# Patient Record
Sex: FEMALE | Race: WHITE | NOT HISPANIC OR LATINO | Employment: UNEMPLOYED | URBAN - METROPOLITAN AREA
[De-identification: names, ages, dates, MRNs, and addresses within clinical notes are randomized per-mention and may not be internally consistent; named-entity substitution may affect disease eponyms.]

---

## 2020-12-17 ENCOUNTER — MEDICAL CORRESPONDENCE (OUTPATIENT)
Dept: HEALTH INFORMATION MANAGEMENT | Facility: CLINIC | Age: 40
End: 2020-12-17

## 2020-12-17 ENCOUNTER — TRANSFERRED RECORDS (OUTPATIENT)
Dept: HEALTH INFORMATION MANAGEMENT | Facility: CLINIC | Age: 40
End: 2020-12-17

## 2020-12-23 DIAGNOSIS — Z31.69 ENCOUNTER FOR PRECONCEPTION CONSULTATION: Primary | ICD-10-CM

## 2021-03-18 ENCOUNTER — TELEPHONE (OUTPATIENT)
Dept: MATERNAL FETAL MEDICINE | Facility: CLINIC | Age: 41
End: 2021-03-18

## 2021-03-18 NOTE — TELEPHONE ENCOUNTER
Pt called to cancel her preconception appointment. Pt declined rescheduling appointment. Orders are being removed.    Jacki Muhammad

## 2023-11-02 ENCOUNTER — TELEPHONE (OUTPATIENT)
Dept: SURGERY | Facility: CLINIC | Age: 43
End: 2023-11-02

## 2023-11-02 ENCOUNTER — OFFICE VISIT (OUTPATIENT)
Dept: SURGERY | Facility: CLINIC | Age: 43
End: 2023-11-02
Payer: COMMERCIAL

## 2023-11-02 VITALS — WEIGHT: 170.3 LBS | HEIGHT: 66 IN | BODY MASS INDEX: 27.37 KG/M2

## 2023-11-02 DIAGNOSIS — C50.811 MALIGNANT NEOPLASM OF OVERLAPPING SITES OF BOTH BREASTS IN FEMALE, ESTROGEN RECEPTOR POSITIVE (H): Primary | ICD-10-CM

## 2023-11-02 DIAGNOSIS — C50.812 MALIGNANT NEOPLASM OF OVERLAPPING SITES OF BOTH BREASTS IN FEMALE, ESTROGEN RECEPTOR POSITIVE (H): Primary | ICD-10-CM

## 2023-11-02 DIAGNOSIS — Z17.0 MALIGNANT NEOPLASM OF OVERLAPPING SITES OF BOTH BREASTS IN FEMALE, ESTROGEN RECEPTOR POSITIVE (H): Primary | ICD-10-CM

## 2023-11-02 PROCEDURE — 99205 OFFICE O/P NEW HI 60 MIN: CPT | Performed by: SURGERY

## 2023-11-02 PROCEDURE — 99417 PROLNG OP E/M EACH 15 MIN: CPT | Performed by: SURGERY

## 2023-11-02 NOTE — NURSING NOTE
"Angie Michael's goals for this visit include:   Chief Complaint   Patient presents with    Consult     Breast cancer       She requests these members of her care team be copied on today's visit information: no    PCP: No Ref-Primary, Physician    Referring Provider:  No referring provider defined for this encounter.    Ht 1.676 m (5' 6\")   Wt 77.2 kg (170 lb 4.8 oz)   BMI 27.49 kg/m      Do you need any medication refills at today's visit? No    Chari Sepulveda LPN      "

## 2023-11-02 NOTE — LETTER
11/2/2023         RE: Angie Michael  [address] [place] [id number]        Dear Colleague,    Thank you for referring your patient, Angie Michael, to the [facility]. Please see a copy of my visit note below.    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 She is here for a 2nd opinion. She is scheduled for surgery at [facility] (Dr. Oliva Dwyer) on 11/17 at this time.     Angie has a known history of BRCA2 gene mutation.  She had a screening breast MRI on 10/5/2023 which revealed a 3.6 cm irregular enhancing mass in the right breast posterior depth as well as a 0.9 cm enhancing mass at 9:00 middle depth and a 1.2 cm irregular enhancing mass at 930 anterior depth with this mass being within 1 cm of the nipple.  There is a suspicious level 1 right axillary lymph node.  In her left breast there was a 9.9 cm span of suspicious non-mass enhancement from 10:00 to 7:00 as well as within this a 1.7 cm enhancing mass at 4:00 and a 1.9 cm enhancing mass at 1230.  There were other suspicious foci of enhancement in the left breast as well.  There was one suspicious level 1 left axillary lymph node noted.  She then underwent bilateral diagnostic mammography and ultrasound and had ultrasound guided core needle biopsy of bilateral breast and bilateral axillary lymph nodes with the results as above.  She had a PET/CT on 10/17/2023 which revealed hypermetabolic bilateral breast nodules compatible with known malignancy and  hypermetabolic bilateral level 1 axillary lymph nodes compatible with metastases and no convincing evidence for metastatic disease.  There is a single focus of hypermetabolic activity in the right tibial shaft of unknown significance and osseous metastases was felt unlikely and recommendation for MRI or bone scan was noted.  A subcentimeter hypermetabolic focus in the anterior margin of the left thyroid lobe was seen and ultrasound was recommended.    She reports she had triplets in 2022 and  for approximately 7 months. She felt her breasts were more deflated and then felt more firmness which she attributed to her breasts returning to their normal state. The night prior to her MRI she noticed a lump on her right upper outer breast. She denies prior breast surgeries. She has had prior left breast biopsies which were benign. She is otherwise very healthy. IVF was needed for pregnancy and she did 3 rounds of this.).       Hormonal history:  menarche 15, triplets are 20 months now, 3 children, 1st at age 41,  pre menopausal, approx 10 years OCP use, no HRT, IVF for fertility treatment.     Family history of breast cancer: Yes - paternal grandmother, many paternal grandfathers side - cousin with DCIS tested positive for BRCA 1  Family history of ovarian cancer:  No  Family history of colon cancer: No  Family history of prostate cancer: No      Past Medical History:   has no past medical history on file.    No current outpatient medications on file.    Past Surgical History:  No past surgical history on file.      Not on File     Social History:  Social History     Socioeconomic History     Marital status: Not on file     Spouse name: Not on file     Number of children: Not on file     Years of education: Not on file     Highest education level: Not on file   Occupational History     Not on file   Tobacco Use     Smoking status: Not on file     Smokeless tobacco: Not on file   Substance and Sexual Activity      "Alcohol use: Not on file     Drug use: Not on file     Sexual activity: Not on file   Other Topics Concern     Not on file   Social History Narrative     Not on file     Social Determinants of Health     Financial Resource Strain: Not on file   Food Insecurity: Not on file   Transportation Needs: Not on file   Physical Activity: Not on file   Stress: Not on file   Social Connections: Not on file   Interpersonal Safety: Not on file   Housing Stability: Not on file        ROS:  The 10 point review of systems is negative other than noted in the HPI and above.    PE:  Vitals: Ht 1.676 m (5' 6\")   Wt 77.2 kg (170 lb 4.8 oz)   BMI 27.49 kg/m    General appearance: well-nourished, sitting comfortably, no apparent distress  Psych: normal affect, pleasant  HEENT:  Head normocephalic and atraumatic, pupils equal and round, conjunctivae clear, mucous membranes moist, external ears and nose normal  Neck: Supple without thyromegaly or masses  Lungs: Respirations unlabored  Lymphatic: No cervical, or supraclavicular lymphadenopathy  Extremities: Without edema  Musculoskeletal:  Normal station and gait  Neurologic: nonfocal, grossly intact times four extremities, alert and oriented times three  Psychiatric: Mood and affect are appropriate  Skin: Without lesions or rashes    Breast:  A bilateral breast exam was performed in the supine position.. Bilateral breasts were palpated in a circumferential clockwise fashion including the supraclavicular and axillary areas.   Breasts are large and pendulous. There is increased density across the majority of the left breast on the lateral and upper breast. On the right breast there is a palpable mass in the upper outer breast which is 4cm in size. It is mobile from the chest wall. There is a palpable mass on the left breast that is 2.5cm in size at 12:00 on the upper breast and a smaller mass on the left lower outer breast.     Lymph:       No supraclavicular/infraclavicular " adenopathy.   Axillary adenopathy: bilateral axillary adenopathy with palpable mobile nodes in each axilla.     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     Plan:   Angie Michael is a 43 year old female has newly diagnosed bilateral breast cancer.  I reviewed the imaging and pathology reports with her and her  and explained the findings.  We talked about the fact that this is invasive lobular and ductal carcinoma  that is large in size and multifocal and was found on screening MRI.  we discussed her mike involvement and the Ki67 being elevated. We discussed the receptor status. We discussed that breast cancer is treated in a multidisciplinary fashion.     We did discuss the possibility of neoadjuvant chemotherapy given axillary mike involvement and a 10 cm span of disease in her left breast with a Ki-67 of 70% it is likely she would see a response with neoadjuvant chemotherapy and decreasing the size of the tumor and potentially making her a candidate for more limited axillary surgery, ie targeted node dissection versus complete axillary mike dissection. We also discussed the improved likelihood of negative margins with neoadjuvant therapy.     We reviewed that oncologic outcomes are equivalent when chemotherapy is given either before or after surgery. Surgery is typically performed 4-6 weeks after neoadjuvant chemotherapy is completed.  We reviewed that the benefits of neoadjuvant systemic therapy include potential prognostic information from tumor pathology post-chemotherapy, which may influence additional adjuvant systemic therapy. This can also downstage her axilla and reduce the number of  nodes removed at the time of surgery which decreases lymphedema risk.     We next discussed the surgical options for treatment.  She is unfortunately not a candidate for breast conservation and I would recommend bilateral mastectomies.    We also discussed the various types of mastectomy, including simple with aesthetic flat closure vs skin-sparing, and nipple-sparing mastectomy.  We also talked about post-mastectomy reconstruction options and the stages involved. We reviewed that the nipple-sparing technique is cosmetic; sensation and contractility will likely be lost.  Angie  is not a candidate for nipple-sparing mastectomy from an oncologic perspective given tumor proximity to the NAC.  The option of having immediate versus delayed reconstruction was also discussed.   We reviewed that the advantages of immediate reconstruction includes superior cosmetics, as the skin is preserved.      She is interested in immediate implant based reconstruction.     I advised that lymph node biopsy is recommended whenever we are dealing with invasive breast cancer and described the procedure for sentinel lymph node biopsy.  We discussed with node positive disease NCCN guidelines would recommend axillary node dissection and with this is the highest risk for lymphedema.  Adjuvant radiation would also be recommended given her young age even with mastectomy which would further increase risk for axillary lymphedema.  We discussed the option of tag localized node excision with sentinel lymph node biopsy and that I would routinely send these for routine pathology except in the neoadjuvant chemotherapy setting.  We discussed the use of technetium and sometimes methylene blue to identify sentinel nodes. We talked about the risk for lymphedema which is small with removal of only a few nodes, but certainly not zero.      We discussed the role of oncotype for hormone positive, HER 2 negative cancers with negative sentinel nodes or 1-3  positive nodes. We reviewed all of her imaging together at length and I believe there are likely 3 abnormal nodes in each axilla. I would like to present her at tumor board next week to have additional opinions weigh in on neoadjuvant chemotherapy. She is in agreement with this. We will also help her schedule with medical oncology (Dr. Dan) next week as well as with plastic surgery (Dr. Yee) and radiation oncology (Dr. Cortes). She would recommend from adjuvant radiation bilaterally following surgery given mike involvement and likely size of invasive disease. She is getting a 3rd opinion at [place] next week which I encouraged as well.     Plan:   Tumor board review next Wednesday  Med onc referral  Plastic surgery referral   Rad onc referral  Recommend consideration of neoadjuvant chemotherapy  Port placement    103 minutes total time spent on the date of this encounter doing: chart review, review of test results, patient visit, physical exam, education, counseling, developing plan of care, and documenting.    Fanny Little MD      Please route or send letter to:  Primary Care Provider (PCP) and Referring Provider            Again, thank you for allowing me to participate in the care of your patient.        Sincerely,        Fanny Little MD

## 2023-11-02 NOTE — PROGRESS NOTES
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 She is here for a 2nd opinion. She is scheduled for surgery at [facility] (Dr. Oliva Dwyer) on 11/17 at this time.     Angie has a known history of BRCA2 gene mutation.  She had a screening breast MRI on 10/5/2023 which revealed a 3.6 cm irregular enhancing mass in the right breast posterior depth as well as a 0.9 cm enhancing mass at 9:00 middle depth and a 1.2 cm irregular enhancing mass at 930 anterior depth with this mass being within 1 cm of the nipple.  There is a suspicious level 1 right axillary lymph node.  In her left breast there was a 9.9 cm span of suspicious non-mass enhancement from 10:00 to 7:00 as well as within this a 1.7 cm enhancing mass at 4:00 and a 1.9 cm enhancing mass at 1230.  There were other suspicious foci of enhancement in the left breast as well.  There was one suspicious level 1 left axillary lymph node noted.  She then underwent bilateral diagnostic mammography and ultrasound and had ultrasound guided core needle biopsy of bilateral breast and bilateral axillary lymph nodes with the results as above.  She had a PET/CT on 10/17/2023 which revealed hypermetabolic bilateral breast nodules compatible with known malignancy and hypermetabolic bilateral level 1 axillary lymph nodes compatible with metastases and no convincing evidence for metastatic disease.  There is a single focus of hypermetabolic activity in the right tibial shaft of unknown significance and osseous metastases  was felt unlikely and recommendation for MRI or bone scan was noted.  A subcentimeter hypermetabolic focus in the anterior margin of the left thyroid lobe was seen and ultrasound was recommended.    She reports she had triplets in 2022 and  for approximately 7 months. She felt her breasts were more deflated and then felt more firmness which she attributed to her breasts returning to their normal state. The night prior to her MRI she noticed a lump on her right upper outer breast. She denies prior breast surgeries. She has had prior left breast biopsies which were benign. She is otherwise very healthy. IVF was needed for pregnancy and she did 3 rounds of this.).       Hormonal history:  menarche 15, triplets are 20 months now, 3 children, 1st at age 41,  pre menopausal, approx 10 years OCP use, no HRT, IVF for fertility treatment.     Family history of breast cancer: Yes - paternal grandmother, many paternal grandfathers side - cousin with DCIS tested positive for BRCA 1  Family history of ovarian cancer:  No  Family history of colon cancer: No  Family history of prostate cancer: No      Past Medical History:   has no past medical history on file.    No current outpatient medications on file.    Past Surgical History:  No past surgical history on file.      Not on File     Social History:  Social History     Socioeconomic History    Marital status: Not on file     Spouse name: Not on file    Number of children: Not on file    Years of education: Not on file    Highest education level: Not on file   Occupational History    Not on file   Tobacco Use    Smoking status: Not on file    Smokeless tobacco: Not on file   Substance and Sexual Activity    Alcohol use: Not on file    Drug use: Not on file    Sexual activity: Not on file   Other Topics Concern    Not on file   Social History Narrative    Not on file     Social Determinants of Health     Financial Resource Strain: Not on file   Food Insecurity: Not on  "file   Transportation Needs: Not on file   Physical Activity: Not on file   Stress: Not on file   Social Connections: Not on file   Interpersonal Safety: Not on file   Housing Stability: Not on file        ROS:  The 10 point review of systems is negative other than noted in the HPI and above.    PE:  Vitals: Ht 1.676 m (5' 6\")   Wt 77.2 kg (170 lb 4.8 oz)   BMI 27.49 kg/m    General appearance: well-nourished, sitting comfortably, no apparent distress  Psych: normal affect, pleasant  HEENT:  Head normocephalic and atraumatic, pupils equal and round, conjunctivae clear, mucous membranes moist, external ears and nose normal  Neck: Supple without thyromegaly or masses  Lungs: Respirations unlabored  Lymphatic: No cervical, or supraclavicular lymphadenopathy  Extremities: Without edema  Musculoskeletal:  Normal station and gait  Neurologic: nonfocal, grossly intact times four extremities, alert and oriented times three  Psychiatric: Mood and affect are appropriate  Skin: Without lesions or rashes    Breast:  A bilateral breast exam was performed in the supine position.. Bilateral breasts were palpated in a circumferential clockwise fashion including the supraclavicular and axillary areas.   Breasts are large and pendulous. There is increased density across the majority of the left breast on the lateral and upper breast. On the right breast there is a palpable mass in the upper outer breast which is 4cm in size. It is mobile from the chest wall. There is a palpable mass on the left breast that is 2.5cm in size at 12:00 on the upper breast and a smaller mass on the left lower outer breast.     Lymph:       No supraclavicular/infraclavicular adenopathy.   Axillary adenopathy: bilateral axillary adenopathy with palpable mobile nodes in each axilla.     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     Plan:   Angie Michael is a 43 year old female has newly diagnosed bilateral breast cancer.  I reviewed the imaging and pathology reports with her and her  and explained the findings.  We talked about the fact that this is invasive lobular and ductal carcinoma  that is large in size and multifocal and was found on screening MRI.  we discussed her mike involvement and the Ki67 being elevated. We discussed the receptor status. We discussed that breast cancer is treated in a multidisciplinary fashion.     We did discuss the possibility of neoadjuvant chemotherapy given axillary mike involvement and a 10 cm span of disease in her left breast with a Ki-67 of 70% it is likely she would see a response with neoadjuvant chemotherapy and decreasing the size of the tumor and potentially making her a candidate for more limited axillary surgery, ie targeted node dissection versus complete axillary mike dissection. We also discussed the improved likelihood of negative margins with neoadjuvant therapy.     We reviewed that oncologic outcomes are equivalent when chemotherapy is given either before or after surgery. Surgery is typically performed 4-6 weeks after neoadjuvant chemotherapy is completed.  We reviewed that the benefits of neoadjuvant systemic therapy include potential prognostic information from tumor pathology post-chemotherapy, which may influence additional adjuvant systemic therapy. This can also downstage her axilla and reduce the number of nodes removed at the time of surgery which decreases lymphedema risk.     We next discussed the surgical options for treatment.  She is unfortunately not a candidate for breast conservation and I would recommend bilateral mastectomies.    We also discussed the various types of  mastectomy, including simple with aesthetic flat closure vs skin-sparing, and nipple-sparing mastectomy.  We also talked about post-mastectomy reconstruction options and the stages involved. We reviewed that the nipple-sparing technique is cosmetic; sensation and contractility will likely be lost.  Angie  is not a candidate for nipple-sparing mastectomy from an oncologic perspective given tumor proximity to the NAC.  The option of having immediate versus delayed reconstruction was also discussed.   We reviewed that the advantages of immediate reconstruction includes superior cosmetics, as the skin is preserved.      She is interested in immediate implant based reconstruction.     I advised that lymph node biopsy is recommended whenever we are dealing with invasive breast cancer and described the procedure for sentinel lymph node biopsy.  We discussed with node positive disease NCCN guidelines would recommend axillary node dissection and with this is the highest risk for lymphedema.  Adjuvant radiation would also be recommended given her young age even with mastectomy which would further increase risk for axillary lymphedema.  We discussed the option of tag localized node excision with sentinel lymph node biopsy and that I would routinely send these for routine pathology except in the neoadjuvant chemotherapy setting.  We discussed the use of technetium and sometimes methylene blue to identify sentinel nodes. We talked about the risk for lymphedema which is small with removal of only a few nodes, but certainly not zero.      We discussed the role of oncotype for hormone positive, HER 2 negative cancers with negative sentinel nodes or 1-3 positive nodes. We reviewed all of her imaging together at length and I believe there are likely 3 abnormal nodes in each axilla. I would like to present her at tumor board next week to have additional opinions weigh in on neoadjuvant chemotherapy. She is in agreement with this. We  will also help her schedule with medical oncology (Dr. Dan) next week as well as with plastic surgery (Dr. Yee) and radiation oncology (Dr. Cortes). She would recommend from adjuvant radiation bilaterally following surgery given mike involvement and likely size of invasive disease. She is getting a 3rd opinion at [place] next week which I encouraged as well.     Plan:   Tumor board review next Wednesday  Med onc referral  Plastic surgery referral   Rad onc referral  Recommend consideration of neoadjuvant chemotherapy  Port placement    103 minutes total time spent on the date of this encounter doing: chart review, review of test results, patient visit, physical exam, education, counseling, developing plan of care, and documenting.    Fanny Little MD      Please route or send letter to:  Primary Care Provider (PCP) and Referring Provider

## 2023-11-02 NOTE — CONFIDENTIAL NOTE
Message received from Dr. Little asking me to reach out to Angie as Angie states that Dr. Little is out of network. I left Angie a message to briefly introduce myself and I asked her to return my call with questions and if she would like me to put in a referral for social work (for financial assistance). She has my name and number to return my call.     Veronica Worley, RN, BSN, PHN  Breast Care Nurse Coordinator  [facility] [place]  [phone]

## 2023-11-03 ENCOUNTER — TELEPHONE (OUTPATIENT)
Dept: SURGERY | Facility: CLINIC | Age: 43
End: 2023-11-03
Payer: COMMERCIAL

## 2023-11-03 DIAGNOSIS — Z85.3 PERSONAL HISTORY OF MALIGNANT NEOPLASM OF BREAST: Primary | ICD-10-CM

## 2023-11-03 NOTE — TELEPHONE ENCOUNTER
Name of caller: Patient    Reason for Call:  Patient met with Dr. Little yesterday for a consult and was asked to call back with recent lab result.  She states her hemoglobin was last drawn about 2 months ago and it was 5 g/dL    Surgeon:  Dr. Little    Recent Surgery:  No    If yes, when & what type:  N/A      Best phone number to reach pt at is: [phone]   Ok to leave a message with medical info? Yes.

## 2023-11-03 NOTE — PATIENT INSTRUCTIONS
You are scheduled for the following appointments:  1) Dr. Renaldo Cortes at [facility] on 11/8/23 at 10am  2) Dr. Vasiliy Yee at [facility] on 11/14/23 at 10:30am    Please contact Bev at [facility] with any questions or concerns.

## 2023-11-03 NOTE — TELEPHONE ENCOUNTER
I called Angie to discuss. She had triplets in January 2022 and had been taking iron during her pregnancy and then had some infusions after delivery.     We discussed that a hemoglobin of 5 is very low and it would be very surprising for her not to have symptoms for this long. After some discussion, it was found that she was looking at her A1C, not hemoglobin. We discussed that makes way more sense, as a A1C of about 5 would be normal. Her last hemoglobin was on drawn on 4/28/22 and was 14.2. I told her this is a normal hemoglobin. I will route to Dr. Little to notify of the clarification.     She would like an order placed for social work to help with financial assistance. Referral placed and told her they would be in touch.     Questions answered. Pt verbalized understanding.     Veronica Worley, RN, BSN, PHN  Breast Care Nurse Coordinator  [facility] [place]  [phone]

## 2023-11-08 ENCOUNTER — TRANSFERRED RECORDS (OUTPATIENT)
Dept: HEALTH INFORMATION MANAGEMENT | Facility: CLINIC | Age: 43
End: 2023-11-08

## 2023-11-08 ENCOUNTER — TELEPHONE (OUTPATIENT)
Dept: SURGERY | Facility: CLINIC | Age: 43
End: 2023-11-08

## 2023-11-08 ENCOUNTER — TUMOR CONFERENCE (OUTPATIENT)
Dept: ONCOLOGY | Facility: CLINIC | Age: 43
End: 2023-11-08
Payer: COMMERCIAL

## 2023-11-08 NOTE — CONFIDENTIAL NOTE
I called Angie and discussed our tumor board recommendations. Neoadjuvant chemotherapy is recommended. Adjuvant radiation is recommended. Surgery after neoadjuvant chemo with bilateral SSM and targeted node evaluation recommended. She is in agreement with this plan. She is considering continuing her oncology care at [facility] due to insurance and established care with Dr. Ryan. I will reach out to Dr. Ryan to discuss our recommendations. She is meeting with Dr. Cortes today. She would like to proceed with port placement.     Fanny Little MD  [facility]  [phone]

## 2023-11-09 ENCOUNTER — TELEPHONE (OUTPATIENT)
Dept: SURGERY | Facility: CLINIC | Age: 43
End: 2023-11-09
Payer: COMMERCIAL

## 2023-11-09 ENCOUNTER — PATIENT OUTREACH (OUTPATIENT)
Dept: CARE COORDINATION | Facility: CLINIC | Age: 43
End: 2023-11-09
Payer: COMMERCIAL

## 2023-11-09 NOTE — PROGRESS NOTES
Social Work - Intervention  [facility]    Data/Intervention: Angie is a 43-year-old woman with a new diagnosis of breast cancer who is followed at [facility].    Patient Name: Angie Michael Goes By: Angie    /Age: 1980 (43 year old)     Visit Type: telephone  Referral Source: Dr. Little  Reason for Referral: Out-of-     Psychosocial Information/Concerns:  Angie expressed that she would like to receive chemotherapy at [facility] as this is in-network with her health insurance, but that she would like any procedure (port placement, surgery) to be completed by Dr. Little. Angie reports that she is aware that [facility] is considered out-of-network with her health insurance plan. SW discussed avenues she has for appealing her health insurance to request consideration of Dr. Little being considered in-network. Angie reports that she is familiar with this process having appealed health insurance in the past. Receptive to this clinician sending additional resources.      Intervention/Education/Resources Provided: (emailed: [email or web address])  Appeals Process at [facility]:  Complaints and appeals  [facility]  I believe what you would be requesting is to have Dr. Little considered as an in-network provider     [place] Estimate of Costs: [phone]  You can call this line and get an estimate of the cost of procedures that you will be having through [facility].    [facility]: Pay a Bill ([email or web address])  Like many health systems, [place] has a program available called [facility] to assist with bill pay (if you meet financial eligibility requirements). This page has FAQs, and this page has paper the online applications. Last I checked it was about a 2 week turnaround for determination of [facility] Care eligibility.     Breast Cancer Specific Funding Available (regardless of location of care): *These programs  require patients to be in active treatment*   [employer] Foundation (359)-[phone] https://mnange.org/financialassistance/     General Sai: Up to $850 (one time only)    Tarah ramirez Fund: $200 (renewable every year if patient is receiving active treatment) **This is presently closed    The Joe Foundation [place] can be used for rent/mortgage payment, home/cell phone bill, gas, electric or water bill, cub food gift cards, gas gift cards.       Pay It Forward Fund (432)-[phone]  https://www.payitforwardfund.net/apply-for-help     Available to patients with a diagnosis of Breast, Ovarian, Uterine, Cervical, and/or Endometrial Cancer. Funds can be used for rent/mortgage payment, home/cell phone bill, gas, electric or water bill.      [facility] ([phone] https://www.Tapioca Mobile/get-help/emergency-assistance-grants     Patient s must be a resident of [place] and in active treatment. [facility] can assist with basic living expenses such as, mortgage, rent, utility bills, car payments, and licensed childcare.      [facility] [employer] Treatment Assistance 1-801.289.8105 https://www.duuin.org/treatment-assistance-program/     Patients with Metastatic (stage IV) Breast Cancer will receive $500 and patients with earlier stage Breast Cancer (Stage 0 to III) will receive $300. Awards can be used for rent/housing, utilities/bills, side-effect management medication (pain, anti-nausea, etc.), childcare/elder care, oral treatment medication (chemotherapy, hormone therapy, etc.), transportation to and from treatment, durable medical equipment (oxygen tank, walker, etc.), lymphedema care and supplies, food/groceries, palliative care, home health care.      The [place] [employer] 1-308.452.8819 https://Multispan.org/get-help/     Patients must provide proof of a diagnosis on official letterhead and medical verification form completed by an Oncologist, Licensed Social Worker, Patient Advocate, or a Nurse Navigator. Awards can be used for utility  bills, mortgage or rent, car or care insurance payment, and health insurance premiums.       [facility] [employer]  https://www.Strawberry energy/patient-assistance-program/?v=34o56b6xi35j  The financial assistance program has estimated open/close dates when patients can complete an application. Patients must be a [place] Citizen and reside in [place], [place], [place], [place], or [place]. Eligible patients can receive $500 and may only apply once.     A few general resources to consider for kid support (I remember you mentioning children):   Joe Foundation  (mnangel.org)   Helping families through cancer -[employer] Network     Assessment/Plan:  Onc SW will remain available as needed for psychosocial support. Angie knows to outreach in case of concern or need.      Provided patient/family with contact information and availability.    Raquel Soto, DERRELL, LICSW, OSW-C  Clinical - Adult Oncology  She/Her/Hers  Phone: [phone]  [facility]: M, Thu  *every other Tue, 8am-4:30pm  [facility]: W, F, *every other Tue, 8am-4:30pm

## 2023-11-09 NOTE — TELEPHONE ENCOUNTER
Type of surgery: Port placement  Location of surgery: [facility]  Date and time of surgery: 11/20/23 at 7:30am  Surgeon: Dr. Fanny Little  Pre-Op Appt Date: patient to schedule  Post-Op Appt Date: patient to schedule   Packet sent out: Yes  Pre-cert/Authorization completed:  Not Applicable  Date: 11/9/23

## 2023-11-10 NOTE — TUMOR CONFERENCE
Tumor Conference Information  Tumor Conference: Breast  Specialties Present: Medical oncology, Radiation oncology, Pathology, Radiology, Surgery  Patient Status: Prospective  Stage: bilateral breast cancer:  clinical stage IIb  Treatment to Date: Biopsy  Clinical Trials: Not discussed  Genetic Testing Discussed/Recommended?: Already Completed (Comment: BRCA2+)  Supportive Care Services Discussed/Recommended?: No  Recommended Plan: Follows evidence-based guidelines  Did the review exceed 30 minutes?: did not       Plan for neoadjuvent chemotherapy  Bilateral Skin Sparing Mastectomy/ targeted node evaluation and adjuvent radiation.  Maddie Rm RN BSN      Documentation / Disclaimer Cancer Tumor Board Note  Cancer tumor board recommendations do not override what is determined to be reasonable care and treatment, which is dependent on the circumstances of a patient's case; the patient's medical, social, and personal concerns; and the clinical judgment of the oncologist [physician].

## 2023-11-10 NOTE — TELEPHONE ENCOUNTER
RECORDS STATUS - BREAST    RECORDS REQUESTED FROM: [facility], [facility], [facility]   DATE REQUESTED:    NOTES DETAILS STATUS   OFFICE NOTE from medical oncologist  -  Dr. Bev Ryan   OFFICE NOTE from surgeon Epic Dr. Fanny Little   MEDICATION LIST [email or web address]   LABS     PATHOLOGY REPORTS  (Tissue diagnosis, Stage, ER/MS percentage positive and intensity of staining, HER2 IHC, FISH, and all biopsies from breast and any distant metastasis)                 [facility], Report in CE, slides requested 11/10  [employer] [facility] 10/12/23: [id number]      [facility] - not requested per report  3/21/13: [id number]    [facility] - not requested per reports  5/19/15: [id number]  6/4/15: [id number]   GENONOMIC TESTING     TYPE:   (Next Generation Sequencing, including Foundation One testing, and Oncotype score) [facility] - Received 11/10, sent to HIM for STAT upload, emailed to NN email. 3/2/10: Comprehensive [facility] Analysis   IMAGING (NEED IMAGES & REPORT)     MRI PACS 10/5/23:    MAMMO PACS 10/12/23, 22:    ULTRASOUND PACS 10/12/23, 22:    PET PACS 10/17/23:    MAMMO PACS   4/22/10 - 10/13/20    [facility]  14, 3/21/13   ULTRASOUND PACS   4/22/10 - 8/15/22; 11/3/23    [facility]  14, 13   MRI PACS   11/1/10 - 21; 11/3/23    [facility]  10/10/13, 3/21/13, 2/15/13, 13

## 2023-11-14 NOTE — PROGRESS NOTES
[facility]    Hematology/Oncology New Patient Note      Today's Date: 11/15/23    Reason for Consult: Bilateral breast cancer.    HISTORY OF PRESENT ILLNESS: Angie Michael is a 43 year old female with BRCA2 gene mutation who presents with the following oncologic history:  1.  10/05/2023: High risk surveillance breast MRI showed right breast 3.6 x 3 x 3.2 cm irregular enhancing mass at 10:00, 0.9 x 0.8 x 0.9 cm second mass at 9:00, 1 x 0.9 x 1.2 cm third mass at 9:30, other suspicious foci of enhancement throughout the breast.  Left breast with 9.9 x 5.2 x 6.7 cm suspicious non-mass enhancement at 10:00-7:00; 1.6 x 1.3 x 1.7 cm enhancing mass at 4:00; 1.8 x 1.6 x 1.9 cm enhancing mass at 12:30; other suspicious foci of enhancement throughout the left breast.  One level 1 suspicious right axillary lymph node and one level 1 suspicious left axillary lymph node..  Developing suspicious interpectoral lymph node.  2.  10/12/2023: Bilateral diagnostic mammogram showed right breast with 3.2 x 2.7 x 4.1 cm irregular mass at 10:00, 6 cm from nipple; 0.9 x 0.6 x 1 cm irregular mass at 11:00, 2 cm from nipple and prominent right axillary lymph node.  Left breast with 1.4 x 1.1 x 1.7 cm mass at 12:00, 2 cm from nipple, 1.8 x 1 x 1.6 cm mass at 3:30, 4 cm from nipple, 1 x 0.9 x 1.1 cm mass at 3:00, 7 cm from nipple, multiple areas of hypoechogenicity with calcifications in the lateral left breast, and prominent left axillary lymph node.  3.  10/12/2023: Biopsy of right breast at 10:00 showed grade 3 invasive lobular carcinoma, ER strongly positive at 81-90%, WI positive at 31-40%, HER2 equivocal with IHC = 2+, FISH negative, Ki-67 = 70%.  Biopsy of right axillary lymph node showed metastatic carcinoma.  Biopsy of left breast at 3:30 showed grade 2 invasive carcinoma with ductal and lobular features, ER positive at %, WI positive at 2%, HER2 equivocal with IHC = 2+, FISH negative, Ki-67 = 26%.  Biopsy of  left axillary lymph node showed metastatic carcinoma.  4.  10/17/2023: PET/CT scan showed cluster of hypermetabolic right breast nodules with SUV max 14.9; 2.1 x 1 cm hypermetabolic level 1 right axillary lymph node with SUV max 5.5; hypermetabolic left breast nodule with SUV max 18.8 and several additional less intense hypermetabolic left breast nodules; 1.5 x 1.3 cm hypermetabolic level 1 left axillary lymph node with SUV max 4.  Small sclerotic focus of metabolic activity within the proximal tibial shaft with SUV max 3.7, indeterminate but unlikely to reflect metastatic disease.  Subcentimeter hypermetabolic focus along anterior margin of the left thyroid lobe, likely a thyroid nodule.  5.  11/03/2023: Thyroid ultrasound showed 0.7 x 0.6 x 0.5 cm solid hypoechoic nodule at the junction of the isthmus and left hemithyroid corresponding to increased uptake on PET/CT, consistent with TIRADS 4 nodule and subcentimeter TIRADS 3 nodule in the left inferior thyroid.  6. 11/3/2023: MR right tibia/fibula showed discrete focal 3 cm area of increased T2 signal and enhancement in mid shaft of right tibia with fat signal interspersed throughout this area with significant signal dropout on opposed phase imaging, favoring benign process.    Angie reports she breast-fed her triplets through 8/2022.  She had not had any breast imaging until 10/2023.  She had plugged milk ducts during her breast-feeding time but otherwise had not noted any breast abnormalities until her breast cancer diagnosis.  She did notice more breast fullness over the past few months.    Interestingly, she did a Galleri test back in 9/2022 that came back negative.    REVIEW OF SYSTEMS:   14 point ROS was reviewed and is negative other than as noted above in HPI.       HOME MEDICATIONS:  Current Outpatient Medications   Medication Sig Dispense Refill    Ferrous Gluconate 239 (27 Fe) MG TABS            ALLERGIES:  Allergies   Allergen Reactions    Codeine   "        PAST MEDICAL HISTORY:  BRCA2 gene mutation.  Bilateral breast cancer as outlined above.    Gynecologic history:  Age of menarche at 15.  3 children/triplets age 20 months.  Age of first pregnancy at 41.  Used 10 years of OCPs no HRT, IVF for fertility treatment.      PAST SURGICAL HISTORY:  No past surgical history on file.      SOCIAL HISTORY:  Social History     Socioeconomic History    Marital status:      Spouse name: Not on file    Number of children: Not on file    Years of education: Not on file    Highest education level: Not on file   Occupational History    Not on file   Tobacco Use    Smoking status: Not on file    Smokeless tobacco: Not on file   Substance and Sexual Activity    Alcohol use: Not on file    Drug use: Not on file    Sexual activity: Not on file   Other Topics Concern    Not on file   Social History Narrative    Not on file     Social Determinants of Health     Financial Resource Strain: Not on file   Food Insecurity: Not on file   Transportation Needs: Not on file   Physical Activity: Not on file   Stress: Not on file   Social Connections: Not on file   Interpersonal Safety: Not on file   Housing Stability: Not on file         FAMILY HISTORY:  Paternal grandmother and many paternal grandfathers with breast cancer; cousin with DCIS tested positive for BRCA1.  No ovarian, colon, or prostate cancer.      PHYSICAL EXAM:  Vital signs:  BP (!) 157/84   Pulse 69   Resp 16   Ht 1.676 m (5' 6\")   Wt 77.1 kg (170 lb)   SpO2 98%   BMI 27.44 kg/m     ECO  GENERAL/CONSTITUTIONAL: No acute distress.  EYES:  No scleral icterus.  ENT/MOUTH: Neck supple. Oropharynx grossly clear.  LYMPH: No cervical, supraclavicular adenopathy.  Palpable mobile nontender small right axillary lymph node and left axillary lymph node.  BREAST: Predominant 4 to 5 cm movable nontender right upper outer breast mass.  Left breast with multiple mobile masses in the upper outer, lateral and inferior breast " regions.  RESPIRATORY: Clear to auscultation bilaterally. No crackles or wheezing.   CARDIOVASCULAR: Regular rate and rhythm without murmurs.  GASTROINTESTINAL: No hepatosplenomegaly, masses, or tenderness.  No guarding.  No distention.  MUSCULOSKELETAL: Warm and well-perfused, no cyanosis, clubbing, or edema.  NEUROLOGIC: No focal motor deficits. Alert, oriented, answers questions appropriately.  INTEGUMENTARY: No rashes or jaundice.  GAIT: Steady, does not use assistive device      LABS:  None.    PATHOLOGY:  Reviewed as per HPI.    IMAGING:  Reviewed as per HPI.    ASSESSMENT/PLAN:  Angie Michael is a 43 year old female with the following issues:  1.  Clinical prognostic stage IIB, cT2-N1-M0, multifocal grade 3 invasive lobular carcinoma of the RIGHT upper outer breast, ER positive 81-90%, ID positive 31-40%, HER-2 FISH negative (IHC = 2+), Ki-67 = 70%  2.  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 BRCA-2 deleterious gene mutation  -- I reviewed Angie's breast imaging demonstrates multifocal disease in both breasts.  She has more significant disease in the left breast.  She has more aggressive disease in the right breast given very high Ki-67 of 70%.  Her bilateral breast cancers are strongly estrogen receptor positive, progesterone receptor positive, HER2 FISH negative.  - Her 10/17/2023 PET/CT scan was fortunately negative for distant metastatic disease.  - I would strongly recommend neoadjuvant chemotherapy with 4 cycles of dose dense Adriamycin and Cytoxan followed by weekly paclitaxel for 12 weeks.  She plans to pursue initial chemotherapy with Dr. Bev Ryan at [facility].  She will revisit with Dr. Fanny Little for bilateral mastectomies after completion of chemotherapy.  She also met with Dr. Vasiliy Yee for discussion  of breast reconstruction.  -We discussed consideration for a repeat breast MRI after 4 cycles of ddAC to reevaluate response to chemotherapy at that juncture prior to proceeding on to weekly paclitaxel.  -Given biopsy-proven lymph node disease, adjuvant radiation therapy would be advised.  She has already met with Dr. Finesse Cortes for radiation discussion.  - Given her extent of disease bilaterally, she would benefit from adjuvant abemaciclib for 2 years along with hormone blockade therapy as per the MonarchE trial.  -We also briefly discussed possible consideration for adjuvant olaparib (Lynparza).  I do note that based on the Tomas trial, those patients who had hormone receptor positive HER2 negative breast cancer were required to have at least 4 pathologically confirmed positive lymph nodes.    -I would advise hormone blockade therapy with ovarian suppression therapy with Zoladex with an aromatase inhibitor after surgery.  We did discuss this today.  We also discussed potential timing for removal of her ovaries.  She already removed her fallopian tubes.  This will be discussed again after completion of chemotherapy and surgery.  -She will revisit with me likely in January 2024, as she is works out her insurance issues.    4.  Right tibia bone lesion  - The 11/3/2023 MR right tibia/fibula showed a 3 cm area of increased T2 signal in the midshaft area.  Although the signaling suggested a benign process, this remains technically indeterminate.  I would advise a repeat MRI of this area in about 3 months.    5.  Fertility discussion  -Angie has [id number] triplets.  She has saved embryos.  She has not yet decided whether to have more children.  - We did discuss that chemotherapy followed by radiation and hormone blockade therapy for 2-3 years is typically recommended before bearing more children.  She has also briefly considered a surrogate if she wishes to have more children.  We will certainly have more  discussion of this after chemotherapy and surgery have been completed.    6.  Preanesthesia medical evaluation  - Angie is medically acceptable to proceed with portacatheter placement by Dr. Fanny Little on 11/20/2023.    Monse Dan MD  Hematology/Oncology  [facility]    Total time spent today: 83 minutes in chart review, patient evaluation, counseling, documentation, test and/or medication/prescription orders, and coordination of care.

## 2023-11-15 ENCOUNTER — PRE VISIT (OUTPATIENT)
Dept: ONCOLOGY | Facility: CLINIC | Age: 43
End: 2023-11-15
Payer: COMMERCIAL

## 2023-11-15 ENCOUNTER — OFFICE VISIT (OUTPATIENT)
Dept: ONCOLOGY | Facility: CLINIC | Age: 43
End: 2023-11-15
Attending: SURGERY
Payer: COMMERCIAL

## 2023-11-15 ENCOUNTER — DOCUMENTATION ONLY (OUTPATIENT)
Dept: ONCOLOGY | Facility: CLINIC | Age: 43
End: 2023-11-15

## 2023-11-15 VITALS
RESPIRATION RATE: 16 BRPM | HEART RATE: 69 BPM | HEIGHT: 66 IN | SYSTOLIC BLOOD PRESSURE: 157 MMHG | DIASTOLIC BLOOD PRESSURE: 84 MMHG | BODY MASS INDEX: 27.32 KG/M2 | OXYGEN SATURATION: 98 % | WEIGHT: 170 LBS

## 2023-11-15 DIAGNOSIS — C50.811 MALIGNANT NEOPLASM OF OVERLAPPING SITES OF BOTH BREASTS IN FEMALE, ESTROGEN RECEPTOR POSITIVE (H): ICD-10-CM

## 2023-11-15 DIAGNOSIS — C50.411 MALIGNANT NEOPLASM OF UPPER-OUTER QUADRANT OF RIGHT BREAST IN FEMALE, ESTROGEN RECEPTOR POSITIVE (H): ICD-10-CM

## 2023-11-15 DIAGNOSIS — Z17.0 MALIGNANT NEOPLASM OF UPPER-OUTER QUADRANT OF RIGHT BREAST IN FEMALE, ESTROGEN RECEPTOR POSITIVE (H): ICD-10-CM

## 2023-11-15 DIAGNOSIS — Z17.0 MALIGNANT NEOPLASM OF UPPER-OUTER QUADRANT OF BOTH BREASTS IN FEMALE, ESTROGEN RECEPTOR POSITIVE (H): Primary | ICD-10-CM

## 2023-11-15 DIAGNOSIS — M89.9 BONE LESION: ICD-10-CM

## 2023-11-15 DIAGNOSIS — Z17.0 MALIGNANT NEOPLASM OF OVERLAPPING SITES OF BOTH BREASTS IN FEMALE, ESTROGEN RECEPTOR POSITIVE (H): ICD-10-CM

## 2023-11-15 DIAGNOSIS — C50.411 MALIGNANT NEOPLASM OF UPPER-OUTER QUADRANT OF BOTH BREASTS IN FEMALE, ESTROGEN RECEPTOR POSITIVE (H): Primary | ICD-10-CM

## 2023-11-15 DIAGNOSIS — C50.412 MALIGNANT NEOPLASM OF UPPER-OUTER QUADRANT OF BOTH BREASTS IN FEMALE, ESTROGEN RECEPTOR POSITIVE (H): Primary | ICD-10-CM

## 2023-11-15 DIAGNOSIS — C50.812 MALIGNANT NEOPLASM OF OVERLAPPING SITES OF BOTH BREASTS IN FEMALE, ESTROGEN RECEPTOR POSITIVE (H): ICD-10-CM

## 2023-11-15 PROCEDURE — 99213 OFFICE O/P EST LOW 20 MIN: CPT | Performed by: INTERNAL MEDICINE

## 2023-11-15 PROCEDURE — 99205 OFFICE O/P NEW HI 60 MIN: CPT | Performed by: INTERNAL MEDICINE

## 2023-11-15 ASSESSMENT — PAIN SCALES - GENERAL: PAINLEVEL: NO PAIN (0)

## 2023-11-15 NOTE — LETTER
11/15/2023         RE: Angie Michael  [address] [place] [id number]        Dear Colleague,    Thank you for referring your patient, Angie Michael, to the [facility]. Please see a copy of my visit note below.    [facility]    Hematology/Oncology New Patient Note      Today's Date: 11/15/23    Reason for Consult: Bilateral breast cancer.    HISTORY OF PRESENT ILLNESS: Angie Michael is a 43 year old female with BRCA2 gene mutation who presents with the following oncologic history:  1.  10/05/2023: High risk surveillance breast MRI showed right breast 3.6 x 3 x 3.2 cm irregular enhancing mass at 10:00, 0.9 x 0.8 x 0.9 cm second mass at 9:00, 1 x 0.9 x 1.2 cm third mass at 9:30, other suspicious foci of enhancement throughout the breast.  Left breast with 9.9 x 5.2 x 6.7 cm suspicious non-mass enhancement at 10:00-7:00; 1.6 x 1.3 x 1.7 cm enhancing mass at 4:00; 1.8 x 1.6 x 1.9 cm enhancing mass at 12:30; other suspicious foci of enhancement throughout the left breast.  One level 1 suspicious right axillary lymph node and one level 1 suspicious left axillary lymph node..  Developing suspicious interpectoral lymph node.  2.  10/12/2023: Bilateral diagnostic mammogram showed right breast with 3.2 x 2.7 x 4.1 cm irregular mass at 10:00, 6 cm from nipple; 0.9 x 0.6 x 1 cm irregular mass at 11:00, 2 cm from nipple and prominent right axillary lymph node.  Left breast with 1.4 x 1.1 x 1.7 cm mass at 12:00, 2 cm from nipple, 1.8 x 1 x 1.6 cm mass at 3:30, 4 cm from nipple, 1 x 0.9 x 1.1 cm mass at 3:00, 7 cm from nipple, multiple areas of hypoechogenicity with calcifications in the lateral left breast, and prominent left axillary lymph node.  3.  10/12/2023: Biopsy of right breast at 10:00 showed grade 3 invasive lobular carcinoma, ER strongly positive at 81-90%, NV positive at 31-40%, HER2 equivocal with IHC = 2+, FISH negative, Ki-67 = 70%.  Biopsy of right axillary  lymph node showed metastatic carcinoma.  Biopsy of left breast at 3:30 showed grade 2 invasive carcinoma with ductal and lobular features, ER positive at %, OR positive at 2%, HER2 equivocal with IHC = 2+, FISH negative, Ki-67 = 26%.  Biopsy of left axillary lymph node showed metastatic carcinoma.  4.  10/17/2023: PET/CT scan showed cluster of hypermetabolic right breast nodules with SUV max 14.9; 2.1 x 1 cm hypermetabolic level 1 right axillary lymph node with SUV max 5.5; hypermetabolic left breast nodule with SUV max 18.8 and several additional less intense hypermetabolic left breast nodules; 1.5 x 1.3 cm hypermetabolic level 1 left axillary lymph node with SUV max 4.  Small sclerotic focus of metabolic activity within the proximal tibial shaft with SUV max 3.7, indeterminate but unlikely to reflect metastatic disease.  Subcentimeter hypermetabolic focus along anterior margin of the left thyroid lobe, likely a thyroid nodule.  5.  11/03/2023: Thyroid ultrasound showed 0.7 x 0.6 x 0.5 cm solid hypoechoic nodule at the junction of the isthmus and left hemithyroid corresponding to increased uptake on PET/CT, consistent with TIRADS 4 nodule and subcentimeter TIRADS 3 nodule in the left inferior thyroid.  6. 11/3/2023: MR right tibia/fibula showed discrete focal 3 cm area of increased T2 signal and enhancement in mid shaft of right tibia with fat signal interspersed throughout this area with significant signal dropout on opposed phase imaging, favoring benign process.    Angie reports she breast-fed her triplets through 8/2022.  She had not had any breast imaging until 10/2023.  She had plugged milk ducts during her breast-feeding time but otherwise had not noted any breast abnormalities until her breast cancer diagnosis.  She did notice more breast fullness over the past few months.    Interestingly, she did a Galleri test back in 9/2022 that came back negative.    REVIEW OF SYSTEMS:   14 point ROS was reviewed  "and is negative other than as noted above in HPI.       HOME MEDICATIONS:  Current Outpatient Medications   Medication Sig Dispense Refill     Ferrous Gluconate 239 (27 Fe) MG TABS            ALLERGIES:  Allergies   Allergen Reactions     Codeine          PAST MEDICAL HISTORY:  BRCA2 gene mutation.  Bilateral breast cancer as outlined above.    Gynecologic history:  Age of menarche at 15.  3 children/triplets age 20 months.  Age of first pregnancy at 41.  Used 10 years of OCPs no HRT, IVF for fertility treatment.      PAST SURGICAL HISTORY:  No past surgical history on file.      SOCIAL HISTORY:  Social History     Socioeconomic History     Marital status:      Spouse name: Not on file     Number of children: Not on file     Years of education: Not on file     Highest education level: Not on file   Occupational History     Not on file   Tobacco Use     Smoking status: Not on file     Smokeless tobacco: Not on file   Substance and Sexual Activity     Alcohol use: Not on file     Drug use: Not on file     Sexual activity: Not on file   Other Topics Concern     Not on file   Social History Narrative     Not on file     Social Determinants of Health     Financial Resource Strain: Not on file   Food Insecurity: Not on file   Transportation Needs: Not on file   Physical Activity: Not on file   Stress: Not on file   Social Connections: Not on file   Interpersonal Safety: Not on file   Housing Stability: Not on file         FAMILY HISTORY:  Paternal grandmother and many paternal grandfathers with breast cancer; cousin with DCIS tested positive for BRCA1.  No ovarian, colon, or prostate cancer.      PHYSICAL EXAM:  Vital signs:  BP (!) 157/84   Pulse 69   Resp 16   Ht 1.676 m (5' 6\")   Wt 77.1 kg (170 lb)   SpO2 98%   BMI 27.44 kg/m     ECO  GENERAL/CONSTITUTIONAL: No acute distress.  EYES:  No scleral icterus.  ENT/MOUTH: Neck supple. Oropharynx grossly clear.  LYMPH: No cervical, supraclavicular " adenopathy.  Palpable mobile nontender small right axillary lymph node and left axillary lymph node.  BREAST: Predominant 4 to 5 cm movable nontender right upper outer breast mass.  Left breast with multiple mobile masses in the upper outer, lateral and inferior breast regions.  RESPIRATORY: Clear to auscultation bilaterally. No crackles or wheezing.   CARDIOVASCULAR: Regular rate and rhythm without murmurs.  GASTROINTESTINAL: No hepatosplenomegaly, masses, or tenderness.  No guarding.  No distention.  MUSCULOSKELETAL: Warm and well-perfused, no cyanosis, clubbing, or edema.  NEUROLOGIC: No focal motor deficits. Alert, oriented, answers questions appropriately.  INTEGUMENTARY: No rashes or jaundice.  GAIT: Steady, does not use assistive device      LABS:  None.    PATHOLOGY:  Reviewed as per HPI.    IMAGING:  Reviewed as per HPI.    ASSESSMENT/PLAN:  Angie Michael is a 43 year old female with the following issues:  1.  Clinical prognostic stage IIB, cT2-N1-M0, multifocal grade 3 invasive lobular carcinoma of the RIGHT upper outer breast, ER positive 81-90%, LA positive 31-40%, HER-2 FISH negative (IHC = 2+), Ki-67 = 70%  2.  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 BRCA-2 deleterious gene mutation  -- I reviewed Angie's breast imaging demonstrates multifocal disease in both breasts.  She has more significant disease in the left breast.  She has more aggressive disease in the right breast given very high Ki-67 of 70%.  Her bilateral breast cancers are strongly estrogen receptor positive, progesterone receptor positive, HER2 FISH negative.  - Her 10/17/2023 PET/CT scan was fortunately negative for distant metastatic disease.  - I would strongly recommend neoadjuvant chemotherapy with 4 cycles of dose dense Adriamycin and Cytoxan followed by  weekly paclitaxel for 12 weeks.  She plans to pursue initial chemotherapy with Dr. Bev Ryan at [facility].  She will revisit with Dr. Fanny Little for bilateral mastectomies after completion of chemotherapy.  She also met with Dr. Vasiliy Yee for discussion of breast reconstruction.  -We discussed consideration for a repeat breast MRI after 4 cycles of ddAC to reevaluate response to chemotherapy at that juncture prior to proceeding on to weekly paclitaxel.  -Given biopsy-proven lymph node disease, adjuvant radiation therapy would be advised.  She has already met with Dr. Finesse Cortes for radiation discussion.  - Given her extent of disease bilaterally, she would benefit from adjuvant abemaciclib for 2 years along with hormone blockade therapy as per the MonarchE trial.  -We also briefly discussed possible consideration for adjuvant olaparib (Lynparza).  I do note that based on the Tomas trial, those patients who had hormone receptor positive HER2 negative breast cancer were required to have at least 4 pathologically confirmed positive lymph nodes.    -I would advise hormone blockade therapy with ovarian suppression therapy with Zoladex with an aromatase inhibitor after surgery.  We did discuss this today.  We also discussed potential timing for removal of her ovaries.  She already removed her fallopian tubes.  This will be discussed again after completion of chemotherapy and surgery.  -She will revisit with me likely in January 2024, as she is works out her insurance issues.    4.  Right tibia bone lesion  - The 11/3/2023 MR right tibia/fibula showed a 3 cm area of increased T2 signal in the midshaft area.  Although the signaling suggested a benign process, this remains technically indeterminate.  I would advise a repeat MRI of this area in about 3 months.    5.  Fertility discussion  -Angie has [id number] triplets.  She has saved embryos.  She has not yet decided whether to have more children.  -  "We did discuss that chemotherapy followed by radiation and hormone blockade therapy for 2-3 years is typically recommended before bearing more children.  She has also briefly considered a surrogate if she wishes to have more children.  We will certainly have more discussion of this after chemotherapy and surgery have been completed.    6.  Preanesthesia medical evaluation  - Angie is medically acceptable to proceed with portacatheter placement by Dr. Fanny Little on 11/20/2023.    Monse Dan MD  Hematology/Oncology  [facility]    Total time spent today: 83 minutes in chart review, patient evaluation, counseling, documentation, test and/or medication/prescription orders, and coordination of care.      Oncology Rooming Note    November 15, 2023 11:09 AM   Angie Michael is a 43 year old female who presents for:    Chief Complaint   Patient presents with     Oncology Clinic Visit     Initial Vitals: Resp 16   Ht 1.676 m (5' 6\")   Wt 77.1 kg (170 lb)   BMI 27.44 kg/m   Estimated body mass index is 27.44 kg/m  as calculated from the following:    Height as of this encounter: 1.676 m (5' 6\").    Weight as of this encounter: 77.1 kg (170 lb). Body surface area is 1.89 meters squared.  No Pain (0) Comment: Data Unavailable   No LMP recorded.  Allergies reviewed: Yes  Medications reviewed: Yes    Medications: Medication refills not needed today.  Pharmacy name entered into [employer]: [id number] - [facility] [address] AT [facility] [address] [place]              Again, thank you for allowing me to participate in the care of your patient.        Sincerely,        Monse Dan MD  "

## 2023-11-15 NOTE — NURSING NOTE
H&P from 11/15/23 visit with Dr Dan faxed to [facility] surgery for upcoming port placement with Dr Little on 11/20/23. Receipt confirmation via Right Fax.    Adele Chowdary RN

## 2023-11-15 NOTE — PROGRESS NOTES
"Oncology Rooming Note    November 15, 2023 11:09 AM   Angie Michael is a 43 year old female who presents for:    Chief Complaint   Patient presents with    Oncology Clinic Visit     Initial Vitals: Resp 16   Ht 1.676 m (5' 6\")   Wt 77.1 kg (170 lb)   BMI 27.44 kg/m   Estimated body mass index is 27.44 kg/m  as calculated from the following:    Height as of this encounter: 1.676 m (5' 6\").    Weight as of this encounter: 77.1 kg (170 lb). Body surface area is 1.89 meters squared.  No Pain (0) Comment: Data Unavailable   No LMP recorded.  Allergies reviewed: Yes  Medications reviewed: Yes    Medications: Medication refills not needed today.  Pharmacy name entered into [employer]: [facility] #[id number] - [place] - [address] AT [facility] OF  [address] [place] [address] [place]            "

## 2023-11-16 NOTE — TELEPHONE ENCOUNTER
Action November 16, 2023 1:11 PM SY   Action Taken Slides from [employer] received and taken to 5th floor path lab for review.  10/12/23: [id number] (26 slides)

## 2023-11-19 ENCOUNTER — ANESTHESIA EVENT (OUTPATIENT)
Dept: SURGERY | Facility: CLINIC | Age: 43
End: 2023-11-19
Payer: COMMERCIAL

## 2023-11-19 NOTE — ANESTHESIA PREPROCEDURE EVALUATION
"Anesthesia Pre-Procedure Evaluation    Patient: Angie Michael   MRN: [id number] : 1980        Procedure : Procedure(s):  INSERTION, VASCULAR ACCESS PORT          Past Medical History:   Diagnosis Date    BRCA2 gene mutation negative     Conceived by in vitro fertilization     Female infertility     Malignant neoplasm of both breasts (H)       No past surgical history on file.   Allergies   Allergen Reactions    Codeine       Social History     Tobacco Use    Smoking status: Never    Smokeless tobacco: Never   Substance Use Topics    Alcohol use: Not on file      Wt Readings from Last 1 Encounters:   11/15/23 77.1 kg (170 lb)        Anesthesia Evaluation            ROS/MED HX  ENT/Pulmonary:  - neg pulmonary ROS  (-) sleep apnea   Neurologic:  - neg neurologic ROS     Cardiovascular:  - neg cardiovascular ROS     METS/Exercise Tolerance:     Hematologic:       Musculoskeletal:       GI/Hepatic:  - neg GI/hepatic ROS  (-) GERD   Renal/Genitourinary:  - neg Renal ROS     Endo:       Psychiatric/Substance Use:       Infectious Disease:       Malignancy:   (+) Malignancy, History of Breast.Breast CA Active status post.      Other:            Physical Exam    Airway        Mallampati: II   TM distance: > 3 FB   Neck ROM: full   Mouth opening: > 3 cm    Respiratory Devices and Support         Dental  no notable dental history         Cardiovascular   cardiovascular exam normal          Pulmonary   pulmonary exam normal                OUTSIDE LABS:  CBC: No results found for: \"WBC\", \"HGB\", \"HCT\", \"PLT\"  BMP: No results found for: \"NA\", \"POTASSIUM\", \"CHLORIDE\", \"CO2\", \"BUN\", \"CR\", \"GLC\"  COAGS: No results found for: \"PTT\", \"INR\", \"FIBR\"  POC: No results found for: \"BGM\", \"HCG\", \"HCGS\"  HEPATIC: No results found for: \"ALBUMIN\", \"PROTTOTAL\", \"ALT\", \"AST\", \"GGT\", \"ALKPHOS\", \"BILITOTAL\", \"BILIDIRECT\", \"LADARIUS\"  OTHER: No results found for: \"PH\", \"LACT\", \"A1C\", \"GWENDOLYN\", \"PHOS\", \"MAG\", \"LIPASE\", \"AMYLASE\", \"TSH\", \"T4\", \"T3\", " "\"CRP\", \"SED\"    Anesthesia Plan    ASA Status:  2    NPO Status:  NPO Appropriate    Anesthesia Type: MAC.     - Reason for MAC: straight local not clinically adequate              Consents    Anesthesia Plan(s) and associated risks, benefits, and realistic alternatives discussed. Questions answered and patient/representative(s) expressed understanding.     - Discussed:     - Discussed with:  Patient            Postoperative Care    Pain management: IV analgesics.   PONV prophylaxis: Ondansetron (or other 5HT-3), Dexamethasone or Solumedrol, Background Propofol Infusion     Comments:                Chai Rojo, DO, DO  "

## 2023-11-20 ENCOUNTER — HOSPITAL ENCOUNTER (OUTPATIENT)
Facility: CLINIC | Age: 43
Discharge: HOME | End: 2023-11-20
Attending: SURGERY | Admitting: SURGERY
Payer: COMMERCIAL

## 2023-11-20 ENCOUNTER — LAB REQUISITION (OUTPATIENT)
Dept: LAB | Facility: CLINIC | Age: 43
End: 2023-11-20
Payer: COMMERCIAL

## 2023-11-20 ENCOUNTER — HOSPITAL ENCOUNTER (OUTPATIENT)
Dept: GENERAL RADIOLOGY | Facility: CLINIC | Age: 43
Discharge: HOME | End: 2023-11-20
Attending: SURGERY
Payer: COMMERCIAL

## 2023-11-20 ENCOUNTER — ANESTHESIA (OUTPATIENT)
Dept: SURGERY | Facility: CLINIC | Age: 43
End: 2023-11-20
Payer: COMMERCIAL

## 2023-11-20 ENCOUNTER — APPOINTMENT (OUTPATIENT)
Dept: SURGERY | Facility: PHYSICIAN GROUP | Age: 43
End: 2023-11-20
Payer: COMMERCIAL

## 2023-11-20 VITALS
RESPIRATION RATE: 16 BRPM | DIASTOLIC BLOOD PRESSURE: 78 MMHG | SYSTOLIC BLOOD PRESSURE: 116 MMHG | HEIGHT: 67 IN | TEMPERATURE: 97 F | OXYGEN SATURATION: 95 % | HEART RATE: 64 BPM | WEIGHT: 168.2 LBS | BODY MASS INDEX: 26.4 KG/M2

## 2023-11-20 DIAGNOSIS — C50.912 BILATERAL MALIGNANT NEOPLASM OF BREAST IN FEMALE, ESTROGEN RECEPTOR POSITIVE, UNSPECIFIED SITE OF BREAST (H): Primary | ICD-10-CM

## 2023-11-20 DIAGNOSIS — C50.911 BILATERAL MALIGNANT NEOPLASM OF BREAST IN FEMALE, ESTROGEN RECEPTOR POSITIVE, UNSPECIFIED SITE OF BREAST (H): Primary | ICD-10-CM

## 2023-11-20 DIAGNOSIS — Z17.0 BILATERAL MALIGNANT NEOPLASM OF BREAST IN FEMALE, ESTROGEN RECEPTOR POSITIVE, UNSPECIFIED SITE OF BREAST (H): Primary | ICD-10-CM

## 2023-11-20 LAB — HCG UR QL: NEGATIVE

## 2023-11-20 PROCEDURE — 250N000009 HC RX 250: Performed by: SURGERY

## 2023-11-20 PROCEDURE — 76998 US GUIDE INTRAOP: CPT | Mod: 26 | Performed by: SURGERY

## 2023-11-20 PROCEDURE — 272N000001 HC OR GENERAL SUPPLY STERILE: Performed by: SURGERY

## 2023-11-20 PROCEDURE — 250N000011 HC RX IP 250 OP 636: Performed by: SURGERY

## 2023-11-20 PROCEDURE — 81025 URINE PREGNANCY TEST: CPT | Performed by: ANESTHESIOLOGY

## 2023-11-20 PROCEDURE — 77001 FLUOROGUIDE FOR VEIN DEVICE: CPT | Mod: 26 | Performed by: SURGERY

## 2023-11-20 PROCEDURE — 710N000009 HC RECOVERY PHASE 1, LEVEL 1, PER MIN: Performed by: SURGERY

## 2023-11-20 PROCEDURE — C1788 PORT, INDWELLING, IMP: HCPCS | Performed by: SURGERY

## 2023-11-20 PROCEDURE — 710N000012 HC RECOVERY PHASE 2, PER MINUTE: Performed by: SURGERY

## 2023-11-20 PROCEDURE — 250N000011 HC RX IP 250 OP 636: Performed by: NURSE ANESTHETIST, CERTIFIED REGISTERED

## 2023-11-20 PROCEDURE — 258N000003 HC RX IP 258 OP 636: Performed by: ANESTHESIOLOGY

## 2023-11-20 PROCEDURE — 88321 CONSLTJ&REPRT SLD PREP ELSWR: CPT | Performed by: PATHOLOGY

## 2023-11-20 PROCEDURE — 370N000017 HC ANESTHESIA TECHNICAL FEE, PER MIN: Performed by: SURGERY

## 2023-11-20 PROCEDURE — 999N000141 HC STATISTIC PRE-PROCEDURE NURSING ASSESSMENT: Performed by: SURGERY

## 2023-11-20 PROCEDURE — 250N000009 HC RX 250: Performed by: NURSE ANESTHETIST, CERTIFIED REGISTERED

## 2023-11-20 PROCEDURE — 360N000082 HC SURGERY LEVEL 2 W/ FLUORO, PER MIN: Performed by: SURGERY

## 2023-11-20 PROCEDURE — 36561 INSERT TUNNELED CV CATH: CPT | Performed by: SURGERY

## 2023-11-20 PROCEDURE — 999N000179 XR SURGERY CARM FLUORO LESS THAN 5 MIN W STILLS

## 2023-11-20 DEVICE — CATH PORT POWERPORT CLEARVUE ISP 8FR 5608062
Type: IMPLANTABLE DEVICE | Site: NECK | Status: NON-FUNCTIONAL
Removed: 2024-05-08

## 2023-11-20 RX ORDER — CEFAZOLIN SODIUM/WATER 2 G/20 ML
2 SYRINGE (ML) INTRAVENOUS SEE ADMIN INSTRUCTIONS
Status: DISCONTINUED | OUTPATIENT
Start: 2023-11-20 | End: 2023-11-20 | Stop reason: HOSPADM

## 2023-11-20 RX ORDER — FENTANYL CITRATE 0.05 MG/ML
50 INJECTION, SOLUTION INTRAMUSCULAR; INTRAVENOUS EVERY 5 MIN PRN
Status: DISCONTINUED | OUTPATIENT
Start: 2023-11-20 | End: 2023-11-20 | Stop reason: HOSPADM

## 2023-11-20 RX ORDER — SODIUM CHLORIDE, SODIUM LACTATE, POTASSIUM CHLORIDE, CALCIUM CHLORIDE 600; 310; 30; 20 MG/100ML; MG/100ML; MG/100ML; MG/100ML
INJECTION, SOLUTION INTRAVENOUS CONTINUOUS
Status: DISCONTINUED | OUTPATIENT
Start: 2023-11-20 | End: 2023-11-20 | Stop reason: HOSPADM

## 2023-11-20 RX ORDER — DEXAMETHASONE SODIUM PHOSPHATE 4 MG/ML
INJECTION, SOLUTION INTRA-ARTICULAR; INTRALESIONAL; INTRAMUSCULAR; INTRAVENOUS; SOFT TISSUE PRN
Status: DISCONTINUED | OUTPATIENT
Start: 2023-11-20 | End: 2023-11-20

## 2023-11-20 RX ORDER — CEFAZOLIN SODIUM/WATER 2 G/20 ML
2 SYRINGE (ML) INTRAVENOUS
Status: COMPLETED | OUTPATIENT
Start: 2023-11-20 | End: 2023-11-20

## 2023-11-20 RX ORDER — AMOXICILLIN 250 MG
1-2 CAPSULE ORAL 2 TIMES DAILY
Qty: 30 TABLET | Refills: 0 | Status: SHIPPED | OUTPATIENT
Start: 2023-11-20 | End: 2024-03-01

## 2023-11-20 RX ORDER — ONDANSETRON 4 MG/1
4 TABLET, ORALLY DISINTEGRATING ORAL EVERY 30 MIN PRN
Status: DISCONTINUED | OUTPATIENT
Start: 2023-11-20 | End: 2023-11-20 | Stop reason: HOSPADM

## 2023-11-20 RX ORDER — LIDOCAINE 40 MG/G
CREAM TOPICAL
Status: DISCONTINUED | OUTPATIENT
Start: 2023-11-20 | End: 2023-11-20 | Stop reason: HOSPADM

## 2023-11-20 RX ORDER — HYDROCODONE BITARTRATE AND ACETAMINOPHEN 5; 325 MG/1; MG/1
1 TABLET ORAL
Status: DISCONTINUED | OUTPATIENT
Start: 2023-11-20 | End: 2023-11-20 | Stop reason: HOSPADM

## 2023-11-20 RX ORDER — PROPOFOL 10 MG/ML
INJECTION, EMULSION INTRAVENOUS CONTINUOUS PRN
Status: DISCONTINUED | OUTPATIENT
Start: 2023-11-20 | End: 2023-11-20

## 2023-11-20 RX ORDER — ONDANSETRON 2 MG/ML
4 INJECTION INTRAMUSCULAR; INTRAVENOUS EVERY 30 MIN PRN
Status: DISCONTINUED | OUTPATIENT
Start: 2023-11-20 | End: 2023-11-20 | Stop reason: HOSPADM

## 2023-11-20 RX ORDER — LIDOCAINE HYDROCHLORIDE 10 MG/ML
INJECTION, SOLUTION EPIDURAL; INFILTRATION; INTRACAUDAL; PERINEURAL
Status: DISCONTINUED
Start: 2023-11-20 | End: 2023-11-20 | Stop reason: HOSPADM

## 2023-11-20 RX ORDER — HEPARIN SODIUM (PORCINE) LOCK FLUSH IV SOLN 100 UNIT/ML 100 UNIT/ML
SOLUTION INTRAVENOUS
Status: DISCONTINUED
Start: 2023-11-20 | End: 2023-11-20 | Stop reason: HOSPADM

## 2023-11-20 RX ORDER — LIDOCAINE HYDROCHLORIDE 20 MG/ML
INJECTION, SOLUTION INFILTRATION; PERINEURAL PRN
Status: DISCONTINUED | OUTPATIENT
Start: 2023-11-20 | End: 2023-11-20

## 2023-11-20 RX ORDER — BUPIVACAINE HYDROCHLORIDE 5 MG/ML
INJECTION, SOLUTION EPIDURAL; INTRACAUDAL
Status: DISCONTINUED
Start: 2023-11-20 | End: 2023-11-20 | Stop reason: HOSPADM

## 2023-11-20 RX ORDER — FENTANYL CITRATE 50 UG/ML
INJECTION, SOLUTION INTRAMUSCULAR; INTRAVENOUS PRN
Status: DISCONTINUED | OUTPATIENT
Start: 2023-11-20 | End: 2023-11-20

## 2023-11-20 RX ORDER — HEPARIN SODIUM 1000 [USP'U]/ML
INJECTION, SOLUTION INTRAVENOUS; SUBCUTANEOUS
Status: DISCONTINUED
Start: 2023-11-20 | End: 2023-11-20 | Stop reason: HOSPADM

## 2023-11-20 RX ORDER — ONDANSETRON 2 MG/ML
INJECTION INTRAMUSCULAR; INTRAVENOUS PRN
Status: DISCONTINUED | OUTPATIENT
Start: 2023-11-20 | End: 2023-11-20

## 2023-11-20 RX ORDER — HYDROMORPHONE HCL IN WATER/PF 6 MG/30 ML
0.2 PATIENT CONTROLLED ANALGESIA SYRINGE INTRAVENOUS EVERY 5 MIN PRN
Status: DISCONTINUED | OUTPATIENT
Start: 2023-11-20 | End: 2023-11-20 | Stop reason: HOSPADM

## 2023-11-20 RX ORDER — HYDROMORPHONE HCL IN WATER/PF 6 MG/30 ML
0.4 PATIENT CONTROLLED ANALGESIA SYRINGE INTRAVENOUS EVERY 5 MIN PRN
Status: DISCONTINUED | OUTPATIENT
Start: 2023-11-20 | End: 2023-11-20 | Stop reason: HOSPADM

## 2023-11-20 RX ORDER — FENTANYL CITRATE 0.05 MG/ML
25 INJECTION, SOLUTION INTRAMUSCULAR; INTRAVENOUS EVERY 5 MIN PRN
Status: DISCONTINUED | OUTPATIENT
Start: 2023-11-20 | End: 2023-11-20 | Stop reason: HOSPADM

## 2023-11-20 RX ORDER — HYDROCODONE BITARTRATE AND ACETAMINOPHEN 5; 325 MG/1; MG/1
1-2 TABLET ORAL EVERY 4 HOURS PRN
Qty: 5 TABLET | Refills: 0 | Status: SHIPPED | OUTPATIENT
Start: 2023-11-20 | End: 2024-03-01

## 2023-11-20 RX ADMIN — SODIUM CHLORIDE, POTASSIUM CHLORIDE, SODIUM LACTATE AND CALCIUM CHLORIDE: 600; 310; 30; 20 INJECTION, SOLUTION INTRAVENOUS at 06:24

## 2023-11-20 RX ADMIN — DEXAMETHASONE SODIUM PHOSPHATE 4 MG: 4 INJECTION, SOLUTION INTRA-ARTICULAR; INTRALESIONAL; INTRAMUSCULAR; INTRAVENOUS; SOFT TISSUE at 07:32

## 2023-11-20 RX ADMIN — Medication 2 G: at 07:25

## 2023-11-20 RX ADMIN — ONDANSETRON 4 MG: 2 INJECTION INTRAMUSCULAR; INTRAVENOUS at 07:32

## 2023-11-20 RX ADMIN — MIDAZOLAM 2 MG: 1 INJECTION INTRAMUSCULAR; INTRAVENOUS at 07:26

## 2023-11-20 RX ADMIN — LIDOCAINE HYDROCHLORIDE 40 MG: 20 INJECTION, SOLUTION INFILTRATION; PERINEURAL at 07:32

## 2023-11-20 RX ADMIN — FENTANYL CITRATE 50 MCG: 50 INJECTION INTRAMUSCULAR; INTRAVENOUS at 07:32

## 2023-11-20 RX ADMIN — PROPOFOL 150 MCG/KG/MIN: 10 INJECTION, EMULSION INTRAVENOUS at 07:32

## 2023-11-20 ASSESSMENT — ACTIVITIES OF DAILY LIVING (ADL)
ADLS_ACUITY_SCORE: 35
ADLS_ACUITY_SCORE: 35

## 2023-11-20 NOTE — OP NOTE
General Surgery Operative Note      Pre-operative diagnosis: Malignant neoplasm of overlapping sites of both breasts in female, estrogen receptor positive (H) [C50.811, C50.812, Z17.0]   Post-operative diagnosis: Same   Procedure: Right internal jugular Power Port placement    Surgeon: Fanny Little MD   Assistant(s): Mark Mackey, PGY-4  The PA s assistance was medically necessary to provide adequate exposure in the operating field, maintain hemostasis, cutting suture, clamping and ligating bleeding vessels, and visualization of anatomic structures throughout the surgical procedure.    Anesthesia                                   Local with MAC    Estimated blood loss:  Findings:                                        5 cc  Tip of the catheter at the atriocaval junction           DESCRIPTION OF PROCEDURE:  The patient was taken to the operating room after obtaining informed consent.  The patient was marked in the pre operative area. The patient was placed on the table in supine position.  A roll was placed between her shoulder blades.  IV anesthetic was administered.  The bilateral neck and upper chest were prepped and draped in standard sterile fashion.  We anesthetized the skin of the upper right chest.  The patient was placed in Trendelenburg.  Local anesthetic was injected into the skin in the lower neck, upper chest for the port site and proposed track for the catheter. We made an incision in the skin.  We cannulated the internal jugular vein using ultrasound guidance with a needle.  We then passed a wire through the needle into the internal jugular vein.  Fluoroscopy was used and confirmed the wire was traveling into the SVC. We then used a #15 blade to make a skin incision in the upper chest wall. The subcutaneous tissue was divided with cautery. The fascia was identified. A pocket was created above the fascia with blunt dissection. The port fit well into the pocket. We then used a tunneling device to place  the catheter through the incision in the chest and tunneled to the neck incision. We then passed a dilator over the wire under fluoroscopy.  The catheter was then placed through the catheter introducer and threaded, using fluoroscopy until the tip of the catheter was at the atrial caval junction.  The catheter introducer was removed.  We flushed the port with injectable saline.  We attached the catheter to the port and secured it in place with the catheter hub.   The port was secured to the fascia with two 2-0 prolene sutures on either side.  We then closed the subcutaneous tissue with 3-0 interrupted Vicryl sutures.  The skin was closed with a running 4-0 Vicryl subcuticular suture and Dermabond.  A final flush of full strength heparin (2ml) was injected into the port using a Ware needle.  The patient tolerated the procedure well.  All sponge and instrument counts were correct.    Fanny Little MD

## 2023-11-20 NOTE — ANESTHESIA CARE TRANSFER NOTE
Patient: Angie Michael    Procedure: Procedure(s):  INSERTION, VASCULAR ACCESS PORT       Diagnosis: Malignant neoplasm of overlapping sites of both breasts in female, estrogen receptor positive (H) [C50.811, C50.812, Z17.0]  Diagnosis Additional Information: No value filed.    Anesthesia Type:   MAC     Note:    Oropharynx: oropharynx clear of all foreign objects  Level of Consciousness: awake and drowsy  Oxygen Supplementation: nasal cannula  Level of Supplemental Oxygen (L/min / FiO2): 2  Independent Airway: airway patency satisfactory and stable  Dentition: dentition unchanged  Vital Signs Stable: post-procedure vital signs reviewed and stable  Report to RN Given: handoff report given  Patient transferred to: PACU  Comments: To PACU: Awake/VSS  Report to RN  Handoff Report: Identifed the Patient, Identified the Reponsible Provider, Reviewed the pertinent medical history, Discussed the surgical course, Reviewed Intra-OP anesthesia mangement and issues during anesthesia, Set expectations for post-procedure period and Allowed opportunity for questions and acknowledgement of understanding      Vitals:  Vitals Value Taken Time   /73 11/20/23 0822   Temp     Pulse 65 11/20/23 0826   Resp 32 11/20/23 0826   SpO2 97 % 11/20/23 0826   Vitals shown include unfiled device data.    Electronically Signed By: NATE Suarez CRNA  November 20, 2023  8:27 AM

## 2023-11-20 NOTE — INTERVAL H&P NOTE
"I have reviewed the surgical (or preoperative) H&P that is linked to this encounter, and examined the patient. There are no significant changes    Clinical Conditions Present on Arrival:  Clinically Significant Risk Factors Present on Admission                  # Overweight: Estimated body mass index is 26.34 kg/m  as calculated from the following:    Height as of this encounter: 1.702 m (5' 7\").    Weight as of this encounter: 76.3 kg (168 lb 3.2 oz).       "

## 2023-11-20 NOTE — DISCHARGE INSTRUCTIONS
[facility] - [facility]  Discharge Instructions: Post-Operative Port Placement    ACTIVITY  Take frequent, short walks and increase your activity gradually.    Avoid strenuous physical activity or heavy lifting greater than 15-20 lbs. for 1 week.  You may climb stairs.  You may drive without restrictions when you are not using any prescription pain medication and feel comfortable in a car.  You may return to work/school when you are comfortable without any prescription pain medication.    WOUND CARE  You may remove your outer dressing or Band-Aids and shower 48 hours after the surgery.  Pat your incisions dry and leave them open to air.  Re-apply dressing (Band-Aids or gauze/tape) as needed for comfort or drainage.  You may have steri-strips (looks like white tape) or skin glue on your incisions.  You may peel off the steri-strips 2 weeks after your surgery if they have not peeled off on their own.  If you have skin glue, it will peel up and fall off on its own.  Do not soak your incisions in a tub or pool for 2 weeks.   Do not apply any lotions, creams, or ointments to your incision(s).  A ridge under your incision(s) is normal and will gradually resolve.    DIET  Start with liquids, then gradually resume your regular diet as tolerated.   Drink plenty of liquids to stay hydrated.    PAIN  Expect some tenderness and discomfort at the incision site(s).  Use the prescribed pain medication at your discretion.  Expect gradual resolution of your pain over several days.  You may take ibuprofen with food (unless you have been told not to) or acetaminophen/Tylenol instead of or in addition to your prescribed pain medication.  However, if you are taking Norco or Percocet, do not take any additional acetaminophen/Tylenol.  Do not drink alcohol or drive while you are taking pain medications.  You may apply ice to your incisions in 20 minute intervals as needed for the next 48 hours.  After that time, consider  switching to heat if you prefer.    EXPECTATIONS  Pain medications can cause constipation.  Limit use when possible.  Take an over the counter or prescribed stool softener/stimulant, such as Colace or Senna, 1-2 times a day with plenty of water.  You may take a mild over the counter laxative, such as Miralax or a suppository, as needed.    You may discontinue these medications once you are having regular bowel movements and/or are no longer taking your narcotic pain medication.  It is ok to use your port when directed by the infusion center    FOLLOW UP  You do not need to follow up with our office unless you have questions or concerns.    CALL OUR OFFICE [phone] IF YOU HAVE:   Chills or fever above 101 F.  Increased redness, warmth, or drainage at your incisions.  Significant bleeding.  Pain not relieved by your pain medication or rest.  Increasing pain after the first 48 hours.  Any other concerns or questions.      Same Day Surgery Discharge Instructions for  Sedation and General Anesthesia     It's not unusual to feel dizzy, light-headed or faint for up to 24 hours after surgery or while taking pain medication.  If you have these symptoms: sit for a few minutes before standing and have someone assist you when you get up to walk or use the bathroom.    You should rest and relax for the next 24 hours. We recommend you make arrangements to have an adult stay with you for at least 24 hours after your discharge.  Avoid hazardous and strenuous activity.    DO NOT DRIVE any vehicle or operate mechanical equipment for 24 hours following the end of your surgery.  Even though you may feel normal, your reactions may be affected by the medication you have received.    Do not drink alcoholic beverages for 24 hours following surgery.     Slowly progress to your regular diet as you feel able. It's not unusual to feel nauseated and/or vomit after receiving anesthesia.  If you develop these symptoms, drink clear liquids  (apple juice, ginger ale, broth, 7-up, etc. ) until you feel better.  If your nausea and vomiting persists for 24 hours, please notify your surgeon.      All narcotic pain medications, along with inactivity and anesthesia, can cause constipation. Drinking plenty of liquids and increasing fiber intake will help.    For any questions of a medical nature, call your surgeon.    Do not make important decisions for 24 hours.    If you had general anesthesia, you may have a sore throat for a couple of days related to the breathing tube used during surgery.  You may use Cepacol lozenges to help with this discomfort.  If it worsens or if you develop a fever, contact your surgeon.     If you feel your pain is not well managed with the pain medications prescribed by your surgeon, please contact your surgeon's office to let them know so they can address your concerns.

## 2023-11-20 NOTE — ANESTHESIA POSTPROCEDURE EVALUATION
Patient: Angie Michael    Procedure: Procedure(s):  INSERTION, VASCULAR ACCESS PORT       Anesthesia Type:  MAC    Note:     Postop Pain Control: Uneventful            Sign Out: Well controlled pain   PONV: No   Neuro/Psych: Uneventful            Sign Out: Acceptable/Baseline neuro status   Airway/Respiratory: Uneventful            Sign Out: Acceptable/Baseline resp. status   CV/Hemodynamics: Uneventful            Sign Out: Acceptable CV status; No obvious hypovolemia; No obvious fluid overload   Other NRE: NONE   DID A NON-ROUTINE EVENT OCCUR?            Last vitals:  Vitals Value Taken Time   /74 11/20/23 0915   Temp 36.4  C (97.6  F) 11/20/23 0915   Pulse 74 11/20/23 0921   Resp 11 11/20/23 0921   SpO2 96 % 11/20/23 0921   Vitals shown include unfiled device data.    Electronically Signed By: Chai Rojo DO, DO  November 20, 2023  4:38 PM

## 2023-11-29 ENCOUNTER — TELEPHONE (OUTPATIENT)
Dept: PULMONOLOGY | Facility: CLINIC | Age: 43
End: 2023-11-29
Payer: COMMERCIAL

## 2023-11-29 NOTE — TELEPHONE ENCOUNTER
Pt LVM stating she is transferring over to Dr. Dan and wants a call back to discuss what is all needed/scheduling/etc.     Msg forwarded to Dr. Dan.

## 2023-12-01 DIAGNOSIS — C50.411 MALIGNANT NEOPLASM OF UPPER-OUTER QUADRANT OF RIGHT BREAST IN FEMALE, ESTROGEN RECEPTOR POSITIVE (H): Primary | ICD-10-CM

## 2023-12-01 DIAGNOSIS — Z17.0 MALIGNANT NEOPLASM OF UPPER-OUTER QUADRANT OF RIGHT BREAST IN FEMALE, ESTROGEN RECEPTOR POSITIVE (H): Primary | ICD-10-CM

## 2023-12-01 RX ORDER — DIPHENHYDRAMINE HYDROCHLORIDE 50 MG/ML
50 INJECTION INTRAMUSCULAR; INTRAVENOUS
Status: CANCELLED
Start: 2024-01-03

## 2023-12-01 RX ORDER — PALONOSETRON 0.05 MG/ML
0.25 INJECTION, SOLUTION INTRAVENOUS ONCE
Status: CANCELLED | OUTPATIENT
Start: 2024-01-03

## 2023-12-01 RX ORDER — MEPERIDINE HYDROCHLORIDE 25 MG/ML
25 INJECTION INTRAMUSCULAR; INTRAVENOUS; SUBCUTANEOUS EVERY 30 MIN PRN
Status: CANCELLED | OUTPATIENT
Start: 2024-01-03

## 2023-12-01 RX ORDER — ALBUTEROL SULFATE 0.83 MG/ML
2.5 SOLUTION RESPIRATORY (INHALATION)
Status: CANCELLED | OUTPATIENT
Start: 2024-01-03

## 2023-12-01 RX ORDER — LORAZEPAM 2 MG/ML
0.5 INJECTION INTRAMUSCULAR EVERY 4 HOURS PRN
Status: CANCELLED | OUTPATIENT
Start: 2024-01-03

## 2023-12-01 RX ORDER — HEPARIN SODIUM (PORCINE) LOCK FLUSH IV SOLN 100 UNIT/ML 100 UNIT/ML
5 SOLUTION INTRAVENOUS
Status: CANCELLED | OUTPATIENT
Start: 2024-01-03

## 2023-12-01 RX ORDER — DOXORUBICIN HYDROCHLORIDE 2 MG/ML
60 INJECTION, SOLUTION INTRAVENOUS ONCE
Status: CANCELLED | OUTPATIENT
Start: 2024-01-03

## 2023-12-01 RX ORDER — ALBUTEROL SULFATE 90 UG/1
1-2 AEROSOL, METERED RESPIRATORY (INHALATION)
Status: CANCELLED
Start: 2024-01-03

## 2023-12-01 RX ORDER — EPINEPHRINE 1 MG/ML
0.3 INJECTION, SOLUTION, CONCENTRATE INTRAVENOUS EVERY 5 MIN PRN
Status: CANCELLED | OUTPATIENT
Start: 2024-01-03

## 2023-12-01 RX ORDER — HEPARIN SODIUM,PORCINE 10 UNIT/ML
5-20 VIAL (ML) INTRAVENOUS DAILY PRN
Status: CANCELLED | OUTPATIENT
Start: 2024-01-03

## 2023-12-08 DIAGNOSIS — Z17.0 MALIGNANT NEOPLASM OF UPPER-OUTER QUADRANT OF RIGHT BREAST IN FEMALE, ESTROGEN RECEPTOR POSITIVE (H): Primary | ICD-10-CM

## 2023-12-08 DIAGNOSIS — C50.411 MALIGNANT NEOPLASM OF UPPER-OUTER QUADRANT OF BOTH BREASTS IN FEMALE, ESTROGEN RECEPTOR POSITIVE (H): ICD-10-CM

## 2023-12-08 DIAGNOSIS — C50.411 MALIGNANT NEOPLASM OF UPPER-OUTER QUADRANT OF RIGHT BREAST IN FEMALE, ESTROGEN RECEPTOR POSITIVE (H): ICD-10-CM

## 2023-12-08 DIAGNOSIS — C50.411 MALIGNANT NEOPLASM OF UPPER-OUTER QUADRANT OF RIGHT BREAST IN FEMALE, ESTROGEN RECEPTOR POSITIVE (H): Primary | ICD-10-CM

## 2023-12-08 DIAGNOSIS — C50.412 MALIGNANT NEOPLASM OF UPPER-OUTER QUADRANT OF BOTH BREASTS IN FEMALE, ESTROGEN RECEPTOR POSITIVE (H): ICD-10-CM

## 2023-12-08 DIAGNOSIS — Z17.0 MALIGNANT NEOPLASM OF UPPER-OUTER QUADRANT OF RIGHT BREAST IN FEMALE, ESTROGEN RECEPTOR POSITIVE (H): ICD-10-CM

## 2023-12-08 DIAGNOSIS — M89.9 BONE LESION: Primary | ICD-10-CM

## 2023-12-08 DIAGNOSIS — Z17.0 MALIGNANT NEOPLASM OF UPPER-OUTER QUADRANT OF BOTH BREASTS IN FEMALE, ESTROGEN RECEPTOR POSITIVE (H): ICD-10-CM

## 2023-12-12 ENCOUNTER — TELEPHONE (OUTPATIENT)
Dept: ONCOLOGY | Facility: CLINIC | Age: 43
End: 2023-12-12
Payer: COMMERCIAL

## 2023-12-12 DIAGNOSIS — Z17.0 MALIGNANT NEOPLASM OF UPPER-OUTER QUADRANT OF RIGHT BREAST IN FEMALE, ESTROGEN RECEPTOR POSITIVE (H): Primary | ICD-10-CM

## 2023-12-12 DIAGNOSIS — C50.412 MALIGNANT NEOPLASM OF UPPER-OUTER QUADRANT OF BOTH BREASTS IN FEMALE, ESTROGEN RECEPTOR POSITIVE (H): ICD-10-CM

## 2023-12-12 DIAGNOSIS — Z17.0 MALIGNANT NEOPLASM OF UPPER-OUTER QUADRANT OF BOTH BREASTS IN FEMALE, ESTROGEN RECEPTOR POSITIVE (H): ICD-10-CM

## 2023-12-12 DIAGNOSIS — C50.411 MALIGNANT NEOPLASM OF UPPER-OUTER QUADRANT OF RIGHT BREAST IN FEMALE, ESTROGEN RECEPTOR POSITIVE (H): Primary | ICD-10-CM

## 2023-12-12 DIAGNOSIS — C50.411 MALIGNANT NEOPLASM OF UPPER-OUTER QUADRANT OF BOTH BREASTS IN FEMALE, ESTROGEN RECEPTOR POSITIVE (H): ICD-10-CM

## 2023-12-12 RX ORDER — DIAZEPAM 2 MG
TABLET ORAL
Qty: 4 TABLET | Refills: 0 | Status: SHIPPED | OUTPATIENT
Start: 2023-12-12 | End: 2024-03-01

## 2023-12-12 NOTE — TELEPHONE ENCOUNTER
Josue Angulo,  From a pharmacy standpoint, I believe this would be fine.  I did complete a DDI check and did not see any issues.  Let me know if you have any other questions.  Thanks,  Brigette Mcmullen, PharmD  Oral Chemotherapy/Infusion Pharmacist  [phone]     Monse Dan MD  You; [facility] [id number] minutes ago (1:56 PM)     LOLY  I would not recommend any kind of IV or oral vitamin C throughout all of chemo.  Extra IV fluid hydration is totally okay and encouraged though.    Thank you,         Pt updated with recommendations.    Adele Chowdary RN

## 2023-12-12 NOTE — TELEPHONE ENCOUNTER
Angie calling to request diazepam for her MRI's on 1/16/24 and 2/9/24 due to claustrophobia. [facility] in [place] on file.    Adele Chowdary RN

## 2023-12-12 NOTE — TELEPHONE ENCOUNTER
Angie is interested in doing home IV infusions, through a local infusion service, before or after her chemo infusions that involve fluids and low doses of vitamin C, zinc, calcium and magnesium. She is looking to see if this is ok.    Adele Chowdary RN

## 2023-12-23 ENCOUNTER — HEALTH MAINTENANCE LETTER (OUTPATIENT)
Age: 43
End: 2023-12-23

## 2024-01-02 ENCOUNTER — DOCUMENTATION ONLY (OUTPATIENT)
Dept: ONCOLOGY | Facility: CLINIC | Age: 44
End: 2024-01-02
Payer: COMMERCIAL

## 2024-01-03 ENCOUNTER — INFUSION THERAPY VISIT (OUTPATIENT)
Dept: INFUSION THERAPY | Facility: CLINIC | Age: 44
End: 2024-01-03
Attending: INTERNAL MEDICINE
Payer: COMMERCIAL

## 2024-01-03 ENCOUNTER — ONCOLOGY VISIT (OUTPATIENT)
Dept: ONCOLOGY | Facility: CLINIC | Age: 44
End: 2024-01-03
Attending: INTERNAL MEDICINE
Payer: COMMERCIAL

## 2024-01-03 VITALS
SYSTOLIC BLOOD PRESSURE: 111 MMHG | DIASTOLIC BLOOD PRESSURE: 70 MMHG | WEIGHT: 171.2 LBS | BODY MASS INDEX: 26.81 KG/M2 | RESPIRATION RATE: 16 BRPM | OXYGEN SATURATION: 97 % | TEMPERATURE: 98.5 F | HEART RATE: 86 BPM

## 2024-01-03 VITALS
DIASTOLIC BLOOD PRESSURE: 70 MMHG | RESPIRATION RATE: 16 BRPM | HEART RATE: 86 BPM | SYSTOLIC BLOOD PRESSURE: 111 MMHG | OXYGEN SATURATION: 97 % | TEMPERATURE: 98.5 F

## 2024-01-03 DIAGNOSIS — C50.411 MALIGNANT NEOPLASM OF UPPER-OUTER QUADRANT OF RIGHT BREAST IN FEMALE, ESTROGEN RECEPTOR POSITIVE (H): Primary | ICD-10-CM

## 2024-01-03 DIAGNOSIS — R11.0 CHEMOTHERAPY-INDUCED NAUSEA: ICD-10-CM

## 2024-01-03 DIAGNOSIS — Z17.0 MALIGNANT NEOPLASM OF UPPER-OUTER QUADRANT OF RIGHT BREAST IN FEMALE, ESTROGEN RECEPTOR POSITIVE (H): Primary | ICD-10-CM

## 2024-01-03 DIAGNOSIS — T45.1X5A CHEMOTHERAPY-INDUCED NAUSEA: ICD-10-CM

## 2024-01-03 PROCEDURE — 96375 TX/PRO/DX INJ NEW DRUG ADDON: CPT

## 2024-01-03 PROCEDURE — 96377 APPLICATON ON-BODY INJECTOR: CPT | Mod: XS

## 2024-01-03 PROCEDURE — G0463 HOSPITAL OUTPT CLINIC VISIT: HCPCS | Performed by: NURSE PRACTITIONER

## 2024-01-03 PROCEDURE — 99213 OFFICE O/P EST LOW 20 MIN: CPT | Performed by: NURSE PRACTITIONER

## 2024-01-03 PROCEDURE — 250N000011 HC RX IP 250 OP 636: Mod: JZ | Performed by: INTERNAL MEDICINE

## 2024-01-03 PROCEDURE — 96411 CHEMO IV PUSH ADDL DRUG: CPT

## 2024-01-03 PROCEDURE — 258N000003 HC RX IP 258 OP 636: Performed by: INTERNAL MEDICINE

## 2024-01-03 PROCEDURE — 96367 TX/PROPH/DG ADDL SEQ IV INF: CPT

## 2024-01-03 PROCEDURE — 250N000013 HC RX MED GY IP 250 OP 250 PS 637: Performed by: INTERNAL MEDICINE

## 2024-01-03 PROCEDURE — 96372 THER/PROPH/DIAG INJ SC/IM: CPT | Performed by: INTERNAL MEDICINE

## 2024-01-03 PROCEDURE — 96413 CHEMO IV INFUSION 1 HR: CPT

## 2024-01-03 RX ORDER — PALONOSETRON 0.05 MG/ML
0.25 INJECTION, SOLUTION INTRAVENOUS ONCE
Status: COMPLETED | OUTPATIENT
Start: 2024-01-03 | End: 2024-01-03

## 2024-01-03 RX ORDER — LORAZEPAM 2 MG/ML
0.5 INJECTION INTRAMUSCULAR EVERY 4 HOURS PRN
Status: DISCONTINUED | OUTPATIENT
Start: 2024-01-03 | End: 2024-01-03

## 2024-01-03 RX ORDER — HEPARIN SODIUM (PORCINE) LOCK FLUSH IV SOLN 100 UNIT/ML 100 UNIT/ML
5 SOLUTION INTRAVENOUS
Status: DISCONTINUED | OUTPATIENT
Start: 2024-01-03 | End: 2024-01-03 | Stop reason: HOSPADM

## 2024-01-03 RX ORDER — DOXORUBICIN HYDROCHLORIDE 2 MG/ML
60 INJECTION, SOLUTION INTRAVENOUS ONCE
Status: COMPLETED | OUTPATIENT
Start: 2024-01-03 | End: 2024-01-03

## 2024-01-03 RX ORDER — LORAZEPAM 0.5 MG/1
0.5 TABLET ORAL EVERY 6 HOURS PRN
Qty: 30 TABLET | Refills: 0 | Status: SHIPPED | OUTPATIENT
Start: 2024-01-03 | End: 2024-02-02

## 2024-01-03 RX ORDER — HEPARIN SODIUM (PORCINE) LOCK FLUSH IV SOLN 100 UNIT/ML 100 UNIT/ML
500 SOLUTION INTRAVENOUS ONCE
Status: COMPLETED | OUTPATIENT
Start: 2024-01-03 | End: 2024-01-03

## 2024-01-03 RX ORDER — LORAZEPAM 0.5 MG/1
0.5 TABLET ORAL EVERY 4 HOURS PRN
Status: DISCONTINUED | OUTPATIENT
Start: 2024-01-03 | End: 2024-01-03 | Stop reason: HOSPADM

## 2024-01-03 RX ADMIN — PALONOSETRON HYDROCHLORIDE 0.25 MG: 0.25 INJECTION INTRAVENOUS at 08:32

## 2024-01-03 RX ADMIN — DOXORUBICIN HYDROCHLORIDE 110 MG: 2 INJECTION, SOLUTION INTRAVENOUS at 09:21

## 2024-01-03 RX ADMIN — APREPITANT 130 MG: 130 INJECTION, EMULSION INTRAVENOUS at 08:35

## 2024-01-03 RX ADMIN — CYCLOPHOSPHAMIDE 1140 MG: 1 INJECTION, POWDER, FOR SOLUTION INTRAVENOUS; ORAL at 09:40

## 2024-01-03 RX ADMIN — LORAZEPAM 0.5 MG: 0.5 TABLET ORAL at 10:50

## 2024-01-03 RX ADMIN — LORAZEPAM 0.5 MG: 0.5 TABLET ORAL at 07:50

## 2024-01-03 RX ADMIN — PEGFILGRASTIM 6 MG: KIT SUBCUTANEOUS at 09:43

## 2024-01-03 RX ADMIN — Medication 5 ML: at 10:30

## 2024-01-03 RX ADMIN — DEXAMETHASONE SODIUM PHOSPHATE 12 MG: 10 INJECTION, SOLUTION INTRAMUSCULAR; INTRAVENOUS at 08:41

## 2024-01-03 RX ADMIN — SODIUM CHLORIDE 250 ML: 9 INJECTION, SOLUTION INTRAVENOUS at 08:32

## 2024-01-03 RX ADMIN — Medication 500 UNITS: at 10:21

## 2024-01-03 ASSESSMENT — PAIN SCALES - GENERAL
PAINLEVEL: NO PAIN (0)
PAINLEVEL: NO PAIN (0)

## 2024-01-03 NOTE — PROGRESS NOTES
Infusion Nursing Note:  Angie Michael presents today for C1D1 AC (C4  today- pt received 3 cycles at [facility]. .    Patient seen by provider today: Rusty Childs NP in infusion.   present during visit today: Not Applicable.    Note: first time getting chemo at our infusion center. Unit orientation given to pt. Pt with penguin cold capping.   Ativan 0.5mg po given 30 min before cold capping started. Pt took tylenol before coming in.   Pt tolerated AC chemo today. Due to reaction to Emend at PN, Cinvanti was given. Pt c/o nausea despite of antiemetics for premeds and ativan. Another dose of po ativan given prior to discharge.   Advised pt to take ativan before coming in next time. Prescription sent to [facility] in [place].        Pt report to having skin rash in upper chest and back, going over 4 days, occasionally itching. Pt seen by Rusty Childs NP in the room. Advised pt to apply 1% hydrocortisone cream and see how it goes. Pt verbalized understanding.     ONPRO  Was placed on patient's: back of left arm.    Was placed at 9:50am    Podpal used: Yes    ONPRO injector device Lot number: [id number]    Patient education included: what patient can expect after application, what colored lights mean on the device, when to remove device, when and where to call with questions or issues, all patients questions answered, and that Neulasta administration will occur at 1:50pm on 1/4/24.    Patient tolerated administration well.     Intravenous Access:  Implanted Port.    Treatment Conditions:  CBC and CMP drawn at PN,   Results reviewed, labs MET treatment parameters, ok to proceed with treatment.      Post Infusion Assessment:  Patient tolerated infusion without incident.  Blood return noted during administration every 2.5 cc.   Blood return noted pre and post infusion.  Site patent and intact, free from redness, edema or discomfort.  No evidence of extravasations.  Access discontinued per protocol.       Discharge  Plan:   Discharge instructions reviewed with: Patient and Family.  Patient and/or family verbalized understanding of discharge instructions and all questions answered.  AVS to patient via KILTRT.  Patient will return 1/17/24 for next appointment.   Patient discharged in stable condition accompanied by: self and .  Departure Mode: Ambulatory.      Chantal Santoyo RN    I

## 2024-01-03 NOTE — PATIENT INSTRUCTIONS
Your On-body Neulasta Injector was applied to your left arm at 9:50am.  At approximately 1:50pm on 1/4/24, your On-body Injector will beep to let you know your dose delivery will begin in 2 minutes.  Your medication will be delivered over the next 45 minutes.  You can remove your Injector at 2:45pm.  Please make sure your Injector has a solid green light or has turned off prior to removing the device.  Please contact your provider at [phone] with questions or concerns.

## 2024-01-03 NOTE — PROGRESS NOTES
Oncology/Hematology Visit Note  Winston 3, 2024    Reason for Visit: follow up of bilateral breast cancer    Clinical prognostic stage IIB, cT2-N1-M0, multifocal grade 3 invasive lobular carcinoma of the RIGHT upper outer breast, ER positive 81-90%, DE positive 31-40%, HER-2 FISH negative (IHC = 2+), Ki-67 = 70%  2.  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   BRCA-2 deleterious gene mutation    Patient currently getting treatment neoadjuvant chemotherapy with AC followed by weekly Taxol for 12 weeks    Interval History:  Overall patient reports feeling well.  She noted some mild rash on the upper chest and face after chemo.  Patient denies skin lesions denies fever chills sweats cough shortness of breath chest pain nausea vomiting diarrhea abdominal pain        Review of Systems:  14 point ROS of systems including Constitutional, Eyes, Respiratory, Cardiovascular, Gastroenterology, Genitourinary, Integumentary, Muscularskeletal, Psychiatric were all negative except for pertinent positives noted in my HPI.      Physical Examination:  General: The patient is a pleasant female in no acute distress.  /70   Pulse 86   Temp 98.5  F (36.9  C) (Oral)   Resp 16   LMP 11/14/2023   SpO2 97%   HEENT: EOMI, PERRL. Sclerae are anicteric. Oral mucosa is pink and moist with no lesions or thrush.   Lymph: Neck is supple with no lymphadenopathy in the cervical or supraclavicular areas.   Heart: Regular rate and rhythm.   Lungs: Clear to auscultation bilaterally.   GI: Bowel sounds present, soft, nontender with no palpable hepatosplenomegaly or masses.   Extremities: No lower extremity edema noted bilaterally.   Skin: No rashes, petechiae, or bruising noted on exposed skin.    Laboratory Data:  CBC and CMP results reviewed      Assessment and  Plan:      bilateral breast cancer  Follows with Dr. Dan  Currently undergoing treatment with neoadjuvant AC for 4 cycles followed by weekly Taxol for 12 weeks  Today patient will get fourth cycle of AC  -01/16/24-MRI of the breasts  01/1749-[id number] appointment with Dr. Dan prior to Taxol    Skin rash  Mild  Apply hydrocortisone cream  Okay to take Claritin    Leukocytosis  Secondary to Neulasta.   Advised patient to call our clinic and when to fever chills sweats or any symptoms of infection    Please call clinic with any change in her condition or questions    NATE Mallory [facility] [place]     Chart documentation with Dragon Voice recognition Software. Although reviewed after completion, some words and grammatical errors may remain.

## 2024-01-03 NOTE — LETTER
1/3/2024         RE: Angie Michael  [address] [place] [id number]        Dear Colleague,    Thank you for referring your patient, Angie Michael, to the [facility]. Please see a copy of my visit note below.    Saw patient in [facility]     Oncology/Hematology Visit Note  Winston 3, 2024    Reason for Visit: follow up of bilateral breast cancer    Clinical prognostic stage IIB, cT2-N1-M0, multifocal grade 3 invasive lobular carcinoma of the RIGHT upper outer breast, ER positive 81-90%, PA positive 31-40%, HER-2 FISH negative (IHC = 2+), Ki-67 = 70%  2.  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   BRCA-2 deleterious gene mutation    Patient currently getting treatment neoadjuvant chemotherapy with AC followed by weekly Taxol for 12 weeks    Interval History:  Overall patient reports feeling well.  She noted some mild rash on the upper chest and face after chemo.  Patient denies skin lesions denies fever chills sweats cough shortness of breath chest pain nausea vomiting diarrhea abdominal pain        Review of Systems:  14 point ROS of systems including Constitutional, Eyes, Respiratory, Cardiovascular, Gastroenterology, Genitourinary, Integumentary, Muscularskeletal, Psychiatric were all negative except for pertinent positives noted in my HPI.      Physical Examination:  General: The patient is a pleasant female in no acute distress.  /70   Pulse 86   Temp 98.5  F (36.9  C) (Oral)   Resp 16   LMP 11/14/2023   SpO2 97%   HEENT: EOMI, PERRL. Sclerae are anicteric. Oral mucosa is pink and moist with no lesions or thrush.   Lymph: Neck is supple with no lymphadenopathy in the cervical or supraclavicular areas.   Heart: Regular rate and rhythm.   Lungs: Clear to auscultation bilaterally.   GI: Bowel sounds  present, soft, nontender with no palpable hepatosplenomegaly or masses.   Extremities: No lower extremity edema noted bilaterally.   Skin: No rashes, petechiae, or bruising noted on exposed skin.    Laboratory Data:  CBC and CMP results reviewed      Assessment and Plan:      bilateral breast cancer  Follows with Dr. Dan  Currently undergoing treatment with neoadjuvant AC for 4 cycles followed by weekly Taxol for 12 weeks  Today patient will get fourth cycle of AC  -01/16/24-MRI of the breasts  01/[id number] appointment with Dr. Dan prior to Taxol    Skin rash  Mild  Apply hydrocortisone cream  Okay to take Claritin    Leukocytosis  Secondary to Neulasta.   Advised patient to call our clinic and when to fever chills sweats or any symptoms of infection    Please call clinic with any change in her condition or questions    NATE Mallory CNP  [facility] [place]     Chart documentation with Dragon Voice recognition Software. Although reviewed after completion, some words and grammatical errors may remain.          Again, thank you for allowing me to participate in the care of your patient.        Sincerely,        NATE Mallory CNP

## 2024-01-11 NOTE — PROGRESS NOTES
[facility], [place]    Hematology/Oncology Established Patient Note      Today's Date: 1/17/2024    Reason for visit: Bilateral breast cancer.    HISTORY OF PRESENT ILLNESS: Angie Michael is a 43 year old female with BRCA2 gene mutation who presents with the following oncologic history:  1.  10/05/2023: High risk surveillance breast MRI showed right breast 3.6 x 3 x 3.2 cm irregular enhancing mass at 10:00, 0.9 x 0.8 x 0.9 cm second mass at 9:00, 1 x 0.9 x 1.2 cm third mass at 9:30, other suspicious foci of enhancement throughout the breast.  Left breast with 9.9 x 5.2 x 6.7 cm suspicious non-mass enhancement at 10:00-7:00; 1.6 x 1.3 x 1.7 cm enhancing mass at 4:00; 1.8 x 1.6 x 1.9 cm enhancing mass at 12:30; other suspicious foci of enhancement throughout the left breast.  One level 1 suspicious right axillary lymph node and one level 1 suspicious left axillary lymph node..  Developing suspicious interpectoral lymph node.  2.  10/12/2023: Bilateral diagnostic mammogram showed right breast with 3.2 x 2.7 x 4.1 cm irregular mass at 10:00, 6 cm from nipple; 0.9 x 0.6 x 1 cm irregular mass at 11:00, 2 cm from nipple and prominent right axillary lymph node.  Left breast with 1.4 x 1.1 x 1.7 cm mass at 12:00, 2 cm from nipple, 1.8 x 1 x 1.6 cm mass at 3:30, 4 cm from nipple, 1 x 0.9 x 1.1 cm mass at 3:00, 7 cm from nipple, multiple areas of hypoechogenicity with calcifications in the lateral left breast, and prominent left axillary lymph node.  3.  10/12/2023: Biopsy of right breast at 10:00 showed grade 3 invasive lobular carcinoma, ER strongly positive at 81-90%, NM positive at 31-40%, HER2 equivocal with IHC = 2+, FISH negative, Ki-67 = 70%.  Biopsy of right axillary lymph node showed metastatic carcinoma.  Biopsy of left breast at 3:30 showed grade 2 invasive carcinoma with ductal and lobular features, ER positive at %, NM positive at 2%, HER2 equivocal with IHC = 2+, FISH negative, Ki-67 = 26%.   Biopsy of left axillary lymph node showed metastatic carcinoma.  4.  10/17/2023: PET/CT scan showed cluster of hypermetabolic right breast nodules with SUV max 14.9; 2.1 x 1 cm hypermetabolic level 1 right axillary lymph node with SUV max 5.5; hypermetabolic left breast nodule with SUV max 18.8 and several additional less intense hypermetabolic left breast nodules; 1.5 x 1.3 cm hypermetabolic level 1 left axillary lymph node with SUV max 4.  Small sclerotic focus of metabolic activity within the proximal tibial shaft with SUV max 3.7, indeterminate but unlikely to reflect metastatic disease.  Subcentimeter hypermetabolic focus along anterior margin of the left thyroid lobe, likely a thyroid nodule.  5.  11/03/2023: Thyroid ultrasound showed 0.7 x 0.6 x 0.5 cm solid hypoechoic nodule at the junction of the isthmus and left hemithyroid corresponding to increased uptake on PET/CT, consistent with TIRADS 4 nodule and subcentimeter TIRADS 3 nodule in the left inferior thyroid.  6. 11/3/2023: MR right tibia/fibula showed discrete focal 3 cm area of increased T2 signal and enhancement in mid shaft of right tibia with fat signal interspersed throughout this area with significant signal dropout on opposed phase imaging, favoring benign process.  7. 11/21/2023: Started neoadjuvant chemotherapy with dose-dense Adriamycin + Cytoxan with Neulasta support with Dr. Bev Ryan at [facility]. Reacted to Emend.  8. 11/25/2023: Evaluated at [facility] ED with diarrhea and dizziness, latter attributed to olanzapine -- this was discontinued.  9. 1/03/2024: Completed cycle 4 dose-dense AC.  10. 1/16/2024: Breast MRI showed persistent abnormal mass and nonmass enhancement in the left breast measuring 10.2 cm in AP dimension (unchanged).  Previously seen mass at the 12:30 position now measures 1.0 cm in greatest dimension (previously  1.9 cm). Previously seen seen mass at the 4:00 position now measures 1.1 cm in greatest dimension  (previously 1.7 cm). Previously seen enlarged left axillary lymph node now has normal size and morphology. Previously seen intrapectoral lymph node now has normal size and morphology. Previously seen abnormal right breast enhancement has entirely resolved. Previously visualized enlarged right axillary lymph node now has normal size and morphology.  Other previously seen axillary lymph nodes appear stable.  11. 1/17/2024: Start of weekly paclitaxel for planned 12 weeks.    INTERVAL HISTORY:  Angie reports feeling only mild fatigue for 2 days after AC, tolerating this very well.  She does IV infusions for extra hydration after each chemo and drinks green juice.  She has been busy taking care of her triplets.    REVIEW OF SYSTEMS:   14 point ROS was reviewed and is negative other than as noted above in HPI.       HOME MEDICATIONS:  Current Outpatient Medications   Medication Sig Dispense Refill    diazepam (VALIUM) 2 MG tablet Take 1 tab at least 30 min prior to MRI 4 tablet 0    Ferrous Gluconate 239 (27 Fe) MG TABS Take 1 tablet by mouth daily      HYDROcodone-acetaminophen (NORCO) 5-325 MG tablet Take 1-2 tablets by mouth every 4 hours as needed for moderate to severe pain 5 tablet 0    LORazepam (ATIVAN) 0.5 MG tablet Take 1 tablet (0.5 mg) by mouth every 6 hours as needed for nausea 30 tablet 0    senna-docusate (SENOKOT-S/PERICOLACE) 8.6-50 MG tablet Take 1-2 tablets by mouth 2 times daily 30 tablet 0         ALLERGIES:  Allergies   Allergen Reactions    Fosaprepitant Unknown     Allergy added per chart review. [facility]    Codeine Nausea and Vomiting         PAST MEDICAL HISTORY:  BRCA2 gene mutation.  Bilateral breast cancer as outlined above.  9/2022 Galleri test - negative for malignancy.    Gynecologic history:  Age of menarche at 15.  3 children/triplets -- 1 boy and twin girls born 1/2022.  Age of first pregnancy at 41.  Used 10 years of OCPs no HRT, IVF for fertility treatment.      PAST SURGICAL  HISTORY:  Past Surgical History:   Procedure Laterality Date    ABDOMEN SURGERY          ENT SURGERY      tonsillectomy    GYN SURGERY      egg retrieval for IVF    INSERT PORT VASCULAR ACCESS Right 2023    Procedure: INSERTION, VASCULAR ACCESS PORT;  Surgeon: Fanny Little MD;  Location:  OR    ORTHOPEDIC SURGERY      knee arthroscopy         SOCIAL HISTORY:  Social History     Socioeconomic History    Marital status:      Spouse name: Not on file    Number of children: Not on file    Years of education: Not on file    Highest education level: Not on file   Occupational History    Not on file   Tobacco Use    Smoking status: Never    Smokeless tobacco: Never   Vaping Use    Vaping Use: Never used   Substance and Sexual Activity    Alcohol use: Not Currently    Drug use: Not Currently    Sexual activity: Not on file   Other Topics Concern    Not on file   Social History Narrative    Not on file     Social Determinants of Health     Financial Resource Strain: Not on file   Food Insecurity: Not on file   Transportation Needs: Not on file   Physical Activity: Not on file   Stress: Not on file   Social Connections: Not on file   Interpersonal Safety: Not on file   Housing Stability: Not on file         FAMILY HISTORY:  Paternal grandmother and many paternal grandfathers with breast cancer; cousin with DCIS tested positive for BRCA1.  No ovarian, colon, or prostate cancer.      PHYSICAL EXAM:  Vital signs:  /81   Pulse 86   Temp 97.8  F (36.6  C) (Oral)   Resp 16   Wt 77.1 kg (170 lb)   LMP 2023   SpO2 97%   BMI 26.63 kg/m     GENERAL: No acute distress.  EYES: No scleral icterus. No overt erythema.  RESPIRATORY: No audible cough, wheezing, or labored breathing.  MUSCULOSKELETAL: Range of motion in the neck, shoulders, and arms appear normal.  SKIN: No overt rashes, discolorations, or lesions over the face and neck.  NEUROLOGIC: Alert.  No overt tremors.  PSYCHIATRIC: Normal  affect and mood.  Does not appear anxious.   Remaining exam deferred for detailed discussion today.      LABS:  CBC RESULTS:   Recent Labs   Lab Test 01/16/24  0903   WBC 13.2*   RBC 3.89   HGB 11.7   HCT 35.6   MCV 92   MCH 30.1   MCHC 32.9   RDW 14.8        Last Comprehensive Metabolic Panel:  Sodium   Date Value Ref Range Status   01/16/2024 141 135 - 145 mmol/L Final     Comment:     Reference intervals for this test were updated on 09/26/2023 to more accurately reflect our healthy population. There may be differences in the flagging of prior results with similar values performed with this method. Interpretation of those prior results can be made in the context of the updated reference intervals.      Potassium   Date Value Ref Range Status   01/16/2024 4.4 3.4 - 5.3 mmol/L Final     Chloride   Date Value Ref Range Status   01/16/2024 107 98 - 107 mmol/L Final     Carbon Dioxide (CO2)   Date Value Ref Range Status   01/16/2024 27 22 - 29 mmol/L Final     Anion Gap   Date Value Ref Range Status   01/16/2024 7 7 - 15 mmol/L Final     Glucose   Date Value Ref Range Status   01/16/2024 99 70 - 99 mg/dL Final     Urea Nitrogen   Date Value Ref Range Status   01/16/2024 12.6 6.0 - 20.0 mg/dL Final     Creatinine   Date Value Ref Range Status   01/16/2024 0.70 0.51 - 0.95 mg/dL Final     GFR Estimate   Date Value Ref Range Status   01/16/2024 >90 >60 mL/min/1.73m2 Final     Calcium   Date Value Ref Range Status   01/16/2024 9.1 8.6 - 10.0 mg/dL Final     Bilirubin Total   Date Value Ref Range Status   01/16/2024 0.3 <=1.2 mg/dL Final     Alkaline Phosphatase   Date Value Ref Range Status   01/16/2024 99 40 - 150 U/L Final     Comment:     Reference intervals for this test were updated on 11/14/2023 to more accurately reflect our healthy population. There may be differences in the flagging of prior results with similar values performed with this method. Interpretation of those prior results can be made in the  context of the updated reference intervals.     ALT   Date Value Ref Range Status   01/16/2024 23 0 - 50 U/L Final     Comment:     Reference intervals for this test were updated on 6/12/2023 to more accurately reflect our healthy population. There may be differences in the flagging of prior results with similar values performed with this method. Interpretation of those prior results can be made in the context of the updated reference intervals.       AST   Date Value Ref Range Status   01/16/2024 22 0 - 45 U/L Final     Comment:     Reference intervals for this test were updated on 6/12/2023 to more accurately reflect our healthy population. There may be differences in the flagging of prior results with similar values performed with this method. Interpretation of those prior results can be made in the context of the updated reference intervals.       IMAGING:  Reviewed as per HPI.    ASSESSMENT/PLAN:  Angie Michael is a 43 year old female with the following issues:  1.  Clinical prognostic stage IIB, cT2-N1-M0, multifocal grade 3 invasive lobular carcinoma of the RIGHT upper outer breast, ER positive 81-90%, AZ positive 31-40%, HER-2 FISH negative (IHC = 2+), Ki-67 = 70%  2.  Clinical prognostic stage IIA, cT3-N1-M0, multifocal grade 2 invasive ductal and lobular carcinoma of LEFT breast overlapping sites, ER positive %, AZ positive 2%, HER-2 FISH negative (IHC = 2+), Ki-67 = 26%  3. BRCA-2 deleterious gene mutation  -- Angie has completed 4 cycles of neoadjuvant ddAC so far.  -- I personally reviewed Angie's 1/16/2023 breast MRI and showed her the images.  The MRI showed persistent abnormal mass and nonmass enhancement in the left breast measuring 10.2 cm in AP dimension (unchanged).  Previously seen mass at the 12:30 position now measures 1.0 cm in greatest dimension (previously 1.9 cm). Previously seen seen mass at the 4:00 position now measures 1.1 cm in greatest dimension (previously 1.7 cm). Previously  seen enlarged left axillary lymph node now has normal size and morphology. Previously seen intrapectoral lymph node now has normal size and morphology. Previously seen abnormal right breast enhancement has entirely resolved. Previously visualized enlarged right axillary lymph node now has normal size and morphology.  Other previously seen axillary lymph nodes appear stable.  All these findings are consistent with excellent complete response on the right and partial response on the left.  -- Reviewed that her WBC is 13.2, Hgb 11.7, platelets 157,000, CMP normal.  - She will commence weekly paclitaxel for 12 weeks.    -- She asked about whether she would need to complete all 12 doses of paclitaxel given concern for side effects such as peripheral neuropathy.  Discussed that a repeat breast MRI could be obtained during paclitaxel if she develops significant neuropathy and if under consideration to stop paclitaxel early.  -- She will revisit with Dr. Fanny Little for bilateral mastectomies after completion of chemotherapy.  She already met with Dr. Vasiliy Yee for discussion of breast reconstruction.  -Given biopsy-proven lymph node disease, adjuvant radiation therapy would be advised.  She has already met with Dr. Finesse Cortes for radiation discussion.  - Given her extent of disease bilaterally, she would benefit from adjuvant abemaciclib for 2 years along with hormone blockade therapy as per the MonarchE trial.  -Adjuvant olaparib (Lynparza) for 1 year would be considered as well.  I do note that based on the Tomas trial, those patients who had hormone receptor positive HER2 negative breast cancer were required to have at least 4 pathologically confirmed positive lymph nodes.    -I would advise hormone blockade therapy with ovarian suppression therapy with Zoladex with an aromatase inhibitor after surgery.  She already removed her fallopian tubes and will undergo BSO in the future.  This will be discussed  again after completion of chemotherapy and surgery.    4.  Right tibia bone lesion  - The 11/3/2023 MR right tibia/fibula showed a 3 cm area of increased T2 signal in the midshaft area.  Although the signaling suggested a benign process, this remains technically indeterminate.  Plan to repeat MRI of this area on 2/9/2024.    5.  Fertility discussion  -Angie has [id number] triplets.  She has saved embryos.  She has not yet decided whether to have more children.  - Following completion of chemotherapy followed by radiation, hormone blockade therapy would be advised for 2-3 years before bearing more children.  She has also briefly considered a surrogate if she wishes to have more children.  Will discuss again after chemotherapy and surgery have been completed.      Monse Dan MD  Hematology/Oncology  [facility]    Total time spent today: 49 minutes in chart review, patient evaluation, counseling, documentation, test and/or medication/prescription orders, and coordination of care.

## 2024-01-16 ENCOUNTER — HOSPITAL ENCOUNTER (OUTPATIENT)
Dept: MRI IMAGING | Facility: CLINIC | Age: 44
Discharge: HOME | End: 2024-01-16
Attending: INTERNAL MEDICINE
Payer: COMMERCIAL

## 2024-01-16 ENCOUNTER — HOSPITAL ENCOUNTER (OUTPATIENT)
Facility: CLINIC | Age: 44
Discharge: HOME | End: 2024-01-16
Admitting: RADIOLOGY
Payer: COMMERCIAL

## 2024-01-16 DIAGNOSIS — Z17.0 MALIGNANT NEOPLASM OF UPPER-OUTER QUADRANT OF BOTH BREASTS IN FEMALE, ESTROGEN RECEPTOR POSITIVE (H): ICD-10-CM

## 2024-01-16 DIAGNOSIS — Z17.0 MALIGNANT NEOPLASM OF OVERLAPPING SITES OF BOTH BREASTS IN FEMALE, ESTROGEN RECEPTOR POSITIVE (H): ICD-10-CM

## 2024-01-16 DIAGNOSIS — C50.411 MALIGNANT NEOPLASM OF UPPER-OUTER QUADRANT OF BOTH BREASTS IN FEMALE, ESTROGEN RECEPTOR POSITIVE (H): ICD-10-CM

## 2024-01-16 DIAGNOSIS — C50.412 MALIGNANT NEOPLASM OF UPPER-OUTER QUADRANT OF BOTH BREASTS IN FEMALE, ESTROGEN RECEPTOR POSITIVE (H): ICD-10-CM

## 2024-01-16 DIAGNOSIS — C50.411 MALIGNANT NEOPLASM OF UPPER-OUTER QUADRANT OF RIGHT BREAST IN FEMALE, ESTROGEN RECEPTOR POSITIVE (H): ICD-10-CM

## 2024-01-16 DIAGNOSIS — C50.811 MALIGNANT NEOPLASM OF OVERLAPPING SITES OF BOTH BREASTS IN FEMALE, ESTROGEN RECEPTOR POSITIVE (H): ICD-10-CM

## 2024-01-16 DIAGNOSIS — Z17.0 MALIGNANT NEOPLASM OF UPPER-OUTER QUADRANT OF RIGHT BREAST IN FEMALE, ESTROGEN RECEPTOR POSITIVE (H): ICD-10-CM

## 2024-01-16 DIAGNOSIS — C50.812 MALIGNANT NEOPLASM OF OVERLAPPING SITES OF BOTH BREASTS IN FEMALE, ESTROGEN RECEPTOR POSITIVE (H): ICD-10-CM

## 2024-01-16 DIAGNOSIS — M89.9 BONE LESION: ICD-10-CM

## 2024-01-16 LAB
ALBUMIN SERPL BCG-MCNC: 4.2 G/DL (ref 3.5–5.2)
ALP SERPL-CCNC: 99 U/L (ref 40–150)
ALT SERPL W P-5'-P-CCNC: 23 U/L (ref 0–50)
ANION GAP SERPL CALCULATED.3IONS-SCNC: 7 MMOL/L (ref 7–15)
AST SERPL W P-5'-P-CCNC: 22 U/L (ref 0–45)
BASOPHILS # BLD AUTO: ABNORMAL 10*3/UL
BASOPHILS # BLD MANUAL: 0 10E3/UL (ref 0–0.2)
BASOPHILS NFR BLD AUTO: ABNORMAL %
BASOPHILS NFR BLD MANUAL: 0 %
BILIRUB SERPL-MCNC: 0.3 MG/DL
BUN SERPL-MCNC: 12.6 MG/DL (ref 6–20)
CALCIUM SERPL-MCNC: 9.1 MG/DL (ref 8.6–10)
CHLORIDE SERPL-SCNC: 107 MMOL/L (ref 98–107)
CREAT SERPL-MCNC: 0.7 MG/DL (ref 0.51–0.95)
DEPRECATED HCO3 PLAS-SCNC: 27 MMOL/L (ref 22–29)
EGFRCR SERPLBLD CKD-EPI 2021: >90 ML/MIN/1.73M2
EOSINOPHIL # BLD AUTO: ABNORMAL 10*3/UL
EOSINOPHIL # BLD MANUAL: 0.3 10E3/UL (ref 0–0.7)
EOSINOPHIL NFR BLD AUTO: ABNORMAL %
EOSINOPHIL NFR BLD MANUAL: 2 %
ERYTHROCYTE [DISTWIDTH] IN BLOOD BY AUTOMATED COUNT: 14.8 % (ref 10–15)
GLUCOSE SERPL-MCNC: 99 MG/DL (ref 70–99)
HCT VFR BLD AUTO: 35.6 % (ref 35–47)
HGB BLD-MCNC: 11.7 G/DL (ref 11.7–15.7)
IMM GRANULOCYTES # BLD: ABNORMAL 10*3/UL
IMM GRANULOCYTES NFR BLD: ABNORMAL %
LYMPHOCYTES # BLD AUTO: ABNORMAL 10*3/UL
LYMPHOCYTES # BLD MANUAL: 1.5 10E3/UL (ref 0.8–5.3)
LYMPHOCYTES NFR BLD AUTO: ABNORMAL %
LYMPHOCYTES NFR BLD MANUAL: 11 %
MCH RBC QN AUTO: 30.1 PG (ref 26.5–33)
MCHC RBC AUTO-ENTMCNC: 32.9 G/DL (ref 31.5–36.5)
MCV RBC AUTO: 92 FL (ref 78–100)
METAMYELOCYTES # BLD MANUAL: 0.3 10E3/UL
METAMYELOCYTES NFR BLD MANUAL: 2 %
MONOCYTES # BLD AUTO: ABNORMAL 10*3/UL
MONOCYTES # BLD MANUAL: 0.3 10E3/UL (ref 0–1.3)
MONOCYTES NFR BLD AUTO: ABNORMAL %
MONOCYTES NFR BLD MANUAL: 2 %
NEUTROPHILS # BLD AUTO: ABNORMAL 10*3/UL
NEUTROPHILS # BLD MANUAL: 11 10E3/UL (ref 1.6–8.3)
NEUTROPHILS NFR BLD AUTO: ABNORMAL %
NEUTROPHILS NFR BLD MANUAL: 83 %
NRBC # BLD AUTO: 0 10E3/UL
NRBC BLD AUTO-RTO: 0 /100
PLAT MORPH BLD: ABNORMAL
PLATELET # BLD AUTO: 157 10E3/UL (ref 150–450)
POTASSIUM SERPL-SCNC: 4.4 MMOL/L (ref 3.4–5.3)
PROT SERPL-MCNC: 6.9 G/DL (ref 6.4–8.3)
RBC # BLD AUTO: 3.89 10E6/UL (ref 3.8–5.2)
RBC MORPH BLD: ABNORMAL
SODIUM SERPL-SCNC: 141 MMOL/L (ref 135–145)
WBC # BLD AUTO: 13.2 10E3/UL (ref 4–11)

## 2024-01-16 PROCEDURE — 85007 BL SMEAR W/DIFF WBC COUNT: CPT | Performed by: NURSE PRACTITIONER

## 2024-01-16 PROCEDURE — 36591 DRAW BLOOD OFF VENOUS DEVICE: CPT | Performed by: NURSE PRACTITIONER

## 2024-01-16 PROCEDURE — 77049 MRI BREAST C-+ W/CAD BI: CPT

## 2024-01-16 PROCEDURE — 255N000002 HC RX 255 OP 636: Performed by: INTERNAL MEDICINE

## 2024-01-16 PROCEDURE — A9585 GADOBUTROL INJECTION: HCPCS | Performed by: INTERNAL MEDICINE

## 2024-01-16 PROCEDURE — 85027 COMPLETE CBC AUTOMATED: CPT | Performed by: NURSE PRACTITIONER

## 2024-01-16 PROCEDURE — 80053 COMPREHEN METABOLIC PANEL: CPT | Performed by: NURSE PRACTITIONER

## 2024-01-16 PROCEDURE — 999N000154 HC STATISTIC RADIOLOGY XRAY, US, CT, MAR, NM

## 2024-01-16 PROCEDURE — 250N000011 HC RX IP 250 OP 636: Performed by: PHYSICIAN ASSISTANT

## 2024-01-16 RX ORDER — HEPARIN SODIUM (PORCINE) LOCK FLUSH IV SOLN 100 UNIT/ML 100 UNIT/ML
5-10 SOLUTION INTRAVENOUS
Status: DISCONTINUED | OUTPATIENT
Start: 2024-01-16 | End: 2024-01-16 | Stop reason: HOSPADM

## 2024-01-16 RX ORDER — GADOBUTROL 604.72 MG/ML
7 INJECTION INTRAVENOUS ONCE
Status: COMPLETED | OUTPATIENT
Start: 2024-01-16 | End: 2024-01-16

## 2024-01-16 RX ORDER — HEPARIN SODIUM,PORCINE 10 UNIT/ML
5-10 VIAL (ML) INTRAVENOUS EVERY 24 HOURS
Status: DISCONTINUED | OUTPATIENT
Start: 2024-01-16 | End: 2024-01-16 | Stop reason: HOSPADM

## 2024-01-16 RX ORDER — HEPARIN SODIUM,PORCINE 10 UNIT/ML
5-10 VIAL (ML) INTRAVENOUS
Status: DISCONTINUED | OUTPATIENT
Start: 2024-01-16 | End: 2024-01-16 | Stop reason: HOSPADM

## 2024-01-16 RX ADMIN — GADOBUTROL 7 ML: 604.72 INJECTION INTRAVENOUS at 08:16

## 2024-01-16 RX ADMIN — HEPARIN SODIUM (PORCINE) LOCK FLUSH IV SOLN 100 UNIT/ML 5 ML: 100 SOLUTION at 09:05

## 2024-01-16 ASSESSMENT — ACTIVITIES OF DAILY LIVING (ADL)
ADLS_ACUITY_SCORE: 35
ADLS_ACUITY_SCORE: 35

## 2024-01-16 NOTE — PROGRESS NOTES
0815 Power Port accessed without difficulty. Angie tolerated this well.    0905  [id number] without difficulty.

## 2024-01-17 ENCOUNTER — INFUSION THERAPY VISIT (OUTPATIENT)
Dept: INFUSION THERAPY | Facility: CLINIC | Age: 44
End: 2024-01-17
Attending: INTERNAL MEDICINE
Payer: COMMERCIAL

## 2024-01-17 ENCOUNTER — ONCOLOGY VISIT (OUTPATIENT)
Dept: ONCOLOGY | Facility: CLINIC | Age: 44
End: 2024-01-17
Attending: INTERNAL MEDICINE
Payer: COMMERCIAL

## 2024-01-17 VITALS
BODY MASS INDEX: 26.63 KG/M2 | DIASTOLIC BLOOD PRESSURE: 81 MMHG | OXYGEN SATURATION: 97 % | SYSTOLIC BLOOD PRESSURE: 139 MMHG | HEART RATE: 86 BPM | RESPIRATION RATE: 16 BRPM | TEMPERATURE: 97.8 F | WEIGHT: 170 LBS

## 2024-01-17 DIAGNOSIS — C50.812 MALIGNANT NEOPLASM OF OVERLAPPING SITES OF BOTH BREASTS IN FEMALE, ESTROGEN RECEPTOR POSITIVE (H): Primary | ICD-10-CM

## 2024-01-17 DIAGNOSIS — Z17.0 MALIGNANT NEOPLASM OF UPPER-OUTER QUADRANT OF RIGHT BREAST IN FEMALE, ESTROGEN RECEPTOR POSITIVE (H): Primary | ICD-10-CM

## 2024-01-17 DIAGNOSIS — C50.411 MALIGNANT NEOPLASM OF UPPER-OUTER QUADRANT OF RIGHT BREAST IN FEMALE, ESTROGEN RECEPTOR POSITIVE (H): Primary | ICD-10-CM

## 2024-01-17 DIAGNOSIS — C50.811 MALIGNANT NEOPLASM OF OVERLAPPING SITES OF BOTH BREASTS IN FEMALE, ESTROGEN RECEPTOR POSITIVE (H): Primary | ICD-10-CM

## 2024-01-17 DIAGNOSIS — M89.9 BONE LESION: ICD-10-CM

## 2024-01-17 DIAGNOSIS — Z17.0 MALIGNANT NEOPLASM OF OVERLAPPING SITES OF BOTH BREASTS IN FEMALE, ESTROGEN RECEPTOR POSITIVE (H): Primary | ICD-10-CM

## 2024-01-17 PROCEDURE — G0463 HOSPITAL OUTPT CLINIC VISIT: HCPCS | Mod: 25 | Performed by: INTERNAL MEDICINE

## 2024-01-17 PROCEDURE — 250N000011 HC RX IP 250 OP 636: Performed by: INTERNAL MEDICINE

## 2024-01-17 PROCEDURE — 99215 OFFICE O/P EST HI 40 MIN: CPT | Performed by: INTERNAL MEDICINE

## 2024-01-17 PROCEDURE — 258N000003 HC RX IP 258 OP 636: Performed by: INTERNAL MEDICINE

## 2024-01-17 PROCEDURE — 96413 CHEMO IV INFUSION 1 HR: CPT

## 2024-01-17 PROCEDURE — 96375 TX/PRO/DX INJ NEW DRUG ADDON: CPT

## 2024-01-17 PROCEDURE — 96367 TX/PROPH/DG ADDL SEQ IV INF: CPT

## 2024-01-17 RX ORDER — HEPARIN SODIUM,PORCINE 10 UNIT/ML
5-20 VIAL (ML) INTRAVENOUS DAILY PRN
Status: CANCELLED | OUTPATIENT
Start: 2024-01-17

## 2024-01-17 RX ORDER — EPINEPHRINE 1 MG/ML
0.3 INJECTION, SOLUTION INTRAMUSCULAR; SUBCUTANEOUS EVERY 5 MIN PRN
Status: CANCELLED | OUTPATIENT
Start: 2024-01-17

## 2024-01-17 RX ORDER — LORAZEPAM 2 MG/ML
0.5 INJECTION INTRAMUSCULAR EVERY 4 HOURS PRN
Status: DISCONTINUED | OUTPATIENT
Start: 2024-01-17 | End: 2024-01-17 | Stop reason: HOSPADM

## 2024-01-17 RX ORDER — METHYLPREDNISOLONE SODIUM SUCCINATE 125 MG/2ML
125 INJECTION, POWDER, LYOPHILIZED, FOR SOLUTION INTRAMUSCULAR; INTRAVENOUS
Status: CANCELLED
Start: 2024-01-17

## 2024-01-17 RX ORDER — HEPARIN SODIUM (PORCINE) LOCK FLUSH IV SOLN 100 UNIT/ML 100 UNIT/ML
5 SOLUTION INTRAVENOUS
Status: DISCONTINUED | OUTPATIENT
Start: 2024-01-17 | End: 2024-01-17 | Stop reason: HOSPADM

## 2024-01-17 RX ORDER — ALBUTEROL SULFATE 90 UG/1
1-2 AEROSOL, METERED RESPIRATORY (INHALATION)
Status: CANCELLED
Start: 2024-01-17

## 2024-01-17 RX ORDER — ALBUTEROL SULFATE 0.83 MG/ML
2.5 SOLUTION RESPIRATORY (INHALATION)
Status: CANCELLED | OUTPATIENT
Start: 2024-01-17

## 2024-01-17 RX ORDER — HEPARIN SODIUM (PORCINE) LOCK FLUSH IV SOLN 100 UNIT/ML 100 UNIT/ML
5 SOLUTION INTRAVENOUS
Status: CANCELLED | OUTPATIENT
Start: 2024-01-17

## 2024-01-17 RX ORDER — MEPERIDINE HYDROCHLORIDE 25 MG/ML
25 INJECTION INTRAMUSCULAR; INTRAVENOUS; SUBCUTANEOUS EVERY 30 MIN PRN
Status: CANCELLED | OUTPATIENT
Start: 2024-01-17

## 2024-01-17 RX ORDER — LORAZEPAM 2 MG/ML
0.5 INJECTION INTRAMUSCULAR EVERY 4 HOURS PRN
Status: CANCELLED | OUTPATIENT
Start: 2024-01-17

## 2024-01-17 RX ORDER — DIPHENHYDRAMINE HCL 25 MG
50 CAPSULE ORAL ONCE
Status: CANCELLED
Start: 2024-01-17

## 2024-01-17 RX ORDER — HEPARIN SODIUM,PORCINE 10 UNIT/ML
5-20 VIAL (ML) INTRAVENOUS DAILY PRN
Status: DISCONTINUED | OUTPATIENT
Start: 2024-01-17 | End: 2024-01-17 | Stop reason: HOSPADM

## 2024-01-17 RX ORDER — DIPHENHYDRAMINE HYDROCHLORIDE 50 MG/ML
50 INJECTION INTRAMUSCULAR; INTRAVENOUS
Status: CANCELLED
Start: 2024-01-17

## 2024-01-17 RX ADMIN — Medication 5 ML: at 12:05

## 2024-01-17 RX ADMIN — SODIUM CHLORIDE 152 MG: 9 INJECTION, SOLUTION INTRAVENOUS at 10:27

## 2024-01-17 RX ADMIN — SODIUM CHLORIDE 250 ML: 9 INJECTION, SOLUTION INTRAVENOUS at 09:45

## 2024-01-17 RX ADMIN — LORAZEPAM 0.5 MG: 2 INJECTION INTRAMUSCULAR; INTRAVENOUS at 09:43

## 2024-01-17 RX ADMIN — FAMOTIDINE 20 MG: 10 INJECTION, SOLUTION INTRAVENOUS at 10:25

## 2024-01-17 RX ADMIN — DIPHENHYDRAMINE HYDROCHLORIDE 50 MG: 50 INJECTION, SOLUTION INTRAMUSCULAR; INTRAVENOUS at 10:10

## 2024-01-17 RX ADMIN — DEXAMETHASONE SODIUM PHOSPHATE: 10 INJECTION, SOLUTION INTRAMUSCULAR; INTRAVENOUS at 09:47

## 2024-01-17 ASSESSMENT — PAIN SCALES - GENERAL: PAINLEVEL: NO PAIN (0)

## 2024-01-17 NOTE — PROGRESS NOTES
Infusion Nursing Note:  Angie Michael presents today for taxol.    Patient seen by provider today: Yes: Sy   present during visit today: Not Applicable.    Note: N/A.      Intravenous Access:  Implanted Port.    Treatment Conditions:  Lab Results   Component Value Date    HGB 11.7 01/16/2024    WBC 13.2 (H) 01/16/2024    ANEU 11.0 (H) 01/16/2024     01/16/2024        Lab Results   Component Value Date     01/16/2024    POTASSIUM 4.4 01/16/2024    CR 0.70 01/16/2024    GWENDOLYN 9.1 01/16/2024    BILITOTAL 0.3 01/16/2024    ALBUMIN 4.2 01/16/2024    ALT 23 01/16/2024    AST 22 01/16/2024       Results reviewed, labs MET treatment parameters, ok to proceed with treatment.      Post Infusion Assessment:  Patient tolerated infusion without incident.  Site patent and intact, free from redness, edema or discomfort.  No evidence of extravasations.  Access discontinued per protocol.       Discharge Plan:   Patient and/or family verbalized understanding of discharge instructions and all questions answered.  AVS to patient via [email or web address].  Patient will return 1/24 for next appointment.   Patient discharged in stable condition accompanied by: self.  Departure Mode: Ambulatory.      Jacki Coronado RN

## 2024-01-17 NOTE — LETTER
1/17/2024         RE: Angie Michael  [address] [place] [id number]        Dear Colleague,    Thank you for referring your patient, Angie Michael, to the [facility]. Please see a copy of my visit note below.    [facility]    Hematology/Oncology Established Patient Note      Today's Date: 1/17/2024    Reason for visit: Bilateral breast cancer.    HISTORY OF PRESENT ILLNESS: Angie Michael is a 43 year old female with BRCA2 gene mutation who presents with the following oncologic history:  1.  10/05/2023: High risk surveillance breast MRI showed right breast 3.6 x 3 x 3.2 cm irregular enhancing mass at 10:00, 0.9 x 0.8 x 0.9 cm second mass at 9:00, 1 x 0.9 x 1.2 cm third mass at 9:30, other suspicious foci of enhancement throughout the breast.  Left breast with 9.9 x 5.2 x 6.7 cm suspicious non-mass enhancement at 10:00-7:00; 1.6 x 1.3 x 1.7 cm enhancing mass at 4:00; 1.8 x 1.6 x 1.9 cm enhancing mass at 12:30; other suspicious foci of enhancement throughout the left breast.  One level 1 suspicious right axillary lymph node and one level 1 suspicious left axillary lymph node..  Developing suspicious interpectoral lymph node.  2.  10/12/2023: Bilateral diagnostic mammogram showed right breast with 3.2 x 2.7 x 4.1 cm irregular mass at 10:00, 6 cm from nipple; 0.9 x 0.6 x 1 cm irregular mass at 11:00, 2 cm from nipple and prominent right axillary lymph node.  Left breast with 1.4 x 1.1 x 1.7 cm mass at 12:00, 2 cm from nipple, 1.8 x 1 x 1.6 cm mass at 3:30, 4 cm from nipple, 1 x 0.9 x 1.1 cm mass at 3:00, 7 cm from nipple, multiple areas of hypoechogenicity with calcifications in the lateral left breast, and prominent left axillary lymph node.  3.  10/12/2023: Biopsy of right breast at 10:00 showed grade 3 invasive lobular carcinoma, ER strongly positive at 81-90%, MD positive at 31-40%, HER2 equivocal with IHC = 2+, FISH negative, Ki-67 = 70%.  Biopsy of right  axillary lymph node showed metastatic carcinoma.  Biopsy of left breast at 3:30 showed grade 2 invasive carcinoma with ductal and lobular features, ER positive at %, MD positive at 2%, HER2 equivocal with IHC = 2+, FISH negative, Ki-67 = 26%.  Biopsy of left axillary lymph node showed metastatic carcinoma.  4.  10/17/2023: PET/CT scan showed cluster of hypermetabolic right breast nodules with SUV max 14.9; 2.1 x 1 cm hypermetabolic level 1 right axillary lymph node with SUV max 5.5; hypermetabolic left breast nodule with SUV max 18.8 and several additional less intense hypermetabolic left breast nodules; 1.5 x 1.3 cm hypermetabolic level 1 left axillary lymph node with SUV max 4.  Small sclerotic focus of metabolic activity within the proximal tibial shaft with SUV max 3.7, indeterminate but unlikely to reflect metastatic disease.  Subcentimeter hypermetabolic focus along anterior margin of the left thyroid lobe, likely a thyroid nodule.  5.  11/03/2023: Thyroid ultrasound showed 0.7 x 0.6 x 0.5 cm solid hypoechoic nodule at the junction of the isthmus and left hemithyroid corresponding to increased uptake on PET/CT, consistent with TIRADS 4 nodule and subcentimeter TIRADS 3 nodule in the left inferior thyroid.  6. 11/3/2023: MR right tibia/fibula showed discrete focal 3 cm area of increased T2 signal and enhancement in mid shaft of right tibia with fat signal interspersed throughout this area with significant signal dropout on opposed phase imaging, favoring benign process.  7. 11/21/2023: Started neoadjuvant chemotherapy with dose-dense Adriamycin + Cytoxan with Neulasta support with Dr. Bev Ryan at [facility]. Reacted to Emend.  8. 11/25/2023: [facility] and [facility]iness, latter attributed to olanzapine -- this was discontinued.  9. 1/03/2024: Completed cycle 4 dose-dense AC.  10. 1/16/2024: Breast MRI showed persistent abnormal mass and nonmass enhancement in the left  breast measuring 10.2 cm in AP dimension (unchanged).  Previously seen mass at the 12:30 position now measures 1.0 cm in greatest dimension (previously  1.9 cm). Previously seen seen mass at the 4:00 position now measures 1.1 cm in greatest dimension (previously 1.7 cm). Previously seen enlarged left axillary lymph node now has normal size and morphology. Previously seen intrapectoral lymph node now has normal size and morphology. Previously seen abnormal right breast enhancement has entirely resolved. Previously visualized enlarged right axillary lymph node now has normal size and morphology.  Other previously seen axillary lymph nodes appear stable.  11. 1/17/2024: Start of weekly paclitaxel for planned 12 weeks.    INTERVAL HISTORY:  Angie reports feeling only mild fatigue for 2 days after AC, tolerating this very well.  She does IV infusions for extra hydration after each chemo and drinks green juice.  She has been busy taking care of her triplets.    REVIEW OF SYSTEMS:   14 point ROS was reviewed and is negative other than as noted above in HPI.       HOME MEDICATIONS:  Current Outpatient Medications   Medication Sig Dispense Refill     diazepam (VALIUM) 2 MG tablet Take 1 tab at least 30 min prior to MRI 4 tablet 0     Ferrous Gluconate 239 (27 Fe) MG TABS Take 1 tablet by mouth daily       HYDROcodone-acetaminophen (NORCO) 5-325 MG tablet Take 1-2 tablets by mouth every 4 hours as needed for moderate to severe pain 5 tablet 0     LORazepam (ATIVAN) 0.5 MG tablet Take 1 tablet (0.5 mg) by mouth every 6 hours as needed for nausea 30 tablet 0     senna-docusate (SENOKOT-S/PERICOLACE) 8.6-50 MG tablet Take 1-2 tablets by mouth 2 times daily 30 tablet 0         ALLERGIES:  Allergies   Allergen Reactions     Fosaprepitant Unknown     Allergy added per chart review. [facility]     Codeine Nausea and Vomiting         PAST MEDICAL HISTORY:  BRCA2 gene mutation.  Bilateral breast cancer as outlined above.  9/2022  Radha test - negative for malignancy.    Gynecologic history:  Age of menarche at 15.  3 children/triplets -- 1 boy and twin girls born 2022.  Age of first pregnancy at 41.  Used 10 years of OCPs no HRT, IVF for fertility treatment.      PAST SURGICAL HISTORY:  Past Surgical History:   Procedure Laterality Date     ABDOMEN SURGERY           ENT SURGERY      tonsillectomy     GYN SURGERY      egg retrieval for IVF     INSERT PORT VASCULAR ACCESS Right 2023    Procedure: INSERTION, VASCULAR ACCESS PORT;  Surgeon: Fanny Little MD;  Location:  OR     ORTHOPEDIC SURGERY      knee arthroscopy         SOCIAL HISTORY:  Social History     Socioeconomic History     Marital status:      Spouse name: Not on file     Number of children: Not on file     Years of education: Not on file     Highest education level: Not on file   Occupational History     Not on file   Tobacco Use     Smoking status: Never     Smokeless tobacco: Never   Vaping Use     Vaping Use: Never used   Substance and Sexual Activity     Alcohol use: Not Currently     Drug use: Not Currently     Sexual activity: Not on file   Other Topics Concern     Not on file   Social History Narrative     Not on file     Social Determinants of Health     Financial Resource Strain: Not on file   Food Insecurity: Not on file   Transportation Needs: Not on file   Physical Activity: Not on file   Stress: Not on file   Social Connections: Not on file   Interpersonal Safety: Not on file   Housing Stability: Not on file         FAMILY HISTORY:  Paternal grandmother and many paternal grandfathers with breast cancer; cousin with DCIS tested positive for BRCA1.  No ovarian, colon, or prostate cancer.      PHYSICAL EXAM:  Vital signs:  /81   Pulse 86   Temp 97.8  F (36.6  C) (Oral)   Resp 16   Wt 77.1 kg (170 lb)   LMP 2023   SpO2 97%   BMI 26.63 kg/m     GENERAL: No acute distress.  EYES: No scleral icterus. No overt  erythema.  RESPIRATORY: No audible cough, wheezing, or labored breathing.  MUSCULOSKELETAL: Range of motion in the neck, shoulders, and arms appear normal.  SKIN: No overt rashes, discolorations, or lesions over the face and neck.  NEUROLOGIC: Alert.  No overt tremors.  PSYCHIATRIC: Normal affect and mood.  Does not appear anxious.   Remaining exam deferred for detailed discussion today.      LABS:  CBC RESULTS:   Recent Labs   Lab Test 01/16/24  0903   WBC 13.2*   RBC 3.89   HGB 11.7   HCT 35.6   MCV 92   MCH 30.1   MCHC 32.9   RDW 14.8        Last Comprehensive Metabolic Panel:  Sodium   Date Value Ref Range Status   01/16/2024 141 135 - 145 mmol/L Final     Comment:     Reference intervals for this test were updated on 09/26/2023 to more accurately reflect our healthy population. There may be differences in the flagging of prior results with similar values performed with this method. Interpretation of those prior results can be made in the context of the updated reference intervals.      Potassium   Date Value Ref Range Status   01/16/2024 4.4 3.4 - 5.3 mmol/L Final     Chloride   Date Value Ref Range Status   01/16/2024 107 98 - 107 mmol/L Final     Carbon Dioxide (CO2)   Date Value Ref Range Status   01/16/2024 27 22 - 29 mmol/L Final     Anion Gap   Date Value Ref Range Status   01/16/2024 7 7 - 15 mmol/L Final     Glucose   Date Value Ref Range Status   01/16/2024 99 70 - 99 mg/dL Final     Urea Nitrogen   Date Value Ref Range Status   01/16/2024 12.6 6.0 - 20.0 mg/dL Final     Creatinine   Date Value Ref Range Status   01/16/2024 0.70 0.51 - 0.95 mg/dL Final     GFR Estimate   Date Value Ref Range Status   01/16/2024 >90 >60 mL/min/1.73m2 Final     Calcium   Date Value Ref Range Status   01/16/2024 9.1 8.6 - 10.0 mg/dL Final     Bilirubin Total   Date Value Ref Range Status   01/16/2024 0.3 <=1.2 mg/dL Final     Alkaline Phosphatase   Date Value Ref Range Status   01/16/2024 99 40 - 150 U/L Final      Comment:     Reference intervals for this test were updated on 11/14/2023 to more accurately reflect our healthy population. There may be differences in the flagging of prior results with similar values performed with this method. Interpretation of those prior results can be made in the context of the updated reference intervals.     ALT   Date Value Ref Range Status   01/16/2024 23 0 - 50 U/L Final     Comment:     Reference intervals for this test were updated on 6/12/2023 to more accurately reflect our healthy population. There may be differences in the flagging of prior results with similar values performed with this method. Interpretation of those prior results can be made in the context of the updated reference intervals.       AST   Date Value Ref Range Status   01/16/2024 22 0 - 45 U/L Final     Comment:     Reference intervals for this test were updated on 6/12/2023 to more accurately reflect our healthy population. There may be differences in the flagging of prior results with similar values performed with this method. Interpretation of those prior results can be made in the context of the updated reference intervals.       IMAGING:  Reviewed as per HPI.    ASSESSMENT/PLAN:  Angie Michael is a 43 year old female with the following issues:  1.  Clinical prognostic stage IIB, cT2-N1-M0, multifocal grade 3 invasive lobular carcinoma of the RIGHT upper outer breast, ER positive 81-90%, WI positive 31-40%, HER-2 FISH negative (IHC = 2+), Ki-67 = 70%  2.  Clinical prognostic stage IIA, cT3-N1-M0, multifocal grade 2 invasive ductal and lobular carcinoma of LEFT breast overlapping sites, ER positive %, WI positive 2%, HER-2 FISH negative (IHC = 2+), Ki-67 = 26%  3. BRCA-2 deleterious gene mutation  -- Angie has completed 4 cycles of neoadjuvant ddAC so far.  -- I personally reviewed Angie's 1/16/2023 breast MRI and showed her the images.  The MRI showed persistent abnormal mass and nonmass enhancement in  the left breast measuring 10.2 cm in AP dimension (unchanged).  Previously seen mass at the 12:30 position now measures 1.0 cm in greatest dimension (previously 1.9 cm). Previously seen seen mass at the 4:00 position now measures 1.1 cm in greatest dimension (previously 1.7 cm). Previously seen enlarged left axillary lymph node now has normal size and morphology. Previously seen intrapectoral lymph node now has normal size and morphology. Previously seen abnormal right breast enhancement has entirely resolved. Previously visualized enlarged right axillary lymph node now has normal size and morphology.  Other previously seen axillary lymph nodes appear stable.  All these findings are consistent with excellent complete response on the right and partial response on the left.  -- Reviewed that her WBC is 13.2, Hgb 11.7, platelets 157,000, CMP normal.  - She will commence weekly paclitaxel for 12 weeks.    -- She asked about whether she would need to complete all 12 doses of paclitaxel given concern for side effects such as peripheral neuropathy.  Discussed that a repeat breast MRI could be obtained during paclitaxel if she develops significant neuropathy and if under consideration to stop paclitaxel early.  -- She will revisit with Dr. Fanny Little for bilateral mastectomies after completion of chemotherapy.  She already met with Dr. Vasiliy Yee for discussion of breast reconstruction.  -Given biopsy-proven lymph node disease, adjuvant radiation therapy would be advised.  She has already met with Dr. Finesse Cortes for radiation discussion.  - Given her extent of disease bilaterally, she would benefit from adjuvant abemaciclib for 2 years along with hormone blockade therapy as per the MonarchE trial.  -Adjuvant olaparib (Lynparza) for 1 year would be considered as well.  I do note that based on the Tomas trial, those patients who had hormone receptor positive HER2 negative breast cancer were required to have at  least 4 pathologically confirmed positive lymph nodes.    -I would advise hormone blockade therapy with ovarian suppression therapy with Zoladex with an aromatase inhibitor after surgery.  She already removed her fallopian tubes and will undergo BSO in the future.  This will be discussed again after completion of chemotherapy and surgery.    4.  Right tibia bone lesion  - The 11/3/2023 MR right tibia/fibula showed a 3 cm area of increased T2 signal in the midshaft area.  Although the signaling suggested a benign process, this remains technically indeterminate.  Plan to repeat MRI of this area on 2/9/2024.    5.  Fertility discussion  -Angie has 27-iplyy-wai triplets.  She has saved embryos.  She has not yet decided whether to have more children.  - Following completion of chemotherapy followed by radiation, hormone blockade therapy would be advised for 2-3 years before bearing more children.  She has also briefly considered a surrogate if she wishes to have more children.  Will discuss again after chemotherapy and surgery have been completed.      Monse Dan MD  Hematology/Oncology  [facility]    Total time spent today: 49 minutes in chart review, patient evaluation, counseling, documentation, test and/or medication/prescription orders, and coordination of care.        Again, thank you for allowing me to participate in the care of your patient.        Sincerely,        Monse Dan MD

## 2024-01-21 RX ORDER — LORAZEPAM 2 MG/ML
0.5 INJECTION INTRAMUSCULAR EVERY 4 HOURS PRN
Status: CANCELLED | OUTPATIENT
Start: 2024-01-31

## 2024-01-21 RX ORDER — LORAZEPAM 2 MG/ML
0.5 INJECTION INTRAMUSCULAR EVERY 4 HOURS PRN
Status: CANCELLED | OUTPATIENT
Start: 2024-01-24

## 2024-01-21 RX ORDER — MEPERIDINE HYDROCHLORIDE 25 MG/ML
25 INJECTION INTRAMUSCULAR; INTRAVENOUS; SUBCUTANEOUS EVERY 30 MIN PRN
Status: CANCELLED | OUTPATIENT
Start: 2024-01-24

## 2024-01-21 RX ORDER — EPINEPHRINE 1 MG/ML
0.3 INJECTION, SOLUTION, CONCENTRATE INTRAVENOUS EVERY 5 MIN PRN
Status: CANCELLED | OUTPATIENT
Start: 2024-01-31

## 2024-01-21 RX ORDER — EPINEPHRINE 1 MG/ML
0.3 INJECTION, SOLUTION, CONCENTRATE INTRAVENOUS EVERY 5 MIN PRN
Status: CANCELLED | OUTPATIENT
Start: 2024-01-24

## 2024-01-21 RX ORDER — HEPARIN SODIUM (PORCINE) LOCK FLUSH IV SOLN 100 UNIT/ML 100 UNIT/ML
5 SOLUTION INTRAVENOUS
Status: CANCELLED | OUTPATIENT
Start: 2024-01-24

## 2024-01-21 RX ORDER — DIPHENHYDRAMINE HYDROCHLORIDE 50 MG/ML
50 INJECTION INTRAMUSCULAR; INTRAVENOUS
Status: CANCELLED
Start: 2024-01-31

## 2024-01-21 RX ORDER — DIPHENHYDRAMINE HYDROCHLORIDE 50 MG/ML
50 INJECTION INTRAMUSCULAR; INTRAVENOUS
Status: CANCELLED
Start: 2024-01-24

## 2024-01-21 RX ORDER — ALBUTEROL SULFATE 0.83 MG/ML
2.5 SOLUTION RESPIRATORY (INHALATION)
Status: CANCELLED | OUTPATIENT
Start: 2024-01-31

## 2024-01-21 RX ORDER — ONDANSETRON 2 MG/ML
8 INJECTION INTRAMUSCULAR; INTRAVENOUS ONCE
Status: CANCELLED | OUTPATIENT
Start: 2024-01-31

## 2024-01-21 RX ORDER — HEPARIN SODIUM,PORCINE 10 UNIT/ML
5-20 VIAL (ML) INTRAVENOUS DAILY PRN
Status: CANCELLED | OUTPATIENT
Start: 2024-01-31

## 2024-01-21 RX ORDER — HEPARIN SODIUM (PORCINE) LOCK FLUSH IV SOLN 100 UNIT/ML 100 UNIT/ML
5 SOLUTION INTRAVENOUS
Status: CANCELLED | OUTPATIENT
Start: 2024-01-31

## 2024-01-21 RX ORDER — ALBUTEROL SULFATE 90 UG/1
1-2 AEROSOL, METERED RESPIRATORY (INHALATION)
Status: CANCELLED
Start: 2024-01-31

## 2024-01-21 RX ORDER — DIPHENHYDRAMINE HCL 50 MG
50 CAPSULE ORAL ONCE
Status: CANCELLED
Start: 2024-01-24

## 2024-01-21 RX ORDER — ALBUTEROL SULFATE 0.83 MG/ML
2.5 SOLUTION RESPIRATORY (INHALATION)
Status: CANCELLED | OUTPATIENT
Start: 2024-01-24

## 2024-01-21 RX ORDER — DIPHENHYDRAMINE HCL 25 MG
50 CAPSULE ORAL
Status: CANCELLED
Start: 2024-01-31

## 2024-01-21 RX ORDER — MEPERIDINE HYDROCHLORIDE 25 MG/ML
25 INJECTION INTRAMUSCULAR; INTRAVENOUS; SUBCUTANEOUS EVERY 30 MIN PRN
Status: CANCELLED | OUTPATIENT
Start: 2024-01-31

## 2024-01-21 RX ORDER — HEPARIN SODIUM,PORCINE 10 UNIT/ML
5-20 VIAL (ML) INTRAVENOUS DAILY PRN
Status: CANCELLED | OUTPATIENT
Start: 2024-01-24

## 2024-01-21 RX ORDER — ALBUTEROL SULFATE 90 UG/1
1-2 AEROSOL, METERED RESPIRATORY (INHALATION)
Status: CANCELLED
Start: 2024-01-24

## 2024-01-23 ENCOUNTER — TELEPHONE (OUTPATIENT)
Dept: ONCOLOGY | Facility: CLINIC | Age: 44
End: 2024-01-23
Payer: COMMERCIAL

## 2024-01-23 ENCOUNTER — DOCUMENTATION ONLY (OUTPATIENT)
Dept: ONCOLOGY | Facility: CLINIC | Age: 44
End: 2024-01-23
Payer: COMMERCIAL

## 2024-01-23 NOTE — TELEPHONE ENCOUNTER
Pt is calling. States that she is at [facility] in [place] now for lab appt.  However, the lab states that they do not have any orders.    Pt states that [facility] has already faxed lab orders, but the lab cannot seem to find them today.  Pt is requesting that lab orders be faxed to [phone] as soon as possible.    Will route to [facility] for follow up.    Miriam Garcia RN on 1/23/2024 at 10:32 AM   
Statement Selected

## 2024-01-24 ENCOUNTER — INFUSION THERAPY VISIT (OUTPATIENT)
Dept: INFUSION THERAPY | Facility: CLINIC | Age: 44
End: 2024-01-24
Attending: INTERNAL MEDICINE
Payer: COMMERCIAL

## 2024-01-24 VITALS
WEIGHT: 170 LBS | SYSTOLIC BLOOD PRESSURE: 109 MMHG | BODY MASS INDEX: 26.63 KG/M2 | HEART RATE: 92 BPM | DIASTOLIC BLOOD PRESSURE: 69 MMHG | OXYGEN SATURATION: 97 % | TEMPERATURE: 97.5 F | RESPIRATION RATE: 18 BRPM

## 2024-01-24 DIAGNOSIS — Z17.0 MALIGNANT NEOPLASM OF UPPER-OUTER QUADRANT OF RIGHT BREAST IN FEMALE, ESTROGEN RECEPTOR POSITIVE (H): Primary | ICD-10-CM

## 2024-01-24 DIAGNOSIS — C50.411 MALIGNANT NEOPLASM OF UPPER-OUTER QUADRANT OF RIGHT BREAST IN FEMALE, ESTROGEN RECEPTOR POSITIVE (H): Primary | ICD-10-CM

## 2024-01-24 PROCEDURE — 258N000003 HC RX IP 258 OP 636: Performed by: INTERNAL MEDICINE

## 2024-01-24 PROCEDURE — 96413 CHEMO IV INFUSION 1 HR: CPT

## 2024-01-24 PROCEDURE — 96367 TX/PROPH/DG ADDL SEQ IV INF: CPT

## 2024-01-24 PROCEDURE — 250N000011 HC RX IP 250 OP 636: Performed by: INTERNAL MEDICINE

## 2024-01-24 PROCEDURE — 96375 TX/PRO/DX INJ NEW DRUG ADDON: CPT

## 2024-01-24 RX ORDER — ONDANSETRON 8 MG/1
1 TABLET, FILM COATED ORAL EVERY 8 HOURS PRN
COMMUNITY
Start: 2023-11-14 | End: 2024-03-01

## 2024-01-24 RX ORDER — PROCHLORPERAZINE MALEATE 10 MG
10 TABLET ORAL
COMMUNITY
Start: 2023-11-14 | End: 2024-03-01

## 2024-01-24 RX ORDER — HEPARIN SODIUM (PORCINE) LOCK FLUSH IV SOLN 100 UNIT/ML 100 UNIT/ML
5 SOLUTION INTRAVENOUS
Status: DISCONTINUED | OUTPATIENT
Start: 2024-01-24 | End: 2024-01-24 | Stop reason: HOSPADM

## 2024-01-24 RX ORDER — LORAZEPAM 2 MG/ML
0.5 INJECTION INTRAMUSCULAR EVERY 4 HOURS PRN
Status: DISCONTINUED | OUTPATIENT
Start: 2024-01-24 | End: 2024-01-24 | Stop reason: HOSPADM

## 2024-01-24 RX ORDER — HYDROXYZINE HYDROCHLORIDE 25 MG/1
25 TABLET, FILM COATED ORAL
COMMUNITY
Start: 2023-11-25 | End: 2024-03-01

## 2024-01-24 RX ADMIN — HEPARIN 5 ML: 100 SYRINGE at 14:03

## 2024-01-24 RX ADMIN — DEXAMETHASONE SODIUM PHOSPHATE: 10 INJECTION, SOLUTION INTRAMUSCULAR; INTRAVENOUS at 09:02

## 2024-01-24 RX ADMIN — SODIUM CHLORIDE 250 ML: 9 INJECTION, SOLUTION INTRAVENOUS at 08:37

## 2024-01-24 RX ADMIN — FAMOTIDINE 20 MG: 10 INJECTION, SOLUTION INTRAVENOUS at 09:24

## 2024-01-24 RX ADMIN — LORAZEPAM 0.5 MG: 2 INJECTION INTRAMUSCULAR; INTRAVENOUS at 08:37

## 2024-01-24 RX ADMIN — DIPHENHYDRAMINE HYDROCHLORIDE 50 MG: 50 INJECTION, SOLUTION INTRAMUSCULAR; INTRAVENOUS at 09:26

## 2024-01-24 RX ADMIN — SODIUM CHLORIDE 152 MG: 9 INJECTION, SOLUTION INTRAVENOUS at 09:47

## 2024-01-24 ASSESSMENT — PAIN SCALES - GENERAL: PAINLEVEL: NO PAIN (0)

## 2024-01-24 NOTE — PROGRESS NOTES
Infusion Nursing Note:  Angie Michael presents today for C3D1 Taxol (2nd Taxol).    Patient seen by provider today: No   present during visit today: Not Applicable.    Note: Pt reports diarrhea 2-3 days after last infusion, has since stopped. Also having difficulty sleeping. Current medications not helping. Consulted with FREEDOM Angulo- offered OTC medications to help & discussed with pt. (Melatonin, magnesium, etc.)      Intravenous Access:  Implanted Port.    Treatment Conditions:  Lab Results   Component Value Date    HGB 11.7 01/16/2024    WBC 13.2 (H) 01/16/2024    ANEU 11.0 (H) 01/16/2024     01/16/2024        Results reviewed, labs MET treatment parameters, ok to proceed with treatment.      Post Infusion Assessment:  Patient tolerated infusion without incident.  Blood return noted pre and post infusion.  Site patent and intact, free from redness, edema or discomfort.  No evidence of extravasations.  Access discontinued per protocol.       Discharge Plan:   Patient and/or family verbalized understanding of discharge instructions and all questions answered.  Patient discharged in stable condition accompanied by: self, .  Departure Mode: Ambulatory.      Rosa Rao RN

## 2024-01-31 ENCOUNTER — INFUSION THERAPY VISIT (OUTPATIENT)
Dept: INFUSION THERAPY | Facility: CLINIC | Age: 44
End: 2024-01-31
Attending: INTERNAL MEDICINE
Payer: COMMERCIAL

## 2024-01-31 VITALS
OXYGEN SATURATION: 98 % | TEMPERATURE: 97.7 F | BODY MASS INDEX: 27.35 KG/M2 | RESPIRATION RATE: 18 BRPM | HEART RATE: 100 BPM | SYSTOLIC BLOOD PRESSURE: 112 MMHG | WEIGHT: 174.6 LBS | DIASTOLIC BLOOD PRESSURE: 78 MMHG

## 2024-01-31 DIAGNOSIS — C50.411 MALIGNANT NEOPLASM OF UPPER-OUTER QUADRANT OF RIGHT BREAST IN FEMALE, ESTROGEN RECEPTOR POSITIVE (H): Primary | ICD-10-CM

## 2024-01-31 DIAGNOSIS — Z17.0 MALIGNANT NEOPLASM OF UPPER-OUTER QUADRANT OF RIGHT BREAST IN FEMALE, ESTROGEN RECEPTOR POSITIVE (H): Primary | ICD-10-CM

## 2024-01-31 PROCEDURE — 96413 CHEMO IV INFUSION 1 HR: CPT

## 2024-01-31 PROCEDURE — 96375 TX/PRO/DX INJ NEW DRUG ADDON: CPT

## 2024-01-31 PROCEDURE — 258N000003 HC RX IP 258 OP 636: Performed by: INTERNAL MEDICINE

## 2024-01-31 PROCEDURE — 250N000011 HC RX IP 250 OP 636: Performed by: INTERNAL MEDICINE

## 2024-01-31 RX ORDER — LORAZEPAM 2 MG/ML
0.5 INJECTION INTRAMUSCULAR EVERY 4 HOURS PRN
Status: DISCONTINUED | OUTPATIENT
Start: 2024-01-31 | End: 2024-01-31 | Stop reason: HOSPADM

## 2024-01-31 RX ORDER — HEPARIN SODIUM (PORCINE) LOCK FLUSH IV SOLN 100 UNIT/ML 100 UNIT/ML
5 SOLUTION INTRAVENOUS
Status: DISCONTINUED | OUTPATIENT
Start: 2024-01-31 | End: 2024-01-31 | Stop reason: HOSPADM

## 2024-01-31 RX ORDER — ONDANSETRON 2 MG/ML
8 INJECTION INTRAMUSCULAR; INTRAVENOUS ONCE
Status: COMPLETED | OUTPATIENT
Start: 2024-01-31 | End: 2024-01-31

## 2024-01-31 RX ADMIN — LORAZEPAM 0.5 MG: 2 INJECTION INTRAMUSCULAR; INTRAVENOUS at 07:54

## 2024-01-31 RX ADMIN — SODIUM CHLORIDE 250 ML: 9 INJECTION, SOLUTION INTRAVENOUS at 07:54

## 2024-01-31 RX ADMIN — Medication 5 ML: at 10:03

## 2024-01-31 RX ADMIN — PACLITAXEL 152 MG: 6 INJECTION, SOLUTION INTRAVENOUS at 08:53

## 2024-01-31 RX ADMIN — ONDANSETRON 8 MG: 2 INJECTION INTRAMUSCULAR; INTRAVENOUS at 08:18

## 2024-01-31 NOTE — PROGRESS NOTES
Infusion Nursing Note:  Angie Michael presents today for C4D1 Taxol.    Patient seen by provider today: No   present during visit today: Not Applicable.    Note: Using penguin cold caps and ativan prior to cooling. Post cooling occurs at patient's house.      Intravenous Access:  Implanted Port.    Treatment Conditions:  Lab Results   Component Value Date    HGB 11.7 01/16/2024    WBC 13.2 (H) 01/16/2024    ANEU 11.0 (H) 01/16/2024     01/16/2024        Lab Results   Component Value Date     01/16/2024    POTASSIUM 4.4 01/16/2024    CR 0.70 01/16/2024    GWENDOLYN 9.1 01/16/2024    BILITOTAL 0.3 01/16/2024    ALBUMIN 4.2 01/16/2024    ALT 23 01/16/2024    AST 22 01/16/2024       Results reviewed, labs MET treatment parameters, ok to proceed with treatment.      Post Infusion Assessment:  Patient tolerated infusion without incident.  Blood return noted pre and post infusion.  Site patent and intact, free from redness, edema or discomfort.  No evidence of extravasations.  Access discontinued per protocol.       Discharge Plan:   Patient declined prescription refills.  Discharge instructions reviewed with: Family.  Patient and/or family verbalized understanding of discharge instructions and all questions answered.  AVS to patient via [employer].  Patient will return 2/6 for next appointment.   Patient discharged in stable condition accompanied by: self and .  Departure Mode: Ambulatory.      Sarath Todd RN

## 2024-02-05 NOTE — PROGRESS NOTES
[facility]    Hematology/Oncology Established Patient Note      Today's Date: 1/17/2024    Reason for visit: Bilateral breast cancer.    HISTORY OF PRESENT ILLNESS: Angie Michael is a 43 year old female with BRCA2 gene mutation who presents with the following oncologic history:  1.  10/05/2023: High risk surveillance breast MRI showed right breast 3.6 x 3 x 3.2 cm irregular enhancing mass at 10:00, 0.9 x 0.8 x 0.9 cm second mass at 9:00, 1 x 0.9 x 1.2 cm third mass at 9:30, other suspicious foci of enhancement throughout the breast.  Left breast with 9.9 x 5.2 x 6.7 cm suspicious non-mass enhancement at 10:00-7:00; 1.6 x 1.3 x 1.7 cm enhancing mass at 4:00; 1.8 x 1.6 x 1.9 cm enhancing mass at 12:30; other suspicious foci of enhancement throughout the left breast.  One level 1 suspicious right axillary lymph node and one level 1 suspicious left axillary lymph node..  Developing suspicious interpectoral lymph node.  2.  10/12/2023: Bilateral diagnostic mammogram showed right breast with 3.2 x 2.7 x 4.1 cm irregular mass at 10:00, 6 cm from nipple; 0.9 x 0.6 x 1 cm irregular mass at 11:00, 2 cm from nipple and prominent right axillary lymph node.  Left breast with 1.4 x 1.1 x 1.7 cm mass at 12:00, 2 cm from nipple, 1.8 x 1 x 1.6 cm mass at 3:30, 4 cm from nipple, 1 x 0.9 x 1.1 cm mass at 3:00, 7 cm from nipple, multiple areas of hypoechogenicity with calcifications in the lateral left breast, and prominent left axillary lymph node.  3.  10/12/2023: Biopsy of right breast at 10:00 showed grade 3 invasive lobular carcinoma, ER strongly positive at 81-90%, NM positive at 31-40%, HER2 equivocal with IHC = 2+, FISH negative, Ki-67 = 70%.  Biopsy of right axillary lymph node showed metastatic carcinoma.  Biopsy of left breast at 3:30 showed grade 2 invasive carcinoma with ductal and lobular features, ER positive at %, NM positive at 2%, HER2 equivocal with IHC = 2+, FISH negative, Ki-67 = 26%.   Biopsy of left axillary lymph node showed metastatic carcinoma.  4.  10/17/2023: PET/CT scan showed cluster of hypermetabolic right breast nodules with SUV max 14.9; 2.1 x 1 cm hypermetabolic level 1 right axillary lymph node with SUV max 5.5; hypermetabolic left breast nodule with SUV max 18.8 and several additional less intense hypermetabolic left breast nodules; 1.5 x 1.3 cm hypermetabolic level 1 left axillary lymph node with SUV max 4.  Small sclerotic focus of metabolic activity within the proximal tibial shaft with SUV max 3.7, indeterminate but unlikely to reflect metastatic disease.  Subcentimeter hypermetabolic focus along anterior margin of the left thyroid lobe, likely a thyroid nodule.  5.  11/03/2023: Thyroid ultrasound showed 0.7 x 0.6 x 0.5 cm solid hypoechoic nodule at the junction of the isthmus and left hemithyroid corresponding to increased uptake on PET/CT, consistent with TIRADS 4 nodule and subcentimeter TIRADS 3 nodule in the left inferior thyroid.  6. 11/3/2023: MR right tibia/fibula showed discrete focal 3 cm area of increased T2 signal and enhancement in mid shaft of right tibia with fat signal interspersed throughout this area with significant signal dropout on opposed phase imaging, favoring benign process.  7. 11/21/2023: Started neoadjuvant chemotherapy with dose-dense Adriamycin + Cytoxan with Neulasta support with Dr. Bev Ryan at [facility]. Reacted to Emend.  8. 11/25/2023: Evaluated at [facility] ED with diarrhea and dizziness, latter attributed to olanzapine -- this was discontinued.  9. 1/03/2024: Completed cycle 4 dose-dense AC.  10. 1/16/2024: Breast MRI showed persistent abnormal mass and nonmass enhancement in the left breast measuring 10.2 cm in AP dimension (unchanged).  Previously seen mass at the 12:30 position now measures 1.0 cm in greatest dimension (previously  1.9 cm). Previously seen seen mass at the 4:00 position now measures 1.1 cm in greatest dimension  "(previously 1.7 cm). Previously seen enlarged left axillary lymph node now has normal size and morphology. Previously seen intrapectoral lymph node now has normal size and morphology. Previously seen abnormal right breast enhancement has entirely resolved. Previously visualized enlarged right axillary lymph node now has normal size and morphology.  Other previously seen axillary lymph nodes appear stable.  11. 1/17/2024: Start of weekly paclitaxel for planned 12 weeks.    INTERVAL HISTORY:  -Angie is here today for evaluation and toxicity check prior to chemotherapy (week 4 of weekly paclitaxel following AC).  -She reports that she is doing well overall.  -She is also doing acupuncture, IVF with zinc and B vitamins the day post-infusion.  Drinking lots of fresh green juices.  Eating well.  -She is not having much fatigue.  -No nausea, mouth sores, no const but a few loose stools a day, worse on Friday and Saturday.   -Some red cheeks and a few hives on her chin occasionally.   -Has had a lot of trouble sleeping.  Tried Melatonin not relief.  Has magnesium and will try this, Does not want to necessarily try another medications.  She does get really hot - not sure if this is a \"hot flashes\" per say as they last 30-60 minutes.  -She remains busy taking care of her triplets .    REVIEW OF SYSTEMS:   14 point ROS was reviewed and is negative other than as noted above in HPI.       HOME MEDICATIONS:  Current Outpatient Medications   Medication Sig Dispense Refill    diazepam (VALIUM) 2 MG tablet Take 1 tab at least 30 min prior to MRI 4 tablet 0    Ferrous Gluconate 239 (27 Fe) MG TABS Take 1 tablet by mouth daily      HYDROcodone-acetaminophen (NORCO) 5-325 MG tablet Take 1-2 tablets by mouth every 4 hours as needed for moderate to severe pain 5 tablet 0    hydrOXYzine HCl (ATARAX) 25 MG tablet Take 25 mg by mouth      ondansetron (ZOFRAN) 8 MG tablet Take 1 tablet by mouth every 8 hours as needed      prochlorperazine " (COMPAZINE) 10 MG tablet Take 10 mg by mouth      senna-docusate (SENOKOT-S/PERICOLACE) 8.6-50 MG tablet Take 1-2 tablets by mouth 2 times daily 30 tablet 0         ALLERGIES:  Allergies   Allergen Reactions    Aprepitant Difficulty breathing and Shortness Of Breath    Fosaprepitant Unknown     Allergy added per chart review. [facility]    Carboprost GI Disturbance     Diarrhea    Codeine Nausea and Vomiting         PAST MEDICAL HISTORY:  BRCA2 gene mutation.  Bilateral breast cancer as outlined above.  2022 Galleri test - negative for malignancy.    Gynecologic history:  Age of menarche at 15.  3 children/triplets -- 1 boy and twin girls born 2022.  Age of first pregnancy at 41.  Used 10 years of OCPs no HRT, IVF for fertility treatment.      PAST SURGICAL HISTORY:  Past Surgical History:   Procedure Laterality Date    ABDOMEN SURGERY          ENT SURGERY      tonsillectomy    GYN SURGERY      egg retrieval for IVF    INSERT PORT VASCULAR ACCESS Right 2023    Procedure: INSERTION, VASCULAR ACCESS PORT;  Surgeon: Fanny Little MD;  Location:  OR    ORTHOPEDIC SURGERY      knee arthroscopy         SOCIAL HISTORY:  Social History     Socioeconomic History    Marital status:      Spouse name: Not on file    Number of children: Not on file    Years of education: Not on file    Highest education level: Not on file   Occupational History    Not on file   Tobacco Use    Smoking status: Never    Smokeless tobacco: Never   Vaping Use    Vaping Use: Never used   Substance and Sexual Activity    Alcohol use: Not Currently    Drug use: Not Currently    Sexual activity: Not on file   Other Topics Concern    Not on file   Social History Narrative    Not on file     Social Determinants of Health     Financial Resource Strain: Not on file   Food Insecurity: Not on file   Transportation Needs: Not on file   Physical Activity: Not on file   Stress: Not on file   Social Connections: Not on file    Interpersonal Safety: Not on file   Housing Stability: Not on file         FAMILY HISTORY:  Paternal grandmother and many paternal grandfathers with breast cancer; cousin with DCIS tested positive for BRCA1.  No ovarian, colon, or prostate cancer.      PHYSICAL EXAM:  Vital signs:  /78   Pulse 74   Temp 97.9  F (36.6  C) (Oral)   Resp 16   Wt 78.5 kg (173 lb)   SpO2 99%   BMI 27.10 kg/m     GENERAL: No acute distress.  EYES: No scleral icterus. No overt erythema.  RESPIRATORY: No audible cough, wheezing, or labored breathing.  Lungs are clear to auscultation bilaterally  CARDIAC: S1-S2, regular rate and rhythm with no murmur rub or gallop noted.  No lower extremity edema.  MUSCULOSKELETAL: Range of motion in the neck, shoulders, and arms appear normal.  SKIN: No overt rashes, discolorations, or lesions over the face and neck.  NEUROLOGIC: Alert.  No overt tremors.  PSYCHIATRIC: Normal affect and mood.  Does not appear anxious.   Remaining exam deferred for detailed discussion today.      LABS:  Most Recent 3 CBC's:  Recent Labs   Lab Test 02/06/24  1440 01/16/24  0903   WBC 4.8 13.2*   HGB 10.8* 11.7   MCV 93 92    157   ANEUTAUTO 2.9  --      Most Recent 3 BMP's:  Recent Labs   Lab Test 02/06/24  1440 01/16/24  0903    141   POTASSIUM 4.1 4.4   CHLORIDE 106 107   CO2 27 27   BUN 14.3 12.6   CR 0.74 0.70   ANIONGAP 7 7   GWENDOLYN 9.3 9.1   * 99   PROTTOTAL 6.9 6.9   ALBUMIN 4.2 4.2    Most Recent 3 LFT's:  Recent Labs   Lab Test 02/06/24  1440 01/16/24  0903   AST 19 22   ALT 21 23   ALKPHOS 52 99   BILITOTAL 0.7 0.3     I reviewed the above labs today.      IMAGING:  Reviewed as per HPI.    ASSESSMENT/PLAN:  Angie Michael is a 43 year old female with the following issues:  1.  Clinical prognostic stage IIB, cT2-N1-M0, multifocal grade 3 invasive lobular carcinoma of the RIGHT upper outer breast, ER positive 81-90%, IN positive 31-40%, HER-2 FISH negative (IHC = 2+), Ki-67 = 70%  2.   Clinical prognostic stage IIA, cT3-N1-M0, multifocal grade 2 invasive ductal and lobular carcinoma of LEFT breast overlapping sites, ER positive %, WA positive 2%, HER-2 FISH negative (IHC = 2+), Ki-67 = 26%  3. BRCA-2 deleterious gene mutation  -- Angie has completed 4 cycles of neoadjuvant ddAC and is here today for week 4 of weekly paclitaxel.   -- She and Dr. Dan reviewed her last MRI showing good response to treatment last month.   -- She is clinically stable and labs are adequate for treatment tomorrow.  - She will continue weekly paclitaxel for 12 weeks.    -- At her prior visit, she asked about whether she would need to complete all 12 doses of paclitaxel given concern for side effects such as peripheral neuropathy.  Discussed that a repeat breast MRI could be obtained during paclitaxel if she develops significant neuropathy and if under consideration to stop paclitaxel early.  -- She will revisit with Dr. Fanny Little for bilateral mastectomies after completion of chemotherapy.  She already met with Dr. Vasiliy Yee for discussion of breast reconstruction.  -Given biopsy-proven lymph node disease, adjuvant radiation therapy would be advised.  She has already met with Dr. Finesse Cortes for radiation discussion.  - Per prior notes, given her extent of disease bilaterally, she would benefit from adjuvant abemaciclib for 2 years along with hormone blockade therapy as per the MonarchE trial.  - Per prior notes, adjuvant olaparib (Lynparza) for 1 year would be considered as well.  I do note that based on the Tomas trial, those patients who had hormone receptor positive HER2 negative breast cancer were required to have at least 4 pathologically confirmed positive lymph nodes.    - Per prior notes, Dr. Dan  advised hormone blockade therapy with ovarian suppression therapy with Zoladex with an aromatase inhibitor after surgery.  She already removed her fallopian tubes and will undergo BSO in the  future.  This will be discussed again after completion of chemotherapy and surgery.    4.  Right tibia bone lesion  - The 11/3/2023 MR right tibia/fibula showed a 3 cm area of increased T2 signal in the midshaft area.  Although the signaling suggested a benign process, this remains technically indeterminate.  Plan to repeat MRI of this area on 2/9/2024.    5.  Fertility discussion (not addressed at this visit)  - Angie has [id number] triplets.  She has saved embryos.  She has not yet decided whether to have more children.  - Following completion of chemotherapy followed by radiation, hormone blockade therapy would be advised for 2-3 years before bearing more children.  Per prior notes, she has also briefly considered a surrogate if she wishes to have more children.  Will discuss again after chemotherapy and surgery have been completed.    NATE Landeros [facility]   [phone]

## 2024-02-06 ENCOUNTER — LAB (OUTPATIENT)
Dept: INFUSION THERAPY | Facility: CLINIC | Age: 44
End: 2024-02-06
Attending: INTERNAL MEDICINE
Payer: COMMERCIAL

## 2024-02-06 ENCOUNTER — ONCOLOGY VISIT (OUTPATIENT)
Dept: ONCOLOGY | Facility: CLINIC | Age: 44
End: 2024-02-06
Attending: INTERNAL MEDICINE
Payer: COMMERCIAL

## 2024-02-06 VITALS
DIASTOLIC BLOOD PRESSURE: 78 MMHG | SYSTOLIC BLOOD PRESSURE: 111 MMHG | HEART RATE: 74 BPM | WEIGHT: 173 LBS | RESPIRATION RATE: 16 BRPM | BODY MASS INDEX: 27.1 KG/M2 | OXYGEN SATURATION: 99 % | TEMPERATURE: 97.9 F

## 2024-02-06 DIAGNOSIS — C50.412 MALIGNANT NEOPLASM OF UPPER-OUTER QUADRANT OF BOTH BREASTS IN FEMALE, ESTROGEN RECEPTOR POSITIVE (H): ICD-10-CM

## 2024-02-06 DIAGNOSIS — C50.811 MALIGNANT NEOPLASM OF OVERLAPPING SITES OF BOTH BREASTS IN FEMALE, ESTROGEN RECEPTOR POSITIVE (H): Primary | ICD-10-CM

## 2024-02-06 DIAGNOSIS — C50.411 MALIGNANT NEOPLASM OF UPPER-OUTER QUADRANT OF BOTH BREASTS IN FEMALE, ESTROGEN RECEPTOR POSITIVE (H): ICD-10-CM

## 2024-02-06 DIAGNOSIS — C50.811 MALIGNANT NEOPLASM OF OVERLAPPING SITES OF BOTH BREASTS IN FEMALE, ESTROGEN RECEPTOR POSITIVE (H): ICD-10-CM

## 2024-02-06 DIAGNOSIS — Z17.0 MALIGNANT NEOPLASM OF UPPER-OUTER QUADRANT OF RIGHT BREAST IN FEMALE, ESTROGEN RECEPTOR POSITIVE (H): Primary | ICD-10-CM

## 2024-02-06 DIAGNOSIS — Z17.0 MALIGNANT NEOPLASM OF OVERLAPPING SITES OF BOTH BREASTS IN FEMALE, ESTROGEN RECEPTOR POSITIVE (H): Primary | ICD-10-CM

## 2024-02-06 DIAGNOSIS — Z17.0 MALIGNANT NEOPLASM OF OVERLAPPING SITES OF BOTH BREASTS IN FEMALE, ESTROGEN RECEPTOR POSITIVE (H): ICD-10-CM

## 2024-02-06 DIAGNOSIS — Z17.0 MALIGNANT NEOPLASM OF UPPER-OUTER QUADRANT OF BOTH BREASTS IN FEMALE, ESTROGEN RECEPTOR POSITIVE (H): ICD-10-CM

## 2024-02-06 DIAGNOSIS — C50.411 MALIGNANT NEOPLASM OF UPPER-OUTER QUADRANT OF RIGHT BREAST IN FEMALE, ESTROGEN RECEPTOR POSITIVE (H): Primary | ICD-10-CM

## 2024-02-06 DIAGNOSIS — C50.812 MALIGNANT NEOPLASM OF OVERLAPPING SITES OF BOTH BREASTS IN FEMALE, ESTROGEN RECEPTOR POSITIVE (H): Primary | ICD-10-CM

## 2024-02-06 DIAGNOSIS — C50.812 MALIGNANT NEOPLASM OF OVERLAPPING SITES OF BOTH BREASTS IN FEMALE, ESTROGEN RECEPTOR POSITIVE (H): ICD-10-CM

## 2024-02-06 DIAGNOSIS — M89.9 BONE LESION: ICD-10-CM

## 2024-02-06 LAB
ALBUMIN SERPL BCG-MCNC: 4.2 G/DL (ref 3.5–5.2)
ALP SERPL-CCNC: 52 U/L (ref 40–150)
ALT SERPL W P-5'-P-CCNC: 21 U/L (ref 0–50)
ANION GAP SERPL CALCULATED.3IONS-SCNC: 7 MMOL/L (ref 7–15)
AST SERPL W P-5'-P-CCNC: 19 U/L (ref 0–45)
BASOPHILS # BLD AUTO: 0 10E3/UL (ref 0–0.2)
BASOPHILS NFR BLD AUTO: 1 %
BILIRUB SERPL-MCNC: 0.7 MG/DL
BUN SERPL-MCNC: 14.3 MG/DL (ref 6–20)
CALCIUM SERPL-MCNC: 9.3 MG/DL (ref 8.6–10)
CHLORIDE SERPL-SCNC: 106 MMOL/L (ref 98–107)
CREAT SERPL-MCNC: 0.74 MG/DL (ref 0.51–0.95)
DEPRECATED HCO3 PLAS-SCNC: 27 MMOL/L (ref 22–29)
EGFRCR SERPLBLD CKD-EPI 2021: >90 ML/MIN/1.73M2
EOSINOPHIL # BLD AUTO: 0.4 10E3/UL (ref 0–0.7)
EOSINOPHIL NFR BLD AUTO: 8 %
ERYTHROCYTE [DISTWIDTH] IN BLOOD BY AUTOMATED COUNT: 15.1 % (ref 10–15)
GLUCOSE SERPL-MCNC: 105 MG/DL (ref 70–99)
HCT VFR BLD AUTO: 32.5 % (ref 35–47)
HGB BLD-MCNC: 10.8 G/DL (ref 11.7–15.7)
IMM GRANULOCYTES # BLD: 0 10E3/UL
IMM GRANULOCYTES NFR BLD: 0 %
LYMPHOCYTES # BLD AUTO: 1.2 10E3/UL (ref 0.8–5.3)
LYMPHOCYTES NFR BLD AUTO: 25 %
MCH RBC QN AUTO: 30.9 PG (ref 26.5–33)
MCHC RBC AUTO-ENTMCNC: 33.2 G/DL (ref 31.5–36.5)
MCV RBC AUTO: 93 FL (ref 78–100)
MONOCYTES # BLD AUTO: 0.3 10E3/UL (ref 0–1.3)
MONOCYTES NFR BLD AUTO: 5 %
NEUTROPHILS # BLD AUTO: 2.9 10E3/UL (ref 1.6–8.3)
NEUTROPHILS NFR BLD AUTO: 61 %
NRBC # BLD AUTO: 0 10E3/UL
NRBC BLD AUTO-RTO: 0 /100
PLATELET # BLD AUTO: 215 10E3/UL (ref 150–450)
POTASSIUM SERPL-SCNC: 4.1 MMOL/L (ref 3.4–5.3)
PROT SERPL-MCNC: 6.9 G/DL (ref 6.4–8.3)
RBC # BLD AUTO: 3.49 10E6/UL (ref 3.8–5.2)
SODIUM SERPL-SCNC: 140 MMOL/L (ref 135–145)
WBC # BLD AUTO: 4.8 10E3/UL (ref 4–11)

## 2024-02-06 PROCEDURE — 85025 COMPLETE CBC W/AUTO DIFF WBC: CPT | Performed by: INTERNAL MEDICINE

## 2024-02-06 PROCEDURE — 36415 COLL VENOUS BLD VENIPUNCTURE: CPT | Performed by: INTERNAL MEDICINE

## 2024-02-06 PROCEDURE — G0463 HOSPITAL OUTPT CLINIC VISIT: HCPCS | Performed by: NURSE PRACTITIONER

## 2024-02-06 PROCEDURE — 82040 ASSAY OF SERUM ALBUMIN: CPT | Performed by: NURSE PRACTITIONER

## 2024-02-06 PROCEDURE — 250N000011 HC RX IP 250 OP 636: Performed by: INTERNAL MEDICINE

## 2024-02-06 PROCEDURE — 99215 OFFICE O/P EST HI 40 MIN: CPT | Performed by: NURSE PRACTITIONER

## 2024-02-06 RX ORDER — HEPARIN SODIUM (PORCINE) LOCK FLUSH IV SOLN 100 UNIT/ML 100 UNIT/ML
5 SOLUTION INTRAVENOUS
Status: CANCELLED | OUTPATIENT
Start: 2024-02-07

## 2024-02-06 RX ORDER — HEPARIN SODIUM (PORCINE) LOCK FLUSH IV SOLN 100 UNIT/ML 100 UNIT/ML
500 SOLUTION INTRAVENOUS EVERY 8 HOURS
Status: DISCONTINUED | OUTPATIENT
Start: 2024-02-06 | End: 2024-02-06 | Stop reason: HOSPADM

## 2024-02-06 RX ORDER — MEPERIDINE HYDROCHLORIDE 25 MG/ML
25 INJECTION INTRAMUSCULAR; INTRAVENOUS; SUBCUTANEOUS EVERY 30 MIN PRN
Status: CANCELLED | OUTPATIENT
Start: 2024-02-07

## 2024-02-06 RX ORDER — HEPARIN SODIUM,PORCINE 10 UNIT/ML
5-20 VIAL (ML) INTRAVENOUS DAILY PRN
Status: CANCELLED | OUTPATIENT
Start: 2024-02-07

## 2024-02-06 RX ORDER — EPINEPHRINE 1 MG/ML
0.3 INJECTION, SOLUTION INTRAMUSCULAR; SUBCUTANEOUS EVERY 5 MIN PRN
Status: CANCELLED | OUTPATIENT
Start: 2024-02-07

## 2024-02-06 RX ORDER — LORAZEPAM 2 MG/ML
0.5 INJECTION INTRAMUSCULAR EVERY 4 HOURS PRN
Status: CANCELLED | OUTPATIENT
Start: 2024-02-07

## 2024-02-06 RX ORDER — ONDANSETRON 2 MG/ML
8 INJECTION INTRAMUSCULAR; INTRAVENOUS ONCE
Status: CANCELLED | OUTPATIENT
Start: 2024-02-07

## 2024-02-06 RX ORDER — METHYLPREDNISOLONE SODIUM SUCCINATE 125 MG/2ML
125 INJECTION, POWDER, LYOPHILIZED, FOR SOLUTION INTRAMUSCULAR; INTRAVENOUS
Status: CANCELLED
Start: 2024-02-07

## 2024-02-06 RX ORDER — DIPHENHYDRAMINE HYDROCHLORIDE 50 MG/ML
50 INJECTION INTRAMUSCULAR; INTRAVENOUS
Status: CANCELLED
Start: 2024-02-07

## 2024-02-06 RX ORDER — DIPHENHYDRAMINE HCL 25 MG
50 CAPSULE ORAL
Status: CANCELLED
Start: 2024-02-07

## 2024-02-06 RX ORDER — ALBUTEROL SULFATE 90 UG/1
1-2 AEROSOL, METERED RESPIRATORY (INHALATION)
Status: CANCELLED
Start: 2024-02-07

## 2024-02-06 RX ORDER — ALBUTEROL SULFATE 0.83 MG/ML
2.5 SOLUTION RESPIRATORY (INHALATION)
Status: CANCELLED | OUTPATIENT
Start: 2024-02-07

## 2024-02-06 RX ADMIN — Medication 500 UNITS: at 14:53

## 2024-02-06 ASSESSMENT — PAIN SCALES - GENERAL: PAINLEVEL: NO PAIN (0)

## 2024-02-06 NOTE — LETTER
2/6/2024         RE: Angie Michael  [address] [place] [id number]        Dear Colleague,    Thank you for referring your patient, Angie Michael, to the [facility]. Please see a copy of my visit note below.    [facility]    Hematology/Oncology Established Patient Note      Today's Date: 1/17/2024    Reason for visit: Bilateral breast cancer.    HISTORY OF PRESENT ILLNESS: Angie Michael is a 43 year old female with BRCA2 gene mutation who presents with the following oncologic history:  1.  10/05/2023: High risk surveillance breast MRI showed right breast 3.6 x 3 x 3.2 cm irregular enhancing mass at 10:00, 0.9 x 0.8 x 0.9 cm second mass at 9:00, 1 x 0.9 x 1.2 cm third mass at 9:30, other suspicious foci of enhancement throughout the breast.  Left breast with 9.9 x 5.2 x 6.7 cm suspicious non-mass enhancement at 10:00-7:00; 1.6 x 1.3 x 1.7 cm enhancing mass at 4:00; 1.8 x 1.6 x 1.9 cm enhancing mass at 12:30; other suspicious foci of enhancement throughout the left breast.  One level 1 suspicious right axillary lymph node and one level 1 suspicious left axillary lymph node..  Developing suspicious interpectoral lymph node.  2.  10/12/2023: Bilateral diagnostic mammogram showed right breast with 3.2 x 2.7 x 4.1 cm irregular mass at 10:00, 6 cm from nipple; 0.9 x 0.6 x 1 cm irregular mass at 11:00, 2 cm from nipple and prominent right axillary lymph node.  Left breast with 1.4 x 1.1 x 1.7 cm mass at 12:00, 2 cm from nipple, 1.8 x 1 x 1.6 cm mass at 3:30, 4 cm from nipple, 1 x 0.9 x 1.1 cm mass at 3:00, 7 cm from nipple, multiple areas of hypoechogenicity with calcifications in the lateral left breast, and prominent left axillary lymph node.  3.  10/12/2023: Biopsy of right breast at 10:00 showed grade 3 invasive lobular carcinoma, ER strongly positive at 81-90%, GA positive at 31-40%, HER2 equivocal with IHC = 2+, FISH negative, Ki-67 = 70%.  Biopsy of right  axillary lymph node showed metastatic carcinoma.  Biopsy of left breast at 3:30 showed grade 2 invasive carcinoma with ductal and lobular features, ER positive at %, MA positive at 2%, HER2 equivocal with IHC = 2+, FISH negative, Ki-67 = 26%.  Biopsy of left axillary lymph node showed metastatic carcinoma.  4.  10/17/2023: PET/CT scan showed cluster of hypermetabolic right breast nodules with SUV max 14.9; 2.1 x 1 cm hypermetabolic level 1 right axillary lymph node with SUV max 5.5; hypermetabolic left breast nodule with SUV max 18.8 and several additional less intense hypermetabolic left breast nodules; 1.5 x 1.3 cm hypermetabolic level 1 left axillary lymph node with SUV max 4.  Small sclerotic focus of metabolic activity within the proximal tibial shaft with SUV max 3.7, indeterminate but unlikely to reflect metastatic disease.  Subcentimeter hypermetabolic focus along anterior margin of the left thyroid lobe, likely a thyroid nodule.  5.  11/03/2023: Thyroid ultrasound showed 0.7 x 0.6 x 0.5 cm solid hypoechoic nodule at the junction of the isthmus and left hemithyroid corresponding to increased uptake on PET/CT, consistent with TIRADS 4 nodule and subcentimeter TIRADS 3 nodule in the left inferior thyroid.  6. 11/3/2023: MR right tibia/fibula showed discrete focal 3 cm area of increased T2 signal and enhancement in mid shaft of right tibia with fat signal interspersed throughout this area with significant signal dropout on opposed phase imaging, favoring benign process.  7. 11/21/2023: Started neoadjuvant chemotherapy with dose-dense Adriamycin + Cytoxan with Neulasta support with Dr. Bev Ryan at [facility]. Reacted to Emend.  8. 11/25/2023: Evaluated at [facility] ED with diarrhea and dizziness, latter attributed to olanzapine -- this was discontinued.  9. 1/03/2024: Completed cycle 4 dose-dense AC.  10. 1/16/2024: Breast MRI showed persistent abnormal mass and nonmass enhancement in the left  "breast measuring 10.2 cm in AP dimension (unchanged).  Previously seen mass at the 12:30 position now measures 1.0 cm in greatest dimension (previously  1.9 cm). Previously seen seen mass at the 4:00 position now measures 1.1 cm in greatest dimension (previously 1.7 cm). Previously seen enlarged left axillary lymph node now has normal size and morphology. Previously seen intrapectoral lymph node now has normal size and morphology. Previously seen abnormal right breast enhancement has entirely resolved. Previously visualized enlarged right axillary lymph node now has normal size and morphology.  Other previously seen axillary lymph nodes appear stable.  11. 1/17/2024: Start of weekly paclitaxel for planned 12 weeks.    INTERVAL HISTORY:  -Angie is here today for evaluation and toxicity check prior to chemotherapy (week 4 of weekly paclitaxel following AC).  -She reports that she is doing well overall.  -She is also doing acupuncture, IVF with zinc and B vitamins the day post-infusion.  Drinking lots of fresh green juices.  Eating well.  -She is not having much fatigue.  -No nausea, mouth sores, no const but a few loose stools a day, worse on Friday and Saturday.   -Some red cheeks and a few hives on her chin occasionally.   -Has had a lot of trouble sleeping.  Tried Melatonin not relief.  Has magnesium and will try this, Does not want to necessarily try another medications.  She does get really hot - not sure if this is a \"hot flashes\" per say as they last 30-60 minutes.  -She remains busy taking care of her triplets .    REVIEW OF SYSTEMS:   14 point ROS was reviewed and is negative other than as noted above in HPI.       HOME MEDICATIONS:  Current Outpatient Medications   Medication Sig Dispense Refill     diazepam (VALIUM) 2 MG tablet Take 1 tab at least 30 min prior to MRI 4 tablet 0     Ferrous Gluconate 239 (27 Fe) MG TABS Take 1 tablet by mouth daily       HYDROcodone-acetaminophen (NORCO) 5-325 MG tablet Take " 1-2 tablets by mouth every 4 hours as needed for moderate to severe pain 5 tablet 0     hydrOXYzine HCl (ATARAX) 25 MG tablet Take 25 mg by mouth       ondansetron (ZOFRAN) 8 MG tablet Take 1 tablet by mouth every 8 hours as needed       prochlorperazine (COMPAZINE) 10 MG tablet Take 10 mg by mouth       senna-docusate (SENOKOT-S/PERICOLACE) 8.6-50 MG tablet Take 1-2 tablets by mouth 2 times daily 30 tablet 0         ALLERGIES:  Allergies   Allergen Reactions     Aprepitant Difficulty breathing and Shortness Of Breath     Fosaprepitant Unknown     Allergy added per chart review. [facility]     Carboprost GI Disturbance     Diarrhea     Codeine Nausea and Vomiting         PAST MEDICAL HISTORY:  BRCA2 gene mutation.  Bilateral breast cancer as outlined above.  2022 Galleri test - negative for malignancy.    Gynecologic history:  Age of menarche at 15.  3 children/triplets -- 1 boy and twin girls born 2022.  Age of first pregnancy at 41.  Used 10 years of OCPs no HRT, IVF for fertility treatment.      PAST SURGICAL HISTORY:  Past Surgical History:   Procedure Laterality Date     ABDOMEN SURGERY           ENT SURGERY      tonsillectomy     GYN SURGERY      egg retrieval for IVF     INSERT PORT VASCULAR ACCESS Right 2023    Procedure: INSERTION, VASCULAR ACCESS PORT;  Surgeon: Fanny Little MD;  Location:  OR     ORTHOPEDIC SURGERY      knee arthroscopy         SOCIAL HISTORY:  Social History     Socioeconomic History     Marital status:      Spouse name: Not on file     Number of children: Not on file     Years of education: Not on file     Highest education level: Not on file   Occupational History     Not on file   Tobacco Use     Smoking status: Never     Smokeless tobacco: Never   Vaping Use     Vaping Use: Never used   Substance and Sexual Activity     Alcohol use: Not Currently     Drug use: Not Currently     Sexual activity: Not on file   Other Topics Concern     Not on file    Social History Narrative     Not on file     Social Determinants of Health     Financial Resource Strain: Not on file   Food Insecurity: Not on file   Transportation Needs: Not on file   Physical Activity: Not on file   Stress: Not on file   Social Connections: Not on file   Interpersonal Safety: Not on file   Housing Stability: Not on file         FAMILY HISTORY:  Paternal grandmother and many paternal grandfathers with breast cancer; cousin with DCIS tested positive for BRCA1.  No ovarian, colon, or prostate cancer.      PHYSICAL EXAM:  Vital signs:  /78   Pulse 74   Temp 97.9  F (36.6  C) (Oral)   Resp 16   Wt 78.5 kg (173 lb)   SpO2 99%   BMI 27.10 kg/m     GENERAL: No acute distress.  EYES: No scleral icterus. No overt erythema.  RESPIRATORY: No audible cough, wheezing, or labored breathing.  Lungs are clear to auscultation bilaterally  CARDIAC: S1-S2, regular rate and rhythm with no murmur rub or gallop noted.  No lower extremity edema.  MUSCULOSKELETAL: Range of motion in the neck, shoulders, and arms appear normal.  SKIN: No overt rashes, discolorations, or lesions over the face and neck.  NEUROLOGIC: Alert.  No overt tremors.  PSYCHIATRIC: Normal affect and mood.  Does not appear anxious.   Remaining exam deferred for detailed discussion today.      LABS:  Most Recent 3 CBC's:  Recent Labs   Lab Test 02/06/24  1440 01/16/24  0903   WBC 4.8 13.2*   HGB 10.8* 11.7   MCV 93 92    157   ANEUTAUTO 2.9  --      Most Recent 3 BMP's:  Recent Labs   Lab Test 02/06/24  1440 01/16/24  0903    141   POTASSIUM 4.1 4.4   CHLORIDE 106 107   CO2 27 27   BUN 14.3 12.6   CR 0.74 0.70   ANIONGAP 7 7   GWENDOLYN 9.3 9.1   * 99   PROTTOTAL 6.9 6.9   ALBUMIN 4.2 4.2    Most Recent 3 LFT's:  Recent Labs   Lab Test 02/06/24  1440 01/16/24  0903   AST 19 22   ALT 21 23   ALKPHOS 52 99   BILITOTAL 0.7 0.3     I reviewed the above labs today.      IMAGING:  Reviewed as per HPI.    ASSESSMENT/PLAN:  Angie DESAI  Fernanda is a 43 year old female with the following issues:  1.  Clinical prognostic stage IIB, cT2-N1-M0, multifocal grade 3 invasive lobular carcinoma of the RIGHT upper outer breast, ER positive 81-90%, AK positive 31-40%, HER-2 FISH negative (IHC = 2+), Ki-67 = 70%  2.  Clinical prognostic stage IIA, cT3-N1-M0, multifocal grade 2 invasive ductal and lobular carcinoma of LEFT breast overlapping sites, ER positive %, AK positive 2%, HER-2 FISH negative (IHC = 2+), Ki-67 = 26%  3. BRCA-2 deleterious gene mutation  -- Angie has completed 4 cycles of neoadjuvant ddAC and is here today for week 4 of weekly paclitaxel.   -- She and Dr. Dan reviewed her last MRI showing good response to treatment last month.   -- She is clinically stable and labs are adequate for treatment tomorrow.  - She will continue weekly paclitaxel for 12 weeks.    -- At her prior visit, she asked about whether she would need to complete all 12 doses of paclitaxel given concern for side effects such as peripheral neuropathy.  Discussed that a repeat breast MRI could be obtained during paclitaxel if she develops significant neuropathy and if under consideration to stop paclitaxel early.  -- She will revisit with Dr. Fanny Little for bilateral mastectomies after completion of chemotherapy.  She already met with Dr. Vasiliy Yee for discussion of breast reconstruction.  -Given biopsy-proven lymph node disease, adjuvant radiation therapy would be advised.  She has already met with Dr. Finesse Cortes for radiation discussion.  - Per prior notes, given her extent of disease bilaterally, she would benefit from adjuvant abemaciclib for 2 years along with hormone blockade therapy as per the MonarchE trial.  - Per prior notes, adjuvant olaparib (Lynparza) for 1 year would be considered as well.  I do note that based on the Tomas trial, those patients who had hormone receptor positive HER2 negative breast cancer were required to have at least 4  "pathologically confirmed positive lymph nodes.    - Per prior notes, Dr. Dan  advised hormone blockade therapy with ovarian suppression therapy with Zoladex with an aromatase inhibitor after surgery.  She already removed her fallopian tubes and will undergo BSO in the future.  This will be discussed again after completion of chemotherapy and surgery.    4.  Right tibia bone lesion  - The 11/3/2023 MR right tibia/fibula showed a 3 cm area of increased T2 signal in the midshaft area.  Although the signaling suggested a benign process, this remains technically indeterminate.  Plan to repeat MRI of this area on 2/9/2024.    5.  Fertility discussion (not addressed at this visit)  - Angie has 22-bitcg-xro triplets.  She has saved embryos.  She has not yet decided whether to have more children.  - Following completion of chemotherapy followed by radiation, hormone blockade therapy would be advised for 2-3 years before bearing more children.  Per prior notes, she has also briefly considered a surrogate if she wishes to have more children.  Will discuss again after chemotherapy and surgery have been completed.    NATE Landeros  [facility]   [phone]      Oncology Rooming Note    February 6, 2024 2:56 PM   Angie Michael is a 43 year old female who presents for:    Chief Complaint   Patient presents with     Oncology Clinic Visit     Initial Vitals: /78   Pulse 74   Temp 97.9  F (36.6  C) (Oral)   Resp 16   Wt 78.5 kg (173 lb)   SpO2 99%   BMI 27.10 kg/m   Estimated body mass index is 27.1 kg/m  as calculated from the following:    Height as of 11/20/23: 1.702 m (5' 7\").    Weight as of this encounter: 78.5 kg (173 lb). Body surface area is 1.93 meters squared.  No Pain (0) Comment: Data Unavailable   No LMP recorded.  Allergies reviewed: Yes  Medications reviewed: Yes    Medications: Medication refills not needed today.  Pharmacy name entered into [employer]:    [id number] - [place], [place] - [address] AT [facility] [address]  & [facility], [place] - [address]    Frailty Screening:   Is the patient here for a new oncology consult visit in cancer care? 2. No      Clinical concerns: no       Shari J. Schoenberger, [facility]                Again, thank you for allowing me to participate in the care of your patient.        Sincerely,        NATE Landeros CNP

## 2024-02-06 NOTE — PROGRESS NOTES
"Oncology Rooming Note    February 6, 2024 2:56 PM   Angie Michael is a 43 year old female who presents for:    Chief Complaint   Patient presents with    Oncology Clinic Visit     Initial Vitals: /78   Pulse 74   Temp 97.9  F (36.6  C) (Oral)   Resp 16   Wt 78.5 kg (173 lb)   SpO2 99%   BMI 27.10 kg/m   Estimated body mass index is 27.1 kg/m  as calculated from the following:    Height as of 11/20/23: 1.702 m (5' 7\").    Weight as of this encounter: 78.5 kg (173 lb). Body surface area is 1.93 meters squared.  No Pain (0) Comment: Data Unavailable   No LMP recorded.  Allergies reviewed: Yes  Medications reviewed: Yes    Medications: Medication refills not needed today.  Pharmacy name entered into [facility]:    [id number] - [place], [place] - [address] AT [facility] [address] [facility] [place], [place] - [address] [address]    Frailty Screening:   Is the patient here for a new oncology consult visit in cancer care? 2. No      Clinical concerns: no       Shari J. Schoenberger, CMA              "

## 2024-02-06 NOTE — PROGRESS NOTES
Nursing Note:  Angie Michael presents today for Port Labs.    Patient seen by provider today: Yes: Alma BRAND NP   present during visit today: Not Applicable.    Note: N/A.    Intravenous Access:  Labs drawn without difficulty.  Implanted Port.    Discharge Plan:   Patient was sent to [facility] for 3:30 appointment.    Isabel Min RN

## 2024-02-07 ENCOUNTER — INFUSION THERAPY VISIT (OUTPATIENT)
Dept: INFUSION THERAPY | Facility: CLINIC | Age: 44
End: 2024-02-07
Attending: INTERNAL MEDICINE
Payer: COMMERCIAL

## 2024-02-07 VITALS
DIASTOLIC BLOOD PRESSURE: 77 MMHG | RESPIRATION RATE: 18 BRPM | TEMPERATURE: 98 F | SYSTOLIC BLOOD PRESSURE: 108 MMHG | HEART RATE: 90 BPM | OXYGEN SATURATION: 98 %

## 2024-02-07 DIAGNOSIS — Z17.0 MALIGNANT NEOPLASM OF UPPER-OUTER QUADRANT OF RIGHT BREAST IN FEMALE, ESTROGEN RECEPTOR POSITIVE (H): Primary | ICD-10-CM

## 2024-02-07 DIAGNOSIS — C50.411 MALIGNANT NEOPLASM OF UPPER-OUTER QUADRANT OF RIGHT BREAST IN FEMALE, ESTROGEN RECEPTOR POSITIVE (H): Primary | ICD-10-CM

## 2024-02-07 PROCEDURE — 96413 CHEMO IV INFUSION 1 HR: CPT

## 2024-02-07 PROCEDURE — 258N000003 HC RX IP 258 OP 636: Performed by: NURSE PRACTITIONER

## 2024-02-07 PROCEDURE — 250N000011 HC RX IP 250 OP 636: Performed by: NURSE PRACTITIONER

## 2024-02-07 PROCEDURE — 96375 TX/PRO/DX INJ NEW DRUG ADDON: CPT

## 2024-02-07 RX ORDER — ONDANSETRON 2 MG/ML
8 INJECTION INTRAMUSCULAR; INTRAVENOUS ONCE
Status: COMPLETED | OUTPATIENT
Start: 2024-02-07 | End: 2024-02-07

## 2024-02-07 RX ORDER — HEPARIN SODIUM (PORCINE) LOCK FLUSH IV SOLN 100 UNIT/ML 100 UNIT/ML
5 SOLUTION INTRAVENOUS
Status: DISCONTINUED | OUTPATIENT
Start: 2024-02-07 | End: 2024-02-07 | Stop reason: HOSPADM

## 2024-02-07 RX ORDER — LORAZEPAM 2 MG/ML
0.5 INJECTION INTRAMUSCULAR EVERY 4 HOURS PRN
Status: DISCONTINUED | OUTPATIENT
Start: 2024-02-07 | End: 2024-02-07 | Stop reason: HOSPADM

## 2024-02-07 RX ADMIN — Medication 5 ML: at 10:05

## 2024-02-07 RX ADMIN — LORAZEPAM 0.5 MG: 2 INJECTION INTRAMUSCULAR; INTRAVENOUS at 08:12

## 2024-02-07 RX ADMIN — PACLITAXEL 152 MG: 6 INJECTION, SOLUTION INTRAVENOUS at 09:00

## 2024-02-07 RX ADMIN — ONDANSETRON 8 MG: 2 INJECTION INTRAMUSCULAR; INTRAVENOUS at 08:19

## 2024-02-07 RX ADMIN — SODIUM CHLORIDE 250 ML: 9 INJECTION, SOLUTION INTRAVENOUS at 08:17

## 2024-02-07 NOTE — PROGRESS NOTES
Infusion Nursing Note:  Angie Michael presents today for C5D1 Taxol.    Patient seen by provider today: Yes: URI Landeros   present during visit today: Not Applicable.    Note: Patient requested Ativan prior to Penguin cold caps.      Intravenous Access:  Implanted Port.    Treatment Conditions:  Lab Results   Component Value Date    HGB 10.8 (L) 02/06/2024    WBC 4.8 02/06/2024    ANEU 11.0 (H) 01/16/2024    ANEUTAUTO 2.9 02/06/2024     02/06/2024        Lab Results   Component Value Date     02/06/2024    POTASSIUM 4.1 02/06/2024    CR 0.74 02/06/2024    GWENDOLYN 9.3 02/06/2024    BILITOTAL 0.7 02/06/2024    ALBUMIN 4.2 02/06/2024    ALT 21 02/06/2024    AST 19 02/06/2024       Results reviewed, labs MET treatment parameters, ok to proceed with treatment.      Post Infusion Assessment:  Patient tolerated infusion without incident.  Blood return noted pre and post infusion.  Site patent and intact, free from redness, edema or discomfort.  No evidence of extravasations.  Access discontinued per protocol.       Discharge Plan:   Patient declined prescription refills.  Discharge instructions reviewed with: Family.  Patient and/or family verbalized understanding of discharge instructions and all questions answered.  AVS to patient via [employer].  Patient will return 2/9 for next appointment.   Patient discharged in stable condition accompanied by: self and .  Departure Mode: Ambulatory.      Sarath Todd RN

## 2024-02-08 RX ORDER — HEPARIN SODIUM (PORCINE) LOCK FLUSH IV SOLN 100 UNIT/ML 100 UNIT/ML
5-10 SOLUTION INTRAVENOUS
Status: CANCELLED | OUTPATIENT
Start: 2024-02-08

## 2024-02-08 RX ORDER — HEPARIN SODIUM,PORCINE 10 UNIT/ML
5-10 VIAL (ML) INTRAVENOUS
Status: CANCELLED | OUTPATIENT
Start: 2024-02-08

## 2024-02-08 RX ORDER — HEPARIN SODIUM,PORCINE 10 UNIT/ML
5-10 VIAL (ML) INTRAVENOUS EVERY 24 HOURS
Status: CANCELLED | OUTPATIENT
Start: 2024-02-08

## 2024-02-09 ENCOUNTER — HOSPITAL ENCOUNTER (OUTPATIENT)
Facility: CLINIC | Age: 44
Discharge: HOME | End: 2024-02-09
Admitting: RADIOLOGY
Payer: COMMERCIAL

## 2024-02-09 ENCOUNTER — HOSPITAL ENCOUNTER (OUTPATIENT)
Dept: MRI IMAGING | Facility: CLINIC | Age: 44
Discharge: HOME | End: 2024-02-09
Attending: INTERNAL MEDICINE
Payer: COMMERCIAL

## 2024-02-09 DIAGNOSIS — C50.411 MALIGNANT NEOPLASM OF UPPER-OUTER QUADRANT OF BOTH BREASTS IN FEMALE, ESTROGEN RECEPTOR POSITIVE (H): ICD-10-CM

## 2024-02-09 DIAGNOSIS — Z17.0 MALIGNANT NEOPLASM OF UPPER-OUTER QUADRANT OF BOTH BREASTS IN FEMALE, ESTROGEN RECEPTOR POSITIVE (H): ICD-10-CM

## 2024-02-09 DIAGNOSIS — M89.9 BONE LESION: ICD-10-CM

## 2024-02-09 DIAGNOSIS — C50.412 MALIGNANT NEOPLASM OF UPPER-OUTER QUADRANT OF BOTH BREASTS IN FEMALE, ESTROGEN RECEPTOR POSITIVE (H): ICD-10-CM

## 2024-02-09 PROCEDURE — 73720 MRI LWR EXTREMITY W/O&W/DYE: CPT | Mod: 26 | Performed by: RADIOLOGY

## 2024-02-09 PROCEDURE — 255N000002 HC RX 255 OP 636: Performed by: INTERNAL MEDICINE

## 2024-02-09 PROCEDURE — A9585 GADOBUTROL INJECTION: HCPCS | Performed by: INTERNAL MEDICINE

## 2024-02-09 PROCEDURE — 999N000154 HC STATISTIC RADIOLOGY XRAY, US, CT, MAR, NM

## 2024-02-09 PROCEDURE — 250N000011 HC RX IP 250 OP 636: Performed by: PHYSICIAN ASSISTANT

## 2024-02-09 PROCEDURE — 73720 MRI LWR EXTREMITY W/O&W/DYE: CPT | Mod: RT

## 2024-02-09 RX ORDER — HEPARIN SODIUM,PORCINE 10 UNIT/ML
5-10 VIAL (ML) INTRAVENOUS
Status: DISCONTINUED | OUTPATIENT
Start: 2024-02-09 | End: 2024-02-09 | Stop reason: HOSPADM

## 2024-02-09 RX ORDER — HEPARIN SODIUM (PORCINE) LOCK FLUSH IV SOLN 100 UNIT/ML 100 UNIT/ML
5-10 SOLUTION INTRAVENOUS
Status: DISCONTINUED | OUTPATIENT
Start: 2024-02-09 | End: 2024-02-09 | Stop reason: HOSPADM

## 2024-02-09 RX ORDER — GADOBUTROL 604.72 MG/ML
8 INJECTION INTRAVENOUS ONCE
Status: COMPLETED | OUTPATIENT
Start: 2024-02-09 | End: 2024-02-09

## 2024-02-09 RX ORDER — HEPARIN SODIUM,PORCINE 10 UNIT/ML
5-10 VIAL (ML) INTRAVENOUS EVERY 24 HOURS
Status: DISCONTINUED | OUTPATIENT
Start: 2024-02-09 | End: 2024-02-09 | Stop reason: HOSPADM

## 2024-02-09 RX ADMIN — HEPARIN SODIUM (PORCINE) LOCK FLUSH IV SOLN 100 UNIT/ML 5 ML: 100 SOLUTION at 08:55

## 2024-02-09 RX ADMIN — GADOBUTROL 8 ML: 604.72 INJECTION INTRAVENOUS at 08:47

## 2024-02-09 ASSESSMENT — ACTIVITIES OF DAILY LIVING (ADL): ADLS_ACUITY_SCORE: 35

## 2024-02-12 ENCOUNTER — MYC MEDICAL ADVICE (OUTPATIENT)
Dept: ONCOLOGY | Facility: CLINIC | Age: 44
End: 2024-02-12
Payer: COMMERCIAL

## 2024-02-12 ENCOUNTER — TELEPHONE (OUTPATIENT)
Dept: ONCOLOGY | Facility: CLINIC | Age: 44
End: 2024-02-12
Payer: COMMERCIAL

## 2024-02-12 ENCOUNTER — DOCUMENTATION ONLY (OUTPATIENT)
Dept: ONCOLOGY | Facility: CLINIC | Age: 44
End: 2024-02-12
Payer: COMMERCIAL

## 2024-02-12 DIAGNOSIS — C50.812 MALIGNANT NEOPLASM OF OVERLAPPING SITES OF BOTH BREASTS IN FEMALE, ESTROGEN RECEPTOR POSITIVE (H): ICD-10-CM

## 2024-02-12 DIAGNOSIS — Z17.0 MALIGNANT NEOPLASM OF OVERLAPPING SITES OF BOTH BREASTS IN FEMALE, ESTROGEN RECEPTOR POSITIVE (H): ICD-10-CM

## 2024-02-12 DIAGNOSIS — C50.811 MALIGNANT NEOPLASM OF OVERLAPPING SITES OF BOTH BREASTS IN FEMALE, ESTROGEN RECEPTOR POSITIVE (H): ICD-10-CM

## 2024-02-12 DIAGNOSIS — M89.9 BONE LESION: Primary | ICD-10-CM

## 2024-02-12 RX ORDER — DIPHENHYDRAMINE HCL 25 MG
50 CAPSULE ORAL
Status: CANCELLED
Start: 2024-02-14

## 2024-02-12 RX ORDER — MEPERIDINE HYDROCHLORIDE 25 MG/ML
25 INJECTION INTRAMUSCULAR; INTRAVENOUS; SUBCUTANEOUS EVERY 30 MIN PRN
Status: CANCELLED | OUTPATIENT
Start: 2024-02-14

## 2024-02-12 RX ORDER — HEPARIN SODIUM,PORCINE 10 UNIT/ML
5-20 VIAL (ML) INTRAVENOUS DAILY PRN
Status: CANCELLED | OUTPATIENT
Start: 2024-02-14

## 2024-02-12 RX ORDER — HEPARIN SODIUM (PORCINE) LOCK FLUSH IV SOLN 100 UNIT/ML 100 UNIT/ML
5 SOLUTION INTRAVENOUS
Status: CANCELLED | OUTPATIENT
Start: 2024-02-14

## 2024-02-12 RX ORDER — ALBUTEROL SULFATE 90 UG/1
1-2 AEROSOL, METERED RESPIRATORY (INHALATION)
Status: CANCELLED
Start: 2024-02-14

## 2024-02-12 RX ORDER — DIPHENHYDRAMINE HYDROCHLORIDE 50 MG/ML
50 INJECTION INTRAMUSCULAR; INTRAVENOUS
Status: CANCELLED
Start: 2024-02-14

## 2024-02-12 RX ORDER — ONDANSETRON 2 MG/ML
8 INJECTION INTRAMUSCULAR; INTRAVENOUS ONCE
Status: CANCELLED | OUTPATIENT
Start: 2024-02-14

## 2024-02-12 RX ORDER — LORAZEPAM 2 MG/ML
0.5 INJECTION INTRAMUSCULAR EVERY 4 HOURS PRN
Status: CANCELLED | OUTPATIENT
Start: 2024-02-14

## 2024-02-12 RX ORDER — ALBUTEROL SULFATE 0.83 MG/ML
2.5 SOLUTION RESPIRATORY (INHALATION)
Status: CANCELLED | OUTPATIENT
Start: 2024-02-14

## 2024-02-12 RX ORDER — EPINEPHRINE 1 MG/ML
0.3 INJECTION, SOLUTION, CONCENTRATE INTRAVENOUS EVERY 5 MIN PRN
Status: CANCELLED | OUTPATIENT
Start: 2024-02-14

## 2024-02-12 NOTE — PROGRESS NOTES
I called back Angie today and explained her MRI tibia results and my discussion with the second radiologist.  Because the bone lesion has enlarged further and remains indeterminate, I recommended a biopsy of the bone lesion.  Discussed that she may have pain for a few weeks after biopsy and that I can provide stronger (that OTC) pain medication if needed.  She agrees to proceed with bone lesion biopsy.  IR referral was placed and scheduling notified.    We talked about what her prognosis and treatment plan would be like if she were to have biopsy proven bone metastasis.  We talked about recommendation for radiation and short interruption in her chemo if biopsy proves that this lesions is a metastasis.    Monse Dan MD  Hematology/Oncology  [facility]

## 2024-02-12 NOTE — TELEPHONE ENCOUNTER
Pt is calling. States that she missed a call from Dr Dan today and has also read the [employer] message from Dr Dan.   Pt has further questions and would like to talk to Dr Dan directly.   Pt is requesting return call from Dr Dan whenever she has time. Pt states that her kids will be sleeping until around 2:30 if it is possible to call before then, but pt will make sure that her ringer is turned on so that she won't miss the call.    Patient verbalized understanding and agreement with plan.  Patient was instructed to call the clinic with any questions, concerns, or worsening symptoms.     Miriam Garcia RN on 2/12/2024 at 12:58 PM

## 2024-02-13 NOTE — PROGRESS NOTES
[facility]    Hematology/Oncology Established Patient Note      Today's Date: 2/21/2024    Reason for visit: Bilateral breast cancer.    HISTORY OF PRESENT ILLNESS: Angie Michael is a 43 year old female with BRCA2 gene mutation who presents with the following oncologic history:  1.  10/05/2023: High risk surveillance breast MRI showed right breast 3.6 x 3 x 3.2 cm irregular enhancing mass at 10:00, 0.9 x 0.8 x 0.9 cm second mass at 9:00, 1 x 0.9 x 1.2 cm third mass at 9:30, other suspicious foci of enhancement throughout the breast.  Left breast with 9.9 x 5.2 x 6.7 cm suspicious non-mass enhancement at 10:00-7:00; 1.6 x 1.3 x 1.7 cm enhancing mass at 4:00; 1.8 x 1.6 x 1.9 cm enhancing mass at 12:30; other suspicious foci of enhancement throughout the left breast.  One level 1 suspicious right axillary lymph node and one level 1 suspicious left axillary lymph node..  Developing suspicious interpectoral lymph node.  2.  10/12/2023: Bilateral diagnostic mammogram showed right breast with 3.2 x 2.7 x 4.1 cm irregular mass at 10:00, 6 cm from nipple; 0.9 x 0.6 x 1 cm irregular mass at 11:00, 2 cm from nipple and prominent right axillary lymph node.  Left breast with 1.4 x 1.1 x 1.7 cm mass at 12:00, 2 cm from nipple, 1.8 x 1 x 1.6 cm mass at 3:30, 4 cm from nipple, 1 x 0.9 x 1.1 cm mass at 3:00, 7 cm from nipple, multiple areas of hypoechogenicity with calcifications in the lateral left breast, and prominent left axillary lymph node.  3.  10/12/2023: Biopsy of right breast at 10:00 showed grade 3 invasive lobular carcinoma, ER strongly positive at 81-90%, NV positive at 31-40%, HER2 equivocal with IHC = 2+, FISH negative, Ki-67 = 70%.  Biopsy of right axillary lymph node showed metastatic carcinoma.  Biopsy of left breast at 3:30 showed grade 2 invasive carcinoma with ductal and lobular features, ER positive at %, NV positive at 2%, HER2 equivocal with IHC = 2+, FISH negative, Ki-67 = 26%.   Biopsy of left axillary lymph node showed metastatic carcinoma.  4.  10/17/2023: PET/CT scan showed cluster of hypermetabolic right breast nodules with SUV max 14.9; 2.1 x 1 cm hypermetabolic level 1 right axillary lymph node with SUV max 5.5; hypermetabolic left breast nodule with SUV max 18.8 and several additional less intense hypermetabolic left breast nodules; 1.5 x 1.3 cm hypermetabolic level 1 left axillary lymph node with SUV max 4.  Small sclerotic focus of metabolic activity within the proximal tibial shaft with SUV max 3.7, indeterminate but unlikely to reflect metastatic disease.  Subcentimeter hypermetabolic focus along anterior margin of the left thyroid lobe, likely a thyroid nodule.  5.  11/03/2023: Thyroid ultrasound showed 0.7 x 0.6 x 0.5 cm solid hypoechoic nodule at the junction of the isthmus and left hemithyroid corresponding to increased uptake on PET/CT, consistent with TIRADS 4 nodule and subcentimeter TIRADS 3 nodule in the left inferior thyroid.  6. 11/3/2023: MR right tibia/fibula showed discrete focal 3 cm area of increased T2 signal and enhancement in mid shaft of right tibia with fat signal interspersed throughout this area with significant signal dropout on opposed phase imaging, favoring benign process.  7. 11/21/2023: Started neoadjuvant chemotherapy with dose-dense Adriamycin + Cytoxan with Neulasta support with Dr. Bev Ryan at [facility]. Reacted to [facility]end.  8. 11/25/2023: Evaluated at [facility] ED with diarrhea and dizziness, latter attributed to olanzapine -- this was discontinued.  9. 1/03/2024: Completed cycle 4 dose-dense AC.  10. 1/16/2024: Breast MRI showed persistent abnormal mass and nonmass enhancement in the left breast measuring 10.2 cm in AP dimension (unchanged).  Previously seen mass at the 12:30 position now measures 1.0 cm in greatest dimension (previously  1.9 cm). Previously seen seen mass at the 4:00 position now measures 1.1 cm in greatest dimension  (previously 1.7 cm). Previously seen enlarged left axillary lymph node now has normal size and morphology. Previously seen intrapectoral lymph node now has normal size and morphology. Previously seen abnormal right breast enhancement has entirely resolved. Previously visualized enlarged right axillary lymph node now has normal size and morphology.  Other previously seen axillary lymph nodes appear stable.  11. 1/17/2024: Start of weekly paclitaxel for planned 12 weeks.  12. 2/9/2024: MR right tibia/fibula showed mid right tibial shaft bone lesion increased from 3.2 cm to 3.8 cm; speckled foci of marrow signal likely areas of red marrow reconversion.    INTERVAL HISTORY:  Angie reports feeling very well with no significant fatigue, paresthesias, or pain.     REVIEW OF SYSTEMS:   14 point ROS was reviewed and is negative other than as noted above in HPI.       HOME MEDICATIONS:  Current Outpatient Medications   Medication Sig Dispense Refill    diazepam (VALIUM) 2 MG tablet Take 1 tab at least 30 min prior to MRI 4 tablet 0    Ferrous Gluconate 239 (27 Fe) MG TABS Take 1 tablet by mouth daily      HYDROcodone-acetaminophen (NORCO) 5-325 MG tablet Take 1-2 tablets by mouth every 4 hours as needed for moderate to severe pain 5 tablet 0    hydrOXYzine HCl (ATARAX) 25 MG tablet Take 25 mg by mouth      ondansetron (ZOFRAN) 8 MG tablet Take 1 tablet by mouth every 8 hours as needed      prochlorperazine (COMPAZINE) 10 MG tablet Take 10 mg by mouth      senna-docusate (SENOKOT-S/PERICOLACE) 8.6-50 MG tablet Take 1-2 tablets by mouth 2 times daily 30 tablet 0         ALLERGIES:  Allergies   Allergen Reactions    Aprepitant Difficulty breathing and Shortness Of Breath    Fosaprepitant Unknown     Allergy added per chart review. [facility]    Carboprost GI Disturbance     Diarrhea    Codeine Nausea and Vomiting         PAST MEDICAL HISTORY:  BRCA2 gene mutation.  Bilateral breast cancer as outlined above.  9/2022 Radah  "test - negative for malignancy.    Gynecologic history:  Age of menarche at 15.  3 children/triplets -- 1 boy and twin girls born 2022.  Age of first pregnancy at 41.  Used 10 years of OCPs no HRT, IVF for fertility treatment.      PAST SURGICAL HISTORY:  Past Surgical History:   Procedure Laterality Date    ABDOMEN SURGERY          ENT SURGERY      tonsillectomy    GYN SURGERY      egg retrieval for IVF    INSERT PORT VASCULAR ACCESS Right 2023    Procedure: INSERTION, VASCULAR ACCESS PORT;  Surgeon: Fanny Little MD;  Location:  OR    ORTHOPEDIC SURGERY      knee arthroscopy         SOCIAL HISTORY:  Social History     Socioeconomic History    Marital status:      Spouse name: Not on file    Number of children: Not on file    Years of education: Not on file    Highest education level: Not on file   Occupational History    Not on file   Tobacco Use    Smoking status: Never    Smokeless tobacco: Never   Vaping Use    Vaping Use: Never used   Substance and Sexual Activity    Alcohol use: Not Currently    Drug use: Not Currently    Sexual activity: Not on file   Other Topics Concern    Not on file   Social History Narrative    Not on file     Social Determinants of Health     Financial Resource Strain: Not on file   Food Insecurity: Not on file   Transportation Needs: Not on file   Physical Activity: Not on file   Stress: Not on file   Social Connections: Not on file   Interpersonal Safety: Not on file   Housing Stability: Not on file         FAMILY HISTORY:  Paternal grandmother and many paternal grandfathers with breast cancer; cousin with DCIS tested positive for BRCA1.  No ovarian, colon, or prostate cancer.      PHYSICAL EXAM:  Vital signs:  /77   Pulse 88   Temp 97.8  F (36.6  C)   Resp 16   Ht 1.702 m (5' 7\")   Wt 78.5 kg (173 lb)   SpO2 98%   BMI 27.10 kg/m     GENERAL: No acute distress.  EYES: No scleral icterus. No overt erythema.  RESPIRATORY: No audible cough, " wheezing, or labored breathing.  MUSCULOSKELETAL: Range of motion in the neck, shoulders, and arms appear normal.  SKIN: No overt rashes, discolorations, or lesions over the face and neck.  NEUROLOGIC: Alert.  No overt tremors.  PSYCHIATRIC: Normal affect and mood.  Does not appear anxious.   Remaining exam deferred for detailed discussion today.      LABS:  CBC RESULTS:   Recent Labs   Lab Test 02/06/24  1440   WBC 4.8   RBC 3.49*   HGB 10.8*   HCT 32.5*   MCV 93   MCH 30.9   MCHC 33.2   RDW 15.1*        2/20/2024:  WBC 4.7, Hgb 12.1, platelets 236,000, ANC 3.1.    Last Comprehensive Metabolic Panel:  Sodium   Date Value Ref Range Status   02/06/2024 140 135 - 145 mmol/L Final     Comment:     Reference intervals for this test were updated on 09/26/2023 to more accurately reflect our healthy population. There may be differences in the flagging of prior results with similar values performed with this method. Interpretation of those prior results can be made in the context of the updated reference intervals.      Potassium   Date Value Ref Range Status   02/06/2024 4.1 3.4 - 5.3 mmol/L Final     Chloride   Date Value Ref Range Status   02/06/2024 106 98 - 107 mmol/L Final     Carbon Dioxide (CO2)   Date Value Ref Range Status   02/06/2024 27 22 - 29 mmol/L Final     Anion Gap   Date Value Ref Range Status   02/06/2024 7 7 - 15 mmol/L Final     Glucose   Date Value Ref Range Status   02/06/2024 105 (H) 70 - 99 mg/dL Final     Urea Nitrogen   Date Value Ref Range Status   02/06/2024 14.3 6.0 - 20.0 mg/dL Final     Creatinine   Date Value Ref Range Status   02/06/2024 0.74 0.51 - 0.95 mg/dL Final     GFR Estimate   Date Value Ref Range Status   02/06/2024 >90 >60 mL/min/1.73m2 Final     Calcium   Date Value Ref Range Status   02/06/2024 9.3 8.6 - 10.0 mg/dL Final     Bilirubin Total   Date Value Ref Range Status   02/06/2024 0.7 <=1.2 mg/dL Final     Alkaline Phosphatase   Date Value Ref Range Status   02/06/2024  52 40 - 150 U/L Final     Comment:     Reference intervals for this test were updated on 11/14/2023 to more accurately reflect our healthy population. There may be differences in the flagging of prior results with similar values performed with this method. Interpretation of those prior results can be made in the context of the updated reference intervals.     ALT   Date Value Ref Range Status   02/06/2024 21 0 - 50 U/L Final     Comment:     Reference intervals for this test were updated on 6/12/2023 to more accurately reflect our healthy population. There may be differences in the flagging of prior results with similar values performed with this method. Interpretation of those prior results can be made in the context of the updated reference intervals.       AST   Date Value Ref Range Status   02/06/2024 19 0 - 45 U/L Final     Comment:     Reference intervals for this test were updated on 6/12/2023 to more accurately reflect our healthy population. There may be differences in the flagging of prior results with similar values performed with this method. Interpretation of those prior results can be made in the context of the updated reference intervals.       IMAGING:  Reviewed as per HPI.    ASSESSMENT/PLAN:  Angie Michael is a 43 year old female with the following issues:  1.  Clinical prognostic stage IIB, cT2-N1-M0, multifocal grade 3 invasive lobular carcinoma of the RIGHT upper outer breast, ER positive 81-90%, OK positive 31-40%, HER-2 FISH negative (IHC = 2+), Ki-67 = 70%  2.  Clinical prognostic stage IIA, cT3-N1-M0, multifocal grade 2 invasive ductal and lobular carcinoma of LEFT breast overlapping sites, ER positive %, OK positive 2%, HER-2 FISH negative (IHC = 2+), Ki-67 = 26%  3. BRCA-2 deleterious gene mutation  -- Following 4 cycles of neoadjuvant ddAC, 1/16/2023 breast MRI showed persistent abnormal mass and nonmass enhancement in the left breast measuring 10.2 cm in AP dimension (unchanged).   Previously seen mass at the 12:30 position now measures 1.0 cm in greatest dimension (previously 1.9 cm). Previously seen seen mass at the 4:00 position now measures 1.1 cm in greatest dimension (previously 1.7 cm). Previously seen enlarged left axillary lymph node now has normal size and morphology. Previously seen intrapectoral lymph node now has normal size and morphology. Previously seen abnormal right breast enhancement has entirely resolved. Previously visualized enlarged right axillary lymph node now has normal size and morphology.  Other previously seen axillary lymph nodes appear stable.  All these findings are consistent with excellent complete response on the right and partial response on the left.  -- Reviewed that her CBC and CMP are normal from labs drawn at  on 2/20/2024.  - Continue with weekly paclitaxel for total 12 weeks.   --Will repeat breast MRI after paclitaxel per her request to reevaluate response after paclitaxel.   -- She will revisit with Dr. Fanny Little for discussion of bilateral mastectomies towards end of chemotherapy.  She already met with Dr. Vasiliy Yee for discussion of breast reconstruction. I have messaged Dr. Little on update with Angie's progress and to arrange return visit.  -Given biopsy-proven lymph node disease, adjuvant radiation therapy would be advised.  She has already met with Dr. Finesse Cortes for radiation discussion.  - Given her extent of disease bilaterally, she would benefit from adjuvant abemaciclib for 2 years along with hormone blockade therapy as per the MonarchE trial.  -Adjuvant olaparib (Lynparza) for 1 year would be considered as well.  I do note that based on the Tomas trial, those patients who had hormone receptor positive HER2 negative breast cancer were required to have at least 4 pathologically confirmed positive lymph nodes. Discussed this today.  -I would advise hormone blockade therapy with ovarian suppression therapy with Zoladex  with an aromatase inhibitor after surgery.  She already removed her fallopian tubes and will undergo BSO in the future.  We discussed this today.    4.  Right tibia bone lesion  - 11/3/2023 MR right tibia/fibula showed a 3 cm area of increased T2 signal in the midshaft area.  Although the signaling suggested a benign process, this was technically indeterminate.    -- Repeat 2/9/2024 MR right tibia was reviewed with radiology.  The interval increase from 3.2 cm to 3.8 cm is concerning in the face of Angie's breast cancer diagnosis.   -- She will proceed with biopsy of this bone lesion on 2/27/2024.  We discussed the various possibilities of what this bone lesion could represent. Even if this were proven to be an oligo bone metastasis, she could undergo radiation to this area and the plan would be for her to complete chemo and proceed to bilateral mastectomies.  We talked about how that would change her prognosis and treatment plan for hormone blockade therapy.    5.  Fertility discussion  -Angie has [id number] triplets.  She has saved embryos.  She has not yet decided whether to have more children.  - Following completion of chemotherapy followed by radiation, hormone blockade therapy would be advised for 2-3 years before bearing more children.  She has also briefly considered a surrogate if she wishes to have more children.  Will discuss again after chemotherapy and surgery have been completed.      Monse Dan MD  Hematology/Oncology  [facility]    Total time spent today: 48 minutes in chart review, patient evaluation, counseling, documentation, test and/or medication/prescription orders, and coordination of care.

## 2024-02-14 ENCOUNTER — INFUSION THERAPY VISIT (OUTPATIENT)
Dept: INFUSION THERAPY | Facility: CLINIC | Age: 44
End: 2024-02-14
Attending: INTERNAL MEDICINE
Payer: COMMERCIAL

## 2024-02-14 VITALS
TEMPERATURE: 97.6 F | BODY MASS INDEX: 26.43 KG/M2 | HEART RATE: 91 BPM | SYSTOLIC BLOOD PRESSURE: 107 MMHG | DIASTOLIC BLOOD PRESSURE: 73 MMHG | WEIGHT: 168.4 LBS | OXYGEN SATURATION: 98 % | HEIGHT: 67 IN | RESPIRATION RATE: 18 BRPM

## 2024-02-14 DIAGNOSIS — C50.411 MALIGNANT NEOPLASM OF UPPER-OUTER QUADRANT OF RIGHT BREAST IN FEMALE, ESTROGEN RECEPTOR POSITIVE (H): Primary | ICD-10-CM

## 2024-02-14 DIAGNOSIS — Z17.0 MALIGNANT NEOPLASM OF UPPER-OUTER QUADRANT OF RIGHT BREAST IN FEMALE, ESTROGEN RECEPTOR POSITIVE (H): Primary | ICD-10-CM

## 2024-02-14 PROCEDURE — 258N000003 HC RX IP 258 OP 636: Performed by: INTERNAL MEDICINE

## 2024-02-14 PROCEDURE — 96375 TX/PRO/DX INJ NEW DRUG ADDON: CPT

## 2024-02-14 PROCEDURE — 96413 CHEMO IV INFUSION 1 HR: CPT

## 2024-02-14 PROCEDURE — 250N000011 HC RX IP 250 OP 636: Performed by: INTERNAL MEDICINE

## 2024-02-14 RX ORDER — LORAZEPAM 2 MG/ML
0.5 INJECTION INTRAMUSCULAR EVERY 4 HOURS PRN
Status: DISCONTINUED | OUTPATIENT
Start: 2024-02-14 | End: 2024-02-14 | Stop reason: HOSPADM

## 2024-02-14 RX ORDER — HEPARIN SODIUM (PORCINE) LOCK FLUSH IV SOLN 100 UNIT/ML 100 UNIT/ML
5 SOLUTION INTRAVENOUS
Status: DISCONTINUED | OUTPATIENT
Start: 2024-02-14 | End: 2024-02-14 | Stop reason: HOSPADM

## 2024-02-14 RX ORDER — ONDANSETRON 2 MG/ML
8 INJECTION INTRAMUSCULAR; INTRAVENOUS ONCE
Status: COMPLETED | OUTPATIENT
Start: 2024-02-14 | End: 2024-02-14

## 2024-02-14 RX ADMIN — ONDANSETRON 8 MG: 2 INJECTION INTRAMUSCULAR; INTRAVENOUS at 09:41

## 2024-02-14 RX ADMIN — PACLITAXEL 152 MG: 6 INJECTION, SOLUTION INTRAVENOUS at 09:49

## 2024-02-14 RX ADMIN — LORAZEPAM 0.5 MG: 2 INJECTION INTRAMUSCULAR; INTRAVENOUS at 08:58

## 2024-02-14 RX ADMIN — SODIUM CHLORIDE 250 ML: 9 INJECTION, SOLUTION INTRAVENOUS at 08:58

## 2024-02-14 RX ADMIN — Medication 5 ML: at 10:57

## 2024-02-14 NOTE — PROGRESS NOTES
Infusion Nursing Note:  Angie Michael presents today for [id number].    Patient seen by provider today: No   present during visit today: Not Applicable.    Note: Pt reports no new health changes or concerns at this time.       Intravenous Access:  Implanted Port.    Treatment Conditions:  Lab Results   Component Value Date    HGB 10.8 (L) 02/06/2024    WBC 4.8 02/06/2024    ANEU 11.0 (H) 01/16/2024    ANEUTAUTO 2.9 02/06/2024     02/06/2024        Lab Results   Component Value Date     02/06/2024    POTASSIUM 4.1 02/06/2024    CR 0.74 02/06/2024    GWENDOLYN 9.3 02/06/2024    BILITOTAL 0.7 02/06/2024    ALBUMIN 4.2 02/06/2024    ALT 21 02/06/2024    AST 19 02/06/2024       Results reviewed, labs MET treatment parameters, ok to proceed with treatment.      Post Infusion Assessment:  Patient tolerated infusion without incident.  Blood return noted pre and post infusion.  Site patent and intact, free from redness, edema or discomfort.  No evidence of extravasations.  Access discontinued per protocol.       Discharge Plan:   Patient declined prescription refills.  Discharge instructions reviewed with: Patient.  Patient and/or family verbalized understanding of discharge instructions and all questions answered.  AVS to patient via [employer].  Patient will return 2/21/24 for next appointment.   Patient discharged in stable condition accompanied by: .  Departure Mode: Ambulatory.      Isabel Min RN

## 2024-02-21 ENCOUNTER — ONCOLOGY VISIT (OUTPATIENT)
Dept: ONCOLOGY | Facility: CLINIC | Age: 44
End: 2024-02-21
Attending: INTERNAL MEDICINE
Payer: COMMERCIAL

## 2024-02-21 ENCOUNTER — TELEPHONE (OUTPATIENT)
Dept: MEDSURG UNIT | Facility: CLINIC | Age: 44
End: 2024-02-21
Payer: COMMERCIAL

## 2024-02-21 ENCOUNTER — INFUSION THERAPY VISIT (OUTPATIENT)
Dept: INFUSION THERAPY | Facility: CLINIC | Age: 44
End: 2024-02-21
Attending: INTERNAL MEDICINE
Payer: COMMERCIAL

## 2024-02-21 VITALS
TEMPERATURE: 97.8 F | OXYGEN SATURATION: 98 % | SYSTOLIC BLOOD PRESSURE: 112 MMHG | HEART RATE: 88 BPM | HEIGHT: 67 IN | BODY MASS INDEX: 27.15 KG/M2 | WEIGHT: 173 LBS | RESPIRATION RATE: 16 BRPM | DIASTOLIC BLOOD PRESSURE: 77 MMHG

## 2024-02-21 DIAGNOSIS — Z17.0 MALIGNANT NEOPLASM OF OVERLAPPING SITES OF BOTH BREASTS IN FEMALE, ESTROGEN RECEPTOR POSITIVE (H): Primary | ICD-10-CM

## 2024-02-21 DIAGNOSIS — C50.812 MALIGNANT NEOPLASM OF OVERLAPPING SITES OF BOTH BREASTS IN FEMALE, ESTROGEN RECEPTOR POSITIVE (H): Primary | ICD-10-CM

## 2024-02-21 DIAGNOSIS — M89.9 BONE LESION: ICD-10-CM

## 2024-02-21 DIAGNOSIS — Z17.0 MALIGNANT NEOPLASM OF UPPER-OUTER QUADRANT OF RIGHT BREAST IN FEMALE, ESTROGEN RECEPTOR POSITIVE (H): Primary | ICD-10-CM

## 2024-02-21 DIAGNOSIS — C50.811 MALIGNANT NEOPLASM OF OVERLAPPING SITES OF BOTH BREASTS IN FEMALE, ESTROGEN RECEPTOR POSITIVE (H): Primary | ICD-10-CM

## 2024-02-21 DIAGNOSIS — C50.411 MALIGNANT NEOPLASM OF UPPER-OUTER QUADRANT OF RIGHT BREAST IN FEMALE, ESTROGEN RECEPTOR POSITIVE (H): Primary | ICD-10-CM

## 2024-02-21 PROCEDURE — 250N000011 HC RX IP 250 OP 636: Performed by: INTERNAL MEDICINE

## 2024-02-21 PROCEDURE — 96375 TX/PRO/DX INJ NEW DRUG ADDON: CPT

## 2024-02-21 PROCEDURE — G0463 HOSPITAL OUTPT CLINIC VISIT: HCPCS | Performed by: INTERNAL MEDICINE

## 2024-02-21 PROCEDURE — 96413 CHEMO IV INFUSION 1 HR: CPT

## 2024-02-21 PROCEDURE — 258N000003 HC RX IP 258 OP 636: Performed by: INTERNAL MEDICINE

## 2024-02-21 PROCEDURE — 99215 OFFICE O/P EST HI 40 MIN: CPT | Performed by: INTERNAL MEDICINE

## 2024-02-21 RX ORDER — LORAZEPAM 2 MG/ML
0.5 INJECTION INTRAMUSCULAR EVERY 4 HOURS PRN
Status: DISCONTINUED | OUTPATIENT
Start: 2024-02-21 | End: 2024-02-21 | Stop reason: HOSPADM

## 2024-02-21 RX ORDER — DIPHENHYDRAMINE HCL 25 MG
50 CAPSULE ORAL
Status: CANCELLED
Start: 2024-02-21

## 2024-02-21 RX ORDER — LORAZEPAM 2 MG/ML
0.5 INJECTION INTRAMUSCULAR EVERY 4 HOURS PRN
Status: CANCELLED | OUTPATIENT
Start: 2024-02-21

## 2024-02-21 RX ORDER — METHYLPREDNISOLONE SODIUM SUCCINATE 125 MG/2ML
125 INJECTION, POWDER, LYOPHILIZED, FOR SOLUTION INTRAMUSCULAR; INTRAVENOUS
Status: CANCELLED
Start: 2024-02-21

## 2024-02-21 RX ORDER — ALBUTEROL SULFATE 0.83 MG/ML
2.5 SOLUTION RESPIRATORY (INHALATION)
Status: CANCELLED | OUTPATIENT
Start: 2024-02-21

## 2024-02-21 RX ORDER — ONDANSETRON 2 MG/ML
8 INJECTION INTRAMUSCULAR; INTRAVENOUS ONCE
Status: CANCELLED | OUTPATIENT
Start: 2024-02-21

## 2024-02-21 RX ORDER — ONDANSETRON 2 MG/ML
8 INJECTION INTRAMUSCULAR; INTRAVENOUS ONCE
Status: COMPLETED | OUTPATIENT
Start: 2024-02-21 | End: 2024-02-21

## 2024-02-21 RX ORDER — HEPARIN SODIUM (PORCINE) LOCK FLUSH IV SOLN 100 UNIT/ML 100 UNIT/ML
5 SOLUTION INTRAVENOUS
Status: DISCONTINUED | OUTPATIENT
Start: 2024-02-21 | End: 2024-02-21 | Stop reason: HOSPADM

## 2024-02-21 RX ORDER — ALBUTEROL SULFATE 90 UG/1
1-2 AEROSOL, METERED RESPIRATORY (INHALATION)
Status: CANCELLED
Start: 2024-02-21

## 2024-02-21 RX ORDER — MEPERIDINE HYDROCHLORIDE 25 MG/ML
25 INJECTION INTRAMUSCULAR; INTRAVENOUS; SUBCUTANEOUS EVERY 30 MIN PRN
Status: CANCELLED | OUTPATIENT
Start: 2024-02-21

## 2024-02-21 RX ORDER — DIPHENHYDRAMINE HYDROCHLORIDE 50 MG/ML
50 INJECTION INTRAMUSCULAR; INTRAVENOUS
Status: CANCELLED
Start: 2024-02-21

## 2024-02-21 RX ORDER — EPINEPHRINE 1 MG/ML
0.3 INJECTION, SOLUTION INTRAMUSCULAR; SUBCUTANEOUS EVERY 5 MIN PRN
Status: CANCELLED | OUTPATIENT
Start: 2024-02-21

## 2024-02-21 RX ORDER — HEPARIN SODIUM (PORCINE) LOCK FLUSH IV SOLN 100 UNIT/ML 100 UNIT/ML
5 SOLUTION INTRAVENOUS
Status: CANCELLED | OUTPATIENT
Start: 2024-02-21

## 2024-02-21 RX ORDER — HEPARIN SODIUM,PORCINE 10 UNIT/ML
5-20 VIAL (ML) INTRAVENOUS DAILY PRN
Status: DISCONTINUED | OUTPATIENT
Start: 2024-02-21 | End: 2024-02-21 | Stop reason: HOSPADM

## 2024-02-21 RX ORDER — HEPARIN SODIUM,PORCINE 10 UNIT/ML
5-20 VIAL (ML) INTRAVENOUS DAILY PRN
Status: CANCELLED | OUTPATIENT
Start: 2024-02-21

## 2024-02-21 RX ADMIN — LORAZEPAM 0.5 MG: 2 INJECTION INTRAMUSCULAR; INTRAVENOUS at 10:32

## 2024-02-21 RX ADMIN — PACLITAXEL 152 MG: 6 INJECTION, SOLUTION INTRAVENOUS at 11:27

## 2024-02-21 RX ADMIN — SODIUM CHLORIDE 250 ML: 9 INJECTION, SOLUTION INTRAVENOUS at 10:32

## 2024-02-21 RX ADMIN — ONDANSETRON 8 MG: 2 INJECTION INTRAMUSCULAR; INTRAVENOUS at 10:32

## 2024-02-21 RX ADMIN — Medication 5 ML: at 12:43

## 2024-02-21 ASSESSMENT — PAIN SCALES - GENERAL: PAINLEVEL: NO PAIN (0)

## 2024-02-21 NOTE — PROGRESS NOTES
Infusion Nursing Note:  Angie Michael presents today for taxol.    Patient seen by provider today: Yes: Sy   present during visit today: Not Applicable.    Note: N/A.      Intravenous Access:  Implanted Port.    Treatment Conditions:  Lab Results   Component Value Date    HGB 10.8 (L) 02/06/2024    WBC 4.8 02/06/2024    ANEU 11.0 (H) 01/16/2024    ANEUTAUTO 2.9 02/06/2024     02/06/2024        Lab Results   Component Value Date     02/06/2024    POTASSIUM 4.1 02/06/2024    CR 0.74 02/06/2024    GWENDOLYN 9.3 02/06/2024    BILITOTAL 0.7 02/06/2024    ALBUMIN 4.2 02/06/2024    ALT 21 02/06/2024    AST 19 02/06/2024       Results reviewed, labs MET treatment parameters, ok to proceed with treatment.      Post Infusion Assessment:  Patient tolerated infusion without incident.  Site patent and intact, free from redness, edema or discomfort.  No evidence of extravasations.  Access discontinued per protocol.       Discharge Plan:   Patient and/or family verbalized understanding of discharge instructions and all questions answered.  AVS to patient via [employer] mobileHART.  Patient will return 2/28 for next appointment.   Patient discharged in stable condition accompanied by: .  Departure Mode: Ambulatory.      Jacki Coronado RN

## 2024-02-21 NOTE — TELEPHONE ENCOUNTER
RADIOLOGY PROCEDURE INSTRUCTIONS     You are scheduled for an upcoming procedure in the   Radiology Department at [facility].       Date: 2/27/24      Procedure: Bone Biopsy     Address: [facility]                 [address] [address]     [facility] is located on [facility], across the street from hospital.  There is a skyway attached to this ramp that will direct you to the patient check in area.     Check into the [facility] Lounge at: 0730    Do not eat any food or liquids after: 0130       Two visitors may accompany you to your procedure.    You will need to have someone available to drive you home.    We recommend that you have a responsible adult with you for 24 hours after you get home.    Please take all of your medications as prescribed with a sip of water in the am, unless you are contacted by a nurse from our department to hold them.  If you are on a GLP-1 (dulaglutide, exenatide, semaglutide, liraglutide, lixisenatide, semaglutide) weight loss drug you must hold this medication for 7 days prior to your procedure. None.    You need to have an up to date History & Physical exam (H&P)  by your primary physician in the 30 day period prior to your scheduled procedure.  Please contact your primary care team for this appointment. Noted H&P 2/21/24.    If you develop a fever, cold symptoms or test positive for COVID-19 please call us to receive direction or reschedule.     Pt notes that she typically needs more sedation than most and has had an endoscopy aborted ~20 years ago because they were unable to sedate her enough for procedure. States it was mentioned that she would need anesthesia. I communicated with pt that I will reach out to one of our radiologists and get this on their radar for the procedure. Also communicated that we should be able to complete with conscious sedation as it is a fairly quick  procedure and the providers numb the area well prior to biopsy.     If you have any questions, please call the [facility] nurses at [phone].     Thank you!      [facility] procedural RN

## 2024-02-21 NOTE — LETTER
2/21/2024         RE: Angie Michael  [address] [place] [id number]        Dear Colleague,    Thank you for referring your patient, Angie Michael, to the [facility]. Please see a copy of my visit note below.    [facility]    Hematology/Oncology Established Patient Note      Today's Date: 2/21/2024    Reason for visit: Bilateral breast cancer.    HISTORY OF PRESENT ILLNESS: Angie Michael is a 43 year old female with BRCA2 gene mutation who presents with the following oncologic history:  1.  10/05/2023: High risk surveillance breast MRI showed right breast 3.6 x 3 x 3.2 cm irregular enhancing mass at 10:00, 0.9 x 0.8 x 0.9 cm second mass at 9:00, 1 x 0.9 x 1.2 cm third mass at 9:30, other suspicious foci of enhancement throughout the breast.  Left breast with 9.9 x 5.2 x 6.7 cm suspicious non-mass enhancement at 10:00-7:00; 1.6 x 1.3 x 1.7 cm enhancing mass at 4:00; 1.8 x 1.6 x 1.9 cm enhancing mass at 12:30; other suspicious foci of enhancement throughout the left breast.  One level 1 suspicious right axillary lymph node and one level 1 suspicious left axillary lymph node..  Developing suspicious interpectoral lymph node.  2.  10/12/2023: Bilateral diagnostic mammogram showed right breast with 3.2 x 2.7 x 4.1 cm irregular mass at 10:00, 6 cm from nipple; 0.9 x 0.6 x 1 cm irregular mass at 11:00, 2 cm from nipple and prominent right axillary lymph node.  Left breast with 1.4 x 1.1 x 1.7 cm mass at 12:00, 2 cm from nipple, 1.8 x 1 x 1.6 cm mass at 3:30, 4 cm from nipple, 1 x 0.9 x 1.1 cm mass at 3:00, 7 cm from nipple, multiple areas of hypoechogenicity with calcifications in the lateral left breast, and prominent left axillary lymph node.  3.  10/12/2023: Biopsy of right breast at 10:00 showed grade 3 invasive lobular carcinoma, ER strongly positive at 81-90%, PA positive at 31-40%, HER2 equivocal with IHC = 2+, FISH negative, Ki-67 = 70%.  Biopsy of right  axillary lymph node showed metastatic carcinoma.  Biopsy of left breast at 3:30 showed grade 2 invasive carcinoma with ductal and lobular features, ER positive at %, TN positive at 2%, HER2 equivocal with IHC = 2+, FISH negative, Ki-67 = 26%.  Biopsy of left axillary lymph node showed metastatic carcinoma.  4.  10/17/2023: PET/CT scan showed cluster of hypermetabolic right breast nodules with SUV max 14.9; 2.1 x 1 cm hypermetabolic level 1 right axillary lymph node with SUV max 5.5; hypermetabolic left breast nodule with SUV max 18.8 and several additional less intense hypermetabolic left breast nodules; 1.5 x 1.3 cm hypermetabolic level 1 left axillary lymph node with SUV max 4.  Small sclerotic focus of metabolic activity within the proximal tibial shaft with SUV max 3.7, indeterminate but unlikely to reflect metastatic disease.  Subcentimeter hypermetabolic focus along anterior margin of the left thyroid lobe, likely a thyroid nodule.  5.  11/03/2023: Thyroid ultrasound showed 0.7 x 0.6 x 0.5 cm solid hypoechoic nodule at the junction of the isthmus and left hemithyroid corresponding to increased uptake on PET/CT, consistent with TIRADS 4 nodule and subcentimeter TIRADS 3 nodule in the left inferior thyroid.  6. 11/3/2023: MR right tibia/fibula showed discrete focal 3 cm area of increased T2 signal and enhancement in mid shaft of right tibia with fat signal interspersed throughout this area with significant signal dropout on opposed phase imaging, favoring benign process.  7. 11/21/2023: Started neoadjuvant chemotherapy with dose-dense Adriamycin + Cytoxan with Neulasta support with Dr. Bev Ryan at [facility]. Reacted to [facility].  8. 11/25/2023: Evaluated at [facility] ED with diarrhea and dizziness, latter attributed to olanzapine -- this was discontinued.  9. 1/03/2024: Completed cycle 4 dose-dense AC.  10. 1/16/2024: Breast MRI showed persistent abnormal mass and nonmass enhancement in the left  breast measuring 10.2 cm in AP dimension (unchanged).  Previously seen mass at the 12:30 position now measures 1.0 cm in greatest dimension (previously  1.9 cm). Previously seen seen mass at the 4:00 position now measures 1.1 cm in greatest dimension (previously 1.7 cm). Previously seen enlarged left axillary lymph node now has normal size and morphology. Previously seen intrapectoral lymph node now has normal size and morphology. Previously seen abnormal right breast enhancement has entirely resolved. Previously visualized enlarged right axillary lymph node now has normal size and morphology.  Other previously seen axillary lymph nodes appear stable.  11. 1/17/2024: Start of weekly paclitaxel for planned 12 weeks.  12. 2/9/2024: MR right tibia/fibula showed mid right tibial shaft bone lesion increased from 3.2 cm to 3.8 cm; speckled foci of marrow signal likely areas of red marrow reconversion.    INTERVAL HISTORY:  Angie reports feeling very well with no significant fatigue, paresthesias, or pain.     REVIEW OF SYSTEMS:   14 point ROS was reviewed and is negative other than as noted above in HPI.       HOME MEDICATIONS:  Current Outpatient Medications   Medication Sig Dispense Refill     diazepam (VALIUM) 2 MG tablet Take 1 tab at least 30 min prior to MRI 4 tablet 0     Ferrous Gluconate 239 (27 Fe) MG TABS Take 1 tablet by mouth daily       HYDROcodone-acetaminophen (NORCO) 5-325 MG tablet Take 1-2 tablets by mouth every 4 hours as needed for moderate to severe pain 5 tablet 0     hydrOXYzine HCl (ATARAX) 25 MG tablet Take 25 mg by mouth       ondansetron (ZOFRAN) 8 MG tablet Take 1 tablet by mouth every 8 hours as needed       prochlorperazine (COMPAZINE) 10 MG tablet Take 10 mg by mouth       senna-docusate (SENOKOT-S/PERICOLACE) 8.6-50 MG tablet Take 1-2 tablets by mouth 2 times daily 30 tablet 0         ALLERGIES:  Allergies   Allergen Reactions     Aprepitant Difficulty breathing and Shortness Of Breath      Fosaprepitant Unknown     Allergy added per chart review. [facility]     Carboprost GI Disturbance     Diarrhea     Codeine Nausea and Vomiting         PAST MEDICAL HISTORY:  BRCA2 gene mutation.  Bilateral breast cancer as outlined above.  2022 Galleri test - negative for malignancy.    Gynecologic history:  Age of menarche at 15.  3 children/triplets -- 1 boy and twin girls born 2022.  Age of first pregnancy at 41.  Used 10 years of OCPs no HRT, IVF for fertility treatment.      PAST SURGICAL HISTORY:  Past Surgical History:   Procedure Laterality Date     ABDOMEN SURGERY           ENT SURGERY      tonsillectomy     GYN SURGERY      egg retrieval for IVF     INSERT PORT VASCULAR ACCESS Right 2023    Procedure: INSERTION, VASCULAR ACCESS PORT;  Surgeon: Fanny Little MD;  Location:  OR     ORTHOPEDIC SURGERY      knee arthroscopy         SOCIAL HISTORY:  Social History     Socioeconomic History     Marital status:      Spouse name: Not on file     Number of children: Not on file     Years of education: Not on file     Highest education level: Not on file   Occupational History     Not on file   Tobacco Use     Smoking status: Never     Smokeless tobacco: Never   Vaping Use     Vaping Use: Never used   Substance and Sexual Activity     Alcohol use: Not Currently     Drug use: Not Currently     Sexual activity: Not on file   Other Topics Concern     Not on file   Social History Narrative     Not on file     Social Determinants of Health     Financial Resource Strain: Not on file   Food Insecurity: Not on file   Transportation Needs: Not on file   Physical Activity: Not on file   Stress: Not on file   Social Connections: Not on file   Interpersonal Safety: Not on file   Housing Stability: Not on file         FAMILY HISTORY:  Paternal grandmother and many paternal grandfathers with breast cancer; cousin with DCIS tested positive for BRCA1.  No ovarian, colon, or prostate  "cancer.      PHYSICAL EXAM:  Vital signs:  /77   Pulse 88   Temp 97.8  F (36.6  C)   Resp 16   Ht 1.702 m (5' 7\")   Wt 78.5 kg (173 lb)   SpO2 98%   BMI 27.10 kg/m     GENERAL: No acute distress.  EYES: No scleral icterus. No overt erythema.  RESPIRATORY: No audible cough, wheezing, or labored breathing.  MUSCULOSKELETAL: Range of motion in the neck, shoulders, and arms appear normal.  SKIN: No overt rashes, discolorations, or lesions over the face and neck.  NEUROLOGIC: Alert.  No overt tremors.  PSYCHIATRIC: Normal affect and mood.  Does not appear anxious.   Remaining exam deferred for detailed discussion today.      LABS:  CBC RESULTS:   Recent Labs   Lab Test 02/06/24  1440   WBC 4.8   RBC 3.49*   HGB 10.8*   HCT 32.5*   MCV 93   MCH 30.9   MCHC 33.2   RDW 15.1*        2/20/2024:  WBC 4.7, Hgb 12.1, platelets 236,000, ANC 3.1.    Last Comprehensive Metabolic Panel:  Sodium   Date Value Ref Range Status   02/06/2024 140 135 - 145 mmol/L Final     Comment:     Reference intervals for this test were updated on 09/26/2023 to more accurately reflect our healthy population. There may be differences in the flagging of prior results with similar values performed with this method. Interpretation of those prior results can be made in the context of the updated reference intervals.      Potassium   Date Value Ref Range Status   02/06/2024 4.1 3.4 - 5.3 mmol/L Final     Chloride   Date Value Ref Range Status   02/06/2024 106 98 - 107 mmol/L Final     Carbon Dioxide (CO2)   Date Value Ref Range Status   02/06/2024 27 22 - 29 mmol/L Final     Anion Gap   Date Value Ref Range Status   02/06/2024 7 7 - 15 mmol/L Final     Glucose   Date Value Ref Range Status   02/06/2024 105 (H) 70 - 99 mg/dL Final     Urea Nitrogen   Date Value Ref Range Status   02/06/2024 14.3 6.0 - 20.0 mg/dL Final     Creatinine   Date Value Ref Range Status   02/06/2024 0.74 0.51 - 0.95 mg/dL Final     GFR Estimate   Date Value Ref " Range Status   02/06/2024 >90 >60 mL/min/1.73m2 Final     Calcium   Date Value Ref Range Status   02/06/2024 9.3 8.6 - 10.0 mg/dL Final     Bilirubin Total   Date Value Ref Range Status   02/06/2024 0.7 <=1.2 mg/dL Final     Alkaline Phosphatase   Date Value Ref Range Status   02/06/2024 52 40 - 150 U/L Final     Comment:     Reference intervals for this test were updated on 11/14/2023 to more accurately reflect our healthy population. There may be differences in the flagging of prior results with similar values performed with this method. Interpretation of those prior results can be made in the context of the updated reference intervals.     ALT   Date Value Ref Range Status   02/06/2024 21 0 - 50 U/L Final     Comment:     Reference intervals for this test were updated on 6/12/2023 to more accurately reflect our healthy population. There may be differences in the flagging of prior results with similar values performed with this method. Interpretation of those prior results can be made in the context of the updated reference intervals.       AST   Date Value Ref Range Status   02/06/2024 19 0 - 45 U/L Final     Comment:     Reference intervals for this test were updated on 6/12/2023 to more accurately reflect our healthy population. There may be differences in the flagging of prior results with similar values performed with this method. Interpretation of those prior results can be made in the context of the updated reference intervals.       IMAGING:  Reviewed as per HPI.    ASSESSMENT/PLAN:  Angie Michael is a 43 year old female with the following issues:  1.  Clinical prognostic stage IIB, cT2-N1-M0, multifocal grade 3 invasive lobular carcinoma of the RIGHT upper outer breast, ER positive 81-90%, IA positive 31-40%, HER-2 FISH negative (IHC = 2+), Ki-67 = 70%  2.  Clinical prognostic stage IIA, cT3-N1-M0, multifocal grade 2 invasive ductal and lobular carcinoma of LEFT breast overlapping sites, ER positive  %, RI positive 2%, HER-2 FISH negative (IHC = 2+), Ki-67 = 26%  3. BRCA-2 deleterious gene mutation  -- Following 4 cycles of neoadjuvant ddAC, 1/16/2023 breast MRI showed persistent abnormal mass and nonmass enhancement in the left breast measuring 10.2 cm in AP dimension (unchanged).  Previously seen mass at the 12:30 position now measures 1.0 cm in greatest dimension (previously 1.9 cm). Previously seen seen mass at the 4:00 position now measures 1.1 cm in greatest dimension (previously 1.7 cm). Previously seen enlarged left axillary lymph node now has normal size and morphology. Previously seen intrapectoral lymph node now has normal size and morphology. Previously seen abnormal right breast enhancement has entirely resolved. Previously visualized enlarged right axillary lymph node now has normal size and morphology.  Other previously seen axillary lymph nodes appear stable.  All these findings are consistent with excellent complete response on the right and partial response on the left.  -- Reviewed that her CBC and CMP are normal from labs drawn at  on 2/20/2024.  - Continue with weekly paclitaxel for total 12 weeks.   --Will repeat breast MRI after paclitaxel per her request to reevaluate response after paclitaxel.   -- She will revisit with Dr. Fanny Little for discussion of bilateral mastectomies towards end of chemotherapy.  She already met with Dr. Vasiliy Yee for discussion of breast reconstruction. I have messaged Dr. Little on update with Angie's progress and to arrange return visit.  -Given biopsy-proven lymph node disease, adjuvant radiation therapy would be advised.  She has already met with Dr. Finesse Cortes for radiation discussion.  - Given her extent of disease bilaterally, she would benefit from adjuvant abemaciclib for 2 years along with hormone blockade therapy as per the MonarchE trial.  -Adjuvant olaparib (Lynparza) for 1 year would be considered as well.  I do note that  based on the Tomas trial, those patients who had hormone receptor positive HER2 negative breast cancer were required to have at least 4 pathologically confirmed positive lymph nodes. Discussed this today.  -I would advise hormone blockade therapy with ovarian suppression therapy with Zoladex with an aromatase inhibitor after surgery.  She already removed her fallopian tubes and will undergo BSO in the future.  We discussed this today.    4.  Right tibia bone lesion  - 11/3/2023 MR right tibia/fibula showed a 3 cm area of increased T2 signal in the midshaft area.  Although the signaling suggested a benign process, this was technically indeterminate.    -- Repeat 2/9/2024 MR right tibia was reviewed with radiology.  The interval increase from 3.2 cm to 3.8 cm is concerning in the face of Angie's breast cancer diagnosis.   -- She will proceed with biopsy of this bone lesion on 2/27/2024.  We discussed the various possibilities of what this bone lesion could represent. Even if this were proven to be an oligo bone metastasis, she could undergo radiation to this area and the plan would be for her to complete chemo and proceed to bilateral mastectomies.  We talked about how that would change her prognosis and treatment plan for hormone blockade therapy.    5.  Fertility discussion  -Angie has [id number] triplets.  She has saved embryos.  She has not yet decided whether to have more children.  - Following completion of chemotherapy followed by radiation, hormone blockade therapy would be advised for 2-3 years before bearing more children.  She has also briefly considered a surrogate if she wishes to have more children.  Will discuss again after chemotherapy and surgery have been completed.      Monse Dan MD  Hematology/Oncology  [facility]    Total time spent today: 48 minutes in chart review, patient evaluation, counseling, documentation, test and/or medication/prescription orders, and  "coordination of care.      Oncology Rooming Note    February 21, 2024 9:03 AM   Angie Michael is a 43 year old female who presents for:    Chief Complaint   Patient presents with     Oncology Clinic Visit     Initial Vitals: /77   Pulse 88   Temp 97.8  F (36.6  C)   Resp 16   Ht 1.702 m (5' 7\")   Wt 78.5 kg (173 lb)   SpO2 98%   BMI 27.10 kg/m   Estimated body mass index is 27.1 kg/m  as calculated from the following:    Height as of this encounter: 1.702 m (5' 7\").    Weight as of this encounter: 78.5 kg (173 lb). Body surface area is 1.93 meters squared.  No Pain (0) Comment: Data Unavailable   No LMP recorded.  Allergies reviewed: Yes  Medications reviewed: Yes    Medications: Medication refills not needed today.  Pharmacy name entered into [employer]:    [id number] - [place], [place] - [address] AT [facility] [address] [facility] [place], [place] - [address] [address]    Frailty Screening:   Is the patient here for a new oncology consult visit in cancer care? 2. No        Beatriz Aguilar MA              Again, thank you for allowing me to participate in the care of your patient.        Sincerely,        Monse Dan MD  "

## 2024-02-21 NOTE — PROGRESS NOTES
"Oncology Rooming Note    February 21, 2024 9:03 AM   Angie Michael is a 43 year old female who presents for:    Chief Complaint   Patient presents with    Oncology Clinic Visit     Initial Vitals: /77   Pulse 88   Temp 97.8  F (36.6  C)   Resp 16   Ht 1.702 m (5' 7\")   Wt 78.5 kg (173 lb)   SpO2 98%   BMI 27.10 kg/m   Estimated body mass index is 27.1 kg/m  as calculated from the following:    Height as of this encounter: 1.702 m (5' 7\").    Weight as of this encounter: 78.5 kg (173 lb). Body surface area is 1.93 meters squared.  No Pain (0) Comment: Data Unavailable   No LMP recorded.  Allergies reviewed: Yes  Medications reviewed: Yes    Medications: Medication refills not needed today.  Pharmacy name entered into [facility]:    [id number] - [place], [place] - [address] [facility] AT [facility] [address] [facility] [address] & [place] [facility] [place] - [address] [address]    Frailty Screening:   Is the patient here for a new oncology consult visit in cancer care? 2. No        Beatriz Aguilar MA            "

## 2024-02-22 ENCOUNTER — HOSPITAL ENCOUNTER (OUTPATIENT)
Facility: CLINIC | Age: 44
End: 2024-02-22
Payer: COMMERCIAL

## 2024-02-22 RX ORDER — LIDOCAINE 40 MG/G
CREAM TOPICAL
Status: CANCELLED | OUTPATIENT
Start: 2024-02-22

## 2024-02-22 NOTE — PROGRESS NOTES
Pt scheduled for a CT guided bone biopsy on 2/27, will need to be rescheduled with anesthesia per Dr. Acosta. IR scheduling will reach out to pt to reschedule this. CT, Janet NP, and Dr. Acosta aware.    Vania Nguyễn RN on 2/22/2024 at 12:31 PM

## 2024-02-26 ENCOUNTER — TELEPHONE (OUTPATIENT)
Dept: MEDSURG UNIT | Facility: CLINIC | Age: 44
End: 2024-02-26
Payer: COMMERCIAL

## 2024-02-26 RX ORDER — ONDANSETRON 2 MG/ML
8 INJECTION INTRAMUSCULAR; INTRAVENOUS ONCE
Status: CANCELLED | OUTPATIENT
Start: 2024-02-28

## 2024-02-26 RX ORDER — ALBUTEROL SULFATE 90 UG/1
1-2 AEROSOL, METERED RESPIRATORY (INHALATION)
Status: CANCELLED
Start: 2024-02-28

## 2024-02-26 RX ORDER — MEPERIDINE HYDROCHLORIDE 25 MG/ML
25 INJECTION INTRAMUSCULAR; INTRAVENOUS; SUBCUTANEOUS EVERY 30 MIN PRN
Status: CANCELLED | OUTPATIENT
Start: 2024-02-28

## 2024-02-26 RX ORDER — ALBUTEROL SULFATE 0.83 MG/ML
2.5 SOLUTION RESPIRATORY (INHALATION)
Status: CANCELLED | OUTPATIENT
Start: 2024-02-28

## 2024-02-26 RX ORDER — DIPHENHYDRAMINE HCL 25 MG
50 CAPSULE ORAL
Status: CANCELLED
Start: 2024-02-28

## 2024-02-26 RX ORDER — EPINEPHRINE 1 MG/ML
0.3 INJECTION, SOLUTION, CONCENTRATE INTRAVENOUS EVERY 5 MIN PRN
Status: CANCELLED | OUTPATIENT
Start: 2024-02-28

## 2024-02-26 RX ORDER — LORAZEPAM 2 MG/ML
0.5 INJECTION INTRAMUSCULAR EVERY 4 HOURS PRN
Status: CANCELLED | OUTPATIENT
Start: 2024-02-28

## 2024-02-26 RX ORDER — HEPARIN SODIUM (PORCINE) LOCK FLUSH IV SOLN 100 UNIT/ML 100 UNIT/ML
5 SOLUTION INTRAVENOUS
Status: CANCELLED | OUTPATIENT
Start: 2024-02-28

## 2024-02-26 RX ORDER — DIPHENHYDRAMINE HYDROCHLORIDE 50 MG/ML
50 INJECTION INTRAMUSCULAR; INTRAVENOUS
Status: CANCELLED
Start: 2024-02-28

## 2024-02-26 RX ORDER — HEPARIN SODIUM,PORCINE 10 UNIT/ML
5-20 VIAL (ML) INTRAVENOUS DAILY PRN
Status: CANCELLED | OUTPATIENT
Start: 2024-02-28

## 2024-02-26 NOTE — TELEPHONE ENCOUNTER
CT is unable to accommodate the 0900 Biopsy with General anesthesia. 2/27/24. Spoke to My in Radiology scheduling and she stated they have a note from her colleague that patient was declining anesthesia.  This was the complete opposite of the note the RN made talking to the patient on the 2/21/24.  Attempting to contact the patient to clarify and possibly rescheduling depending on the needs.

## 2024-02-27 ENCOUNTER — HOSPITAL ENCOUNTER (OUTPATIENT)
Dept: CT IMAGING | Facility: CLINIC | Age: 44
Discharge: HOME | End: 2024-02-27
Attending: INTERNAL MEDICINE
Payer: COMMERCIAL

## 2024-02-27 ENCOUNTER — HOSPITAL ENCOUNTER (OUTPATIENT)
Facility: CLINIC | Age: 44
Discharge: HOME | End: 2024-02-27
Admitting: RADIOLOGY
Payer: COMMERCIAL

## 2024-02-27 VITALS
WEIGHT: 160 LBS | BODY MASS INDEX: 25.71 KG/M2 | HEIGHT: 66 IN | SYSTOLIC BLOOD PRESSURE: 128 MMHG | RESPIRATION RATE: 16 BRPM | HEART RATE: 67 BPM | DIASTOLIC BLOOD PRESSURE: 72 MMHG | OXYGEN SATURATION: 100 % | TEMPERATURE: 97.3 F

## 2024-02-27 DIAGNOSIS — C50.412 MALIGNANT NEOPLASM OF UPPER-OUTER QUADRANT OF BOTH BREASTS IN FEMALE, ESTROGEN RECEPTOR POSITIVE (H): ICD-10-CM

## 2024-02-27 DIAGNOSIS — C50.812 MALIGNANT NEOPLASM OF OVERLAPPING SITES OF BOTH BREASTS IN FEMALE, ESTROGEN RECEPTOR POSITIVE (H): ICD-10-CM

## 2024-02-27 DIAGNOSIS — C50.811 MALIGNANT NEOPLASM OF OVERLAPPING SITES OF BOTH BREASTS IN FEMALE, ESTROGEN RECEPTOR POSITIVE (H): ICD-10-CM

## 2024-02-27 DIAGNOSIS — Z17.0 MALIGNANT NEOPLASM OF OVERLAPPING SITES OF BOTH BREASTS IN FEMALE, ESTROGEN RECEPTOR POSITIVE (H): ICD-10-CM

## 2024-02-27 DIAGNOSIS — M89.9 BONE LESION: ICD-10-CM

## 2024-02-27 DIAGNOSIS — Z17.0 MALIGNANT NEOPLASM OF UPPER-OUTER QUADRANT OF RIGHT BREAST IN FEMALE, ESTROGEN RECEPTOR POSITIVE (H): ICD-10-CM

## 2024-02-27 DIAGNOSIS — C50.411 MALIGNANT NEOPLASM OF UPPER-OUTER QUADRANT OF RIGHT BREAST IN FEMALE, ESTROGEN RECEPTOR POSITIVE (H): ICD-10-CM

## 2024-02-27 DIAGNOSIS — Z17.0 MALIGNANT NEOPLASM OF UPPER-OUTER QUADRANT OF BOTH BREASTS IN FEMALE, ESTROGEN RECEPTOR POSITIVE (H): ICD-10-CM

## 2024-02-27 DIAGNOSIS — C50.411 MALIGNANT NEOPLASM OF UPPER-OUTER QUADRANT OF BOTH BREASTS IN FEMALE, ESTROGEN RECEPTOR POSITIVE (H): ICD-10-CM

## 2024-02-27 LAB
ALBUMIN SERPL BCG-MCNC: 4.2 G/DL (ref 3.5–5.2)
ALP SERPL-CCNC: 50 U/L (ref 40–150)
ALT SERPL W P-5'-P-CCNC: 15 U/L (ref 0–50)
ANION GAP SERPL CALCULATED.3IONS-SCNC: 9 MMOL/L (ref 7–15)
AST SERPL W P-5'-P-CCNC: 15 U/L (ref 0–45)
BASOPHILS # BLD AUTO: 0 10E3/UL (ref 0–0.2)
BASOPHILS NFR BLD AUTO: 1 %
BILIRUB SERPL-MCNC: 1.5 MG/DL
BUN SERPL-MCNC: 13.9 MG/DL (ref 6–20)
CALCIUM SERPL-MCNC: 9.2 MG/DL (ref 8.6–10)
CHLORIDE SERPL-SCNC: 104 MMOL/L (ref 98–107)
CREAT SERPL-MCNC: 0.72 MG/DL (ref 0.51–0.95)
DEPRECATED HCO3 PLAS-SCNC: 25 MMOL/L (ref 22–29)
EGFRCR SERPLBLD CKD-EPI 2021: >90 ML/MIN/1.73M2
EOSINOPHIL # BLD AUTO: 0.2 10E3/UL (ref 0–0.7)
EOSINOPHIL NFR BLD AUTO: 5 %
ERYTHROCYTE [DISTWIDTH] IN BLOOD BY AUTOMATED COUNT: 13.8 % (ref 10–15)
GLUCOSE SERPL-MCNC: 99 MG/DL (ref 70–99)
HCT VFR BLD AUTO: 33.7 % (ref 35–47)
HGB BLD-MCNC: 11 G/DL (ref 11.7–15.7)
IMM GRANULOCYTES # BLD: 0 10E3/UL
IMM GRANULOCYTES NFR BLD: 0 %
INR PPP: 1.05 (ref 0.85–1.15)
LYMPHOCYTES # BLD AUTO: 0.9 10E3/UL (ref 0.8–5.3)
LYMPHOCYTES NFR BLD AUTO: 26 %
MCH RBC QN AUTO: 31.2 PG (ref 26.5–33)
MCHC RBC AUTO-ENTMCNC: 32.6 G/DL (ref 31.5–36.5)
MCV RBC AUTO: 96 FL (ref 78–100)
MONOCYTES # BLD AUTO: 0.2 10E3/UL (ref 0–1.3)
MONOCYTES NFR BLD AUTO: 5 %
NEUTROPHILS # BLD AUTO: 2.3 10E3/UL (ref 1.6–8.3)
NEUTROPHILS NFR BLD AUTO: 63 %
NRBC # BLD AUTO: 0 10E3/UL
NRBC BLD AUTO-RTO: 0 /100
PLATELET # BLD AUTO: 236 10E3/UL (ref 150–450)
POTASSIUM SERPL-SCNC: 3.9 MMOL/L (ref 3.4–5.3)
PROT SERPL-MCNC: 6.9 G/DL (ref 6.4–8.3)
RBC # BLD AUTO: 3.53 10E6/UL (ref 3.8–5.2)
SODIUM SERPL-SCNC: 138 MMOL/L (ref 135–145)
WBC # BLD AUTO: 3.6 10E3/UL (ref 4–11)

## 2024-02-27 PROCEDURE — 250N000011 HC RX IP 250 OP 636: Performed by: PHYSICIAN ASSISTANT

## 2024-02-27 PROCEDURE — 272N000431 CT BONE BIOPSY SUPERFICIAL

## 2024-02-27 PROCEDURE — 85025 COMPLETE CBC W/AUTO DIFF WBC: CPT | Performed by: NURSE PRACTITIONER

## 2024-02-27 PROCEDURE — 82040 ASSAY OF SERUM ALBUMIN: CPT | Performed by: NURSE PRACTITIONER

## 2024-02-27 PROCEDURE — 88341 IMHCHEM/IMCYTCHM EA ADD ANTB: CPT | Mod: 26 | Performed by: PATHOLOGY

## 2024-02-27 PROCEDURE — 250N000009 HC RX 250: Performed by: PHYSICIAN ASSISTANT

## 2024-02-27 PROCEDURE — 77012 CT SCAN FOR NEEDLE BIOPSY: CPT

## 2024-02-27 PROCEDURE — 88342 IMHCHEM/IMCYTCHM 1ST ANTB: CPT | Mod: 26 | Performed by: PATHOLOGY

## 2024-02-27 PROCEDURE — 99152 MOD SED SAME PHYS/QHP 5/>YRS: CPT

## 2024-02-27 PROCEDURE — 88360 TUMOR IMMUNOHISTOCHEM/MANUAL: CPT | Mod: TC | Performed by: INTERNAL MEDICINE

## 2024-02-27 PROCEDURE — 85610 PROTHROMBIN TIME: CPT | Performed by: PHYSICIAN ASSISTANT

## 2024-02-27 PROCEDURE — 36415 COLL VENOUS BLD VENIPUNCTURE: CPT | Performed by: NURSE PRACTITIONER

## 2024-02-27 PROCEDURE — 250N000011 HC RX IP 250 OP 636: Performed by: RADIOLOGY

## 2024-02-27 PROCEDURE — 88307 TISSUE EXAM BY PATHOLOGIST: CPT | Mod: 26 | Performed by: PATHOLOGY

## 2024-02-27 PROCEDURE — 999N000154 HC STATISTIC RADIOLOGY XRAY, US, CT, MAR, NM

## 2024-02-27 RX ORDER — HEPARIN SODIUM (PORCINE) LOCK FLUSH IV SOLN 100 UNIT/ML 100 UNIT/ML
5-10 SOLUTION INTRAVENOUS
Status: DISCONTINUED | OUTPATIENT
Start: 2024-02-27 | End: 2024-02-27 | Stop reason: HOSPADM

## 2024-02-27 RX ORDER — NALOXONE HYDROCHLORIDE 0.4 MG/ML
0.4 INJECTION, SOLUTION INTRAMUSCULAR; INTRAVENOUS; SUBCUTANEOUS
Status: DISCONTINUED | OUTPATIENT
Start: 2024-02-27 | End: 2024-02-28 | Stop reason: HOSPADM

## 2024-02-27 RX ORDER — NALOXONE HYDROCHLORIDE 0.4 MG/ML
0.2 INJECTION, SOLUTION INTRAMUSCULAR; INTRAVENOUS; SUBCUTANEOUS
Status: DISCONTINUED | OUTPATIENT
Start: 2024-02-27 | End: 2024-02-28 | Stop reason: HOSPADM

## 2024-02-27 RX ORDER — LIDOCAINE 40 MG/G
CREAM TOPICAL
Status: DISCONTINUED | OUTPATIENT
Start: 2024-02-27 | End: 2024-02-27 | Stop reason: HOSPADM

## 2024-02-27 RX ORDER — HEPARIN SODIUM,PORCINE 10 UNIT/ML
5-10 VIAL (ML) INTRAVENOUS EVERY 24 HOURS
Status: DISCONTINUED | OUTPATIENT
Start: 2024-02-27 | End: 2024-02-27 | Stop reason: HOSPADM

## 2024-02-27 RX ORDER — FENTANYL CITRATE 50 UG/ML
25-50 INJECTION, SOLUTION INTRAMUSCULAR; INTRAVENOUS EVERY 5 MIN PRN
Status: DISCONTINUED | OUTPATIENT
Start: 2024-02-27 | End: 2024-02-28 | Stop reason: HOSPADM

## 2024-02-27 RX ORDER — FLUMAZENIL 0.1 MG/ML
0.2 INJECTION, SOLUTION INTRAVENOUS
Status: DISCONTINUED | OUTPATIENT
Start: 2024-02-27 | End: 2024-02-28 | Stop reason: HOSPADM

## 2024-02-27 RX ORDER — HEPARIN SODIUM,PORCINE 10 UNIT/ML
5-10 VIAL (ML) INTRAVENOUS
Status: DISCONTINUED | OUTPATIENT
Start: 2024-02-27 | End: 2024-02-27 | Stop reason: HOSPADM

## 2024-02-27 RX ADMIN — FENTANYL CITRATE 50 MCG: 50 INJECTION, SOLUTION INTRAMUSCULAR; INTRAVENOUS at 09:36

## 2024-02-27 RX ADMIN — HEPARIN SODIUM (PORCINE) LOCK FLUSH IV SOLN 100 UNIT/ML 5 ML: 100 SOLUTION at 10:45

## 2024-02-27 RX ADMIN — LIDOCAINE HYDROCHLORIDE 20 ML: 10 INJECTION, SOLUTION EPIDURAL; INFILTRATION; INTRACAUDAL; PERINEURAL at 10:11

## 2024-02-27 RX ADMIN — FENTANYL CITRATE 25 MCG: 50 INJECTION, SOLUTION INTRAMUSCULAR; INTRAVENOUS at 09:52

## 2024-02-27 RX ADMIN — FENTANYL CITRATE 50 MCG: 50 INJECTION, SOLUTION INTRAMUSCULAR; INTRAVENOUS at 09:42

## 2024-02-27 RX ADMIN — FENTANYL CITRATE 25 MCG: 50 INJECTION, SOLUTION INTRAMUSCULAR; INTRAVENOUS at 09:48

## 2024-02-27 RX ADMIN — MIDAZOLAM 0.5 MG: 1 INJECTION INTRAMUSCULAR; INTRAVENOUS at 09:48

## 2024-02-27 RX ADMIN — MIDAZOLAM 1 MG: 1 INJECTION INTRAMUSCULAR; INTRAVENOUS at 09:41

## 2024-02-27 RX ADMIN — MIDAZOLAM 0.5 MG: 1 INJECTION INTRAMUSCULAR; INTRAVENOUS at 09:58

## 2024-02-27 RX ADMIN — MIDAZOLAM 1 MG: 1 INJECTION INTRAMUSCULAR; INTRAVENOUS at 09:36

## 2024-02-27 NOTE — PROGRESS NOTES
Care Suites Admission Nursing Note    Patient Information  Name: Angie Michael  Age: 43 year old  Reason for admission: Bone Biopsy  Care Suites arrival time: 0730    Visitor Information  Name: Cordell-      Patient Admission/Assessment   Pre-procedure assessment complete: Yes  If abnormal assessment/labs, provider notified: Pending  NPO: Yes  Medications held per instructions/orders: N/A  Consent: deferred  If applicable, pregnancy test status: deferred  Patient oriented to room: Yes  Education/questions answered: Yes  Plan/other: Proceed per orders    Discharge Planning  Discharge name/phone number: Cordell [phone]  Overnight post sedation caregiver:   Discharge location: [place]    Jacki Abad RN

## 2024-02-27 NOTE — DISCHARGE INSTRUCTIONS
Bone Biopsy Discharge Instructions (Right Tibia)    After you go home:    You may resume your normal diet  Have an adult stay with you for 6 hours if you received sedation       For 24 hours - due to the sedation you received:  Relax and take it easy  Do NOT make any important or legal decisions  Do NOT drive or operate machines at home or at work  Do NOT drink alcohol    Care of Puncture Site:    For the first 48 hrs, check your puncture site every couple hours while you are awake   You may remove/change the bandaid tomorrow  You may shower tomorrow  No tub baths, whirlpools or swimming until your puncture site has fully healed    Activity     You may go back to normal activity in 24 hours   Wait 48 hours before lifting, straining, exercise or other strenuous activity    Medicines:    You may resume all medications  Resume your Warfarin/Coumadin at your regular dose today. Follow up with your provider to have your INR rechecked  Resume your Platelet Inhibitors and Aspirin tomorrow at your regular dose  For minor pain, you may take Acetaminophen (Tylenol) or Ibuprofen (Advil)            Call the provider who ordered this test if:    Increased pain or a large or growing hard lump around the site  Blood or fluid is draining from the site  The site is red, swollen, hot or tender  Chills or a fever greater than 101 F (38 C)  Pain that is getting worse  Any questions or concerns    Call  911 or go to the Emergency Room if:    Severe pain or trouble breathing  Bleeding that you cannot control        If you have questions call:          [facility] @ [phone]      The provider who performed your procedure was _________________.

## 2024-02-27 NOTE — PROGRESS NOTES
Bone Biopsy: Pt tolerated well. However, the sedation did not affect them. Pt did feel everything and was aware. Pt did extremely well for this situation. VSS. Total sedation given - 150 mcg Fentanyl and 3 mg Versed. Multiple cores obtained & sent to lab for Quick [facility] results. Bandaid applied to site - area CDI.     1015 Pt back to rm 09 per cart & transport.

## 2024-02-27 NOTE — PROGRESS NOTES
Care Suites Discharge Nursing Note    Patient Information  Name: Angie Michael  Age: 43 year old    Discharge Education:  Discharge instructions reviewed: Yes  Additional education/resources provided: Yes  Patient/patient representative verbalizes understanding: Yes  Patient discharging on new medications: No  Medication education completed: N/A    Discharge Plans:   Discharge location: home  Discharge ride contacted: Yes  Approximate discharge time: 1100    Discharge Criteria:  Discharge criteria met and vital signs stable: Yes    Patient Belongs:  Patient belongings returned to patient: Yes    Jacki Abad RN

## 2024-02-28 ENCOUNTER — INFUSION THERAPY VISIT (OUTPATIENT)
Dept: INFUSION THERAPY | Facility: CLINIC | Age: 44
End: 2024-02-28
Attending: INTERNAL MEDICINE
Payer: COMMERCIAL

## 2024-02-28 VITALS
OXYGEN SATURATION: 100 % | RESPIRATION RATE: 18 BRPM | DIASTOLIC BLOOD PRESSURE: 80 MMHG | WEIGHT: 161 LBS | TEMPERATURE: 97.3 F | HEART RATE: 73 BPM | BODY MASS INDEX: 25.88 KG/M2 | HEIGHT: 66 IN | SYSTOLIC BLOOD PRESSURE: 112 MMHG

## 2024-02-28 DIAGNOSIS — C50.411 MALIGNANT NEOPLASM OF UPPER-OUTER QUADRANT OF RIGHT BREAST IN FEMALE, ESTROGEN RECEPTOR POSITIVE (H): Primary | ICD-10-CM

## 2024-02-28 DIAGNOSIS — Z17.0 MALIGNANT NEOPLASM OF UPPER-OUTER QUADRANT OF RIGHT BREAST IN FEMALE, ESTROGEN RECEPTOR POSITIVE (H): Primary | ICD-10-CM

## 2024-02-28 PROCEDURE — 96375 TX/PRO/DX INJ NEW DRUG ADDON: CPT

## 2024-02-28 PROCEDURE — 258N000003 HC RX IP 258 OP 636: Performed by: INTERNAL MEDICINE

## 2024-02-28 PROCEDURE — 96413 CHEMO IV INFUSION 1 HR: CPT

## 2024-02-28 PROCEDURE — 250N000011 HC RX IP 250 OP 636: Performed by: INTERNAL MEDICINE

## 2024-02-28 RX ORDER — LORAZEPAM 2 MG/ML
0.5 INJECTION INTRAMUSCULAR EVERY 4 HOURS PRN
Status: DISCONTINUED | OUTPATIENT
Start: 2024-02-28 | End: 2024-02-28 | Stop reason: HOSPADM

## 2024-02-28 RX ORDER — HEPARIN SODIUM (PORCINE) LOCK FLUSH IV SOLN 100 UNIT/ML 100 UNIT/ML
5 SOLUTION INTRAVENOUS
Status: DISCONTINUED | OUTPATIENT
Start: 2024-02-28 | End: 2024-02-28 | Stop reason: HOSPADM

## 2024-02-28 RX ORDER — ONDANSETRON 2 MG/ML
8 INJECTION INTRAMUSCULAR; INTRAVENOUS ONCE
Status: COMPLETED | OUTPATIENT
Start: 2024-02-28 | End: 2024-02-28

## 2024-02-28 RX ADMIN — PACLITAXEL 152 MG: 6 INJECTION, SOLUTION INTRAVENOUS at 09:34

## 2024-02-28 RX ADMIN — LORAZEPAM 0.5 MG: 2 INJECTION INTRAMUSCULAR; INTRAVENOUS at 08:40

## 2024-02-28 RX ADMIN — Medication 5 ML: at 10:37

## 2024-02-28 RX ADMIN — ONDANSETRON 8 MG: 2 INJECTION INTRAMUSCULAR; INTRAVENOUS at 09:31

## 2024-02-28 RX ADMIN — SODIUM CHLORIDE 250 ML: 9 INJECTION, SOLUTION INTRAVENOUS at 09:31

## 2024-02-28 NOTE — PROGRESS NOTES
Infusion Nursing Note:  Angie Michael presents today for C8D1 Taxol.    Patient seen by provider today: No   present during visit today: Not Applicable.    Note: Dr. Dan aware of bilirubin=1.5, no changes or new orders at this time, ok to proceed with chemo today.    Patient does Penguin cold capping and ice to hands and feet during Taxol infusion.  Patient requests IV Ativan prior to start of capping.      Intravenous Access:  Implanted Port.    Treatment Conditions:  Lab Results   Component Value Date    HGB 11.0 (L) 02/27/2024    WBC 3.6 (L) 02/27/2024    ANEU 11.0 (H) 01/16/2024    ANEUTAUTO 2.3 02/27/2024     02/27/2024        Lab Results   Component Value Date     02/27/2024    POTASSIUM 3.9 02/27/2024    CR 0.72 02/27/2024    GWENDOLYN 9.2 02/27/2024    BILITOTAL 1.5 (H) 02/27/2024    ALBUMIN 4.2 02/27/2024    ALT 15 02/27/2024    AST 15 02/27/2024       Results reviewed, labs MET treatment parameters, ok to proceed with treatment.      Post Infusion Assessment:  Patient tolerated infusion without incident.  Blood return noted pre and post infusion.  Site patent and intact, free from redness, edema or discomfort.  No evidence of extravasations.  Access discontinued per protocol.       Discharge Plan:   Discharge instructions reviewed with: Patient and Family.  Patient and/or family verbalized understanding of discharge instructions and all questions answered.  AVS to patient via [employer].  Patient will return 3/6/24 for next appointment.   Patient discharged in stable condition accompanied by:  and Maria Luisa george cap technician.  Departure Mode: Ambulatory.      Carlton Posadas RN

## 2024-02-29 ENCOUNTER — DOCUMENTATION ONLY (OUTPATIENT)
Dept: ONCOLOGY | Facility: CLINIC | Age: 44
End: 2024-02-29
Payer: COMMERCIAL

## 2024-02-29 LAB
PATH REPORT.COMMENTS IMP SPEC: NORMAL
PATH REPORT.COMMENTS IMP SPEC: NORMAL
PATH REPORT.FINAL DX SPEC: NORMAL
PATH REPORT.GROSS SPEC: NORMAL
PATH REPORT.MICROSCOPIC SPEC OTHER STN: NORMAL
PATH REPORT.RELEVANT HX SPEC: NORMAL
PHOTO IMAGE: NORMAL

## 2024-02-29 NOTE — PROGRESS NOTES
"I called Angie today to inform her of the great news that her bone biopsy came back benign; no evidence of malignancy.  Given her bilateral breast cancer and the \"unusual\" bone lesion, will plan on a follow-up PET/CT scan in 6 months.    Monse Dan MD  Hematology/Oncology  [facility]    "

## 2024-03-01 ENCOUNTER — TRANSCRIBE ORDERS (OUTPATIENT)
Dept: OTHER | Age: 44
End: 2024-03-01

## 2024-03-01 ENCOUNTER — OFFICE VISIT (OUTPATIENT)
Dept: SURGERY | Facility: CLINIC | Age: 44
End: 2024-03-01
Payer: COMMERCIAL

## 2024-03-01 VITALS
WEIGHT: 160 LBS | HEIGHT: 65 IN | DIASTOLIC BLOOD PRESSURE: 80 MMHG | RESPIRATION RATE: 16 BRPM | OXYGEN SATURATION: 96 % | SYSTOLIC BLOOD PRESSURE: 120 MMHG | HEART RATE: 72 BPM | BODY MASS INDEX: 26.66 KG/M2

## 2024-03-01 DIAGNOSIS — M89.9 BONE LESION: Primary | ICD-10-CM

## 2024-03-01 DIAGNOSIS — C50.911 BILATERAL MALIGNANT NEOPLASM OF BREAST IN FEMALE, ESTROGEN RECEPTOR POSITIVE, UNSPECIFIED SITE OF BREAST (H): Primary | ICD-10-CM

## 2024-03-01 DIAGNOSIS — C50.912 BILATERAL MALIGNANT NEOPLASM OF BREAST IN FEMALE, ESTROGEN RECEPTOR POSITIVE, UNSPECIFIED SITE OF BREAST (H): Primary | ICD-10-CM

## 2024-03-01 DIAGNOSIS — Z17.0 BILATERAL MALIGNANT NEOPLASM OF BREAST IN FEMALE, ESTROGEN RECEPTOR POSITIVE, UNSPECIFIED SITE OF BREAST (H): Primary | ICD-10-CM

## 2024-03-01 PROCEDURE — 99214 OFFICE O/P EST MOD 30 MIN: CPT | Performed by: SURGERY

## 2024-03-01 RX ORDER — FERROUS SULFATE 325(65) MG
325 TABLET ORAL DAILY
COMMUNITY
End: 2024-07-10

## 2024-03-01 NOTE — PROGRESS NOTES
[facility] Follow Up Note    CHIEF COMPLAINT:  Bilateral breast cancer    HISTORY OF PRESENT ILLNESS:  Angie Michael is a 43 year old female who is seen in follow up for bilateral breast cancers.    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     Angie has a known history of BRCA2 gene mutation.  She had a screening breast MRI on 10/5/2023 which revealed a 3.6 cm irregular enhancing mass in the right breast posterior depth as well as a 0.9 cm enhancing mass at 9:00 middle depth and a 1.2 cm irregular enhancing mass at 930 anterior depth with this mass being within 1 cm of the nipple.  There is a suspicious level 1 right axillary lymph node.  In her left breast there was a 9.9 cm span of suspicious non-mass enhancement from 10:00 to 7:00 as well as within this a 1.7 cm enhancing mass at 4:00 and a 1.9 cm enhancing mass at 1230.  There were other suspicious foci of enhancement in the left breast as well.  There was one suspicious level 1 left axillary lymph node noted.  She then underwent bilateral diagnostic mammography and ultrasound and had ultrasound guided core needle biopsy of bilateral breast and bilateral axillary lymph nodes with the results as above.  She had a PET/CT on 10/17/2023 which revealed hypermetabolic bilateral breast nodules compatible with known malignancy and hypermetabolic bilateral level 1 axillary lymph nodes compatible with metastases and no convincing evidence for metastatic disease.  There is a single focus of hypermetabolic activity in the right tibial shaft of unknown significance and osseous metastases was felt unlikely and  recommendation for MRI or bone scan was noted.  A subcentimeter hypermetabolic focus in the anterior margin of the left thyroid lobe was seen and ultrasound was recommended.     She reports she had triplets in 2022 and  for approximately 7 months. She felt her breasts were more deflated and then felt more firmness which she attributed to her breasts returning to their normal state. The night prior to her MRI she noticed a lump on her right upper outer breast. She denies prior breast surgeries. She has had prior left breast biopsies which were benign. She is otherwise very healthy. IVF was needed for pregnancy and she did 3 rounds of this.).     She has since underwent neoadjuvant chemotherapy under the care of Dr. Dan. She will complete therapy on 4/3/2024.     She had a breast MRI mid therapy on 1/16/2024 which revealed complete resolution of the right breast enhancement and normal right axillary axillary adenopathy and reduction of the size of the left breast masses. Continues to have 10.2cm of nonmass enhancement on the left breast. Left axillary adenopathy has resolved. She recently had a bone biopsy on her right lower leg which was benign.         Hormonal history:  menarche 15, triplets are 20 months now, 3 children, 1st at age 41,  pre menopausal, approx 10 years OCP use, no HRT, IVF for fertility treatment.      Family history of breast cancer: Yes - paternal grandmother, many paternal grandfathers side - cousin with DCIS tested positive for BRCA 1  Family history of ovarian cancer:  No  Family history of colon cancer: No  Family history of prostate cancer: No        Past Medical History:   Diagnosis Date    BRCA2 gene mutation negative     Complication of anesthesia     when needing novacaine needs extra per dentist    Conceived by in vitro fertilization     Female infertility     Malignant neoplasm of both breasts (H)        Past Surgical History:   Procedure Laterality Date    ABDOMEN SURGERY           ENT SURGERY      tonsillectomy    GYN SURGERY      egg retrieval for IVF    INSERT PORT VASCULAR ACCESS Right 2023    Procedure: INSERTION, VASCULAR ACCESS PORT;  Surgeon: Fanny Little MD;  Location:  OR    ORTHOPEDIC SURGERY      knee arthroscopy       No family history on file.    Social History     Tobacco Use    Smoking status: Never    Smokeless tobacco: Never   Substance Use Topics    Alcohol use: Not Currently       Patient Active Problem List   Diagnosis    Malignant neoplasm of upper-outer quadrant of right breast in female, estrogen receptor positive (H)     Allergies   Allergen Reactions    Aprepitant Difficulty breathing and Shortness Of Breath    Fosaprepitant Unknown     Allergy added per chart review. [facility]    Carboprost GI Disturbance     Diarrhea    Codeine Nausea and Vomiting     Current Outpatient Medications   Medication Sig Dispense Refill    diazepam (VALIUM) 2 MG tablet Take 1 tab at least 30 min prior to MRI 4 tablet 0    Ferrous Gluconate 239 (27 Fe) MG TABS Take 1 tablet by mouth daily      HYDROcodone-acetaminophen (NORCO) 5-325 MG tablet Take 1-2 tablets by mouth every 4 hours as needed for moderate to severe pain 5 tablet 0    hydrOXYzine HCl (ATARAX) 25 MG tablet Take 25 mg by mouth      ondansetron (ZOFRAN) 8 MG tablet Take 1 tablet by mouth every 8 hours as needed      prochlorperazine (COMPAZINE) 10 MG tablet Take 10 mg by mouth      senna-docusate (SENOKOT-S/PERICOLACE) 8.6-50 MG tablet Take 1-2 tablets by mouth 2 times daily 30 tablet 0     Vitals: There were no vitals taken for this visit.  BMI= There is no height or weight on file to calculate BMI.    EXAM:  GENERAL: healthy, alert and no distress   BREAST:  The breasts appear symmetric with no overlying skin changes.  The nipples are normal bilaterally.  There is no dimpling or thickening of the skin.  No mass is appreciated in either breast, specifically I can no longer feel the mass in  the right breast and the left breast tissue is much softer throughout.  The breast tissue is generally still very dense.  There is no axillary or supraclavicular lymphadenopathy.  CARDIOVASCULAR:  RRR  RESPIRATORY: nonlabored breathing  NECK: Neck supple. No adenopathy. Thyroid symmetric, normal size  SKIN: No suspicious lesions or rashes  LYMPH: Normal cervical lymph nodes      ASSESSMENT/PLAN:  Angie Michael is nearing completion of her neoadjuvant chemotherapy for bilateral locally advanced breast cancers.  She has had an excellent response to neoadjuvant chemotherapy and we reviewed her MRI from January which reveal her axillary lymph nodes appear benign and it a complete response in the right breast.  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    We discussed her surgery options.  I would recommend bilateral skin sparing mastectomies with bilateral sentinel lymph node biopsy and take localized node excisions and she is in agreement with this plan.  She had already met with Dr. Lala in the fall but would like to see him back to review reconstruction plan and we will help her schedule.  We discussed the ideal time for surgery is 4 to 5 weeks after completion of her neoadjuvant chemotherapy which would be in early May.    She is wondering if she should meet with an orthopedic specialist regarding the bone changes noted on her PET CTs.  She understands the biopsy was negative but is still wondering why it appears abnormal which is very reasonable.  I will touch base with Dr. Dan and see if we can help her get scheduled with orthopedic oncology, most likely at the .  I will also touch base with Dr. Dan about port removal at the time of her surgery or if she would like to keep this in place for additional treatment postoperatively.          Fanny Little MD  [facility]  [phone]        Please route or send letter to:  Primary Care Provider (PCP) and Referring Provider

## 2024-03-01 NOTE — NURSING NOTE
Breast Nurse Care Coordination:      I re-introduced self to patient and Peter- , and explained my role of breast nurse coordinator. I accompanied Angie to her surgical consultation on 3/1/24 with Dr. Little at the [facility] [place].       At the end of the consultation, we reviewed Angie's plan of care and education. The plan is for patient to meet with Dr. Yee again on 3/5/24 to discuss reconstruction. Angie will be scheduled for bilateral mastectomies, bilateral RFID localized lymph node excision and bilateral SLNBx's. She is aware that she needs a pre-op with her PCP within 30 days before surgery.        I gave patient educational materials regarding Exercises After Breast Surgery and Mastectomy.  Informed patient for mastectomy surgery, she will want to have two good supportive post mastectomy garments that open in the front to wear the 2 weeks following her surgery. I gave patient information on different options to purchase post mastectomy garments. Prescription to [facility] faxed.      I answered her questions and encouraged patient to call me back with any future questions or concerns.  Angie has my contact information, and knows to contact me in the future with any questions or concerns.     Veronica Worley, RN, BSN, PHN  Breast Care Nurse Coordinator  [facility] [place]  [phone]

## 2024-03-04 ENCOUNTER — TELEPHONE (OUTPATIENT)
Dept: ORTHOPEDICS | Facility: CLINIC | Age: 44
End: 2024-03-04

## 2024-03-04 RX ORDER — DIPHENHYDRAMINE HYDROCHLORIDE 50 MG/ML
50 INJECTION INTRAMUSCULAR; INTRAVENOUS
Status: CANCELLED
Start: 2024-03-06

## 2024-03-04 RX ORDER — HEPARIN SODIUM (PORCINE) LOCK FLUSH IV SOLN 100 UNIT/ML 100 UNIT/ML
5 SOLUTION INTRAVENOUS
Status: CANCELLED | OUTPATIENT
Start: 2024-03-06

## 2024-03-04 RX ORDER — DIPHENHYDRAMINE HCL 25 MG
50 CAPSULE ORAL
Status: CANCELLED
Start: 2024-03-06

## 2024-03-04 RX ORDER — EPINEPHRINE 1 MG/ML
0.3 INJECTION, SOLUTION, CONCENTRATE INTRAVENOUS EVERY 5 MIN PRN
Status: CANCELLED | OUTPATIENT
Start: 2024-03-06

## 2024-03-04 RX ORDER — ONDANSETRON 2 MG/ML
8 INJECTION INTRAMUSCULAR; INTRAVENOUS ONCE
Status: CANCELLED | OUTPATIENT
Start: 2024-03-06

## 2024-03-04 RX ORDER — MEPERIDINE HYDROCHLORIDE 25 MG/ML
25 INJECTION INTRAMUSCULAR; INTRAVENOUS; SUBCUTANEOUS EVERY 30 MIN PRN
Status: CANCELLED | OUTPATIENT
Start: 2024-03-06

## 2024-03-04 RX ORDER — ALBUTEROL SULFATE 90 UG/1
1-2 AEROSOL, METERED RESPIRATORY (INHALATION)
Status: CANCELLED
Start: 2024-03-06

## 2024-03-04 RX ORDER — LORAZEPAM 2 MG/ML
0.5 INJECTION INTRAMUSCULAR EVERY 4 HOURS PRN
Status: CANCELLED | OUTPATIENT
Start: 2024-03-06

## 2024-03-04 RX ORDER — HEPARIN SODIUM,PORCINE 10 UNIT/ML
5-20 VIAL (ML) INTRAVENOUS DAILY PRN
Status: CANCELLED | OUTPATIENT
Start: 2024-03-06

## 2024-03-04 RX ORDER — ALBUTEROL SULFATE 0.83 MG/ML
2.5 SOLUTION RESPIRATORY (INHALATION)
Status: CANCELLED | OUTPATIENT
Start: 2024-03-06

## 2024-03-04 NOTE — TELEPHONE ENCOUNTER
VM left requesting a return call to schedule an appointment with an orthopedic oncologist.     Gisella Matute LPN

## 2024-03-04 NOTE — TELEPHONE ENCOUNTER
[facility] Call Center    Phone Message    May a detailed message be left on voicemail: yes     Reason for Call:  Needs an Ortho Oncology Appt    Ref provider: Monse Dan MD   Diagnosis, Bone Lesion, other    Please review and reach out to patient to schedule.  Thanks    Action Taken: Message routed to:  [facility] Ortho Onc    Travel Screening: Not Applicable

## 2024-03-06 ENCOUNTER — INFUSION THERAPY VISIT (OUTPATIENT)
Dept: INFUSION THERAPY | Facility: CLINIC | Age: 44
End: 2024-03-06
Attending: INTERNAL MEDICINE
Payer: COMMERCIAL

## 2024-03-06 ENCOUNTER — MEDICAL CORRESPONDENCE (OUTPATIENT)
Dept: MEDSURG UNIT | Facility: CLINIC | Age: 44
End: 2024-03-06

## 2024-03-06 VITALS
SYSTOLIC BLOOD PRESSURE: 104 MMHG | OXYGEN SATURATION: 98 % | DIASTOLIC BLOOD PRESSURE: 73 MMHG | HEIGHT: 65 IN | RESPIRATION RATE: 16 BRPM | BODY MASS INDEX: 28.32 KG/M2 | TEMPERATURE: 97.7 F | WEIGHT: 170 LBS | HEART RATE: 88 BPM

## 2024-03-06 DIAGNOSIS — Z17.0 BILATERAL MALIGNANT NEOPLASM OF BREAST IN FEMALE, ESTROGEN RECEPTOR POSITIVE, UNSPECIFIED SITE OF BREAST (H): Primary | ICD-10-CM

## 2024-03-06 DIAGNOSIS — C50.911 BILATERAL MALIGNANT NEOPLASM OF BREAST IN FEMALE, ESTROGEN RECEPTOR POSITIVE, UNSPECIFIED SITE OF BREAST (H): Primary | ICD-10-CM

## 2024-03-06 DIAGNOSIS — Z17.0 MALIGNANT NEOPLASM OF UPPER-OUTER QUADRANT OF RIGHT BREAST IN FEMALE, ESTROGEN RECEPTOR POSITIVE (H): Primary | ICD-10-CM

## 2024-03-06 DIAGNOSIS — C50.912 BILATERAL MALIGNANT NEOPLASM OF BREAST IN FEMALE, ESTROGEN RECEPTOR POSITIVE, UNSPECIFIED SITE OF BREAST (H): Primary | ICD-10-CM

## 2024-03-06 DIAGNOSIS — C50.411 MALIGNANT NEOPLASM OF UPPER-OUTER QUADRANT OF RIGHT BREAST IN FEMALE, ESTROGEN RECEPTOR POSITIVE (H): Primary | ICD-10-CM

## 2024-03-06 PROCEDURE — 250N000011 HC RX IP 250 OP 636: Performed by: INTERNAL MEDICINE

## 2024-03-06 PROCEDURE — 258N000003 HC RX IP 258 OP 636: Performed by: INTERNAL MEDICINE

## 2024-03-06 PROCEDURE — 96413 CHEMO IV INFUSION 1 HR: CPT

## 2024-03-06 PROCEDURE — 96375 TX/PRO/DX INJ NEW DRUG ADDON: CPT

## 2024-03-06 RX ORDER — LORAZEPAM 2 MG/ML
0.5 INJECTION INTRAMUSCULAR EVERY 4 HOURS PRN
Status: DISCONTINUED | OUTPATIENT
Start: 2024-03-06 | End: 2024-03-06 | Stop reason: HOSPADM

## 2024-03-06 RX ORDER — HEPARIN SODIUM (PORCINE) LOCK FLUSH IV SOLN 100 UNIT/ML 100 UNIT/ML
5 SOLUTION INTRAVENOUS
Status: DISCONTINUED | OUTPATIENT
Start: 2024-03-06 | End: 2024-03-06 | Stop reason: HOSPADM

## 2024-03-06 RX ORDER — ONDANSETRON 2 MG/ML
8 INJECTION INTRAMUSCULAR; INTRAVENOUS ONCE
Status: COMPLETED | OUTPATIENT
Start: 2024-03-06 | End: 2024-03-06

## 2024-03-06 RX ADMIN — LORAZEPAM 0.5 MG: 2 INJECTION INTRAMUSCULAR; INTRAVENOUS at 08:26

## 2024-03-06 RX ADMIN — SODIUM CHLORIDE 250 ML: 9 INJECTION, SOLUTION INTRAVENOUS at 09:21

## 2024-03-06 RX ADMIN — ONDANSETRON 8 MG: 2 INJECTION INTRAMUSCULAR; INTRAVENOUS at 09:21

## 2024-03-06 RX ADMIN — Medication 5 ML: at 10:31

## 2024-03-06 RX ADMIN — PACLITAXEL 152 MG: 6 INJECTION, SOLUTION INTRAVENOUS at 09:24

## 2024-03-06 NOTE — PROGRESS NOTES
Infusion Nursing Note:  Angie Michael presents today for C9D1 Taxol.    Patient seen by provider today: No   present during visit today: Not Applicable.    Note: Patient reports feeling well today, no changes or new concerns noted.    Patient does Penguin cold capping and ice to hands and feet during Taxol infusion.  Patient requests Ativan prior to start of capping.      Intravenous Access:  Implanted Port.    Treatment Conditions:  Results reviewed, labs MET treatment parameters, ok to proceed with treatment.  Labs in care everywhere, drawn at [facility] 3/5/24.      Post Infusion Assessment:  Patient tolerated infusion without incident.  Blood return noted pre and post infusion.  Site patent and intact, free from redness, edema or discomfort.  No evidence of extravasations.  Access discontinued per protocol.       Discharge Plan:   Discharge instructions reviewed with: Patient and Family.  Patient and/or family verbalized understanding of discharge instructions and all questions answered.  AVS to patient via [employer].  Patient will return 3/12/24 for next appointment.   Patient discharged in stable condition accompanied by: .  Departure Mode: Ambulatory.      Carlton Posadas RN

## 2024-03-11 NOTE — PROGRESS NOTES
[facility]    Hematology/Oncology Established Patient Note      Today's Date: 1/17/2024    Reason for visit: Bilateral breast cancer.    HISTORY OF PRESENT ILLNESS: Angie Michael is a 43 year old female with BRCA2 gene mutation who presents with the following oncologic history:  1.  10/05/2023: High risk surveillance breast MRI showed right breast 3.6 x 3 x 3.2 cm irregular enhancing mass at 10:00, 0.9 x 0.8 x 0.9 cm second mass at 9:00, 1 x 0.9 x 1.2 cm third mass at 9:30, other suspicious foci of enhancement throughout the breast.  Left breast with 9.9 x 5.2 x 6.7 cm suspicious non-mass enhancement at 10:00-7:00; 1.6 x 1.3 x 1.7 cm enhancing mass at 4:00; 1.8 x 1.6 x 1.9 cm enhancing mass at 12:30; other suspicious foci of enhancement throughout the left breast.  One level 1 suspicious right axillary lymph node and one level 1 suspicious left axillary lymph node..  Developing suspicious interpectoral lymph node.  2.  10/12/2023: Bilateral diagnostic mammogram showed right breast with 3.2 x 2.7 x 4.1 cm irregular mass at 10:00, 6 cm from nipple; 0.9 x 0.6 x 1 cm irregular mass at 11:00, 2 cm from nipple and prominent right axillary lymph node.  Left breast with 1.4 x 1.1 x 1.7 cm mass at 12:00, 2 cm from nipple, 1.8 x 1 x 1.6 cm mass at 3:30, 4 cm from nipple, 1 x 0.9 x 1.1 cm mass at 3:00, 7 cm from nipple, multiple areas of hypoechogenicity with calcifications in the lateral left breast, and prominent left axillary lymph node.  3.  10/12/2023: Biopsy of right breast at 10:00 showed grade 3 invasive lobular carcinoma, ER strongly positive at 81-90%, DE positive at 31-40%, HER2 equivocal with IHC = 2+, FISH negative, Ki-67 = 70%.  Biopsy of right axillary lymph node showed metastatic carcinoma.  Biopsy of left breast at 3:30 showed grade 2 invasive carcinoma with ductal and lobular features, ER positive at %, DE positive at 2%, HER2 equivocal with IHC = 2+, FISH negative, Ki-67 = 26%.   Biopsy of left axillary lymph node showed metastatic carcinoma.  4.  10/17/2023: PET/CT scan showed cluster of hypermetabolic right breast nodules with SUV max 14.9; 2.1 x 1 cm hypermetabolic level 1 right axillary lymph node with SUV max 5.5; hypermetabolic left breast nodule with SUV max 18.8 and several additional less intense hypermetabolic left breast nodules; 1.5 x 1.3 cm hypermetabolic level 1 left axillary lymph node with SUV max 4.  Small sclerotic focus of metabolic activity within the proximal tibial shaft with SUV max 3.7, indeterminate but unlikely to reflect metastatic disease.  Subcentimeter hypermetabolic focus along anterior margin of the left thyroid lobe, likely a thyroid nodule.  5.  11/03/2023: Thyroid ultrasound showed 0.7 x 0.6 x 0.5 cm solid hypoechoic nodule at the junction of the isthmus and left hemithyroid corresponding to increased uptake on PET/CT, consistent with TIRADS 4 nodule and subcentimeter TIRADS 3 nodule in the left inferior thyroid.  6. 11/3/2023: MR right tibia/fibula showed discrete focal 3 cm area of increased T2 signal and enhancement in mid shaft of right tibia with fat signal interspersed throughout this area with significant signal dropout on opposed phase imaging, favoring benign process.  7. 11/21/2023: Started neoadjuvant chemotherapy with dose-dense Adriamycin + Cytoxan with Neulasta support with Dr. Bev Ryan at [facility]. Reacted to Emend.  8. 11/25/2023: Evaluated at [facility] ED with diarrhea and dizziness, latter attributed to olanzapine -- this was discontinued.  9. 1/03/2024: Completed cycle 4 dose-dense AC.  10. 1/16/2024: Breast MRI showed persistent abnormal mass and nonmass enhancement in the left breast measuring 10.2 cm in AP dimension (unchanged).  Previously seen mass at the 12:30 position now measures 1.0 cm in greatest dimension (previously  1.9 cm). Previously seen seen mass at the 4:00 position now measures 1.1 cm in greatest dimension  (previously 1.7 cm). Previously seen enlarged left axillary lymph node now has normal size and morphology. Previously seen intrapectoral lymph node now has normal size and morphology. Previously seen abnormal right breast enhancement has entirely resolved. Previously visualized enlarged right axillary lymph node now has normal size and morphology.  Other previously seen axillary lymph nodes appear stable.  11. 2024: Start of weekly paclitaxel for planned 12 weeks.    INTERVAL HISTORY:  Angie is here today for evaluation and toxicity check prior to chemotherapy (week 9 of weekly paclitaxel following AC).  -She is feeling well overall.  She does note she is starting to feel some fatigue.  -She still gets occasional hives on her face but not as much as previously.  -She does feel hot for 30-60 minutes at a time without fever; impacting her sleep still.   -Eating well and thoughtfully.  Denies nausea.      REVIEW OF SYSTEMS:   14 point ROS was reviewed and is negative other than as noted above in HPI.       HOME MEDICATIONS:  Current Outpatient Medications   Medication Sig Dispense Refill    FEROSUL 325 (65 Fe) MG tablet Take 325 mg by mouth daily           ALLERGIES:  Allergies   Allergen Reactions    Aprepitant Difficulty breathing and Shortness Of Breath    Fosaprepitant Unknown     Allergy added per chart review. [facility]    Armoracia Rusticana Ext (Horseradish) Hives    Carboprost GI Disturbance     Diarrhea    Codeine Nausea and Vomiting         PAST MEDICAL HISTORY:  BRCA2 gene mutation.  Bilateral breast cancer as outlined above.  2022 Galleri test - negative for malignancy.    Gynecologic history:  Age of menarche at 15.  3 children/triplets -- 1 boy and twin girls born 2022.  Age of first pregnancy at 41.  Used 10 years of OCPs no HRT, IVF for fertility treatment.      PAST SURGICAL HISTORY:  Past Surgical History:   Procedure Laterality Date    ABDOMEN SURGERY          ENT SURGERY       tonsillectomy    GYN SURGERY      egg retrieval for IVF    INSERT PORT VASCULAR ACCESS Right 11/20/2023    Procedure: INSERTION, VASCULAR ACCESS PORT;  Surgeon: Fanny Little MD;  Location:  OR    ORTHOPEDIC SURGERY      knee arthroscopy         SOCIAL HISTORY:  Social History     Socioeconomic History    Marital status:      Spouse name: Not on file    Number of children: Not on file    Years of education: Not on file    Highest education level: Not on file   Occupational History    Not on file   Tobacco Use    Smoking status: Never    Smokeless tobacco: Never   Vaping Use    Vaping Use: Never used   Substance and Sexual Activity    Alcohol use: Not Currently    Drug use: Not Currently    Sexual activity: Not on file   Other Topics Concern    Not on file   Social History Narrative    Not on file     Social Determinants of Health     Financial Resource Strain: Not on file   Food Insecurity: Not on file   Transportation Needs: Not on file   Physical Activity: Not on file   Stress: Not on file   Social Connections: Not on file   Interpersonal Safety: Not on file   Housing Stability: Not on file         FAMILY HISTORY:  Paternal grandmother and many paternal grandfathers with breast cancer; cousin with DCIS tested positive for BRCA1.  No ovarian, colon, or prostate cancer.      PHYSICAL EXAM:  Vital signs:  /74   Pulse 72   Resp 16   Wt 77.1 kg (170 lb)   SpO2 97%   BMI 28.29 kg/m     GENERAL: No acute distress.  EYES: No scleral icterus. No overt erythema.  RESPIRATORY: No audible cough, wheezing, or labored breathing.  Lungs are clear to auscultation bilaterally  CARDIAC: S1-S2, regular rate and rhythm with no murmur rub or gallop noted.  No lower extremity edema.  MUSCULOSKELETAL: Range of motion in the neck, shoulders, and arms appear normal.  SKIN: No overt rashes, discolorations, or lesions over the face and neck.  NEUROLOGIC: Alert and oriented, pleasant.   PSYCHIATRIC: Normal affect  and mood.        LABS:  Most Recent 3 CBC's:  Recent Labs   Lab Test 03/12/24  1455 02/27/24  0811 02/06/24  1440   WBC 3.6* 3.6* 4.8   HGB 12.0 11.0* 10.8*   MCV 95 96 93    236 215   ANEUTAUTO 2.1 2.3 2.9     Most Recent 3 BMP's:  Recent Labs   Lab Test 03/12/24  1455 02/27/24  0811 02/06/24  1440 01/16/24  0903   NA  --  138 140 141   POTASSIUM  --  3.9 4.1 4.4   CHLORIDE  --  104 106 107   CO2  --  25 27 27   BUN  --  13.9 14.3 12.6   CR  --  0.72 0.74 0.70   ANIONGAP  --  9 7 7   GWENDOLYN  --  9.2 9.3 9.1   GLC  --  99 105* 99   PROTTOTAL 6.9 6.9 6.9 6.9   ALBUMIN 4.4 4.2 4.2 4.2    Most Recent 3 LFT's:  Recent Labs   Lab Test 03/12/24  1455 02/27/24  0811 02/06/24  1440   AST 15 15 19   ALT 15 15 21   ALKPHOS 52 50 52   BILITOTAL 0.6 1.5* 0.7     I reviewed the above labs today.      IMAGING:  Reviewed as per HPI.    ASSESSMENT/PLAN:  Angie Michael is a 43 year old female with the following issues:  1.  Clinical prognostic stage IIB, cT2-N1-M0, multifocal grade 3 invasive lobular carcinoma of the RIGHT upper outer breast, ER positive 81-90%, MO positive 31-40%, HER-2 FISH negative (IHC = 2+), Ki-67 = 70%  2.  Clinical prognostic stage IIA, cT3-N1-M0, multifocal grade 2 invasive ductal and lobular carcinoma of LEFT breast overlapping sites, ER positive %, MO positive 2%, HER-2 FISH negative (IHC = 2+), Ki-67 = 26%  3. BRCA-2 deleterious gene mutation  -- Angie has completed 4 cycles of neoadjuvant ddAC and is here today for week 9 of weekly paclitaxel, scheduled for infusion tomorrow.   -- She is clinically stable and labs are adequate for treatment tomorrow.  - She will continue weekly paclitaxel for 12 weeks.    -- At her prior visit, she asked about whether she would need to complete all 12 doses of paclitaxel given concern for side effects such as peripheral neuropathy.  Discussed that a repeat breast MRI could be obtained during paclitaxel if she develops significant neuropathy and if under  consideration to stop paclitaxel early.  -- She will revisit with Dr. Fanny Little for bilateral mastectomies after completion of chemotherapy.  She already met with Dr. Vasiliy Yee for discussion of breast reconstruction.  -Given biopsy-proven lymph node disease, adjuvant radiation therapy would be advised.  She has already met with Dr. Finesse Cortes for radiation discussion.  - Per prior notes, given her extent of disease bilaterally, she would benefit from adjuvant abemaciclib for 2 years along with hormone blockade therapy as per the MonarchE trial.  - Per prior notes, adjuvant olaparib (Lynparza) for 1 year would be considered as well.  I do note that based on the Tomas trial, those patients who had hormone receptor positive HER2 negative breast cancer were required to have at least 4 pathologically confirmed positive lymph nodes.    - Per prior notes, Dr. Dan  advised hormone blockade therapy with ovarian suppression therapy with Zoladex with an aromatase inhibitor after surgery.  She already removed her fallopian tubes and will undergo BSO in the future.  This will be discussed again after completion of chemotherapy and surgery.    4.  Right tibia bone lesion  - The 11/3/2023 MR right tibia/fibula showed a 3 cm area of increased T2 signal in the midshaft area.  Although the signaling suggested a benign process, this remains technically indeterminate. Repeat MRI of this area on 2/9/2024 showed slight increase in size so she had a biopsy on 2/27/2024.    --Path reviewed and there was no evidence of metastatic carcinoma.     5.  Fertility discussion (not addressed at this visit)  - Angie has [id number] triplets.  She has saved embryos.  She has not yet decided whether to have more children.  - Following completion of chemotherapy followed by radiation, hormone blockade therapy would be advised for 2-3 years before bearing more children.  Per prior notes, she has also briefly considered a surrogate if  she wishes to have more children.  Will discuss again after chemotherapy and surgery have been completed.    NATE Landeros  [facility]   [phone]    28 minutes spent on the date of the encounter doing chart review, review of test results, interpretation of tests, patient visit, and documentation.

## 2024-03-12 ENCOUNTER — ONCOLOGY VISIT (OUTPATIENT)
Dept: ONCOLOGY | Facility: CLINIC | Age: 44
End: 2024-03-12
Payer: COMMERCIAL

## 2024-03-12 ENCOUNTER — LAB (OUTPATIENT)
Dept: INFUSION THERAPY | Facility: CLINIC | Age: 44
End: 2024-03-12
Payer: COMMERCIAL

## 2024-03-12 VITALS
RESPIRATION RATE: 16 BRPM | WEIGHT: 170 LBS | SYSTOLIC BLOOD PRESSURE: 114 MMHG | BODY MASS INDEX: 28.29 KG/M2 | DIASTOLIC BLOOD PRESSURE: 74 MMHG | OXYGEN SATURATION: 97 % | HEART RATE: 72 BPM

## 2024-03-12 DIAGNOSIS — C50.812 MALIGNANT NEOPLASM OF OVERLAPPING SITES OF BOTH BREASTS IN FEMALE, ESTROGEN RECEPTOR POSITIVE (H): Primary | ICD-10-CM

## 2024-03-12 DIAGNOSIS — Z17.0 MALIGNANT NEOPLASM OF OVERLAPPING SITES OF BOTH BREASTS IN FEMALE, ESTROGEN RECEPTOR POSITIVE (H): Primary | ICD-10-CM

## 2024-03-12 DIAGNOSIS — Z17.0 MALIGNANT NEOPLASM OF UPPER-OUTER QUADRANT OF RIGHT BREAST IN FEMALE, ESTROGEN RECEPTOR POSITIVE (H): Primary | ICD-10-CM

## 2024-03-12 DIAGNOSIS — C50.811 MALIGNANT NEOPLASM OF OVERLAPPING SITES OF BOTH BREASTS IN FEMALE, ESTROGEN RECEPTOR POSITIVE (H): Primary | ICD-10-CM

## 2024-03-12 DIAGNOSIS — C50.411 MALIGNANT NEOPLASM OF UPPER-OUTER QUADRANT OF RIGHT BREAST IN FEMALE, ESTROGEN RECEPTOR POSITIVE (H): Primary | ICD-10-CM

## 2024-03-12 LAB
ALBUMIN SERPL BCG-MCNC: 4.4 G/DL (ref 3.5–5.2)
ALP SERPL-CCNC: 52 U/L (ref 40–150)
ALT SERPL W P-5'-P-CCNC: 15 U/L (ref 0–50)
AST SERPL W P-5'-P-CCNC: 15 U/L (ref 0–45)
BASOPHILS # BLD AUTO: 0 10E3/UL (ref 0–0.2)
BASOPHILS NFR BLD AUTO: 1 %
BILIRUB DIRECT SERPL-MCNC: <0.2 MG/DL (ref 0–0.3)
BILIRUB SERPL-MCNC: 0.6 MG/DL
EOSINOPHIL # BLD AUTO: 0.2 10E3/UL (ref 0–0.7)
EOSINOPHIL NFR BLD AUTO: 5 %
ERYTHROCYTE [DISTWIDTH] IN BLOOD BY AUTOMATED COUNT: 13.2 % (ref 10–15)
HCT VFR BLD AUTO: 35.6 % (ref 35–47)
HGB BLD-MCNC: 12 G/DL (ref 11.7–15.7)
IMM GRANULOCYTES # BLD: 0 10E3/UL
IMM GRANULOCYTES NFR BLD: 0 %
LYMPHOCYTES # BLD AUTO: 1.1 10E3/UL (ref 0.8–5.3)
LYMPHOCYTES NFR BLD AUTO: 31 %
MCH RBC QN AUTO: 32.2 PG (ref 26.5–33)
MCHC RBC AUTO-ENTMCNC: 33.7 G/DL (ref 31.5–36.5)
MCV RBC AUTO: 95 FL (ref 78–100)
MONOCYTES # BLD AUTO: 0.2 10E3/UL (ref 0–1.3)
MONOCYTES NFR BLD AUTO: 5 %
NEUTROPHILS # BLD AUTO: 2.1 10E3/UL (ref 1.6–8.3)
NEUTROPHILS NFR BLD AUTO: 58 %
NRBC # BLD AUTO: 0 10E3/UL
NRBC BLD AUTO-RTO: 0 /100
PLATELET # BLD AUTO: 258 10E3/UL (ref 150–450)
PROT SERPL-MCNC: 6.9 G/DL (ref 6.4–8.3)
RBC # BLD AUTO: 3.73 10E6/UL (ref 3.8–5.2)
WBC # BLD AUTO: 3.6 10E3/UL (ref 4–11)

## 2024-03-12 PROCEDURE — 36591 DRAW BLOOD OFF VENOUS DEVICE: CPT | Performed by: INTERNAL MEDICINE

## 2024-03-12 PROCEDURE — G0463 HOSPITAL OUTPT CLINIC VISIT: HCPCS | Performed by: NURSE PRACTITIONER

## 2024-03-12 PROCEDURE — 85048 AUTOMATED LEUKOCYTE COUNT: CPT | Performed by: INTERNAL MEDICINE

## 2024-03-12 PROCEDURE — 99214 OFFICE O/P EST MOD 30 MIN: CPT | Performed by: NURSE PRACTITIONER

## 2024-03-12 PROCEDURE — 250N000011 HC RX IP 250 OP 636: Performed by: INTERNAL MEDICINE

## 2024-03-12 PROCEDURE — 80076 HEPATIC FUNCTION PANEL: CPT | Performed by: INTERNAL MEDICINE

## 2024-03-12 RX ORDER — ONDANSETRON 2 MG/ML
8 INJECTION INTRAMUSCULAR; INTRAVENOUS ONCE
Status: CANCELLED | OUTPATIENT
Start: 2024-03-13

## 2024-03-12 RX ORDER — DIPHENHYDRAMINE HYDROCHLORIDE 50 MG/ML
50 INJECTION INTRAMUSCULAR; INTRAVENOUS
Status: CANCELLED
Start: 2024-03-13

## 2024-03-12 RX ORDER — DIPHENHYDRAMINE HCL 25 MG
50 CAPSULE ORAL
Status: CANCELLED
Start: 2024-03-13

## 2024-03-12 RX ORDER — HEPARIN SODIUM,PORCINE 10 UNIT/ML
5-20 VIAL (ML) INTRAVENOUS DAILY PRN
Status: CANCELLED | OUTPATIENT
Start: 2024-03-13

## 2024-03-12 RX ORDER — HEPARIN SODIUM (PORCINE) LOCK FLUSH IV SOLN 100 UNIT/ML 100 UNIT/ML
5 SOLUTION INTRAVENOUS
Status: CANCELLED | OUTPATIENT
Start: 2024-03-13

## 2024-03-12 RX ORDER — ALBUTEROL SULFATE 0.83 MG/ML
2.5 SOLUTION RESPIRATORY (INHALATION)
Status: CANCELLED | OUTPATIENT
Start: 2024-03-13

## 2024-03-12 RX ORDER — MEPERIDINE HYDROCHLORIDE 25 MG/ML
25 INJECTION INTRAMUSCULAR; INTRAVENOUS; SUBCUTANEOUS EVERY 30 MIN PRN
Status: CANCELLED | OUTPATIENT
Start: 2024-03-13

## 2024-03-12 RX ORDER — HEPARIN SODIUM (PORCINE) LOCK FLUSH IV SOLN 100 UNIT/ML 100 UNIT/ML
5 SOLUTION INTRAVENOUS ONCE
Status: COMPLETED | OUTPATIENT
Start: 2024-03-12 | End: 2024-03-12

## 2024-03-12 RX ORDER — LORAZEPAM 2 MG/ML
0.5 INJECTION INTRAMUSCULAR EVERY 4 HOURS PRN
Status: CANCELLED | OUTPATIENT
Start: 2024-03-13

## 2024-03-12 RX ORDER — ALBUTEROL SULFATE 90 UG/1
1-2 AEROSOL, METERED RESPIRATORY (INHALATION)
Status: CANCELLED
Start: 2024-03-13

## 2024-03-12 RX ORDER — EPINEPHRINE 1 MG/ML
0.3 INJECTION, SOLUTION INTRAMUSCULAR; SUBCUTANEOUS EVERY 5 MIN PRN
Status: CANCELLED | OUTPATIENT
Start: 2024-03-13

## 2024-03-12 RX ORDER — METHYLPREDNISOLONE SODIUM SUCCINATE 125 MG/2ML
125 INJECTION, POWDER, LYOPHILIZED, FOR SOLUTION INTRAMUSCULAR; INTRAVENOUS
Status: CANCELLED
Start: 2024-03-13

## 2024-03-12 RX ADMIN — Medication 5 ML: at 14:55

## 2024-03-12 ASSESSMENT — PAIN SCALES - GENERAL: PAINLEVEL: NO PAIN (0)

## 2024-03-12 NOTE — LETTER
3/12/2024         RE: Angie Michael  [address] [place] [id number]        Dear Colleague,    Thank you for referring your patient, Angie Michael, to the [facility]. Please see a copy of my visit note below.    [facility]    Hematology/Oncology Established Patient Note      Today's Date: 1/17/2024    Reason for visit: Bilateral breast cancer.    HISTORY OF PRESENT ILLNESS: Angie Michael is a 43 year old female with BRCA2 gene mutation who presents with the following oncologic history:  1.  10/05/2023: High risk surveillance breast MRI showed right breast 3.6 x 3 x 3.2 cm irregular enhancing mass at 10:00, 0.9 x 0.8 x 0.9 cm second mass at 9:00, 1 x 0.9 x 1.2 cm third mass at 9:30, other suspicious foci of enhancement throughout the breast.  Left breast with 9.9 x 5.2 x 6.7 cm suspicious non-mass enhancement at 10:00-7:00; 1.6 x 1.3 x 1.7 cm enhancing mass at 4:00; 1.8 x 1.6 x 1.9 cm enhancing mass at 12:30; other suspicious foci of enhancement throughout the left breast.  One level 1 suspicious right axillary lymph node and one level 1 suspicious left axillary lymph node..  Developing suspicious interpectoral lymph node.  2.  10/12/2023: Bilateral diagnostic mammogram showed right breast with 3.2 x 2.7 x 4.1 cm irregular mass at 10:00, 6 cm from nipple; 0.9 x 0.6 x 1 cm irregular mass at 11:00, 2 cm from nipple and prominent right axillary lymph node.  Left breast with 1.4 x 1.1 x 1.7 cm mass at 12:00, 2 cm from nipple, 1.8 x 1 x 1.6 cm mass at 3:30, 4 cm from nipple, 1 x 0.9 x 1.1 cm mass at 3:00, 7 cm from nipple, multiple areas of hypoechogenicity with calcifications in the lateral left breast, and prominent left axillary lymph node.  3.  10/12/2023: Biopsy of right breast at 10:00 showed grade 3 invasive lobular carcinoma, ER strongly positive at 81-90%, MT positive at 31-40%, HER2 equivocal with IHC = 2+, FISH negative, Ki-67 = 70%.  Biopsy of right  axillary lymph node showed metastatic carcinoma.  Biopsy of left breast at 3:30 showed grade 2 invasive carcinoma with ductal and lobular features, ER positive at %, [place] positive at 2%, HER2 equivocal with IHC = 2+, FISH negative, Ki-67 = 26%.  Biopsy of left axillary lymph node showed metastatic carcinoma.  4.  10/17/2023: PET/CT scan showed cluster of hypermetabolic right breast nodules with SUV max 14.9; 2.1 x 1 cm hypermetabolic level 1 right axillary lymph node with SUV max 5.5; hypermetabolic left breast nodule with SUV max 18.8 and several additional less intense hypermetabolic left breast nodules; 1.5 x 1.3 cm hypermetabolic level 1 left axillary lymph node with SUV max 4.  Small sclerotic focus of metabolic activity within the proximal tibial shaft with SUV max 3.7, indeterminate but unlikely to reflect metastatic disease.  Subcentimeter hypermetabolic focus along anterior margin of the left thyroid lobe, likely a thyroid nodule.  5.  11/03/2023: Thyroid ultrasound showed 0.7 x 0.6 x 0.5 cm solid hypoechoic nodule at the junction of the isthmus and left hemithyroid corresponding to increased uptake on PET/CT, consistent with TIRADS 4 nodule and subcentimeter TIRADS 3 nodule in the left inferior thyroid.  6. 11/3/2023: MR right tibia/fibula showed discrete focal 3 cm area of increased T2 signal and enhancement in mid shaft of right tibia with fat signal interspersed throughout this area with significant signal dropout on opposed phase imaging, favoring benign process.  7. 11/21/2023: Started neoadjuvant chemotherapy with dose-dense Adriamycin + Cytoxan with Neulasta support with Dr. Bev Ryan at [facility]. Reacted to Emend.  8. 11/25/2023: Evaluated at [facility] ED with diarrhea and dizziness, latter attributed to olanzapine -- this was discontinued.  9. 1/03/2024: Completed cycle 4 dose-dense AC.  10. 1/16/2024: Breast MRI showed persistent abnormal mass and nonmass enhancement in the left  breast measuring 10.2 cm in AP dimension (unchanged).  Previously seen mass at the 12:30 position now measures 1.0 cm in greatest dimension (previously  1.9 cm). Previously seen seen mass at the 4:00 position now measures 1.1 cm in greatest dimension (previously 1.7 cm). Previously seen enlarged left axillary lymph node now has normal size and morphology. Previously seen intrapectoral lymph node now has normal size and morphology. Previously seen abnormal right breast enhancement has entirely resolved. Previously visualized enlarged right axillary lymph node now has normal size and morphology.  Other previously seen axillary lymph nodes appear stable.  11. 1/17/2024: Start of weekly paclitaxel for planned 12 weeks.    INTERVAL HISTORY:  Angie is here today for evaluation and toxicity check prior to chemotherapy (week 9 of weekly paclitaxel following AC).  -She is feeling well overall.  She does note she is starting to feel some fatigue.  -She still gets occasional hives on her face but not as much as previously.  -She does feel hot for 30-60 minutes at a time without fever; impacting her sleep still.   -Eating well and thoughtfully.  Denies nausea.      REVIEW OF SYSTEMS:   14 point ROS was reviewed and is negative other than as noted above in HPI.       HOME MEDICATIONS:  Current Outpatient Medications   Medication Sig Dispense Refill     FEROSUL 325 (65 Fe) MG tablet Take 325 mg by mouth daily           ALLERGIES:  Allergies   Allergen Reactions     Aprepitant Difficulty breathing and Shortness Of Breath     Fosaprepitant Unknown     Allergy added per chart review. [facility]     Armoracia Rusticana Ext (Horseradish) Hives     Carboprost GI Disturbance     Diarrhea     Codeine Nausea and Vomiting         PAST MEDICAL HISTORY:  BRCA2 gene mutation.  Bilateral breast cancer as outlined above.  9/2022 Galleri test - negative for malignancy.    Gynecologic history:  Age of menarche at 15.  3 children/triplets --  1 boy and twin girls born 2022.  Age of first pregnancy at 41.  Used 10 years of OCPs no HRT, IVF for fertility treatment.      PAST SURGICAL HISTORY:  Past Surgical History:   Procedure Laterality Date     ABDOMEN SURGERY           ENT SURGERY      tonsillectomy     GYN SURGERY      egg retrieval for IVF     INSERT PORT VASCULAR ACCESS Right 2023    Procedure: INSERTION, VASCULAR ACCESS PORT;  Surgeon: Fanny Little MD;  Location:  OR     ORTHOPEDIC SURGERY      knee arthroscopy         SOCIAL HISTORY:  Social History     Socioeconomic History     Marital status:      Spouse name: Not on file     Number of children: Not on file     Years of education: Not on file     Highest education level: Not on file   Occupational History     Not on file   Tobacco Use     Smoking status: Never     Smokeless tobacco: Never   Vaping Use     Vaping Use: Never used   Substance and Sexual Activity     Alcohol use: Not Currently     Drug use: Not Currently     Sexual activity: Not on file   Other Topics Concern     Not on file   Social History Narrative     Not on file     Social Determinants of Health     Financial Resource Strain: Not on file   Food Insecurity: Not on file   Transportation Needs: Not on file   Physical Activity: Not on file   Stress: Not on file   Social Connections: Not on file   Interpersonal Safety: Not on file   Housing Stability: Not on file         FAMILY HISTORY:  Paternal grandmother and many paternal grandfathers with breast cancer; cousin with DCIS tested positive for BRCA1.  No ovarian, colon, or prostate cancer.      PHYSICAL EXAM:  Vital signs:  /74   Pulse 72   Resp 16   Wt 77.1 kg (170 lb)   SpO2 97%   BMI 28.29 kg/m     GENERAL: No acute distress.  EYES: No scleral icterus. No overt erythema.  RESPIRATORY: No audible cough, wheezing, or labored breathing.  Lungs are clear to auscultation bilaterally  CARDIAC: S1-S2, regular rate and rhythm with no murmur rub  or gallop noted.  No lower extremity edema.  MUSCULOSKELETAL: Range of motion in the neck, shoulders, and arms appear normal.  SKIN: No overt rashes, discolorations, or lesions over the face and neck.  NEUROLOGIC: Alert and oriented, pleasant.   PSYCHIATRIC: Normal affect and mood.        LABS:  Most Recent 3 CBC's:  Recent Labs   Lab Test 03/12/24  1455 02/27/24  0811 02/06/24  1440   WBC 3.6* 3.6* 4.8   HGB 12.0 11.0* 10.8*   MCV 95 96 93    236 215   ANEUTAUTO 2.1 2.3 2.9     Most Recent 3 BMP's:  Recent Labs   Lab Test 03/12/24  1455 02/27/24  0811 02/06/24  1440 01/16/24  0903   NA  --  138 140 141   POTASSIUM  --  3.9 4.1 4.4   CHLORIDE  --  104 106 107   CO2  --  25 27 27   BUN  --  13.9 14.3 12.6   CR  --  0.72 0.74 0.70   ANIONGAP  --  9 7 7   GWENDOLYN  --  9.2 9.3 9.1   GLC  --  99 105* 99   PROTTOTAL 6.9 6.9 6.9 6.9   ALBUMIN 4.4 4.2 4.2 4.2    Most Recent 3 LFT's:  Recent Labs   Lab Test 03/12/24  1455 02/27/24  0811 02/06/24  1440   AST 15 15 19   ALT 15 15 21   ALKPHOS 52 50 52   BILITOTAL 0.6 1.5* 0.7     I reviewed the above labs today.      IMAGING:  Reviewed as per HPI.    ASSESSMENT/PLAN:  Angie Michael is a 43 year old female with the following issues:  1.  Clinical prognostic stage IIB, cT2-N1-M0, multifocal grade 3 invasive lobular carcinoma of the RIGHT upper outer breast, ER positive 81-90%, [place] positive 31-40%, HER-2 FISH negative (IHC = 2+), Ki-67 = 70%  2.  Clinical prognostic stage IIA, cT3-N1-M0, multifocal grade 2 invasive ductal and lobular carcinoma of LEFT breast overlapping sites, ER positive %, WV positive 2%, HER-2 FISH negative (IHC = 2+), Ki-67 = 26%  3. BRCA-2 deleterious gene mutation  -- Angie has completed 4 cycles of neoadjuvant ddAC and is here today for week 9 of weekly paclitaxel, scheduled for infusion tomorrow.   -- She is clinically stable and labs are adequate for treatment tomorrow.  - She will continue weekly paclitaxel for 12 weeks.    -- At her prior  visit, she asked about whether she would need to complete all 12 doses of paclitaxel given concern for side effects such as peripheral neuropathy.  Discussed that a repeat breast MRI could be obtained during paclitaxel if she develops significant neuropathy and if under consideration to stop paclitaxel early.  -- She will revisit with Dr. Fanny Little for bilateral mastectomies after completion of chemotherapy.  She already met with Dr. Vasiliy Yee for discussion of breast reconstruction.  -Given biopsy-proven lymph node disease, adjuvant radiation therapy would be advised.  She has already met with Dr. Finesse Cortes for radiation discussion.  - Per prior notes, given her extent of disease bilaterally, she would benefit from adjuvant abemaciclib for 2 years along with hormone blockade therapy as per the MonarchE trial.  - Per prior notes, adjuvant olaparib (Lynparza) for 1 year would be considered as well.  I do note that based on the Tomas trial, those patients who had hormone receptor positive HER2 negative breast cancer were required to have at least 4 pathologically confirmed positive lymph nodes.    - Per prior notes, Dr. Dan  advised hormone blockade therapy with ovarian suppression therapy with Zoladex with an aromatase inhibitor after surgery.  She already removed her fallopian tubes and will undergo BSO in the future.  This will be discussed again after completion of chemotherapy and surgery.    4.  Right tibia bone lesion  - The 11/3/2023 MR right tibia/fibula showed a 3 cm area of increased T2 signal in the midshaft area.  Although the signaling suggested a benign process, this remains technically indeterminate. Repeat MRI of this area on 2/9/2024 showed slight increase in size so she had a biopsy on 2/27/2024.    --Path reviewed and there was no evidence of metastatic carcinoma.     5.  Fertility discussion (not addressed at this visit)  - Angie has [id number] triplets.  She has saved  embryos.  She has not yet decided whether to have more children.  - Following completion of chemotherapy followed by radiation, hormone blockade therapy would be advised for 2-3 years before bearing more children.  Per prior notes, she has also briefly considered a surrogate if she wishes to have more children.  Will discuss again after chemotherapy and surgery have been completed.    NATE Landeros  [facility]   [phone]    28 minutes spent on the date of the encounter doing chart review, review of test results, interpretation of tests, patient visit, and documentation.      Again, thank you for allowing me to participate in the care of your patient.        Sincerely,        NATE Landeros CNP

## 2024-03-12 NOTE — PROGRESS NOTES
Nursing Note:  Angie Michael presents today for Port labs.    Patient seen by provider today: Yes: Alma Calabrese NP    present during visit today: Not Applicable.    Note: N/A.    Intravenous Access:  Implanted Port.    Discharge Plan:   Patient was sent to [facility] for provider appointment.    Sharon Carroll RN

## 2024-03-13 ENCOUNTER — INFUSION THERAPY VISIT (OUTPATIENT)
Dept: INFUSION THERAPY | Facility: CLINIC | Age: 44
End: 2024-03-13
Attending: INTERNAL MEDICINE
Payer: COMMERCIAL

## 2024-03-13 VITALS
HEART RATE: 62 BPM | SYSTOLIC BLOOD PRESSURE: 101 MMHG | TEMPERATURE: 97.4 F | DIASTOLIC BLOOD PRESSURE: 74 MMHG | OXYGEN SATURATION: 97 % | RESPIRATION RATE: 18 BRPM

## 2024-03-13 DIAGNOSIS — C50.411 MALIGNANT NEOPLASM OF UPPER-OUTER QUADRANT OF RIGHT BREAST IN FEMALE, ESTROGEN RECEPTOR POSITIVE (H): Primary | ICD-10-CM

## 2024-03-13 DIAGNOSIS — Z17.0 MALIGNANT NEOPLASM OF UPPER-OUTER QUADRANT OF RIGHT BREAST IN FEMALE, ESTROGEN RECEPTOR POSITIVE (H): Primary | ICD-10-CM

## 2024-03-13 PROCEDURE — 250N000011 HC RX IP 250 OP 636: Performed by: NURSE PRACTITIONER

## 2024-03-13 PROCEDURE — 258N000003 HC RX IP 258 OP 636: Performed by: NURSE PRACTITIONER

## 2024-03-13 PROCEDURE — 96413 CHEMO IV INFUSION 1 HR: CPT

## 2024-03-13 PROCEDURE — 96375 TX/PRO/DX INJ NEW DRUG ADDON: CPT

## 2024-03-13 RX ORDER — HEPARIN SODIUM (PORCINE) LOCK FLUSH IV SOLN 100 UNIT/ML 100 UNIT/ML
5 SOLUTION INTRAVENOUS
Status: DISCONTINUED | OUTPATIENT
Start: 2024-03-13 | End: 2024-03-13 | Stop reason: HOSPADM

## 2024-03-13 RX ORDER — LORAZEPAM 2 MG/ML
0.5 INJECTION INTRAMUSCULAR EVERY 4 HOURS PRN
Status: DISCONTINUED | OUTPATIENT
Start: 2024-03-13 | End: 2024-03-13 | Stop reason: HOSPADM

## 2024-03-13 RX ORDER — ONDANSETRON 2 MG/ML
8 INJECTION INTRAMUSCULAR; INTRAVENOUS ONCE
Status: COMPLETED | OUTPATIENT
Start: 2024-03-13 | End: 2024-03-13

## 2024-03-13 RX ADMIN — ONDANSETRON 8 MG: 2 INJECTION INTRAMUSCULAR; INTRAVENOUS at 08:35

## 2024-03-13 RX ADMIN — PACLITAXEL 152 MG: 6 INJECTION, SOLUTION INTRAVENOUS at 09:17

## 2024-03-13 RX ADMIN — Medication 5 ML: at 10:19

## 2024-03-13 RX ADMIN — LORAZEPAM 0.5 MG: 2 INJECTION INTRAMUSCULAR; INTRAVENOUS at 08:18

## 2024-03-13 RX ADMIN — SODIUM CHLORIDE 250 ML: 9 INJECTION, SOLUTION INTRAVENOUS at 08:17

## 2024-03-13 NOTE — PROGRESS NOTES
Infusion Nursing Note:  Angie Michael presents today for C10D1 Taxol.    Patient seen by provider today: Yes: URI Landeros on 3/12   present during visit today: Not Applicable.    Note: Utilizing Penguin Cold Caps. Ativan given prior to cold capping per patient request.      Intravenous Access:  Implanted Port.    Treatment Conditions:  Lab Results   Component Value Date    HGB 12.0 03/12/2024    WBC 3.6 (L) 03/12/2024    ANEU 11.0 (H) 01/16/2024    ANEUTAUTO 2.1 03/12/2024     03/12/2024        Lab Results   Component Value Date     02/27/2024    POTASSIUM 3.9 02/27/2024    CR 0.72 02/27/2024    GWENDOLYN 9.2 02/27/2024    BILITOTAL 0.6 03/12/2024    ALBUMIN 4.4 03/12/2024    ALT 15 03/12/2024    AST 15 03/12/2024       Results reviewed, labs MET treatment parameters, ok to proceed with treatment.      Post Infusion Assessment:  Patient tolerated infusion without incident.  Blood return noted pre and post infusion.  Site patent and intact, free from redness, edema or discomfort.  No evidence of extravasations.  Access discontinued per protocol.       Discharge Plan:   Patient declined prescription refills.  Discharge instructions reviewed with: Patient.  Patient and/or family verbalized understanding of discharge instructions and all questions answered.  AVS to patient via [employer].  Patient will return 4/10 for next appointment.   Patient discharged in stable condition accompanied by: self and .  Departure Mode: Ambulatory.      Sarath Todd RN

## 2024-03-15 NOTE — TELEPHONE ENCOUNTER
Action March 15, 2024 1:55 PM MT   Action Taken Sent a request for imaging from .     DIAGNOSIS: Bone lesion [M89.9]  - Primary   APPOINTMENT DATE: 03/19/2024   NOTES STATUS DETAILS   OFFICE NOTE from referring provider Internal 02/21/2024 - Monse Dan MD - [facility]   OFFICE NOTE from other specialist Care Everywhere    NM PACS HP:  10/17/2023 - Whole Body   MRI PACS Internal    HP:  11/03/2023 - RT Tib-Fib   CT SCAN PACS Internal   XRAYS (IMAGES & REPORTS) PACS HP:  03/23/2018 - RT Knee  03/23/2018 - LT Knee   TUMOR     PATHOLOGY  Slides & report Internal Case: [id number]                                  Collected: 02/27/2024  Specimen: Tibia, Right

## 2024-03-16 LAB
PATH REPORT.COMMENTS IMP SPEC: ABNORMAL
PATH REPORT.COMMENTS IMP SPEC: YES
PATH REPORT.FINAL DX SPEC: ABNORMAL
PATH REPORT.GROSS SPEC: ABNORMAL
PATH REPORT.MICROSCOPIC SPEC OTHER STN: ABNORMAL
PATH REPORT.RELEVANT HX SPEC: ABNORMAL
PATH REPORT.RELEVANT HX SPEC: ABNORMAL
PATH REPORT.SITE OF ORIGIN SPEC: ABNORMAL

## 2024-03-18 RX ORDER — HEPARIN SODIUM (PORCINE) LOCK FLUSH IV SOLN 100 UNIT/ML 100 UNIT/ML
5 SOLUTION INTRAVENOUS
Status: CANCELLED | OUTPATIENT
Start: 2024-03-20

## 2024-03-18 RX ORDER — LORAZEPAM 2 MG/ML
0.5 INJECTION INTRAMUSCULAR EVERY 4 HOURS PRN
Status: CANCELLED | OUTPATIENT
Start: 2024-03-20

## 2024-03-18 RX ORDER — MEPERIDINE HYDROCHLORIDE 25 MG/ML
25 INJECTION INTRAMUSCULAR; INTRAVENOUS; SUBCUTANEOUS EVERY 30 MIN PRN
Status: CANCELLED | OUTPATIENT
Start: 2024-03-20

## 2024-03-18 RX ORDER — DIPHENHYDRAMINE HCL 25 MG
50 CAPSULE ORAL
Status: CANCELLED
Start: 2024-03-20

## 2024-03-18 RX ORDER — HEPARIN SODIUM,PORCINE 10 UNIT/ML
5-20 VIAL (ML) INTRAVENOUS DAILY PRN
Status: CANCELLED | OUTPATIENT
Start: 2024-03-20

## 2024-03-18 RX ORDER — ALBUTEROL SULFATE 0.83 MG/ML
2.5 SOLUTION RESPIRATORY (INHALATION)
Status: CANCELLED | OUTPATIENT
Start: 2024-03-20

## 2024-03-18 RX ORDER — METHYLPREDNISOLONE SODIUM SUCCINATE 125 MG/2ML
125 INJECTION, POWDER, LYOPHILIZED, FOR SOLUTION INTRAMUSCULAR; INTRAVENOUS
Status: CANCELLED
Start: 2024-03-20

## 2024-03-18 RX ORDER — ONDANSETRON 2 MG/ML
8 INJECTION INTRAMUSCULAR; INTRAVENOUS ONCE
Status: CANCELLED | OUTPATIENT
Start: 2024-03-20

## 2024-03-18 RX ORDER — DIPHENHYDRAMINE HYDROCHLORIDE 50 MG/ML
50 INJECTION INTRAMUSCULAR; INTRAVENOUS
Status: CANCELLED
Start: 2024-03-20

## 2024-03-18 RX ORDER — EPINEPHRINE 1 MG/ML
0.3 INJECTION, SOLUTION INTRAMUSCULAR; SUBCUTANEOUS EVERY 5 MIN PRN
Status: CANCELLED | OUTPATIENT
Start: 2024-03-20

## 2024-03-18 RX ORDER — ALBUTEROL SULFATE 90 UG/1
1-2 AEROSOL, METERED RESPIRATORY (INHALATION)
Status: CANCELLED
Start: 2024-03-20

## 2024-03-19 ENCOUNTER — VIRTUAL VISIT (OUTPATIENT)
Dept: ORTHOPEDICS | Facility: CLINIC | Age: 44
End: 2024-03-19
Payer: COMMERCIAL

## 2024-03-19 ENCOUNTER — PREP FOR PROCEDURE (OUTPATIENT)
Dept: ORTHOPEDICS | Facility: CLINIC | Age: 44
End: 2024-03-19

## 2024-03-19 ENCOUNTER — PRE VISIT (OUTPATIENT)
Dept: ORTHOPEDICS | Facility: CLINIC | Age: 44
End: 2024-03-19

## 2024-03-19 VITALS — HEIGHT: 66 IN | WEIGHT: 160 LBS | BODY MASS INDEX: 25.71 KG/M2

## 2024-03-19 DIAGNOSIS — C79.51 METASTATIC BONE TUMOR (H): Primary | ICD-10-CM

## 2024-03-19 DIAGNOSIS — M85.60 BONE CYST: Primary | ICD-10-CM

## 2024-03-19 PROCEDURE — 99204 OFFICE O/P NEW MOD 45 MIN: CPT | Mod: 95 | Performed by: ORTHOPAEDIC SURGERY

## 2024-03-19 ASSESSMENT — PAIN SCALES - GENERAL: PAINLEVEL: NO PAIN (0)

## 2024-03-19 NOTE — PATIENT INSTRUCTIONS
Impression: Newly diagnosed aggressive breast cancer in a 43-year-old with an incidental finding of a lesion in her right tibia on PET/CT.  Biopsy performed elsewhere was interpreted to show no evidence of malignancy.    Plan: 1.  Angie will continue with her rigorous treatment plan for her breast cancer.  2.  Will see her back in 3 months for a follow-up MRI scan of the right tibia to be compared with her other imaging studies.  If there is evidence of enlargement or if in the interim she develops pain we would recommend open surgical biopsy curettage internal fixation with a plate and either bone grafting or cementation.

## 2024-03-19 NOTE — NURSING NOTE
"Is the patient currently in the state of [place]? YES    Visit mode:VIDEO    If the visit is dropped, the patient can be reconnected by: VIDEO VISIT: Send to e-mail at: [email or web address] ([email or web address]    Will anyone else be joining the visit? [employer]    (If patient encounters technical issues they should call [phone])    How would you like to obtain your AVS? MyChart    Are changes needed to the allergy or medication list? No    Reason for visit: Consult (Patient said, \" PET scan,had MRI and follow up MRI and spot had grown, currently in Treatment for Breat Cancer, biopsy is not cancer, wondering why it grew and will it continue. \")    Meme Kemp VVF       "

## 2024-03-19 NOTE — PROGRESS NOTES
Virtual Visit Details    Type of service:  Video Visit     Impression: Newly diagnosed aggressive breast cancer in a 43-year-old with an incidental finding of a lesion in her right tibia on PET/CT.  Biopsy performed elsewhere was interpreted to show no evidence of malignancy.    Plan: 1.  Angie will continue with her rigorous treatment plan for her breast cancer.  2.  Will see her back in 3 months for a follow-up MRI scan of the right tibia to be compared with her other imaging studies.  If there is evidence of enlargement or if in the interim she develops pain we would recommend open surgical biopsy curettage internal fixation with a plate and either bone grafting or cementation.    Angie is a 43-year-old female who is currently undergoing treatment for her breast cancer which was diagnosed this fall.  As part of that evaluation in October 2017 PET/CT of the body was performed.  This showed a subtle lesion in the right tibial diaphysis.  MRI scan at that time was obtained and was interpreted as most likely represent a benign process.  A follow-up MRI scan was performed February 9, 2024 and it is reported by the radiologist that there was an increase in the size longitudinally from 3.2 cm to 3.8 cm.  A biopsy was therefore recommended and a needle biopsy after drilling and entry portal midtibia was performed on February 27.    I reviewed the pathology report from February 27.  It was not clear to my review the area was localized.  But nonetheless 2 core samples approximately 1 cm x 0.2 cm were obtained and were interpreted to show nonneoplastic bone with trilineage hematopoiesis no evidence of metastatic carcinoma.    Angie expressed some concern because she does not know what is inside of the right tibia.  I reported to her that having seen her CT scan and her 2023 in 2020 for MRI scans of the right tibia I have a very low probability impression that the lesion represents an aggressive bone tumor such as a breast  cancer metastasis or primary tumor of bone.  I reassured her that I believe the most likely finding would be a bone cyst, a benign fibrous tumor of bone or something in that broader category.  Answered all her questions.    After we completed our discussion I offered her the option of open biopsy with prophylactic plating and either allograft packing or cementation depending on the results for frozen section or reimaging in 3 months with its decision to treat her if the lesion continues to grow or she develops symptoms in the meantime.  She prefers the latter decision.  In the context of all that she is going through right now I believe this is the wisest choice.  Will proceed therefore as outlined above.    Her imaging studies were reviewed from 12:30 PM to 12:40 PM.  The face-to-face visit was from 2:27 PM to 2:58 PM.  Chart review after clinic but the day of the clinic and documentation was from 5:20 PM to 5:28 PM.  The total length of visit was greater than 45 minutes.

## 2024-03-20 ENCOUNTER — INFUSION THERAPY VISIT (OUTPATIENT)
Dept: INFUSION THERAPY | Facility: CLINIC | Age: 44
End: 2024-03-20
Attending: INTERNAL MEDICINE
Payer: COMMERCIAL

## 2024-03-20 VITALS
OXYGEN SATURATION: 98 % | SYSTOLIC BLOOD PRESSURE: 116 MMHG | DIASTOLIC BLOOD PRESSURE: 75 MMHG | RESPIRATION RATE: 18 BRPM | TEMPERATURE: 97.5 F | HEART RATE: 89 BPM

## 2024-03-20 DIAGNOSIS — Z17.0 MALIGNANT NEOPLASM OF UPPER-OUTER QUADRANT OF RIGHT BREAST IN FEMALE, ESTROGEN RECEPTOR POSITIVE (H): Primary | ICD-10-CM

## 2024-03-20 DIAGNOSIS — C50.411 MALIGNANT NEOPLASM OF UPPER-OUTER QUADRANT OF RIGHT BREAST IN FEMALE, ESTROGEN RECEPTOR POSITIVE (H): Primary | ICD-10-CM

## 2024-03-20 PROCEDURE — 96413 CHEMO IV INFUSION 1 HR: CPT

## 2024-03-20 PROCEDURE — 96375 TX/PRO/DX INJ NEW DRUG ADDON: CPT

## 2024-03-20 PROCEDURE — 250N000011 HC RX IP 250 OP 636: Performed by: NURSE PRACTITIONER

## 2024-03-20 PROCEDURE — 258N000003 HC RX IP 258 OP 636: Performed by: NURSE PRACTITIONER

## 2024-03-20 RX ORDER — LORAZEPAM 2 MG/ML
0.5 INJECTION INTRAMUSCULAR EVERY 4 HOURS PRN
Status: DISCONTINUED | OUTPATIENT
Start: 2024-03-20 | End: 2024-03-20 | Stop reason: HOSPADM

## 2024-03-20 RX ORDER — HEPARIN SODIUM (PORCINE) LOCK FLUSH IV SOLN 100 UNIT/ML 100 UNIT/ML
5 SOLUTION INTRAVENOUS
Status: DISCONTINUED | OUTPATIENT
Start: 2024-03-20 | End: 2024-03-20 | Stop reason: HOSPADM

## 2024-03-20 RX ORDER — ONDANSETRON 2 MG/ML
8 INJECTION INTRAMUSCULAR; INTRAVENOUS ONCE
Status: COMPLETED | OUTPATIENT
Start: 2024-03-20 | End: 2024-03-20

## 2024-03-20 RX ADMIN — ONDANSETRON 8 MG: 2 INJECTION INTRAMUSCULAR; INTRAVENOUS at 08:54

## 2024-03-20 RX ADMIN — LORAZEPAM 0.5 MG: 2 INJECTION INTRAMUSCULAR; INTRAVENOUS at 08:21

## 2024-03-20 RX ADMIN — SODIUM CHLORIDE 250 ML: 9 INJECTION, SOLUTION INTRAVENOUS at 08:22

## 2024-03-20 RX ADMIN — PACLITAXEL 152 MG: 6 INJECTION, SOLUTION INTRAVENOUS at 09:21

## 2024-03-20 RX ADMIN — Medication 5 ML: at 10:20

## 2024-03-20 ASSESSMENT — PAIN SCALES - GENERAL: PAINLEVEL: NO PAIN (0)

## 2024-03-20 NOTE — PROGRESS NOTES
Infusion Nursing Note:  Angie Michael presents today for C11D1 Taxol.    Patient seen by provider today: No   present during visit today: Not Applicable.    Note: PRN ativan given before cold capping per patient request.      Intravenous Access:  Implanted Port.    Treatment Conditions:  Lab Results   Component Value Date    HGB 12.0 03/12/2024    WBC 3.6 (L) 03/12/2024    ANEU 11.0 (H) 01/16/2024    ANEUTAUTO 2.1 03/12/2024     03/12/2024     Results reviewed, labs MET treatment parameters, ok to proceed with treatment.      Post Infusion Assessment:  Patient tolerated infusion without incident.  Blood return noted pre and post infusion.  Site patent and intact, free from redness, edema or discomfort.  No evidence of extravasations.  Access discontinued per protocol.       Discharge Plan:   Discharge instructions reviewed with: Patient.  Patient and/or family verbalized understanding of discharge instructions and all questions answered.  AVS to patient via [employer].  Patient will return 3/27 for next appointment.   Patient discharged in stable condition accompanied by: self and .  Departure Mode: Ambulatory.      Renae Kessler RN

## 2024-03-21 ENCOUNTER — TELEPHONE (OUTPATIENT)
Dept: SURGERY | Facility: CLINIC | Age: 44
End: 2024-03-21
Payer: COMMERCIAL

## 2024-03-21 ENCOUNTER — TELEPHONE (OUTPATIENT)
Dept: ORTHOPEDICS | Facility: CLINIC | Age: 44
End: 2024-03-21
Payer: COMMERCIAL

## 2024-03-21 NOTE — TELEPHONE ENCOUNTER
Left Voicemail (1st Attempt) for the patient to call back and schedule the following:    Appointment type: Imaging + follow up   Provider: Dr Varghese  Return date:mid to late june  Specialty phone number: [phone]  Additional appointment(s) needed:   Additonal Notes:  LVM regarding scheduling imaging appt + follow up in June with Dr varghese. Contact info provided

## 2024-03-22 ENCOUNTER — MYC MEDICAL ADVICE (OUTPATIENT)
Dept: ONCOLOGY | Facility: CLINIC | Age: 44
End: 2024-03-22
Payer: COMMERCIAL

## 2024-03-22 NOTE — TELEPHONE ENCOUNTER
Triage received MyChart from patient regarding some symptoms.  Called patient to further assess.  Pt had C11D1 Taxol on 3/20/24.    Pt said that she noticed her bilateral forearms felt sore and weak starting yesterday afternoon.  She said she wasn't sure if it was from picking up her triplets or the change in weather or if it could be related to her chemo.  She said she she otherwise feels good.  She said she feels a little tired and run down, but denies fevers, chest pain, shortness of breath, dizziness.  Denies urinary problems or constipation problems.  She said that the soreness in her arms is a new symptom for her, so she just wanted to run it past us.  She said she also noticed a little bit of stiffness in her hands this morning, but said the stiffness is improving now that she is up and moving around.  She said she has a little bit of soreness in her right foot, but said she has had that in the past and denies worsening symptoms.    Explained to patient that it could be a cumulative effect of the Taxol and explained she could try Claritin for the myalgia.  Also told patient she could try Tylenol as well.  Pt said the pain doesn't bother her that much, she said it is very mild and more of just a soreness.  She is willing to try the Claritin.    Writer will route message to Alma Calabrese NP, for further advise/recommendations.  Told patient to call us back if she has any worsening of symptoms or if she develops a fever.  Pt was in agreement with plan.  No further questions or concerns at this time.    Alma Calabrese, Carlita Marquez CNP, RN  Cc: Adele Chowdary RN  Phone Number: [phone]     Thanks for the message!  I agree that this could be related to the chemo - myalgias and arthralgias can be potential side effects.      I agree with your recommendations and would ask that she keep us posted with worsening/ further concern.    Thank you!    Alma Lainez RN, BSN    Triage  Nurse Advisor  [facility]/[place]/Roselia Wilkerson  [phone]

## 2024-03-24 RX ORDER — ALBUTEROL SULFATE 0.83 MG/ML
2.5 SOLUTION RESPIRATORY (INHALATION)
Status: CANCELLED | OUTPATIENT
Start: 2024-03-27

## 2024-03-24 RX ORDER — HEPARIN SODIUM,PORCINE 10 UNIT/ML
5-20 VIAL (ML) INTRAVENOUS DAILY PRN
Status: CANCELLED | OUTPATIENT
Start: 2024-03-27

## 2024-03-24 RX ORDER — DIPHENHYDRAMINE HCL 25 MG
50 CAPSULE ORAL
Status: CANCELLED
Start: 2024-03-27

## 2024-03-24 RX ORDER — ALBUTEROL SULFATE 90 UG/1
1-2 AEROSOL, METERED RESPIRATORY (INHALATION)
Status: CANCELLED
Start: 2024-03-27

## 2024-03-24 RX ORDER — EPINEPHRINE 1 MG/ML
0.3 INJECTION, SOLUTION, CONCENTRATE INTRAVENOUS EVERY 5 MIN PRN
Status: CANCELLED | OUTPATIENT
Start: 2024-03-27

## 2024-03-24 RX ORDER — LORAZEPAM 2 MG/ML
0.5 INJECTION INTRAMUSCULAR EVERY 4 HOURS PRN
Status: CANCELLED | OUTPATIENT
Start: 2024-03-27

## 2024-03-24 RX ORDER — ONDANSETRON 2 MG/ML
8 INJECTION INTRAMUSCULAR; INTRAVENOUS ONCE
Status: CANCELLED | OUTPATIENT
Start: 2024-03-27

## 2024-03-24 RX ORDER — DIPHENHYDRAMINE HYDROCHLORIDE 50 MG/ML
50 INJECTION INTRAMUSCULAR; INTRAVENOUS
Status: CANCELLED
Start: 2024-03-27

## 2024-03-24 RX ORDER — MEPERIDINE HYDROCHLORIDE 25 MG/ML
25 INJECTION INTRAMUSCULAR; INTRAVENOUS; SUBCUTANEOUS EVERY 30 MIN PRN
Status: CANCELLED | OUTPATIENT
Start: 2024-03-27

## 2024-03-24 RX ORDER — HEPARIN SODIUM (PORCINE) LOCK FLUSH IV SOLN 100 UNIT/ML 100 UNIT/ML
5 SOLUTION INTRAVENOUS
Status: CANCELLED | OUTPATIENT
Start: 2024-03-27

## 2024-03-25 ENCOUNTER — TELEPHONE (OUTPATIENT)
Dept: ORTHOPEDICS | Facility: CLINIC | Age: 44
End: 2024-03-25
Payer: COMMERCIAL

## 2024-03-25 ENCOUNTER — TELEPHONE (OUTPATIENT)
Dept: SURGERY | Facility: CLINIC | Age: 44
End: 2024-03-25
Payer: COMMERCIAL

## 2024-03-25 NOTE — TELEPHONE ENCOUNTER
Sent Mychart (1st Attempt) and Left Voicemail (2nd Attempt) for the patient to call back and schedule the following:    Appointment type: [facility] return tumor  Provider: Halima  Return date: June 2024  Specialty phone number: [phone]  Additional appointment(s) needed: MRI on same day or afew days before if pt wants closer to home  Additonal Notes:

## 2024-03-25 NOTE — TELEPHONE ENCOUNTER
Patient is unable to do her MRI that was ordered by Dr. Varghese due to her breast expanders, and is hoping to discuss this with the care team.    Please call [phone], any time, okay to leave detailed msg.

## 2024-03-25 NOTE — TELEPHONE ENCOUNTER
Type of surgery: Bilateral skin sparing mastectomy with bilateral sentinel lymph node biopsy, bilateral RFID localized axillary node excision, possible axillary node dissection, possible port removal  Location of surgery: [facility]  Date and time of surgery: 5/8/24 at 11am  Surgeon: Dr. Fanny Little  Pre-Op Appt Date: patient to schedule  Post-Op Appt Date: patient to schedule   Packet sent out: Yes  Pre-cert/Authorization completed:  Not Applicable  Date: 3/25/24

## 2024-03-27 ENCOUNTER — INFUSION THERAPY VISIT (OUTPATIENT)
Dept: INFUSION THERAPY | Facility: CLINIC | Age: 44
End: 2024-03-27
Attending: INTERNAL MEDICINE
Payer: COMMERCIAL

## 2024-03-27 VITALS
OXYGEN SATURATION: 98 % | SYSTOLIC BLOOD PRESSURE: 116 MMHG | RESPIRATION RATE: 16 BRPM | TEMPERATURE: 97.5 F | BODY MASS INDEX: 28.99 KG/M2 | HEIGHT: 65 IN | WEIGHT: 174 LBS | DIASTOLIC BLOOD PRESSURE: 80 MMHG | HEART RATE: 90 BPM

## 2024-03-27 DIAGNOSIS — C50.411 MALIGNANT NEOPLASM OF UPPER-OUTER QUADRANT OF RIGHT BREAST IN FEMALE, ESTROGEN RECEPTOR POSITIVE (H): Primary | ICD-10-CM

## 2024-03-27 DIAGNOSIS — Z17.0 MALIGNANT NEOPLASM OF UPPER-OUTER QUADRANT OF RIGHT BREAST IN FEMALE, ESTROGEN RECEPTOR POSITIVE (H): Primary | ICD-10-CM

## 2024-03-27 PROCEDURE — 258N000003 HC RX IP 258 OP 636: Performed by: INTERNAL MEDICINE

## 2024-03-27 PROCEDURE — 96413 CHEMO IV INFUSION 1 HR: CPT

## 2024-03-27 PROCEDURE — 250N000011 HC RX IP 250 OP 636: Performed by: INTERNAL MEDICINE

## 2024-03-27 PROCEDURE — 96375 TX/PRO/DX INJ NEW DRUG ADDON: CPT

## 2024-03-27 RX ORDER — ONDANSETRON 2 MG/ML
8 INJECTION INTRAMUSCULAR; INTRAVENOUS ONCE
Status: COMPLETED | OUTPATIENT
Start: 2024-03-27 | End: 2024-03-27

## 2024-03-27 RX ORDER — LORAZEPAM 2 MG/ML
0.5 INJECTION INTRAMUSCULAR EVERY 4 HOURS PRN
Status: DISCONTINUED | OUTPATIENT
Start: 2024-03-27 | End: 2024-03-27 | Stop reason: HOSPADM

## 2024-03-27 RX ORDER — HEPARIN SODIUM (PORCINE) LOCK FLUSH IV SOLN 100 UNIT/ML 100 UNIT/ML
5 SOLUTION INTRAVENOUS
Status: DISCONTINUED | OUTPATIENT
Start: 2024-03-27 | End: 2024-03-27 | Stop reason: HOSPADM

## 2024-03-27 RX ADMIN — Medication 5 ML: at 10:35

## 2024-03-27 RX ADMIN — ONDANSETRON 8 MG: 2 INJECTION INTRAMUSCULAR; INTRAVENOUS at 08:35

## 2024-03-27 RX ADMIN — SODIUM CHLORIDE 250 ML: 9 INJECTION, SOLUTION INTRAVENOUS at 08:32

## 2024-03-27 RX ADMIN — LORAZEPAM 0.5 MG: 2 INJECTION INTRAMUSCULAR; INTRAVENOUS at 08:33

## 2024-03-27 RX ADMIN — PACLITAXEL 152 MG: 6 INJECTION, SOLUTION INTRAVENOUS at 09:33

## 2024-03-27 ASSESSMENT — PAIN SCALES - GENERAL: PAINLEVEL: NO PAIN (0)

## 2024-03-27 NOTE — PROGRESS NOTES
Infusion Nursing Note:  Angie Michael presents today for C12D1 Taxol.    Patient seen by provider today: No   present during visit today: Not Applicable.    Note: Patient reports mylagias that she experienced over the past week have now resolved. Provider aware, see mychart encounter from 3/22. She will use claritin and tylenol if she experiences the pain again, and call us if symptoms now managed by these meds. Has noted some intermittent mild tingling in her fingers and feet since last taxol infusion. Denies fevers, nausea/vomiting, constipation/diarrhea or skin concerns. Patient does Penguin cold caps and uses her own ice packs for hands and feet during taxol infusion. Ativan given prior to cold caps per patient request.      Intravenous Access:  Implanted Port.    Treatment Conditions:  Outside lab results reviewed from 3/26/24, see report in EPIC. Platelets 258K and ANC 2.1.  Results reviewed, labs MET treatment parameters, ok to proceed with treatment.      Post Infusion Assessment:  Patient tolerated infusion without incident.  Blood return noted pre and post infusion.  Site patent and intact, free from redness, edema or discomfort.  No evidence of extravasations.  Access discontinued per protocol.       Discharge Plan:   Discharge instructions reviewed with: Patient.  Patient and/or family verbalized understanding of discharge instructions and all questions answered.  AVS to patient via [employer].  Patient will return 4/3/24 for next appointment.   Patient discharged in stable condition accompanied by: .  Departure Mode: Ambulatory.      Ketty Perez RN

## 2024-03-27 NOTE — CONFIDENTIAL NOTE
Returned call to patient. She is due to follow up in June with an MRI of her right lower leg. She is unable to schedule MRI at that time due to having breast expanders. She is scheduled for surgery May 2024 where they will place breast expanders and believes they will be in place until March 2025. Writer will discuss with provider and update patient.    Tara Holter, RNCC

## 2024-03-28 NOTE — CONFIDENTIAL NOTE
Attempted to call patient. M informing her Dr. Varghese recommends having MRI before her surgery where she will have breast expanders placed. Provided image scheduling and clinic phone number to schedule MRI and appointment with Dr. Varghese. Encouraged her to call back if she had further questions.    Tara Holter, RNCC

## 2024-03-29 NOTE — PROGRESS NOTES
[facility]    Hematology/Oncology Established Patient Note      Today's Date: 4/3/2024    Reason for visit: Bilateral breast cancer.    HISTORY OF PRESENT ILLNESS: Angie Michael is a 43 year old female with BRCA2 gene mutation who presents with the following oncologic history:  1.  10/05/2023: High risk surveillance breast MRI showed right breast 3.6 x 3 x 3.2 cm irregular enhancing mass at 10:00, 0.9 x 0.8 x 0.9 cm second mass at 9:00, 1 x 0.9 x 1.2 cm third mass at 9:30, other suspicious foci of enhancement throughout the breast.  Left breast with 9.9 x 5.2 x 6.7 cm suspicious non-mass enhancement at 10:00-7:00; 1.6 x 1.3 x 1.7 cm enhancing mass at 4:00; 1.8 x 1.6 x 1.9 cm enhancing mass at 12:30; other suspicious foci of enhancement throughout the left breast.  One level 1 suspicious right axillary lymph node and one level 1 suspicious left axillary lymph node..  Developing suspicious interpectoral lymph node.  2.  10/12/2023: Bilateral diagnostic mammogram showed right breast with 3.2 x 2.7 x 4.1 cm irregular mass at 10:00, 6 cm from nipple; 0.9 x 0.6 x 1 cm irregular mass at 11:00, 2 cm from nipple and prominent right axillary lymph node.  Left breast with 1.4 x 1.1 x 1.7 cm mass at 12:00, 2 cm from nipple, 1.8 x 1 x 1.6 cm mass at 3:30, 4 cm from nipple, 1 x 0.9 x 1.1 cm mass at 3:00, 7 cm from nipple, multiple areas of hypoechogenicity with calcifications in the lateral left breast, and prominent left axillary lymph node.  3.  10/12/2023: Biopsy of right breast at 10:00 showed grade 3 invasive lobular carcinoma, ER strongly positive at 81-90%, ME positive at 31-40%, HER2 equivocal with IHC = 2+, FISH negative, Ki-67 = 70%.  Biopsy of right axillary lymph node showed metastatic carcinoma.  Biopsy of left breast at 3:30 showed grade 2 invasive carcinoma with ductal and lobular features, ER positive at %, ME positive at 2%, HER2 equivocal with IHC = 2+, FISH negative, Ki-67 = 26%.   Biopsy of left axillary lymph node showed metastatic carcinoma.  4.  10/17/2023: PET/CT scan showed cluster of hypermetabolic right breast nodules with SUV max 14.9; 2.1 x 1 cm hypermetabolic level 1 right axillary lymph node with SUV max 5.5; hypermetabolic left breast nodule with SUV max 18.8 and several additional less intense hypermetabolic left breast nodules; 1.5 x 1.3 cm hypermetabolic level 1 left axillary lymph node with SUV max 4.  Small sclerotic focus of metabolic activity within the proximal tibial shaft with SUV max 3.7, indeterminate but unlikely to reflect metastatic disease.  Subcentimeter hypermetabolic focus along anterior margin of the left thyroid lobe, likely a thyroid nodule.  5.  11/03/2023: Thyroid ultrasound showed 0.7 x 0.6 x 0.5 cm solid hypoechoic nodule at the junction of the isthmus and left hemithyroid corresponding to increased uptake on PET/CT, consistent with TIRADS 4 nodule and subcentimeter TIRADS 3 nodule in the left inferior thyroid.  6. 11/3/2023: MR right tibia/fibula showed discrete focal 3 cm area of increased T2 signal and enhancement in mid shaft of right tibia with fat signal interspersed throughout this area with significant signal dropout on opposed phase imaging, favoring benign process.  7. 11/21/2023: Started neoadjuvant chemotherapy with dose-dense Adriamycin + Cytoxan with Neulasta support with Dr. Bev Ryan at [facility]. Reacted to Emend.  8. 11/25/2023: Evaluated at [facility] ED with diarrhea and dizziness, latter attributed to olanzapine -- this was discontinued.  9. 1/03/2024: Completed cycle 4 dose-dense AC.  10. 1/16/2024: Breast MRI showed persistent abnormal mass and nonmass enhancement in the left breast measuring 10.2 cm in AP dimension (unchanged).  Previously seen mass at the 12:30 position now measures 1.0 cm in greatest dimension (previously 1.9 cm). Previously seen seen mass at the 4:00 position now measures 1.1 cm in greatest dimension  (previously 1.7 cm). Previously seen enlarged left axillary lymph node now has normal size and morphology. Previously seen intrapectoral lymph node now has normal size and morphology. Previously seen abnormal right breast enhancement has entirely resolved. Previously visualized enlarged right axillary lymph node now has normal size and morphology.  Other previously seen axillary lymph nodes appear stable.  . 2024: Start of weekly paclitaxel for planned 12 weeks.  2024: MR right tibia/fibula showed mid right tibial shaft bone lesion increased from 3.2 cm to 3.8 cm; speckled foci of marrow signal likely areas of red marrow reconversion.    INTERVAL HISTORY:  Angie reports continuing to feel very well with excellent energy, no paresthesias, no new pain.     REVIEW OF SYSTEMS:   14 point ROS was reviewed and is negative other than as noted above in HPI.       HOME MEDICATIONS:  Current Outpatient Medications   Medication Sig Dispense Refill    FEROSUL 325 (65 Fe) MG tablet Take 325 mg by mouth daily           ALLERGIES:  Allergies   Allergen Reactions    Aprepitant Difficulty breathing and Shortness Of Breath    Fosaprepitant Unknown     Allergy added per chart review. [facility]    Armoracia Rusticana Ext (Horseradish) Hives    Carboprost GI Disturbance     Diarrhea    Codeine Nausea and Vomiting         PAST MEDICAL HISTORY:  BRCA2 gene mutation.  Bilateral breast cancer as outlined above.  2022 Galleri test - negative for malignancy.    Gynecologic history:  Age of menarche at 15.  3 children/triplets -- 1 boy and twin girls born 2022.  Age of first pregnancy at 41.  Used 10 years of OCPs no HRT, IVF for fertility treatment.      PAST SURGICAL HISTORY:  Past Surgical History:   Procedure Laterality Date    ABDOMEN SURGERY          ENT SURGERY      tonsillectomy    GYN SURGERY      egg retrieval for IVF    INSERT PORT VASCULAR ACCESS Right 2023    Procedure: INSERTION, VASCULAR  "ACCESS PORT;  Surgeon: Fanny Little MD;  Location:  OR    ORTHOPEDIC SURGERY      knee arthroscopy         SOCIAL HISTORY:  Social History     Socioeconomic History    Marital status:      Spouse name: Not on file    Number of children: Not on file    Years of education: Not on file    Highest education level: Not on file   Occupational History    Not on file   Tobacco Use    Smoking status: Never     Passive exposure: Never    Smokeless tobacco: Never   Vaping Use    Vaping Use: Never used   Substance and Sexual Activity    Alcohol use: Not Currently    Drug use: Not Currently    Sexual activity: Not on file   Other Topics Concern    Not on file   Social History Narrative    Not on file     Social Determinants of Health     Financial Resource Strain: Not on file   Food Insecurity: Not on file   Transportation Needs: Not on file   Physical Activity: Not on file   Stress: Not on file   Social Connections: Not on file   Interpersonal Safety: Not on file   Housing Stability: Not on file         FAMILY HISTORY:  Paternal grandmother and many paternal grandfathers with breast cancer; cousin with DCIS tested positive for BRCA1.  No ovarian, colon, or prostate cancer.      PHYSICAL EXAM:  Vital signs:  /76   Pulse 80   Temp 97.6  F (36.4  C)   Resp 16   Ht 1.651 m (5' 5\")   Wt 80.3 kg (177 lb)   SpO2 99%   BMI 29.45 kg/m     ECO  GENERAL/CONSTITUTIONAL: No acute distress.  EYES: No erythema or scleral icterus.  ENT/MOUTH: Neck supple.  LYMPH: No cervical, supraclavicular, axillary adenopathy.   BREAST: Diffusely dense fibronodular breasts with no discrete masses; nipples everted bilaterally; no skin erythema.  RESPIRATORY: Clear to auscultation bilaterally. No crackles or wheezing.   CARDIOVASCULAR: Regular rate and rhythm without murmurs.  GASTROINTESTINAL: No hepatosplenomegaly, masses, or tenderness. No guarding.  No distention.  MUSCULOSKELETAL: Warm and well-perfused, no cyanosis, " clubbing, or edema.  NEUROLOGIC: No focal motor deficits. Alert, oriented, answers questions appropriately.  INTEGUMENTARY: No rashes or jaundice.  GAIT: Steady, does not use assistive device       LABS:  CBC RESULTS:   Recent Labs   Lab Test 03/12/24  1455   WBC 3.6*   RBC 3.73*   HGB 12.0   HCT 35.6   MCV 95   MCH 32.2   MCHC 33.7   RDW 13.2        Last Comprehensive Metabolic Panel:  Sodium   Date Value Ref Range Status   02/27/2024 138 135 - 145 mmol/L Final     Comment:     Reference intervals for this test were updated on 09/26/2023 to more accurately reflect our healthy population. There may be differences in the flagging of prior results with similar values performed with this method. Interpretation of those prior results can be made in the context of the updated reference intervals.      Potassium   Date Value Ref Range Status   02/27/2024 3.9 3.4 - 5.3 mmol/L Final     Chloride   Date Value Ref Range Status   02/27/2024 104 98 - 107 mmol/L Final     Carbon Dioxide (CO2)   Date Value Ref Range Status   02/27/2024 25 22 - 29 mmol/L Final     Anion Gap   Date Value Ref Range Status   02/27/2024 9 7 - 15 mmol/L Final     Glucose   Date Value Ref Range Status   02/27/2024 99 70 - 99 mg/dL Final     Urea Nitrogen   Date Value Ref Range Status   02/27/2024 13.9 6.0 - 20.0 mg/dL Final     Creatinine   Date Value Ref Range Status   02/27/2024 0.72 0.51 - 0.95 mg/dL Final     GFR Estimate   Date Value Ref Range Status   02/27/2024 >90 >60 mL/min/1.73m2 Final     Calcium   Date Value Ref Range Status   02/27/2024 9.2 8.6 - 10.0 mg/dL Final     Bilirubin Total   Date Value Ref Range Status   03/12/2024 0.6 <=1.2 mg/dL Final     Alkaline Phosphatase   Date Value Ref Range Status   03/12/2024 52 40 - 150 U/L Final     Comment:     Reference intervals for this test were updated on 11/14/2023 to more accurately reflect our healthy population. There may be differences in the flagging of prior results with similar  values performed with this method. Interpretation of those prior results can be made in the context of the updated reference intervals.     ALT   Date Value Ref Range Status   03/12/2024 15 0 - 50 U/L Final     Comment:     Reference intervals for this test were updated on 6/12/2023 to more accurately reflect our healthy population. There may be differences in the flagging of prior results with similar values performed with this method. Interpretation of those prior results can be made in the context of the updated reference intervals.       AST   Date Value Ref Range Status   03/12/2024 15 0 - 45 U/L Final     Comment:     Reference intervals for this test were updated on 6/12/2023 to more accurately reflect our healthy population. There may be differences in the flagging of prior results with similar values performed with this method. Interpretation of those prior results can be made in the context of the updated reference intervals.     4/2:  WBC 4, ANC 2.5  Hgb 13  Plts 254    IMAGING:  Reviewed as per HPI.    ASSESSMENT/PLAN:  Angie Michael is a 43 year old female with the following issues:  1.  Clinical prognostic stage IIB, cT2-N1-M0, multifocal grade 3 invasive lobular carcinoma of the RIGHT upper outer breast, ER positive 81-90%, MT positive 31-40%, HER-2 FISH negative (IHC = 2+), Ki-67 = 70%  2.  Clinical prognostic stage IIA, cT3-N1-M0, multifocal grade 2 invasive ductal and lobular carcinoma of LEFT breast overlapping sites, ER positive %, MT positive 2%, HER-2 FISH negative (IHC = 2+), Ki-67 = 26%  3. BRCA-2 deleterious gene mutation  -- Following 4 cycles of neoadjuvant ddAC, 1/16/2023 breast MRI showed persistent abnormal mass and nonmass enhancement in the left breast measuring 10.2 cm in AP dimension (unchanged).  Previously seen mass at the 12:30 position now measures 1.0 cm in greatest dimension (previously 1.9 cm). Previously seen seen mass at the 4:00 position now measures 1.1 cm in  greatest dimension (previously 1.7 cm). Previously seen enlarged left axillary lymph node now has normal size and morphology. Previously seen intrapectoral lymph node now has normal size and morphology. Previously seen abnormal right breast enhancement has entirely resolved. Previously visualized enlarged right axillary lymph node now has normal size and morphology.  Other previously seen axillary lymph nodes appear stable.  All these findings are consistent with excellent complete response on the right and partial response on the left.  -- Reviewed that her CBC and CMP are within normal limits to proceed with her last paclitaxel today 4/3/2024.   --Will repeat breast MRI after paclitaxel per her request to reevaluate response after paclitaxel. Will give diazepam per her request.  -- She will proceed with bilateral mastectomies on 5/8/2024 with Dr Little and placement of expanders with Dr. Vasiliy Yee for breast reconstruction.   -Given biopsy-proven lymph node disease, adjuvant radiation therapy would be advised.  She has already met with Dr. Finesse Cortes for radiation discussion.  - Given her extent of disease bilaterally, she would benefit from adjuvant abemaciclib for 2 years along with hormone blockade therapy as per the MonarchE trial.  -Adjuvant olaparib (Lynparza) for 1 year would be considered as well.  I do note that based on the Tomas trial, those patients who had hormone receptor positive HER2 negative breast cancer were required to have at least 4 pathologically confirmed positive lymph nodes.  -I would advise hormone blockade therapy with ovarian suppression therapy with Zoladex with an aromatase inhibitor after surgery.  She already removed her fallopian tubes and will undergo BSO sometime in the future.    3.  Right tibia bone lesion  - 11/3/2023 MR right tibia/fibula showed a 3 cm area of increased T2 signal in the midshaft area.  Although the signaling suggested a benign process, this was  technically indeterminate.    -- Repeat 2/9/2024 MR right tibia showed interval increase from 3.2 cm to 3.8 cm.  --2/27/2024 biopsy of this bone lesion was negative for malignancy.    --Reevaluate bone lesion with MRI per Dr. Varghese, prior to breast surgery. If further enlargement, he will discuss open surgical biopsy curettage internal fixation with a plate and either bone grafting or cementation. However we discussed how invasive this would be and if she may experience chronic pain or limitations in her right leg after such a procedure.  She hopes not to requires this procedure. She is scheduled for MRI on 4/5 (earlier, as she will not be able to undergo MRI for another year due to anticipated expander placement).  If the bone lesion expands/increases further, will refer her to [place] [facility] for a 2nd opinion as she is strongly considering this.    4.  Fertility discussion  -Angie has triplets.  She has saved embryos.  She has not yet decided whether to have more children.  - Following completion of chemotherapy followed by radiation, hormone blockade therapy would be advised for 2-3 years before bearing more children.  She has also briefly considered a surrogate if she wishes to have more children.  Will discuss again after chemotherapy and surgery have been completed.    5. Preanesthesia medical evaluation  --Angie is medically acceptable to proceed with bilateral mastectomies with bilateral sentinel lymph node biopsy under care of Dr. Fanny Little and first stage breast reconstruction under care of Dr. Vasiliy Yee on 5/8/2024.    6. Weight gain  --Discussed this is unfortunately common as breast cancer patients undergo chemotherapy and rendered in a chemo-induced menopause.  This then affects metabolism and can cause excess fat accumulation in the central/truncal area of the body.   Angie states she has worked very hard to maintain a healthy weight through diet and prior exercise and has now  gained weight after starting chemo.  --I discussed that after completion of chemo to try to incorporate an hour of walking 5-7 days per week and since her cortisol is so high (tested at her naturopath), to avoid high intensity exercise for now.    Return about 2 weeks after surgery.    Monse Dan MD  Hematology/Oncology  [facility]    Total time spent today: 61 minutes in chart review, patient evaluation, counseling, documentation, test and/or medication/prescription orders, and coordination of care.

## 2024-04-03 ENCOUNTER — ONCOLOGY VISIT (OUTPATIENT)
Dept: ONCOLOGY | Facility: CLINIC | Age: 44
End: 2024-04-03
Attending: INTERNAL MEDICINE
Payer: COMMERCIAL

## 2024-04-03 ENCOUNTER — INFUSION THERAPY VISIT (OUTPATIENT)
Dept: INFUSION THERAPY | Facility: CLINIC | Age: 44
End: 2024-04-03
Attending: INTERNAL MEDICINE
Payer: COMMERCIAL

## 2024-04-03 VITALS
TEMPERATURE: 97.6 F | HEIGHT: 65 IN | BODY MASS INDEX: 29.49 KG/M2 | SYSTOLIC BLOOD PRESSURE: 111 MMHG | DIASTOLIC BLOOD PRESSURE: 76 MMHG | OXYGEN SATURATION: 99 % | RESPIRATION RATE: 16 BRPM | HEART RATE: 80 BPM | WEIGHT: 177 LBS

## 2024-04-03 DIAGNOSIS — C50.812 MALIGNANT NEOPLASM OF OVERLAPPING SITES OF BOTH BREASTS IN FEMALE, ESTROGEN RECEPTOR POSITIVE (H): Primary | ICD-10-CM

## 2024-04-03 DIAGNOSIS — C50.411 MALIGNANT NEOPLASM OF UPPER-OUTER QUADRANT OF RIGHT BREAST IN FEMALE, ESTROGEN RECEPTOR POSITIVE (H): Primary | ICD-10-CM

## 2024-04-03 DIAGNOSIS — Z17.0 MALIGNANT NEOPLASM OF UPPER-OUTER QUADRANT OF RIGHT BREAST IN FEMALE, ESTROGEN RECEPTOR POSITIVE (H): Primary | ICD-10-CM

## 2024-04-03 DIAGNOSIS — Z17.0 MALIGNANT NEOPLASM OF OVERLAPPING SITES OF BOTH BREASTS IN FEMALE, ESTROGEN RECEPTOR POSITIVE (H): Primary | ICD-10-CM

## 2024-04-03 DIAGNOSIS — M89.9 BONE LESION: ICD-10-CM

## 2024-04-03 DIAGNOSIS — C50.811 MALIGNANT NEOPLASM OF OVERLAPPING SITES OF BOTH BREASTS IN FEMALE, ESTROGEN RECEPTOR POSITIVE (H): Primary | ICD-10-CM

## 2024-04-03 PROCEDURE — 99417 PROLNG OP E/M EACH 15 MIN: CPT | Performed by: INTERNAL MEDICINE

## 2024-04-03 PROCEDURE — 250N000011 HC RX IP 250 OP 636: Performed by: INTERNAL MEDICINE

## 2024-04-03 PROCEDURE — 99215 OFFICE O/P EST HI 40 MIN: CPT | Performed by: INTERNAL MEDICINE

## 2024-04-03 PROCEDURE — 96413 CHEMO IV INFUSION 1 HR: CPT

## 2024-04-03 PROCEDURE — 96375 TX/PRO/DX INJ NEW DRUG ADDON: CPT

## 2024-04-03 PROCEDURE — G0463 HOSPITAL OUTPT CLINIC VISIT: HCPCS | Mod: 25 | Performed by: INTERNAL MEDICINE

## 2024-04-03 PROCEDURE — 258N000003 HC RX IP 258 OP 636: Performed by: INTERNAL MEDICINE

## 2024-04-03 RX ORDER — LORAZEPAM 2 MG/ML
0.5 INJECTION INTRAMUSCULAR EVERY 4 HOURS PRN
Status: CANCELLED | OUTPATIENT
Start: 2024-04-03

## 2024-04-03 RX ORDER — METHYLPREDNISOLONE SODIUM SUCCINATE 125 MG/2ML
125 INJECTION, POWDER, LYOPHILIZED, FOR SOLUTION INTRAMUSCULAR; INTRAVENOUS
Status: CANCELLED
Start: 2024-04-03

## 2024-04-03 RX ORDER — ALBUTEROL SULFATE 0.83 MG/ML
2.5 SOLUTION RESPIRATORY (INHALATION)
Status: CANCELLED | OUTPATIENT
Start: 2024-04-03

## 2024-04-03 RX ORDER — DIPHENHYDRAMINE HCL 25 MG
50 CAPSULE ORAL
Status: CANCELLED
Start: 2024-04-03

## 2024-04-03 RX ORDER — ONDANSETRON 2 MG/ML
8 INJECTION INTRAMUSCULAR; INTRAVENOUS ONCE
Status: CANCELLED | OUTPATIENT
Start: 2024-04-03

## 2024-04-03 RX ORDER — DIAZEPAM 2 MG
TABLET ORAL
Qty: 1 TABLET | Refills: 0 | Status: SHIPPED | OUTPATIENT
Start: 2024-04-03 | End: 2024-04-09

## 2024-04-03 RX ORDER — MEPERIDINE HYDROCHLORIDE 25 MG/ML
25 INJECTION INTRAMUSCULAR; INTRAVENOUS; SUBCUTANEOUS EVERY 30 MIN PRN
Status: CANCELLED | OUTPATIENT
Start: 2024-04-03

## 2024-04-03 RX ORDER — LORAZEPAM 2 MG/ML
0.5 INJECTION INTRAMUSCULAR EVERY 4 HOURS PRN
Status: DISCONTINUED | OUTPATIENT
Start: 2024-04-03 | End: 2024-04-03 | Stop reason: HOSPADM

## 2024-04-03 RX ORDER — HEPARIN SODIUM (PORCINE) LOCK FLUSH IV SOLN 100 UNIT/ML 100 UNIT/ML
5 SOLUTION INTRAVENOUS
Status: DISCONTINUED | OUTPATIENT
Start: 2024-04-03 | End: 2024-04-03 | Stop reason: HOSPADM

## 2024-04-03 RX ORDER — HEPARIN SODIUM (PORCINE) LOCK FLUSH IV SOLN 100 UNIT/ML 100 UNIT/ML
5 SOLUTION INTRAVENOUS
Status: CANCELLED | OUTPATIENT
Start: 2024-04-03

## 2024-04-03 RX ORDER — HEPARIN SODIUM,PORCINE 10 UNIT/ML
5-20 VIAL (ML) INTRAVENOUS DAILY PRN
Status: CANCELLED | OUTPATIENT
Start: 2024-04-03

## 2024-04-03 RX ORDER — ONDANSETRON 2 MG/ML
8 INJECTION INTRAMUSCULAR; INTRAVENOUS ONCE
Status: COMPLETED | OUTPATIENT
Start: 2024-04-03 | End: 2024-04-03

## 2024-04-03 RX ORDER — EPINEPHRINE 1 MG/ML
0.3 INJECTION, SOLUTION INTRAMUSCULAR; SUBCUTANEOUS EVERY 5 MIN PRN
Status: CANCELLED | OUTPATIENT
Start: 2024-04-03

## 2024-04-03 RX ORDER — ALBUTEROL SULFATE 90 UG/1
1-2 AEROSOL, METERED RESPIRATORY (INHALATION)
Status: CANCELLED
Start: 2024-04-03

## 2024-04-03 RX ORDER — DIPHENHYDRAMINE HYDROCHLORIDE 50 MG/ML
50 INJECTION INTRAMUSCULAR; INTRAVENOUS
Status: CANCELLED
Start: 2024-04-03

## 2024-04-03 RX ADMIN — LORAZEPAM 0.5 MG: 2 INJECTION INTRAMUSCULAR; INTRAVENOUS at 09:55

## 2024-04-03 RX ADMIN — ONDANSETRON 8 MG: 2 INJECTION INTRAMUSCULAR; INTRAVENOUS at 10:00

## 2024-04-03 RX ADMIN — SODIUM CHLORIDE 250 ML: 9 INJECTION, SOLUTION INTRAVENOUS at 09:51

## 2024-04-03 RX ADMIN — Medication 5 ML: at 11:53

## 2024-04-03 RX ADMIN — PACLITAXEL 152 MG: 6 INJECTION, SOLUTION INTRAVENOUS at 10:49

## 2024-04-03 ASSESSMENT — PAIN SCALES - GENERAL: PAINLEVEL: NO PAIN (0)

## 2024-04-03 NOTE — PROGRESS NOTES
"Oncology Rooming Note    April 3, 2024 8:23 AM   Angie Michael is a 43 year old female who presents for:    Chief Complaint   Patient presents with    Oncology Clinic Visit     Initial Vitals: Resp 16   Ht 1.651 m (5' 5\")   Wt 80.3 kg (177 lb)   BMI 29.45 kg/m   Estimated body mass index is 29.45 kg/m  as calculated from the following:    Height as of this encounter: 1.651 m (5' 5\").    Weight as of this encounter: 80.3 kg (177 lb). Body surface area is 1.92 meters squared.  No Pain (0) Comment: Data Unavailable   No LMP recorded.  Allergies reviewed: Yes  Medications reviewed: Yes    Medications: Medication refills not needed today.  Pharmacy name entered into [facility]:    [id number] - [place], [place] - [address] [facility] [address] [facility], [place] - [address] [address]    Frailty Screening:   Is the patient here for a new oncology consult visit in cancer care? 2. No        Beatriz Aguilar MA            "

## 2024-04-03 NOTE — LETTER
4/3/2024         RE: Angie Michael  [address] [place] [id number]        Dear Colleague,    Thank you for referring your patient, Angie Michael, to the [facility]. Please see a copy of my visit note below.    [facility]    Hematology/Oncology Established Patient Note      Today's Date: 4/3/2024    Reason for visit: Bilateral breast cancer.    HISTORY OF PRESENT ILLNESS: Angie Michael is a 43 year old female with BRCA2 gene mutation who presents with the following oncologic history:  1.  10/05/2023: High risk surveillance breast MRI showed right breast 3.6 x 3 x 3.2 cm irregular enhancing mass at 10:00, 0.9 x 0.8 x 0.9 cm second mass at 9:00, 1 x 0.9 x 1.2 cm third mass at 9:30, other suspicious foci of enhancement throughout the breast.  Left breast with 9.9 x 5.2 x 6.7 cm suspicious non-mass enhancement at 10:00-7:00; 1.6 x 1.3 x 1.7 cm enhancing mass at 4:00; 1.8 x 1.6 x 1.9 cm enhancing mass at 12:30; other suspicious foci of enhancement throughout the left breast.  One level 1 suspicious right axillary lymph node and one level 1 suspicious left axillary lymph node..  Developing suspicious interpectoral lymph node.  2.  10/12/2023: Bilateral diagnostic mammogram showed right breast with 3.2 x 2.7 x 4.1 cm irregular mass at 10:00, 6 cm from nipple; 0.9 x 0.6 x 1 cm irregular mass at 11:00, 2 cm from nipple and prominent right axillary lymph node.  Left breast with 1.4 x 1.1 x 1.7 cm mass at 12:00, 2 cm from nipple, 1.8 x 1 x 1.6 cm mass at 3:30, 4 cm from nipple, 1 x 0.9 x 1.1 cm mass at 3:00, 7 cm from nipple, multiple areas of hypoechogenicity with calcifications in the lateral left breast, and prominent left axillary lymph node.  3.  10/12/2023: Biopsy of right breast at 10:00 showed grade 3 invasive lobular carcinoma, ER strongly positive at 81-90%, RI positive at 31-40%, HER2 equivocal with IHC = 2+, FISH negative, Ki-67 = 70%.  Biopsy of right  axillary lymph node showed metastatic carcinoma.  Biopsy of left breast at 3:30 showed grade 2 invasive carcinoma with ductal and lobular features, ER positive at %, [place] positive at 2%, HER2 equivocal with IHC = 2+, FISH negative, Ki-67 = 26%.  Biopsy of left axillary lymph node showed metastatic carcinoma.  4.  10/17/2023: PET/CT scan showed cluster of hypermetabolic right breast nodules with SUV max 14.9; 2.1 x 1 cm hypermetabolic level 1 right axillary lymph node with SUV max 5.5; hypermetabolic left breast nodule with SUV max 18.8 and several additional less intense hypermetabolic left breast nodules; 1.5 x 1.3 cm hypermetabolic level 1 left axillary lymph node with SUV max 4.  Small sclerotic focus of metabolic activity within the proximal tibial shaft with SUV max 3.7, indeterminate but unlikely to reflect metastatic disease.  Subcentimeter hypermetabolic focus along anterior margin of the left thyroid lobe, likely a thyroid nodule.  5.  11/03/2023: Thyroid ultrasound showed 0.7 x 0.6 x 0.5 cm solid hypoechoic nodule at the junction of the isthmus and left hemithyroid corresponding to increased uptake on PET/CT, consistent with TIRADS 4 nodule and subcentimeter TIRADS 3 nodule in the left inferior thyroid.  6. 11/3/2023: MR right tibia/fibula showed discrete focal 3 cm area of increased T2 signal and enhancement in mid shaft of right tibia with fat signal interspersed throughout this area with significant signal dropout on opposed phase imaging, favoring benign process.  7. 11/21/2023: Started neoadjuvant chemotherapy with dose-dense Adriamycin + Cytoxan with Neulasta support with Dr. Bev Ryan at [facility]. Reacted to [place].  8. 11/25/2023: Evaluated at [facility] ED with diarrhea and dizziness, latter attributed to olanzapine -- this was discontinued.  9. 1/03/2024: Completed cycle 4 dose-dense AC.  10. 1/16/2024: Breast MRI showed persistent abnormal mass and nonmass enhancement in the left  breast measuring 10.2 cm in AP dimension (unchanged).  Previously seen mass at the 12:30 position now measures 1.0 cm in greatest dimension (previously 1.9 cm). Previously seen seen mass at the 4:00 position now measures 1.1 cm in greatest dimension (previously 1.7 cm). Previously seen enlarged left axillary lymph node now has normal size and morphology. Previously seen intrapectoral lymph node now has normal size and morphology. Previously seen abnormal right breast enhancement has entirely resolved. Previously visualized enlarged right axillary lymph node now has normal size and morphology.  Other previously seen axillary lymph nodes appear stable.  11. 1/17/2024: Start of weekly paclitaxel for planned 12 weeks.  12. 2/9/2024: MR right tibia/fibula showed mid right tibial shaft bone lesion increased from 3.2 cm to 3.8 cm; speckled foci of marrow signal likely areas of red marrow reconversion.    INTERVAL HISTORY:  Angie reports continuing to feel very well with excellent energy, no paresthesias, no new pain.     REVIEW OF SYSTEMS:   14 point ROS was reviewed and is negative other than as noted above in HPI.       HOME MEDICATIONS:  Current Outpatient Medications   Medication Sig Dispense Refill     FEROSUL 325 (65 Fe) MG tablet Take 325 mg by mouth daily           ALLERGIES:  Allergies   Allergen Reactions     Aprepitant Difficulty breathing and Shortness Of Breath     Fosaprepitant Unknown     Allergy added per chart review. [facility]     Armoracia Rusticana Ext (Horseradish) Hives     Carboprost GI Disturbance     Diarrhea     Codeine Nausea and Vomiting         PAST MEDICAL HISTORY:  BRCA2 gene mutation.  Bilateral breast cancer as outlined above.  9/2022 Galleri test - negative for malignancy.    Gynecologic history:  Age of menarche at 15.  3 children/triplets -- 1 boy and twin girls born 1/2022.  Age of first pregnancy at 41.  Used 10 years of OCPs no HRT, IVF for fertility treatment.      PAST  "SURGICAL HISTORY:  Past Surgical History:   Procedure Laterality Date     ABDOMEN SURGERY           ENT SURGERY      tonsillectomy     GYN SURGERY      egg retrieval for IVF     INSERT PORT VASCULAR ACCESS Right 2023    Procedure: INSERTION, VASCULAR ACCESS PORT;  Surgeon: Fanny Little MD;  Location:  OR     ORTHOPEDIC SURGERY      knee arthroscopy         SOCIAL HISTORY:  Social History     Socioeconomic History     Marital status:      Spouse name: Not on file     Number of children: Not on file     Years of education: Not on file     Highest education level: Not on file   Occupational History     Not on file   Tobacco Use     Smoking status: Never     Passive exposure: Never     Smokeless tobacco: Never   Vaping Use     Vaping Use: Never used   Substance and Sexual Activity     Alcohol use: Not Currently     Drug use: Not Currently     Sexual activity: Not on file   Other Topics Concern     Not on file   Social History Narrative     Not on file     Social Determinants of Health     Financial Resource Strain: Not on file   Food Insecurity: Not on file   Transportation Needs: Not on file   Physical Activity: Not on file   Stress: Not on file   Social Connections: Not on file   Interpersonal Safety: Not on file   Housing Stability: Not on file         FAMILY HISTORY:  Paternal grandmother and many paternal grandfathers with breast cancer; cousin with DCIS tested positive for BRCA1.  No ovarian, colon, or prostate cancer.      PHYSICAL EXAM:  Vital signs:  /76   Pulse 80   Temp 97.6  F (36.4  C)   Resp 16   Ht 1.651 m (5' 5\")   Wt 80.3 kg (177 lb)   SpO2 99%   BMI 29.45 kg/m     ECO  GENERAL/CONSTITUTIONAL: No acute distress.  EYES: No erythema or scleral icterus.  ENT/MOUTH: Neck supple.  LYMPH: No cervical, supraclavicular, axillary adenopathy.   BREAST: Diffusely dense fibronodular breasts with no discrete masses; nipples everted bilaterally; no skin " erythema.  RESPIRATORY: Clear to auscultation bilaterally. No crackles or wheezing.   CARDIOVASCULAR: Regular rate and rhythm without murmurs.  GASTROINTESTINAL: No hepatosplenomegaly, masses, or tenderness. No guarding.  No distention.  MUSCULOSKELETAL: Warm and well-perfused, no cyanosis, clubbing, or edema.  NEUROLOGIC: No focal motor deficits. Alert, oriented, answers questions appropriately.  INTEGUMENTARY: No rashes or jaundice.  GAIT: Steady, does not use assistive device       LABS:  CBC RESULTS:   Recent Labs   Lab Test 03/12/24  1455   WBC 3.6*   RBC 3.73*   HGB 12.0   HCT 35.6   MCV 95   MCH 32.2   MCHC 33.7   RDW 13.2        Last Comprehensive Metabolic Panel:  Sodium   Date Value Ref Range Status   02/27/2024 138 135 - 145 mmol/L Final     Comment:     Reference intervals for this test were updated on 09/26/2023 to more accurately reflect our healthy population. There may be differences in the flagging of prior results with similar values performed with this method. Interpretation of those prior results can be made in the context of the updated reference intervals.      Potassium   Date Value Ref Range Status   02/27/2024 3.9 3.4 - 5.3 mmol/L Final     Chloride   Date Value Ref Range Status   02/27/2024 104 98 - 107 mmol/L Final     Carbon Dioxide (CO2)   Date Value Ref Range Status   02/27/2024 25 22 - 29 mmol/L Final     Anion Gap   Date Value Ref Range Status   02/27/2024 9 7 - 15 mmol/L Final     Glucose   Date Value Ref Range Status   02/27/2024 99 70 - 99 mg/dL Final     Urea Nitrogen   Date Value Ref Range Status   02/27/2024 13.9 6.0 - 20.0 mg/dL Final     Creatinine   Date Value Ref Range Status   02/27/2024 0.72 0.51 - 0.95 mg/dL Final     GFR Estimate   Date Value Ref Range Status   02/27/2024 >90 >60 mL/min/1.73m2 Final     Calcium   Date Value Ref Range Status   02/27/2024 9.2 8.6 - 10.0 mg/dL Final     Bilirubin Total   Date Value Ref Range Status   03/12/2024 0.6 <=1.2 mg/dL Final      Alkaline Phosphatase   Date Value Ref Range Status   03/12/2024 52 40 - 150 U/L Final     Comment:     Reference intervals for this test were updated on 11/14/2023 to more accurately reflect our healthy population. There may be differences in the flagging of prior results with similar values performed with this method. Interpretation of those prior results can be made in the context of the updated reference intervals.     ALT   Date Value Ref Range Status   03/12/2024 15 0 - 50 U/L Final     Comment:     Reference intervals for this test were updated on 6/12/2023 to more accurately reflect our healthy population. There may be differences in the flagging of prior results with similar values performed with this method. Interpretation of those prior results can be made in the context of the updated reference intervals.       AST   Date Value Ref Range Status   03/12/2024 15 0 - 45 U/L Final     Comment:     Reference intervals for this test were updated on 6/12/2023 to more accurately reflect our healthy population. There may be differences in the flagging of prior results with similar values performed with this method. Interpretation of those prior results can be made in the context of the updated reference intervals.     4/2:  WBC 4, ANC 2.5  Hgb 13  Plts 254    IMAGING:  Reviewed as per HPI.    ASSESSMENT/PLAN:  Angie Michael is a 43 year old female with the following issues:  1.  Clinical prognostic stage IIB, cT2-N1-M0, multifocal grade 3 invasive lobular carcinoma of the RIGHT upper outer breast, ER positive 81-90%, AL positive 31-40%, HER-2 FISH negative (IHC = 2+), Ki-67 = 70%  2.  Clinical prognostic stage IIA, cT3-N1-M0, multifocal grade 2 invasive ductal and lobular carcinoma of LEFT breast overlapping sites, ER positive %, AL positive 2%, HER-2 FISH negative (IHC = 2+), Ki-67 = 26%  3. BRCA-2 deleterious gene mutation  -- Following 4 cycles of neoadjuvant ddAC, 1/16/2023 breast MRI showed  persistent abnormal mass and nonmass enhancement in the left breast measuring 10.2 cm in AP dimension (unchanged).  Previously seen mass at the 12:30 position now measures 1.0 cm in greatest dimension (previously 1.9 cm). Previously seen seen mass at the 4:00 position now measures 1.1 cm in greatest dimension (previously 1.7 cm). Previously seen enlarged left axillary lymph node now has normal size and morphology. Previously seen intrapectoral lymph node now has normal size and morphology. Previously seen abnormal right breast enhancement has entirely resolved. Previously visualized enlarged right axillary lymph node now has normal size and morphology.  Other previously seen axillary lymph nodes appear stable.  All these findings are consistent with excellent complete response on the right and partial response on the left.  -- Reviewed that her CBC and CMP are within normal limits to proceed with her last paclitaxel today 4/3/2024.   --Will repeat breast MRI after paclitaxel per her request to reevaluate response after paclitaxel. Will give diazepam per her request.  -- She will proceed with bilateral mastectomies on 5/8/2024 with Dr Little and placement of expanders with Dr. Vasiliy Yee for breast reconstruction.   -Given biopsy-proven lymph node disease, adjuvant radiation therapy would be advised.  She has already met with Dr. Finesse Cortes for radiation discussion.  - Given her extent of disease bilaterally, she would benefit from adjuvant abemaciclib for 2 years along with hormone blockade therapy as per the MonarchE trial.  -Adjuvant olaparib (Lynparza) for 1 year would be considered as well.  I do note that based on the Tomas trial, those patients who had hormone receptor positive HER2 negative breast cancer were required to have at least 4 pathologically confirmed positive lymph nodes.  -I would advise hormone blockade therapy with ovarian suppression therapy with Zoladex with an aromatase inhibitor  after surgery.  She already removed her fallopian tubes and will undergo BSO sometime in the future.    3.  Right tibia bone lesion  - 11/3/2023 MR right tibia/fibula showed a 3 cm area of increased T2 signal in the midshaft area.  Although the signaling suggested a benign process, this was technically indeterminate.    -- Repeat 2/9/2024 MR right tibia showed interval increase from 3.2 cm to 3.8 cm.  --2/27/2024 biopsy of this bone lesion was negative for malignancy.    --Reevaluate bone lesion with MRI per Dr. Varghese, prior to breast surgery. If further enlargement, he will discuss open surgical biopsy curettage internal fixation with a plate and either bone grafting or cementation. However we discussed how invasive this would be and if she may experience chronic pain or limitations in her right leg after such a procedure.  She hopes not to requires this procedure. She is scheduled for MRI on 4/5 (earlier, as she will not be able to undergo MRI for another year due to anticipated expander placement).  If the bone lesion expands/increases further, will refer her to [place] [facility] onc[facility] for a 2nd opinion as she is strongly considering this.    4.  Fertility discussion  -Angie has triplets.  She has saved embryos.  She has not yet decided whether to have more children.  - Following completion of chemotherapy followed by radiation, hormone blockade therapy would be advised for 2-3 years before bearing more children.  She has also briefly considered a surrogate if she wishes to have more children.  Will discuss again after chemotherapy and surgery have been completed.    5. Preanesthesia medical evaluation  --Angie is medically acceptable to proceed with bilateral mastectomies with bilateral sentinel lymph node biopsy under care of Dr. Fanny Little and first stage breast reconstruction under care of Dr. Vasiliy Yee on 5/8/2024.    6. Weight gain  --Discussed this is unfortunately common as breast cancer  "patients undergo chemotherapy and rendered in a chemo-induced menopause.  This then affects metabolism and can cause excess fat accumulation in the central/truncal area of the body.   Angie states she has worked very hard to maintain a healthy weight through diet and prior exercise and has now gained weight after starting chemo.  --I discussed that after completion of chemo to try to incorporate an hour of walking 5-7 days per week and since her cortisol is so high (tested at her naturopath), to avoid high intensity exercise for now.    Return about 2 weeks after surgery.    Monse Dan MD  Hematology/Oncology  [facility]    Total time spent today: 61 minutes in chart review, patient evaluation, counseling, documentation, test and/or medication/prescription orders, and coordination of care.      Oncology Rooming Note    April 3, 2024 8:23 AM   Angie Michael is a 43 year old female who presents for:    Chief Complaint   Patient presents with     Oncology Clinic Visit     Initial Vitals: Resp 16   Ht 1.651 m (5' 5\")   Wt 80.3 kg (177 lb)   BMI 29.45 kg/m   Estimated body mass index is 29.45 kg/m  as calculated from the following:    Height as of this encounter: 1.651 m (5' 5\").    Weight as of this encounter: 80.3 kg (177 lb). Body surface area is 1.92 meters squared.  No Pain (0) Comment: Data Unavailable   No LMP recorded.  Allergies reviewed: Yes  Medications reviewed: Yes    Medications: Medication refills not needed today.  Pharmacy name entered into [employer]:    [id number] - [place], [place] - [address] [facility] [address] [facility] [address] [address]    Frailty Screening:   Is the patient here for a new oncology consult visit in cancer care? 2. No        Beatriz Aguilar MA              Again, thank you for allowing me to participate in the care of your patient.        Sincerely,        Monse Dan MD  "

## 2024-04-03 NOTE — PROGRESS NOTES
Infusion Nursing Note:  Angie Michael presents today for C13D1 Taxol.    Patient seen by provider today: Yes: Dr. Dan   present during visit today: Not Applicable.    Note: N/A.      Intravenous Access:  Implanted Port.    Treatment Conditions:  Lab Results   Component Value Date    HGB 12.0 03/12/2024    WBC 3.6 (L) 03/12/2024    ANEU 11.0 (H) 01/16/2024    ANEUTAUTO 2.1 03/12/2024     03/12/2024        Lab Results   Component Value Date     02/27/2024    POTASSIUM 3.9 02/27/2024    CR 0.72 02/27/2024    GWENDOLYN 9.2 02/27/2024    BILITOTAL 0.6 03/12/2024    ALBUMIN 4.4 03/12/2024    ALT 15 03/12/2024    AST 15 03/12/2024       Results reviewed, labs MET treatment parameters, ok to proceed with treatment.      Post Infusion Assessment:  Patient tolerated infusion without incident.  Blood return noted pre and post infusion.  Site patent and intact, free from redness, edema or discomfort.  No evidence of extravasations.  Access discontinued per protocol.       Discharge Plan:   Patient declined prescription refills.  Discharge instructions reviewed with: Patient.  Patient and/or family verbalized understanding of discharge instructions and all questions answered.  AVS to patient via [employer].   Patient discharged in stable condition accompanied by: self and .  Departure Mode: Ambulatory.      Sarath Todd RN

## 2024-04-04 ENCOUNTER — TELEPHONE (OUTPATIENT)
Dept: MEDSURG UNIT | Facility: CLINIC | Age: 44
End: 2024-04-04
Payer: COMMERCIAL

## 2024-04-05 ENCOUNTER — HOSPITAL ENCOUNTER (OUTPATIENT)
Facility: CLINIC | Age: 44
Discharge: HOME | End: 2024-04-05
Payer: COMMERCIAL

## 2024-04-05 ENCOUNTER — HOSPITAL ENCOUNTER (OUTPATIENT)
Dept: MRI IMAGING | Facility: CLINIC | Age: 44
Discharge: HOME | End: 2024-04-05
Attending: ORTHOPAEDIC SURGERY
Payer: COMMERCIAL

## 2024-04-05 DIAGNOSIS — M85.60 BONE CYST: ICD-10-CM

## 2024-04-05 PROCEDURE — 73720 MRI LWR EXTREMITY W/O&W/DYE: CPT | Mod: 26 | Performed by: RADIOLOGY

## 2024-04-05 PROCEDURE — A9585 GADOBUTROL INJECTION: HCPCS | Performed by: ORTHOPAEDIC SURGERY

## 2024-04-05 PROCEDURE — 250N000011 HC RX IP 250 OP 636: Performed by: PHYSICIAN ASSISTANT

## 2024-04-05 PROCEDURE — 999N000154 HC STATISTIC RADIOLOGY XRAY, US, CT, MAR, NM

## 2024-04-05 PROCEDURE — 255N000002 HC RX 255 OP 636: Performed by: ORTHOPAEDIC SURGERY

## 2024-04-05 PROCEDURE — 73720 MRI LWR EXTREMITY W/O&W/DYE: CPT | Mod: RT

## 2024-04-05 RX ORDER — HEPARIN SODIUM,PORCINE 10 UNIT/ML
5-10 VIAL (ML) INTRAVENOUS EVERY 24 HOURS
Status: DISCONTINUED | OUTPATIENT
Start: 2024-04-05 | End: 2024-04-05 | Stop reason: HOSPADM

## 2024-04-05 RX ORDER — HEPARIN SODIUM (PORCINE) LOCK FLUSH IV SOLN 100 UNIT/ML 100 UNIT/ML
5-10 SOLUTION INTRAVENOUS
Status: DISCONTINUED | OUTPATIENT
Start: 2024-04-05 | End: 2024-04-05 | Stop reason: HOSPADM

## 2024-04-05 RX ORDER — GADOBUTROL 604.72 MG/ML
8 INJECTION INTRAVENOUS ONCE
Status: COMPLETED | OUTPATIENT
Start: 2024-04-05 | End: 2024-04-05

## 2024-04-05 RX ORDER — HEPARIN SODIUM,PORCINE 10 UNIT/ML
5-10 VIAL (ML) INTRAVENOUS
Status: DISCONTINUED | OUTPATIENT
Start: 2024-04-05 | End: 2024-04-05 | Stop reason: HOSPADM

## 2024-04-05 RX ADMIN — HEPARIN SODIUM (PORCINE) LOCK FLUSH IV SOLN 100 UNIT/ML 5 ML: 100 SOLUTION at 09:42

## 2024-04-05 RX ADMIN — GADOBUTROL 8 ML: 604.72 INJECTION INTRAVENOUS at 09:49

## 2024-04-05 ASSESSMENT — ACTIVITIES OF DAILY LIVING (ADL)
ADLS_ACUITY_SCORE: 35
ADLS_ACUITY_SCORE: 35
ADLS_ACUITY_SCORE: 33

## 2024-04-08 ENCOUNTER — ANCILLARY PROCEDURE (OUTPATIENT)
Dept: MRI IMAGING | Facility: CLINIC | Age: 44
End: 2024-04-08
Attending: INTERNAL MEDICINE
Payer: COMMERCIAL

## 2024-04-08 DIAGNOSIS — C50.811 MALIGNANT NEOPLASM OF OVERLAPPING SITES OF BOTH BREASTS IN FEMALE, ESTROGEN RECEPTOR POSITIVE (H): ICD-10-CM

## 2024-04-08 DIAGNOSIS — Z17.0 MALIGNANT NEOPLASM OF OVERLAPPING SITES OF BOTH BREASTS IN FEMALE, ESTROGEN RECEPTOR POSITIVE (H): ICD-10-CM

## 2024-04-08 DIAGNOSIS — C50.812 MALIGNANT NEOPLASM OF OVERLAPPING SITES OF BOTH BREASTS IN FEMALE, ESTROGEN RECEPTOR POSITIVE (H): ICD-10-CM

## 2024-04-08 PROCEDURE — A9585 GADOBUTROL INJECTION: HCPCS | Performed by: INTERNAL MEDICINE

## 2024-04-08 PROCEDURE — 77049 MRI BREAST C-+ W/CAD BI: CPT

## 2024-04-08 PROCEDURE — 255N000002 HC RX 255 OP 636: Performed by: INTERNAL MEDICINE

## 2024-04-08 RX ORDER — GADOBUTROL 604.72 MG/ML
8 INJECTION INTRAVENOUS ONCE
Status: COMPLETED | OUTPATIENT
Start: 2024-04-08 | End: 2024-04-08

## 2024-04-08 RX ADMIN — GADOBUTROL 8 ML: 604.72 INJECTION INTRAVENOUS at 13:39

## 2024-04-09 ENCOUNTER — MYC MEDICAL ADVICE (OUTPATIENT)
Dept: MAMMOGRAPHY | Facility: CLINIC | Age: 44
End: 2024-04-09

## 2024-04-09 ENCOUNTER — VIRTUAL VISIT (OUTPATIENT)
Dept: ORTHOPEDICS | Facility: CLINIC | Age: 44
End: 2024-04-09
Payer: COMMERCIAL

## 2024-04-09 DIAGNOSIS — M85.60 BONE CYST: Primary | ICD-10-CM

## 2024-04-09 PROCEDURE — 99213 OFFICE O/P EST LOW 20 MIN: CPT | Mod: 95 | Performed by: ORTHOPAEDIC SURGERY

## 2024-04-09 NOTE — LETTER
4/9/2024         RE: Angie Michael  [address] [place] [id number]        Dear Colleague,    Thank you for referring your patient, Angie Michael, to the [facility] [place]. Please see a copy of my visit note below.    Impression: Follow-up of incidental finding of a lesion in the right mid tibia shows reduction in size.  The lesion has been biopsied and is negative for malignancy.  The reduction in size and MRI suggests it is not a bone cyst most likely an MRI finding that we will perhaps never fully understand.    Plan: She will follow-up with our oncology team and will contact us if her future PET scan shows anything in the area or other areas for that matter or she develops symptoms in the right tibia.    Angie was seen today for follow-up on her MRI scan.  She reports she still has no symptoms in the right tibia other than those from her biopsy.    I reviewed the details of her current scan with her.  This shows decrease in the signal intensity as well as by my measurements a decrease in size compared to her February MRI scan from 35 to 29 mm.  I also compared both of those scans in 2020 toward to her scan in November 2023 of the tibia at that time to my review the lesion measured 30 mm.    I answered all her questions.  She will proceed with the recommended radiation treatments and surgical reconstructive treatments.  Will see her back on an as-needed basis based on future PET findings or symptoms of right tibial pain.    This was a virtual visit that began at 2:08 PM with imaging review followed by face-to-face video meeting which ended at 2:31 PM.  The total length of the visit was greater than 20 minutes.    Vito Varghese MD

## 2024-04-09 NOTE — PROGRESS NOTES
Bedside and Verbal shift change report given to KEVYN Street RN (oncoming nurse) by Nu Kern RN (offgoing nurse). Report included the following information SBAR, Kardex, Intake/Output, MAR, Accordion and Recent Results. Impression: Follow-up of incidental finding of a lesion in the right mid tibia shows reduction in size.  The lesion has been biopsied and is negative for malignancy.  The reduction in size and MRI suggests it is not a bone cyst most likely an MRI finding that we will perhaps never fully understand.    Plan: She will follow-up with our oncology team and will contact us if her future PET scan shows anything in the area or other areas for that matter or she develops symptoms in the right tibia.    Angie was seen today for follow-up on her MRI scan.  She reports she still has no symptoms in the right tibia other than those from her biopsy.    I reviewed the details of her current scan with her.  This shows decrease in the signal intensity as well as by my measurements a decrease in size compared to her February MRI scan from 35 to 29 mm.  I also compared both of those scans in 2020 toward to her scan in November 2023 of the tibia at that time to my review the lesion measured 30 mm.    I answered all her questions.  She will proceed with the recommended radiation treatments and surgical reconstructive treatments.  Will see her back on an as-needed basis based on future PET findings or symptoms of right tibial pain.    This was a virtual visit that began at 2:08 PM with imaging review followed by face-to-face video meeting which ended at 2:31 PM.  The total length of the visit was greater than 20 minutes.

## 2024-04-09 NOTE — PATIENT INSTRUCTIONS
Impression: Follow-up of incidental finding of a lesion in the right mid tibia shows reduction in size.  The lesion has been biopsied and is negative for malignancy.  The reduction in size and MRI suggests it is not a bone cyst most likely an MRI finding that we will perhaps never fully understand.    Plan: She will follow-up with our oncology team and will contact us if her future PET scan shows anything in the area or other areas for that matter or she develops symptoms in the right tibia.

## 2024-04-15 ENCOUNTER — PREP FOR PROCEDURE (OUTPATIENT)
Dept: SURGERY | Facility: CLINIC | Age: 44
End: 2024-04-15
Payer: COMMERCIAL

## 2024-04-16 ENCOUNTER — OFFICE VISIT (OUTPATIENT)
Dept: FAMILY MEDICINE | Facility: CLINIC | Age: 44
End: 2024-04-16
Payer: COMMERCIAL

## 2024-04-16 VITALS
WEIGHT: 176.1 LBS | HEIGHT: 65 IN | DIASTOLIC BLOOD PRESSURE: 76 MMHG | TEMPERATURE: 97.5 F | RESPIRATION RATE: 15 BRPM | SYSTOLIC BLOOD PRESSURE: 110 MMHG | HEART RATE: 84 BPM | BODY MASS INDEX: 29.34 KG/M2 | OXYGEN SATURATION: 98 %

## 2024-04-16 DIAGNOSIS — Z17.0 MALIGNANT NEOPLASM OF UPPER-OUTER QUADRANT OF RIGHT BREAST IN FEMALE, ESTROGEN RECEPTOR POSITIVE (H): ICD-10-CM

## 2024-04-16 DIAGNOSIS — Z15.01 BRCA2 GENE MUTATION POSITIVE: ICD-10-CM

## 2024-04-16 DIAGNOSIS — Z17.0 MALIGNANT NEOPLASM OF OVERLAPPING SITES OF LEFT BREAST IN FEMALE, ESTROGEN RECEPTOR POSITIVE (H): ICD-10-CM

## 2024-04-16 DIAGNOSIS — Z15.09 BRCA2 GENE MUTATION POSITIVE: ICD-10-CM

## 2024-04-16 DIAGNOSIS — C50.411 MALIGNANT NEOPLASM OF UPPER-OUTER QUADRANT OF RIGHT BREAST IN FEMALE, ESTROGEN RECEPTOR POSITIVE (H): ICD-10-CM

## 2024-04-16 DIAGNOSIS — Z01.818 PREOP GENERAL PHYSICAL EXAM: Primary | ICD-10-CM

## 2024-04-16 DIAGNOSIS — D50.9 IRON DEFICIENCY ANEMIA, UNSPECIFIED IRON DEFICIENCY ANEMIA TYPE: ICD-10-CM

## 2024-04-16 DIAGNOSIS — C50.812 MALIGNANT NEOPLASM OF OVERLAPPING SITES OF LEFT BREAST IN FEMALE, ESTROGEN RECEPTOR POSITIVE (H): ICD-10-CM

## 2024-04-16 PROCEDURE — 99204 OFFICE O/P NEW MOD 45 MIN: CPT | Performed by: INTERNAL MEDICINE

## 2024-04-16 ASSESSMENT — PAIN SCALES - GENERAL: PAINLEVEL: NO PAIN (0)

## 2024-04-16 NOTE — PROGRESS NOTES
Preoperative Evaluation  [address] [address]  Phone: [phone]  Primary Provider: No Ref-Primary, Physician  Pre-op Performing Provider: ROBBIE PUGH  Apr 16, 2024       Angie is a 43 year old, presenting for the following:  Pre-Op Exam        4/16/2024     1:04 PM   Additional Questions   Roomed by NEMO   Accompanied by SELF     Surgical Information  Surgery/Procedure: Bilateral skin sparing mastectomies with bilateral sentinel lymph node biopsy, Excision left ancillary cyst  Surgery Location: [facility]   Surgeon: Fanny Little   Surgery Date: 5/8/24  Time of Surgery: 11:00 AM  Where patient plans to recover: At home with family  Fax number for surgical facility: Note does not need to be faxed, will be available electronically in Epic.    Assessment & Plan     1.  Preop general physical exam completed.  Patient is medically optimized to undergo the planned bilateral skin sparing mastectomy with sentinel node resection on 5/8/2024.  2.  Malignant neoplasm of the upper outer quadrant of the right breast.  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  Completed neoadjuvant chemotherapy.  3.  Malignant neoplasm of overlapping site left breast.  Stage IIIa, multifocal grade 2 invasive ductal and lobular carcinoma, ER positive, HI weak positive, ER 2 FISH negative K1-67= 26%.  4.  BRCA2 gene mutation positive.  5.  Iron deficiency anemia during pregnancy and 1/2 years ago.  Patient continues to be in oral iron's therapy with most recent hemoglobin in normal range.    The proposed surgical procedure is considered LOW risk.    Recommendation  APPROVAL GIVEN to proceed with proposed procedure, without further diagnostic evaluation.      Subjective       HPI related to upcoming procedure:     43-year-old young lady with known BRCA2 gene mutation of  stefano was discovered to have bilateral breast cancer in October 2023.  She underwent neoadjuvant chemotherapy and is now scheduled to undergo bilateral mastectomy with sentinel node resection followed by reconstruction.  She is also seen radiation oncologist in consultation.  She delivered triplets about 2 and half years ago and has been on iron therapy ever since.  She had blood transfusion during delivery because of blood loss and even prior to delivery she had been placed on iron therapy for anemia.  She has remained on iron therapy since.  Most recent hemoglobin is good at 12 g/dL.  She has no other serious health issues such as hypertension, diabetes, heart disease, pulmonary disease etc.        4/9/2024    12:43 PM   Preop Questions   1. Have you ever had a heart attack or stroke? No   2. Have you ever had surgery on your heart or blood vessels, such as a stent placement, a coronary artery bypass, or surgery on an artery in your head, neck, heart, or legs? No   3. Do you have chest pain with activity? No   4. Do you have a history of  heart failure? No   5. Do you currently have a cold, bronchitis or symptoms of other infection? No   6. Do you have a cough, shortness of breath, or wheezing? No   7. Do you or anyone in your family have previous history of blood clots? No   8. Do you or does anyone in your family have a serious bleeding problem such as prolonged bleeding following surgeries or cuts? No   9. Have you ever had problems with anemia or been told to take iron pills? YES - ON IRON THERAY   10. Have you had any abnormal blood loss such as black, tarry or bloody stools, or abnormal vaginal bleeding? No   11. Have you ever had a blood transfusion? YES - AFTER DELIVERY   11a. Have you ever had a transfusion reaction? No   12. Are you willing to have a blood transfusion if it is medically needed before, during, or after your surgery? Yes   13. Have you or any of your relatives ever had problems with  anesthesia? YES -    14. Do you have sleep apnea, excessive snoring or daytime drowsiness? No   15. Do you have any artifical heart valves or other implanted medical devices like a pacemaker, defibrillator, or continuous glucose monitor? No   16. Do you have artificial joints? No   17. Are you allergic to latex? No   18. Is there any chance that you may be pregnant? No       Health Care Directive  Patient does not have a Health Care Directive or Living Will:       Patient Active Problem List    Diagnosis Date Noted    Malignant neoplasm of upper-outer quadrant of right breast in female, estrogen receptor positive (H) 2023     Priority: Medium      Past Medical History:   Diagnosis Date    BRCA2 gene mutation negative     Complication of anesthesia     when needing novacaine needs extra per dentist    Conceived by in vitro fertilization     Female infertility     History of blood transfusion     Malignant neoplasm of both breasts (H)      Past Surgical History:   Procedure Laterality Date    ABDOMEN SURGERY          ENT SURGERY      tonsillectomy    GYN SURGERY      egg retrieval for IVF    INSERT PORT VASCULAR ACCESS Right 2023    Procedure: INSERTION, VASCULAR ACCESS PORT;  Surgeon: Fanny Little MD;  Location:  OR    ORTHOPEDIC SURGERY      knee arthroscopy     Current Outpatient Medications   Medication Sig Dispense Refill    FEROSUL 325 (65 Fe) MG tablet Take 325 mg by mouth daily         Allergies   Allergen Reactions    Aprepitant Difficulty breathing and Shortness Of Breath    Fosaprepitant Unknown     Allergy added per chart review. [facility]    Armoracia Rusticana Ext (Horseradish) Hives    Carboprost GI Disturbance     Diarrhea    Codeine Nausea and Vomiting        Social History     Tobacco Use    Smoking status: Never     Passive exposure: Never    Smokeless tobacco: Never   Substance Use Topics    Alcohol use: Not Currently     Family History   Problem Relation Age of  "Onset    Hyperlipidemia Mother     Other Cancer Father         Leukemia    Breast Cancer Other      History   Drug Use Unknown         Review of Systems    Review of Systems  Constitutional, HEENT, cardiovascular, pulmonary, GI, , musculoskeletal, neuro, skin, endocrine and psych systems are negative, except as otherwise noted.    Objective    /76 (BP Location: Right arm, Patient Position: Sitting, Cuff Size: Adult Large)   Pulse 84   Temp 97.5  F (36.4  C) (Temporal)   Resp 15   Ht 1.651 m (5' 5\")   Wt 79.9 kg (176 lb 1.6 oz)   LMP 12/01/2023 (Approximate)   SpO2 98%   BMI 29.30 kg/m     Estimated body mass index is 29.3 kg/m  as calculated from the following:    Height as of this encounter: 1.651 m (5' 5\").    Weight as of this encounter: 79.9 kg (176 lb 1.6 oz).  Physical Exam  GENERAL: alert and no distress  EYES: Eyes grossly normal to inspection, PERRL and conjunctivae and sclerae normal  HENT: ear canals and TM's normal, nose and mouth without ulcers or lesions  NECK: no adenopathy, no asymmetry, masses, or scars  RESP: lungs clear to auscultation - no rales, rhonchi or wheezes  CV: regular rate and rhythm, normal S1 S2, no S3 or S4, no murmur, click or rub, no peripheral edema  ABDOMEN: soft, nontender, no hepatosplenomegaly, no masses and bowel sounds normal  MS: no gross musculoskeletal defects noted, no edema  SKIN: no suspicious lesions or rashes  NEURO: Normal strength and tone, mentation intact and speech normal    Recent Labs   Lab Test 03/12/24  1455 02/27/24  0811 02/06/24  1440   HGB 12.0 11.0* 10.8*    236 215   INR  --  1.05  --    NA  --  138 140   POTASSIUM  --  3.9 4.1   CR  --  0.72 0.74        Diagnostics     No EKG required, no history of coronary heart disease, significant arrhythmia, peripheral arterial disease or other structural heart disease.    Revised Cardiac Risk Index (RCRI)  The patient has the following serious cardiovascular risks for perioperative " complications:   - No serious cardiac risks = 0 points     RCRI Interpretation: 0 points: Class I (very low risk - 0.4% complication rate)         Signed Electronically by: Kumar Santiago MD  Copy of this evaluation report is provided to requesting physician.

## 2024-05-06 ENCOUNTER — HOSPITAL ENCOUNTER (OUTPATIENT)
Dept: MAMMOGRAPHY | Facility: CLINIC | Age: 44
Discharge: HOME | End: 2024-05-06
Attending: SURGERY
Payer: COMMERCIAL

## 2024-05-06 DIAGNOSIS — C50.911 BILATERAL MALIGNANT NEOPLASM OF BREAST IN FEMALE, ESTROGEN RECEPTOR POSITIVE, UNSPECIFIED SITE OF BREAST (H): ICD-10-CM

## 2024-05-06 DIAGNOSIS — C50.912 BILATERAL MALIGNANT NEOPLASM OF BREAST IN FEMALE, ESTROGEN RECEPTOR POSITIVE, UNSPECIFIED SITE OF BREAST (H): ICD-10-CM

## 2024-05-06 DIAGNOSIS — Z17.0 BILATERAL MALIGNANT NEOPLASM OF BREAST IN FEMALE, ESTROGEN RECEPTOR POSITIVE, UNSPECIFIED SITE OF BREAST (H): ICD-10-CM

## 2024-05-06 PROCEDURE — 999N000065 MA POST PROCEDURE LEFT

## 2024-05-06 PROCEDURE — 19285 PERQ DEV BREAST 1ST US IMAG: CPT | Mod: LT

## 2024-05-06 PROCEDURE — 999N000065 MA POST PROCEDURE RIGHT

## 2024-05-06 PROCEDURE — 250N000009 HC RX 250: Performed by: RADIOLOGY

## 2024-05-06 PROCEDURE — A4648 IMPLANTABLE TISSUE MARKER: HCPCS

## 2024-05-06 RX ADMIN — LIDOCAINE HYDROCHLORIDE 10 ML: 10 INJECTION, SOLUTION INFILTRATION; PERINEURAL at 09:17

## 2024-05-06 NOTE — DISCHARGE INSTRUCTIONS
What to Do after Localizer Placement    Your care team has placed a localizer tag in your body.    Here's what to expect and what you should do for the next few days.    Bleeding or bruising  A  little bruising is normal.    If you bleed through the bandage, put direct pressure on the site.   If you're still bleeding after 20 minutes, call the doctor who ordered the localizer.    Caring for you bandage  Keep your bandage on until tomorrow morning.    Do not get it wet.    Showering  Don't shower today.  Tomorrow, you may remove the bandage and shower.      If you have pain or discomfort  Place an ice pack on the sore area for 15 to 20 minutes, several times a day.    What to do for infection      Infection is rare.  Signs of infection include:  Fever  Redness  Pain getting worse  Fluid draining from the site.      If you have any of these symptoms, please call the doctor who ordered the localizer.      When to call the doctor   Call the doctor who ordered the localizer if:  You have bleeding that lasts more than 20 minutes.  You have pain that you can't control.  You have signs of infection (fever, redness, drainage or other signs).      Please call one of our nurses if you have questions or concerns.   [facility]   Nurse Navigator  [phone]  Procedure Nurse  [phone]

## 2024-05-06 NOTE — DISCHARGE INSTRUCTIONS
What to Do after Localizer Placement    Your care team has placed a localizer tag in your body.    Here's what to expect and what you should do for the next few days.    Bleeding or bruising  A  little bruising is normal.    If you bleed through the bandage, put direct pressure on the site.   If you're still bleeding after 20 minutes, call the doctor who ordered the localizer.    Caring for you bandage  Keep your bandage on until tomorrow morning.    Do not get it wet.    Showering  Don't shower today.  Tomorrow, you may remove the bandage and shower.      If you have pain or discomfort  Place an ice pack on the sore area for 15 to 20 minutes, several times a day.    What to do for infection      Infection is rare.  Signs of infection include:  Fever  Redness  Pain getting worse  Fluid draining from the site.      If you have any of these symptoms, please call the doctor who ordered the localizer.      When to call the doctor   Call the doctor who ordered the localizer if:  You have bleeding that lasts more than 20 minutes.  You have pain that you can't control.  You have signs of infection (fever, redness, drainage or other signs).      Please call one of our nurses if you have questions or concerns.   [facility]   Nurse Navigator  [phone]  Procedure Nurse  [phone]   
WDL

## 2024-05-07 RX ORDER — DIAZEPAM 2 MG
2 TABLET ORAL PRN
COMMUNITY
End: 2024-07-10

## 2024-05-07 RX ORDER — LORAZEPAM 0.5 MG/1
0.5 TABLET ORAL EVERY 6 HOURS PRN
COMMUNITY
End: 2024-07-10

## 2024-05-07 NOTE — PROGRESS NOTES
PTA medications updated by Medication Scribe prior to surgery via phone call with patient (last doses completed by Nurse)     Medication history sources: Patient, Surescripts, and H&P  In the past week, patient estimated taking medication this percent of the time: Greater than 90%      Significant changes made to the medication list:  None      Additional medication history information:   None    Medication reconciliation completed by provider prior to medication history? No    Time spent in this activity: 20 minutes    The information provided in this note is only as accurate as the sources available at the time of update(s)      Prior to Admission medications    Medication Sig Last Dose Taking? Auth Provider Long Term End Date   diazepam (VALIUM) 2 MG tablet Take 2 mg by mouth as needed for anxiety (MRIs) Unknown at PRN Yes Reported, Patient No    FEROSUL 325 (65 Fe) MG tablet Take 325 mg by mouth daily 5/7/2024 at am Yes Reported, Patient     LORazepam (ATIVAN) 0.5 MG tablet Take 0.5 mg by mouth every 6 hours as needed for anxiety Unknown at PRN Yes Reported, Patient     Zinc Acetate, Oral, (ZINC ACETATE PO)  5/6/2024 at pm Yes Reported, Patient         Medication history completed by: Misha Rod

## 2024-05-08 ENCOUNTER — HOSPITAL ENCOUNTER (OUTPATIENT)
Facility: CLINIC | Age: 44
Discharge: HOME | End: 2024-05-09
Attending: SURGERY | Admitting: PLASTIC SURGERY
Payer: COMMERCIAL

## 2024-05-08 ENCOUNTER — HOSPITAL ENCOUNTER (OUTPATIENT)
Dept: MAMMOGRAPHY | Facility: CLINIC | Age: 44
Discharge: HOME | End: 2024-05-08
Attending: SURGERY
Payer: COMMERCIAL

## 2024-05-08 ENCOUNTER — ANESTHESIA EVENT (OUTPATIENT)
Dept: SURGERY | Facility: CLINIC | Age: 44
End: 2024-05-08
Payer: COMMERCIAL

## 2024-05-08 ENCOUNTER — APPOINTMENT (OUTPATIENT)
Dept: SURGERY | Facility: PHYSICIAN GROUP | Age: 44
End: 2024-05-08
Payer: COMMERCIAL

## 2024-05-08 ENCOUNTER — ANESTHESIA (OUTPATIENT)
Dept: SURGERY | Facility: CLINIC | Age: 44
End: 2024-05-08
Payer: COMMERCIAL

## 2024-05-08 ENCOUNTER — HOSPITAL ENCOUNTER (OUTPATIENT)
Dept: NUCLEAR MEDICINE | Facility: CLINIC | Age: 44
Setting detail: NUCLEAR MEDICINE
Discharge: HOME | End: 2024-05-08
Attending: SURGERY | Admitting: SURGERY
Payer: COMMERCIAL

## 2024-05-08 DIAGNOSIS — Z17.0 BILATERAL MALIGNANT NEOPLASM OF BREAST IN FEMALE, ESTROGEN RECEPTOR POSITIVE, UNSPECIFIED SITE OF BREAST (H): ICD-10-CM

## 2024-05-08 DIAGNOSIS — C50.911 BILATERAL MALIGNANT NEOPLASM OF BREAST IN FEMALE, ESTROGEN RECEPTOR POSITIVE, UNSPECIFIED SITE OF BREAST (H): ICD-10-CM

## 2024-05-08 DIAGNOSIS — C50.912 BILATERAL MALIGNANT NEOPLASM OF BREAST IN FEMALE, ESTROGEN RECEPTOR POSITIVE, UNSPECIFIED SITE OF BREAST (H): ICD-10-CM

## 2024-05-08 DIAGNOSIS — C50.411 MALIGNANT NEOPLASM OF UPPER-OUTER QUADRANT OF RIGHT BREAST IN FEMALE, ESTROGEN RECEPTOR POSITIVE (H): Primary | ICD-10-CM

## 2024-05-08 DIAGNOSIS — Z90.13 ABSENCE OF BOTH BREASTS: ICD-10-CM

## 2024-05-08 DIAGNOSIS — Z17.0 MALIGNANT NEOPLASM OF UPPER-OUTER QUADRANT OF RIGHT BREAST IN FEMALE, ESTROGEN RECEPTOR POSITIVE (H): Primary | ICD-10-CM

## 2024-05-08 LAB — HCG UR QL: NEGATIVE

## 2024-05-08 PROCEDURE — 36590 REMOVAL TUNNELED CV CATH: CPT | Mod: 51 | Performed by: SURGERY

## 2024-05-08 PROCEDURE — 19303 MAST SIMPLE COMPLETE: CPT | Performed by: NURSE ANESTHETIST, CERTIFIED REGISTERED

## 2024-05-08 PROCEDURE — 250N000009 HC RX 250: Performed by: NURSE ANESTHETIST, CERTIFIED REGISTERED

## 2024-05-08 PROCEDURE — 250N000009 HC RX 250: Performed by: REGISTERED NURSE

## 2024-05-08 PROCEDURE — 258N000003 HC RX IP 258 OP 636: Performed by: NURSE ANESTHETIST, CERTIFIED REGISTERED

## 2024-05-08 PROCEDURE — 88331 PATH CONSLTJ SURG 1 BLK 1SPC: CPT | Mod: 26 | Performed by: PATHOLOGY

## 2024-05-08 PROCEDURE — 250N000011 HC RX IP 250 OP 636: Performed by: ANESTHESIOLOGY

## 2024-05-08 PROCEDURE — 999N000104 MA BREAST SPECIMEN RIGHT OR: Mod: TC

## 2024-05-08 PROCEDURE — 258N000003 HC RX IP 258 OP 636: Performed by: ANESTHESIOLOGY

## 2024-05-08 PROCEDURE — 88342 IMHCHEM/IMCYTCHM 1ST ANTB: CPT | Mod: TC,XU | Performed by: SURGERY

## 2024-05-08 PROCEDURE — 999N000141 HC STATISTIC PRE-PROCEDURE NURSING ASSESSMENT: Performed by: SURGERY

## 2024-05-08 PROCEDURE — 343N000001 HC RX 343: Performed by: SURGERY

## 2024-05-08 PROCEDURE — 250N000011 HC RX IP 250 OP 636

## 2024-05-08 PROCEDURE — 250N000011 HC RX IP 250 OP 636: Performed by: NURSE ANESTHETIST, CERTIFIED REGISTERED

## 2024-05-08 PROCEDURE — 88307 TISSUE EXAM BY PATHOLOGIST: CPT | Mod: 26 | Performed by: STUDENT IN AN ORGANIZED HEALTH CARE EDUCATION/TRAINING PROGRAM

## 2024-05-08 PROCEDURE — 250N000009 HC RX 250: Performed by: SURGERY

## 2024-05-08 PROCEDURE — A9520 TC99 TILMANOCEPT DIAG 0.5MCI: HCPCS | Performed by: SURGERY

## 2024-05-08 PROCEDURE — 81025 URINE PREGNANCY TEST: CPT | Performed by: ANESTHESIOLOGY

## 2024-05-08 PROCEDURE — 38792 RA TRACER ID OF SENTINL NODE: CPT

## 2024-05-08 PROCEDURE — 250N000025 HC SEVOFLURANE, PER MIN: Performed by: SURGERY

## 2024-05-08 PROCEDURE — 370N000017 HC ANESTHESIA TECHNICAL FEE, PER MIN: Performed by: SURGERY

## 2024-05-08 PROCEDURE — 272N000001 HC OR GENERAL SUPPLY STERILE: Performed by: SURGERY

## 2024-05-08 PROCEDURE — L8699 PROSTHETIC IMPLANT NOS: HCPCS | Performed by: SURGERY

## 2024-05-08 PROCEDURE — 19303 MAST SIMPLE COMPLETE: CPT | Mod: 50 | Performed by: SURGERY

## 2024-05-08 PROCEDURE — 250N000013 HC RX MED GY IP 250 OP 250 PS 637: Performed by: PHYSICIAN ASSISTANT

## 2024-05-08 PROCEDURE — 38525 BIOPSY/REMOVAL LYMPH NODES: CPT | Mod: 51 | Performed by: SURGERY

## 2024-05-08 PROCEDURE — 250N000009 HC RX 250: Performed by: ANESTHESIOLOGY

## 2024-05-08 PROCEDURE — 258N000003 HC RX IP 258 OP 636

## 2024-05-08 PROCEDURE — 88360 TUMOR IMMUNOHISTOCHEM/MANUAL: CPT | Mod: 26 | Performed by: STUDENT IN AN ORGANIZED HEALTH CARE EDUCATION/TRAINING PROGRAM

## 2024-05-08 PROCEDURE — 88304 TISSUE EXAM BY PATHOLOGIST: CPT | Mod: 26 | Performed by: STUDENT IN AN ORGANIZED HEALTH CARE EDUCATION/TRAINING PROGRAM

## 2024-05-08 PROCEDURE — 19303 MAST SIMPLE COMPLETE: CPT | Mod: AS | Performed by: PHYSICIAN ASSISTANT

## 2024-05-08 PROCEDURE — 88331 PATH CONSLTJ SURG 1 BLK 1SPC: CPT | Mod: TC | Performed by: SURGERY

## 2024-05-08 PROCEDURE — 710N000009 HC RECOVERY PHASE 1, LEVEL 1, PER MIN: Performed by: SURGERY

## 2024-05-08 PROCEDURE — 36590 REMOVAL TUNNELED CV CATH: CPT | Mod: AS | Performed by: PHYSICIAN ASSISTANT

## 2024-05-08 PROCEDURE — 88342 IMHCHEM/IMCYTCHM 1ST ANTB: CPT | Mod: 26 | Performed by: STUDENT IN AN ORGANIZED HEALTH CARE EDUCATION/TRAINING PROGRAM

## 2024-05-08 PROCEDURE — 250N000013 HC RX MED GY IP 250 OP 250 PS 637

## 2024-05-08 PROCEDURE — 360N000083 HC SURGERY LEVEL 3 W/ FLUORO, PER MIN: Performed by: SURGERY

## 2024-05-08 PROCEDURE — 250N000009 HC RX 250: Performed by: PLASTIC SURGERY

## 2024-05-08 PROCEDURE — 19303 MAST SIMPLE COMPLETE: CPT | Performed by: ANESTHESIOLOGY

## 2024-05-08 PROCEDURE — 38900 IO MAP OF SENT LYMPH NODE: CPT | Mod: 50 | Performed by: SURGERY

## 2024-05-08 PROCEDURE — 999N000104 MA BREAST SPECIMEN LEFT OR: Mod: TC

## 2024-05-08 DEVICE — TISSUE EXPANDER, SMOOTH SURFACE, INTEGRATED PORTS, FULL HEIGHT, 480-575 CC
Type: IMPLANTABLE DEVICE | Site: NECK | Status: FUNCTIONAL
Brand: ALLOX2 TISSUE EXPANDER

## 2024-05-08 DEVICE — GRAFT ALLODERM 16X20CM  1518320P CHARGE PER SQ CM= 320 UNITS: Type: IMPLANTABLE DEVICE | Site: BREAST | Status: FUNCTIONAL

## 2024-05-08 RX ORDER — METHOCARBAMOL 750 MG/1
750 TABLET, FILM COATED ORAL 4 TIMES DAILY PRN
Qty: 12 TABLET | Refills: 0 | Status: SHIPPED | OUTPATIENT
Start: 2024-05-08 | End: 2024-06-27

## 2024-05-08 RX ORDER — LIDOCAINE 40 MG/G
CREAM TOPICAL
Status: DISCONTINUED | OUTPATIENT
Start: 2024-05-08 | End: 2024-05-09 | Stop reason: HOSPADM

## 2024-05-08 RX ORDER — DIAZEPAM 2 MG
2 TABLET ORAL EVERY 6 HOURS PRN
Status: DISCONTINUED | OUTPATIENT
Start: 2024-05-08 | End: 2024-05-09 | Stop reason: HOSPADM

## 2024-05-08 RX ORDER — ONDANSETRON 4 MG/1
4 TABLET, ORALLY DISINTEGRATING ORAL EVERY 6 HOURS PRN
Status: DISCONTINUED | OUTPATIENT
Start: 2024-05-08 | End: 2024-05-09 | Stop reason: HOSPADM

## 2024-05-08 RX ORDER — SODIUM CHLORIDE, SODIUM LACTATE, POTASSIUM CHLORIDE, CALCIUM CHLORIDE 600; 310; 30; 20 MG/100ML; MG/100ML; MG/100ML; MG/100ML
INJECTION, SOLUTION INTRAVENOUS CONTINUOUS
Status: DISCONTINUED | OUTPATIENT
Start: 2024-05-08 | End: 2024-05-08 | Stop reason: HOSPADM

## 2024-05-08 RX ORDER — GABAPENTIN 300 MG/1
300 CAPSULE ORAL 3 TIMES DAILY
Status: DISCONTINUED | OUTPATIENT
Start: 2024-05-08 | End: 2024-05-08 | Stop reason: HOSPADM

## 2024-05-08 RX ORDER — INDOCYANINE GREEN AND WATER 25 MG
KIT INJECTION PRN
Status: DISCONTINUED | OUTPATIENT
Start: 2024-05-08 | End: 2024-05-08

## 2024-05-08 RX ORDER — CEFAZOLIN SODIUM/WATER 2 G/20 ML
2 SYRINGE (ML) INTRAVENOUS
Status: COMPLETED | OUTPATIENT
Start: 2024-05-08 | End: 2024-05-08

## 2024-05-08 RX ORDER — ONDANSETRON 2 MG/ML
INJECTION INTRAMUSCULAR; INTRAVENOUS PRN
Status: DISCONTINUED | OUTPATIENT
Start: 2024-05-08 | End: 2024-05-08

## 2024-05-08 RX ORDER — NALOXONE HYDROCHLORIDE 0.4 MG/ML
0.4 INJECTION, SOLUTION INTRAMUSCULAR; INTRAVENOUS; SUBCUTANEOUS
Status: DISCONTINUED | OUTPATIENT
Start: 2024-05-08 | End: 2024-05-09 | Stop reason: HOSPADM

## 2024-05-08 RX ORDER — HYDROMORPHONE HCL IN WATER/PF 6 MG/30 ML
0.2 PATIENT CONTROLLED ANALGESIA SYRINGE INTRAVENOUS EVERY 5 MIN PRN
Status: DISCONTINUED | OUTPATIENT
Start: 2024-05-08 | End: 2024-05-08 | Stop reason: HOSPADM

## 2024-05-08 RX ORDER — PROPOFOL 10 MG/ML
INJECTION, EMULSION INTRAVENOUS PRN
Status: DISCONTINUED | OUTPATIENT
Start: 2024-05-08 | End: 2024-05-08

## 2024-05-08 RX ORDER — CEFAZOLIN SODIUM/WATER 2 G/20 ML
2 SYRINGE (ML) INTRAVENOUS
Status: DISCONTINUED | OUTPATIENT
Start: 2024-05-08 | End: 2024-05-08 | Stop reason: HOSPADM

## 2024-05-08 RX ORDER — HYDROMORPHONE HCL IN WATER/PF 6 MG/30 ML
0.2 PATIENT CONTROLLED ANALGESIA SYRINGE INTRAVENOUS
Status: DISCONTINUED | OUTPATIENT
Start: 2024-05-08 | End: 2024-05-09 | Stop reason: HOSPADM

## 2024-05-08 RX ORDER — MAGNESIUM HYDROXIDE 1200 MG/15ML
LIQUID ORAL PRN
Status: DISCONTINUED | OUTPATIENT
Start: 2024-05-08 | End: 2024-05-08 | Stop reason: HOSPADM

## 2024-05-08 RX ORDER — OXYCODONE HYDROCHLORIDE 5 MG/1
10 TABLET ORAL EVERY 4 HOURS PRN
Status: DISCONTINUED | OUTPATIENT
Start: 2024-05-08 | End: 2024-05-09 | Stop reason: HOSPADM

## 2024-05-08 RX ORDER — ACETAMINOPHEN 325 MG/1
975 TABLET ORAL
Qty: 36 TABLET | Refills: 0 | Status: DISCONTINUED | OUTPATIENT
Start: 2024-05-08 | End: 2024-05-09 | Stop reason: HOSPADM

## 2024-05-08 RX ORDER — FENTANYL CITRATE 0.05 MG/ML
25 INJECTION, SOLUTION INTRAMUSCULAR; INTRAVENOUS EVERY 5 MIN PRN
Status: DISCONTINUED | OUTPATIENT
Start: 2024-05-08 | End: 2024-05-08 | Stop reason: HOSPADM

## 2024-05-08 RX ORDER — AMOXICILLIN 250 MG
1-2 CAPSULE ORAL 2 TIMES DAILY
Status: DISCONTINUED | OUTPATIENT
Start: 2024-05-08 | End: 2024-05-09 | Stop reason: HOSPADM

## 2024-05-08 RX ORDER — OXYCODONE HYDROCHLORIDE 5 MG/1
5 TABLET ORAL EVERY 4 HOURS PRN
Status: DISCONTINUED | OUTPATIENT
Start: 2024-05-08 | End: 2024-05-09 | Stop reason: HOSPADM

## 2024-05-08 RX ORDER — HYDROMORPHONE HCL IN WATER/PF 6 MG/30 ML
0.4 PATIENT CONTROLLED ANALGESIA SYRINGE INTRAVENOUS EVERY 5 MIN PRN
Status: DISCONTINUED | OUTPATIENT
Start: 2024-05-08 | End: 2024-05-08 | Stop reason: HOSPADM

## 2024-05-08 RX ORDER — SODIUM CHLORIDE, SODIUM LACTATE, POTASSIUM CHLORIDE, CALCIUM CHLORIDE 600; 310; 30; 20 MG/100ML; MG/100ML; MG/100ML; MG/100ML
INJECTION, SOLUTION INTRAVENOUS CONTINUOUS PRN
Status: DISCONTINUED | OUTPATIENT
Start: 2024-05-08 | End: 2024-05-08

## 2024-05-08 RX ORDER — CEPHALEXIN 500 MG/1
500 CAPSULE ORAL EVERY 6 HOURS SCHEDULED
Status: DISCONTINUED | OUTPATIENT
Start: 2024-05-08 | End: 2024-05-09 | Stop reason: HOSPADM

## 2024-05-08 RX ORDER — ACETAMINOPHEN 325 MG/1
650 TABLET ORAL EVERY 6 HOURS PRN
COMMUNITY
Start: 2024-05-08 | End: 2024-06-27

## 2024-05-08 RX ORDER — PROCHLORPERAZINE MALEATE 10 MG
10 TABLET ORAL EVERY 6 HOURS PRN
Status: DISCONTINUED | OUTPATIENT
Start: 2024-05-08 | End: 2024-05-09 | Stop reason: HOSPADM

## 2024-05-08 RX ORDER — ONDANSETRON 2 MG/ML
4 INJECTION INTRAMUSCULAR; INTRAVENOUS EVERY 6 HOURS PRN
Status: DISCONTINUED | OUTPATIENT
Start: 2024-05-08 | End: 2024-05-09 | Stop reason: HOSPADM

## 2024-05-08 RX ORDER — LIDOCAINE HYDROCHLORIDE 20 MG/ML
INJECTION, SOLUTION INFILTRATION; PERINEURAL PRN
Status: DISCONTINUED | OUTPATIENT
Start: 2024-05-08 | End: 2024-05-08

## 2024-05-08 RX ORDER — OXYCODONE HYDROCHLORIDE 5 MG/1
5-10 TABLET ORAL EVERY 6 HOURS PRN
Qty: 10 TABLET | Refills: 0 | Status: SHIPPED | OUTPATIENT
Start: 2024-05-08 | End: 2024-05-11

## 2024-05-08 RX ORDER — DEXAMETHASONE SODIUM PHOSPHATE 4 MG/ML
INJECTION, SOLUTION INTRA-ARTICULAR; INTRALESIONAL; INTRAMUSCULAR; INTRAVENOUS; SOFT TISSUE PRN
Status: DISCONTINUED | OUTPATIENT
Start: 2024-05-08 | End: 2024-05-08

## 2024-05-08 RX ORDER — CEFAZOLIN SODIUM/WATER 2 G/20 ML
2 SYRINGE (ML) INTRAVENOUS SEE ADMIN INSTRUCTIONS
Status: DISCONTINUED | OUTPATIENT
Start: 2024-05-08 | End: 2024-05-08 | Stop reason: HOSPADM

## 2024-05-08 RX ORDER — INDOCYANINE GREEN AND WATER 25 MG
KIT INJECTION PRN
Status: DISCONTINUED | OUTPATIENT
Start: 2024-05-08 | End: 2024-05-08 | Stop reason: HOSPADM

## 2024-05-08 RX ORDER — NALOXONE HYDROCHLORIDE 0.4 MG/ML
0.2 INJECTION, SOLUTION INTRAMUSCULAR; INTRAVENOUS; SUBCUTANEOUS
Status: DISCONTINUED | OUTPATIENT
Start: 2024-05-08 | End: 2024-05-09 | Stop reason: HOSPADM

## 2024-05-08 RX ORDER — HYDROMORPHONE HCL IN WATER/PF 6 MG/30 ML
0.4 PATIENT CONTROLLED ANALGESIA SYRINGE INTRAVENOUS
Status: DISCONTINUED | OUTPATIENT
Start: 2024-05-08 | End: 2024-05-09 | Stop reason: HOSPADM

## 2024-05-08 RX ORDER — PROPOFOL 10 MG/ML
INJECTION, EMULSION INTRAVENOUS CONTINUOUS PRN
Status: DISCONTINUED | OUTPATIENT
Start: 2024-05-08 | End: 2024-05-08

## 2024-05-08 RX ORDER — SENNA AND DOCUSATE SODIUM 50; 8.6 MG/1; MG/1
1-2 TABLET, FILM COATED ORAL 2 TIMES DAILY PRN
Qty: 20 TABLET | Refills: 0 | Status: SHIPPED | OUTPATIENT
Start: 2024-05-08 | End: 2024-05-09

## 2024-05-08 RX ORDER — POLYETHYLENE GLYCOL 3350 17 G/17G
17 POWDER, FOR SOLUTION ORAL DAILY
Status: DISCONTINUED | OUTPATIENT
Start: 2024-05-09 | End: 2024-05-09 | Stop reason: HOSPADM

## 2024-05-08 RX ORDER — SODIUM CHLORIDE, SODIUM LACTATE, POTASSIUM CHLORIDE, CALCIUM CHLORIDE 600; 310; 30; 20 MG/100ML; MG/100ML; MG/100ML; MG/100ML
INJECTION, SOLUTION INTRAVENOUS CONTINUOUS
Status: DISCONTINUED | OUTPATIENT
Start: 2024-05-08 | End: 2024-05-09

## 2024-05-08 RX ORDER — HYDROXYZINE HYDROCHLORIDE 25 MG/1
25 TABLET, FILM COATED ORAL EVERY 6 HOURS PRN
Status: DISCONTINUED | OUTPATIENT
Start: 2024-05-08 | End: 2024-05-09 | Stop reason: HOSPADM

## 2024-05-08 RX ORDER — NALOXONE HYDROCHLORIDE 0.4 MG/ML
0.1 INJECTION, SOLUTION INTRAMUSCULAR; INTRAVENOUS; SUBCUTANEOUS
Status: DISCONTINUED | OUTPATIENT
Start: 2024-05-08 | End: 2024-05-08 | Stop reason: HOSPADM

## 2024-05-08 RX ORDER — CELECOXIB 100 MG/1
200 CAPSULE ORAL 2 TIMES DAILY
Status: DISCONTINUED | OUTPATIENT
Start: 2024-05-08 | End: 2024-05-09 | Stop reason: HOSPADM

## 2024-05-08 RX ORDER — FENTANYL CITRATE 50 UG/ML
INJECTION, SOLUTION INTRAMUSCULAR; INTRAVENOUS PRN
Status: DISCONTINUED | OUTPATIENT
Start: 2024-05-08 | End: 2024-05-08

## 2024-05-08 RX ORDER — FENTANYL CITRATE 0.05 MG/ML
50 INJECTION, SOLUTION INTRAMUSCULAR; INTRAVENOUS EVERY 5 MIN PRN
Status: DISCONTINUED | OUTPATIENT
Start: 2024-05-08 | End: 2024-05-08 | Stop reason: HOSPADM

## 2024-05-08 RX ORDER — ONDANSETRON 2 MG/ML
4 INJECTION INTRAMUSCULAR; INTRAVENOUS EVERY 30 MIN PRN
Status: DISCONTINUED | OUTPATIENT
Start: 2024-05-08 | End: 2024-05-08 | Stop reason: HOSPADM

## 2024-05-08 RX ORDER — DEXAMETHASONE SODIUM PHOSPHATE 4 MG/ML
4 INJECTION, SOLUTION INTRA-ARTICULAR; INTRALESIONAL; INTRAMUSCULAR; INTRAVENOUS; SOFT TISSUE
Status: DISCONTINUED | OUTPATIENT
Start: 2024-05-08 | End: 2024-05-08 | Stop reason: HOSPADM

## 2024-05-08 RX ORDER — ONDANSETRON 4 MG/1
4 TABLET, ORALLY DISINTEGRATING ORAL EVERY 30 MIN PRN
Status: DISCONTINUED | OUTPATIENT
Start: 2024-05-08 | End: 2024-05-08 | Stop reason: HOSPADM

## 2024-05-08 RX ORDER — ACETAMINOPHEN 160 MG
TABLET,DISINTEGRATING ORAL PRN
Status: DISCONTINUED | OUTPATIENT
Start: 2024-05-08 | End: 2024-05-08 | Stop reason: HOSPADM

## 2024-05-08 RX ORDER — METHOCARBAMOL 750 MG/1
750 TABLET, FILM COATED ORAL 4 TIMES DAILY
Status: DISCONTINUED | OUTPATIENT
Start: 2024-05-08 | End: 2024-05-09 | Stop reason: HOSPADM

## 2024-05-08 RX ORDER — CELECOXIB 200 MG/1
200 CAPSULE ORAL ONCE
Status: COMPLETED | OUTPATIENT
Start: 2024-05-08 | End: 2024-05-08

## 2024-05-08 RX ADMIN — FENTANYL CITRATE 25 MCG: 50 INJECTION, SOLUTION INTRAMUSCULAR; INTRAVENOUS at 19:04

## 2024-05-08 RX ADMIN — PROPOFOL 200 MG: 10 INJECTION, EMULSION INTRAVENOUS at 14:08

## 2024-05-08 RX ADMIN — ACETAMINOPHEN 975 MG: 325 TABLET, FILM COATED ORAL at 20:06

## 2024-05-08 RX ADMIN — BUPIVACAINE HYDROCHLORIDE 15 ML: 5 INJECTION, SOLUTION EPIDURAL; INTRACAUDAL at 14:08

## 2024-05-08 RX ADMIN — DEXAMETHASONE SODIUM PHOSPHATE 4 MG: 4 INJECTION, SOLUTION INTRA-ARTICULAR; INTRALESIONAL; INTRAMUSCULAR; INTRAVENOUS; SOFT TISSUE at 14:10

## 2024-05-08 RX ADMIN — SUGAMMADEX 150 MG: 100 INJECTION, SOLUTION INTRAVENOUS at 17:54

## 2024-05-08 RX ADMIN — METHOCARBAMOL 750 MG: 750 TABLET ORAL at 21:42

## 2024-05-08 RX ADMIN — SODIUM CHLORIDE, POTASSIUM CHLORIDE, SODIUM LACTATE AND CALCIUM CHLORIDE: 600; 310; 30; 20 INJECTION, SOLUTION INTRAVENOUS at 15:25

## 2024-05-08 RX ADMIN — INDOCYANINE GREEN AND WATER 7.5 MG: KIT at 16:58

## 2024-05-08 RX ADMIN — FENTANYL CITRATE 100 MCG: 50 INJECTION INTRAMUSCULAR; INTRAVENOUS at 14:08

## 2024-05-08 RX ADMIN — GABAPENTIN 300 MG: 300 CAPSULE ORAL at 11:49

## 2024-05-08 RX ADMIN — FENTANYL CITRATE 100 MCG: 50 INJECTION INTRAMUSCULAR; INTRAVENOUS at 15:04

## 2024-05-08 RX ADMIN — SODIUM CHLORIDE, POTASSIUM CHLORIDE, SODIUM LACTATE AND CALCIUM CHLORIDE: 600; 310; 30; 20 INJECTION, SOLUTION INTRAVENOUS at 12:06

## 2024-05-08 RX ADMIN — ROCURONIUM BROMIDE 50 MG: 50 INJECTION, SOLUTION INTRAVENOUS at 14:08

## 2024-05-08 RX ADMIN — CELECOXIB 200 MG: 200 CAPSULE ORAL at 11:48

## 2024-05-08 RX ADMIN — SODIUM CHLORIDE, POTASSIUM CHLORIDE, SODIUM LACTATE AND CALCIUM CHLORIDE: 600; 310; 30; 20 INJECTION, SOLUTION INTRAVENOUS at 20:45

## 2024-05-08 RX ADMIN — HYDROMORPHONE HYDROCHLORIDE 0.5 MG: 1 INJECTION, SOLUTION INTRAMUSCULAR; INTRAVENOUS; SUBCUTANEOUS at 14:54

## 2024-05-08 RX ADMIN — ROCURONIUM BROMIDE 30 MG: 50 INJECTION, SOLUTION INTRAVENOUS at 15:03

## 2024-05-08 RX ADMIN — TILMANOCEPT 0.48 MILLICURIE: KIT at 12:07

## 2024-05-08 RX ADMIN — Medication 2 G: at 14:04

## 2024-05-08 RX ADMIN — CEPHALEXIN 500 MG: 500 CAPSULE ORAL at 20:06

## 2024-05-08 RX ADMIN — OXYCODONE HYDROCHLORIDE 5 MG: 5 TABLET ORAL at 21:53

## 2024-05-08 RX ADMIN — ONDANSETRON 4 MG: 2 INJECTION INTRAMUSCULAR; INTRAVENOUS at 14:15

## 2024-05-08 RX ADMIN — LIDOCAINE HYDROCHLORIDE 100 MG: 20 INJECTION, SOLUTION INFILTRATION; PERINEURAL at 14:08

## 2024-05-08 RX ADMIN — PROPOFOL 20 MCG/KG/MIN: 10 INJECTION, EMULSION INTRAVENOUS at 14:16

## 2024-05-08 RX ADMIN — HYDROMORPHONE HYDROCHLORIDE 0.2 MG: 0.2 INJECTION, SOLUTION INTRAMUSCULAR; INTRAVENOUS; SUBCUTANEOUS at 23:52

## 2024-05-08 RX ADMIN — PHENYLEPHRINE HYDROCHLORIDE 0.2 MCG/KG/MIN: 10 INJECTION INTRAVENOUS at 16:26

## 2024-05-08 RX ADMIN — TILMANOCEPT 0.51 MILLICURIE: KIT at 12:08

## 2024-05-08 RX ADMIN — MIDAZOLAM 2 MG: 1 INJECTION INTRAMUSCULAR; INTRAVENOUS at 14:06

## 2024-05-08 ASSESSMENT — ACTIVITIES OF DAILY LIVING (ADL)
ADLS_ACUITY_SCORE: 18
ADLS_ACUITY_SCORE: 19
ADLS_ACUITY_SCORE: 18
ADLS_ACUITY_SCORE: 33
ADLS_ACUITY_SCORE: 18
ADLS_ACUITY_SCORE: 19
ADLS_ACUITY_SCORE: 19

## 2024-05-08 NOTE — BRIEF OP NOTE
[facility]    Brief Operative Note    Pre-operative diagnosis: Bilateral malignant neoplasm of breast in female, estrogen receptor positive  Post-operative diagnosis Same    Procedure: Bilateral skin sparing mastectomies with bilateral sentinel lymph node biopsy  bilateral tag localized axillary lymph node excision, port removal, excision of left axillary skin cyst    Surgeon:   Panel 1:     * Fanny Little MD - Primary     * Indigo Irwin PA-C - Assisting    Anesthesia: General with Block     Estimated Blood Loss: 40cc from Dr. Little's portion    Specimens:   ID Type Source Tests Collected by Time Destination   1 : LEFT AXILLARY SKIN LESION Tissue Axilla, Left SURGICAL PATHOLOGY EXAM Fanny Little MD 5/8/2024  2:39 PM    2 : LEFT BREAST MASTECTOMY; SHORT STITCH SUPERIOR, LONG STITCH LATERAL Tissue Breast, Left SURGICAL PATHOLOGY EXAM Fanny Little MD 5/8/2024  3:28 PM    3 : TAG LOCALIZED LEFT AXILLARY SENTINEL NODE #1 Tissue Lymph Node(s), Axillary, Left SURGICAL PATHOLOGY EXAM Fanny Little MD 5/8/2024  3:38 PM    4 : TAG LOCALIZED LEFT AXILLARY SENTINEL NODE #2 Tissue Lymph Node(s), Axillary, Left SURGICAL PATHOLOGY EXAM Fanny Little MD 5/8/2024  3:38 PM    5 : RIGHT AXILLARY SENTINEL LYMPH NODE #1 Tissue Lymph Node(s), Axillary, Right SURGICAL PATHOLOGY EXAM Fanny Little MD 5/8/2024  4:24 PM    6 : RIGHT AXILLARY SENTINEL LYMPH NODE #2 Tissue Lymph Node(s), Axillary, Right SURGICAL PATHOLOGY EXAM Fanny Little MD 5/8/2024  4:33 PM    7 : TAG LOCALIZED RIGHT AXILLARY SENTINEL LYMPH NODE #3 Tissue Lymph Node(s), Axillary, Right SURGICAL PATHOLOGY EXAM Fanny Little MD 5/8/2024  4:36 PM    8 : RIGHT AXILLARY SENTINEL LYMPH NODE #4 Tissue Lymph Node(s), Axillary, Right SURGICAL PATHOLOGY EXAM Fanny Little MD 5/8/2024  4:36 PM    9 : RIGHT BREAST MASTECTOMY; SHORT STITCH SUPERIOR, LONG STITCH LATERAL Tissue Breast, Right SURGICAL  PATHOLOGY EXAM Fanny Little MD 5/8/2024  4:41 PM        Implants (removed):   Implant Name Type Inv. Item Serial No.  Lot No. LRB No. Used Action   CATH PORT POWERPORT CLEARVUE ISP 8FR [id number] - [id number] Catheter CATH PORT POWERPORT CLEARVUE ISP [id number] [id number]  CR [employer] [id number] Explanted       Findings: As above. No immediate complications. See operative report for full details. Dr. Yee to follow with reconstruction.       Indigo Irwin PA-C  [facility]  [phone]

## 2024-05-08 NOTE — DISCHARGE INSTRUCTIONS
[facility]  Discharge Instructions: Post-Operative Mastectomy with Reconstruction    ACTIVITY  Take frequent, short walks and increase your activity gradually.    Avoid strenuous physical activity or heavy lifting greater than 10 pounds. You may climb stairs.   Gentle rotation and stretching of your arms and shoulders will prevent joint stiffness.  You may drive without restrictions when you are not using any prescription pain medication and feel comfortable in a car.  You may return to work/school when you are comfortable without any prescription pain medication.    DRAIN CARE  You have drains at your surgical site.  In order to empty these, open the tab/vent on the drain and record how much liquid you remove. To properly close the drain, squeeze the bulb (with tab/vent open) then close the tab while still squeezing the bulb. You should notice that liquid moves in the tubing toward the bulb if it is properly closed.   You should also strip the drain tubing once daily to avoid any clogging. You do this by gently pinching the drain, starting near the skin, and stretching the tubing out this way until you reach the bulb. Do not pull hard on the drain tubing to the point of pulling the drain away from the skin.     DIET  Start with liquids, then gradually resume your regular diet as tolerated.   Drink plenty of fluids to stay hydrated.    PAIN  Expect some tenderness and discomfort at the incision site(s).  Use the prescribed pain medication at your discretion.  Expect gradual resolution of your pain over several days.  Do not drink alcohol or drive while you are taking pain medications.  You may apply ice to your incisions in 20 minute intervals as needed.    EXPECTATIONS  Pain medications can cause constipation.  Limit use when possible.  Take the prescribed stool softener/stimulant twice daily as needed. You may take a mild over the counter laxative, such as Miralax or a  Dulcolax suppository, as needed.   You may discontinue these medications once you are having regular bowel movements and/or are no longer taking your narcotic pain medication.  Dye was used during your surgery to locate lymph nodes and can cause your urine to be blue/green for several days after surgery.  This is not a cause for concern and will resolve on its own.     RETURN APPOINTMENT  Follow-up with Dr. Yee's clinic next week as directed.  Follow-up with Dr. Little on 5/28/24 as scheduled. Call our office at [phone] if you have any questions or need to change your appointment.     CALL DR YEE'S OFFICE IF YOU HAVE:   Chills or fever above 101 F.  Increased redness, warmth, or drainage at your incisions.  Significant bleeding or swelling.  Pain not relieved by your pain medication or rest.  Increasing pain after the first 48 hours.  Any other concerns or questions.

## 2024-05-08 NOTE — OP NOTE
Date of Service: 05/08/24    PREOPERATIVE DIAGNOSES:   1. Bilateral breast cancer  2. Acquired absence of bilateral breasts    POSTOPERATIVE DIAGNOSES:   same    PROCEDURES:     1.  Bilateral first stage breast reconstruction with tissue expanders placed in the prepectoral plane  2.  Acellular dermal matrix (Alloderm) placement to bilateral breasts  3.  Laser angiography of mastectomy skin flaps      SURGEON: Vasiliy Yee MD     ASSISTANT: Manasa Wong PA-c    A first assistant was medically necessary to assist in patient positioning, exposure and visualization of anatomic structures, assistance with hemostasis, and suture closure of the incisions.    ANESTHESIA: General anesthesia    COMPLICATIONS: None.     ESTIMATED BLOOD LOSS: 10 cc    DISPOSITION: To the Post Anesthesia Care Unit in stable condition.    TUBES AND DRAINS: joes c x 2    COUNTS: Correct     SPECIMENS: None    IMPLANTS:     1. On the right : Sientra AlloX2-FH14SE, SN [id number], filled to 400 cc  2. On the left : Sientra AlloX2-FH14SE, SN [id number], filled to 400 cc    INDICATIONS:    This is a 43 year old  yo female who has breast cancer and has elected for bilateral mastectomies.  She has elected for an implant-based reconstruction using tissue expanders and possibly acellular dermal matrix at the first stage.  She has requested prepectoral placement if possible.    We discussed details, risks, benefits, and alternatives of the procedure. She understands limitations and risks including bleeding, hematoma, or seroma that could require surgical evacuation. We discussed tissue expander complications including rupture or deflation, infection or extrusion, rippling or wrinkling, capsular contracture, or any known, suspected, or yet to be determined links between systemic disease and implants. She understands there could be a need for unplanned surgical revision. We discussed there is no guarantee of cure or protection from benign or malignant  breast or skin disease. She voiced understanding, signed consent, elected to proceed.    DESCRIPTION OF PROCEDURE:    She was marked preoperatively in the upright position with a skin sparing, transverse ellipse pattern. She was taken to the operating room.  I came in the room at the completion of the mastectomies. The right breast specimen weighed 625g and the left breast specimen weighed 550g.  A timeout was performed to ensure the correct orientation and procedure.    First, laser angiography was performed.  The anesthesia staff injected 3 cc of the indocyanine green followed by a 10 cc bolus of normal saline.  Using the [employer] system, the skin flaps were evaluated.   Skin flap perfusion indices appeared viable on both sides.  Based on these findings, I decided to proceed with prepectoral reconstruction.    I began on the right side.  I inspected the pocket and ensured hemostasis.  I irrigated with dilute peroxide and Irrisept.   I brought a tissue expander of appropriate base width onto the back field.  It was filled with 400 ml of sterile injectable saline and rinsed in a 50/50 mixture of Irrisept and Betadine.  I brought a piece of 16 x 20 cm acellular dermal matrix onto the field.  It was rinsed in a 50/50 mixture of Irrisept and Betadine.  The acellular dermal matrix was placed into the pocket with the smooth side down and it was secured superiorly and inferiorly with 2-0 vicryl sutures.  The tissue expander was placed into the prepectoral pocket under the acellular dermal matrix and it was secured using the foot plates and 2-0 silk sutures.  The acellular dermal matrix was used to support the lateral aspect of the device by securing it with 2-0 silk sutures.  A dogear of the excess acellular dermal matrix was removed superiorly.  The resulting defect in the mesh was closed with a running 2-0 vicryl suture.   A 15-Albanian round channel drain was placed laterally and secured with a 2-0 silk.  The skin was  closed without tension using deep dermal 3-0 Monocryl and running 4-0 Monocryl subcuticular stitching.      I turned my attention to the left side.  I inspected the pocket and ensured hemostasis.  I irrigated with dilute peroxide and Irrisept.   I brought a tissue expander of appropriate base width onto the back field.  It was filled with 400 ml of sterile injectable saline and rinsed in a 50/50 mixture of Irrisept and Betadine.  I brought a piece of 16 x 20 cm acellular dermal matrix onto the field.  It was rinsed in a 50/50 mixture of Irrisept and Betadine.  The acellular dermal matrix was placed into the pocket with the smooth side down and it was secured superiorly and inferiorly with 2-0 vicryl sutures.  The tissue expander was placed into the prepectoral pocket under the acellular dermal matrix and it was secured using the foot plates and 2-0 silk sutures.  The acellular dermal matrix was used to support the lateral aspect of the device by securing it with 2-0 silk sutures.  A dogear of the excess acellular dermal matrix was removed superiorly.  The resulting defect in the mesh was closed with a running 2-0 vicryl suture.   A 15-Slovak round channel drain was placed laterally and secured with a 2-0 silk.  The skin was closed without tension using deep dermal 3-0 Monocryl and running 4-0 Monocryl subcuticular stitching.     Steri-Strips were applied to the incisions.  Tegaderms were used to secure a Biopatch over each drain site.  Sterile Kerlix gauze was placed over the breasts. She was wrapped comfortably with a double 6-inch Ace bandage, awoken from anesthesia, and taken to the PACU in stable condition.

## 2024-05-08 NOTE — ANESTHESIA PREPROCEDURE EVALUATION
Anesthesia Pre-Procedure Evaluation    Patient: Angie Michael   MRN: [id number] : 1980        Procedure : Procedure(s):  Bilateral skin sparing mastectomies with bilateral sentinel lymph node biopsy  bilateral tag localized node excision  possible limited axillary node dissection  possible port removal  Excision Left Axillary Cyst  BILATERAL FIRST STAGE BREAST RECONSTRUCTION WITH TISSUE EXPANDERS: POSSIBLE ACELLULAR DERMAL MATRIX          Past Medical History:   Diagnosis Date    BRCA2 gene mutation negative     BRCA2 gene mutation positive 2024    Complication of anesthesia     when needing novacaine needs extra per dentist    Conceived by in vitro fertilization     Female infertility     History of blood transfusion     Iron deficiency anemia 2024    Malignant neoplasm of both breasts (H)     Malignant neoplasm of overlapping sites of left breast in female, estrogen receptor positive (H) 10/10/2023      Past Surgical History:   Procedure Laterality Date    ABDOMEN SURGERY          ENT SURGERY      tonsillectomy    GYN SURGERY      egg retrieval for IVF    INSERT PORT VASCULAR ACCESS Right 2023    Procedure: INSERTION, VASCULAR ACCESS PORT;  Surgeon: Fanny Little MD;  Location:  OR    ORTHOPEDIC SURGERY      knee arthroscopy      Allergies   Allergen Reactions    Aprepitant Difficulty breathing and Shortness Of Breath    Fosaprepitant Unknown     Allergy added per chart review. [facility]    Armoracia Rusticana Ext (Horseradish) Hives    Carboprost GI Disturbance     Diarrhea    Codeine Nausea and Vomiting      Social History     Tobacco Use    Smoking status: Never     Passive exposure: Never    Smokeless tobacco: Never   Substance Use Topics    Alcohol use: Not Currently      Wt Readings from Last 1 Encounters:   24 79.9 kg (176 lb 1.6 oz)        Anesthesia Evaluation   Pt has had prior anesthetic.     No history of anesthetic complications (she believes she  requires a lot of anesthesia given recollection during MAC procedures)       ROS/MED HX  ENT/Pulmonary:    (-) sleep apnea   Neurologic:       Cardiovascular:       METS/Exercise Tolerance:     Hematologic:       Musculoskeletal:       GI/Hepatic:    (-) GERD   Renal/Genitourinary:       Endo:       Psychiatric/Substance Use:       Infectious Disease:       Malignancy:   (+) Malignancy, History of Breast.Breast CA Active status post.      Other:            Physical Exam    Airway        Mallampati: II   TM distance: > 3 FB   Neck ROM: full   Mouth opening: > 3 cm    Respiratory Devices and Support         Dental       (+) Minor Abnormalities - some fillings, tiny chips      Cardiovascular   cardiovascular exam normal          Pulmonary   pulmonary exam normal                OUTSIDE LABS:  CBC:   Lab Results   Component Value Date    WBC 3.6 (L) 03/12/2024    WBC 3.6 (L) 02/27/2024    HGB 12.0 03/12/2024    HGB 11.0 (L) 02/27/2024    HCT 35.6 03/12/2024    HCT 33.7 (L) 02/27/2024     03/12/2024     02/27/2024     BMP:   Lab Results   Component Value Date     02/27/2024     02/06/2024    POTASSIUM 3.9 02/27/2024    POTASSIUM 4.1 02/06/2024    CHLORIDE 104 02/27/2024    CHLORIDE 106 02/06/2024    CO2 25 02/27/2024    CO2 27 02/06/2024    BUN 13.9 02/27/2024    BUN 14.3 02/06/2024    CR 0.72 02/27/2024    CR 0.74 02/06/2024    GLC 99 02/27/2024     (H) 02/06/2024     COAGS:   Lab Results   Component Value Date    INR 1.05 02/27/2024     POC:   Lab Results   Component Value Date    HCG Negative 11/20/2023     HEPATIC:   Lab Results   Component Value Date    ALBUMIN 4.4 03/12/2024    PROTTOTAL 6.9 03/12/2024    ALT 15 03/12/2024    AST 15 03/12/2024    ALKPHOS 52 03/12/2024    BILITOTAL 0.6 03/12/2024     OTHER:   Lab Results   Component Value Date    GWENDOLYN 9.2 02/27/2024       Anesthesia Plan    ASA Status:  3    NPO Status:  NPO Appropriate    Anesthesia Type: General.     - Airway: ETT  "  Induction: Intravenous.   Maintenance: Balanced.   Techniques and Equipment:     - Airway: Video-Laryngoscope       Consents    Anesthesia Plan(s) and associated risks, benefits, and realistic alternatives discussed. Questions answered and patient/representative(s) expressed understanding.     - Discussed:     - Discussed with:  Patient            Postoperative Care    Pain management: IV analgesics, Multi-modal analgesia, Peripheral nerve block (Single Shot).   PONV prophylaxis: Ondansetron (or other 5HT-3), Background Propofol Infusion     Comments:    Other Comments: Propofol high dose  Zofran           Vick Chow MD    I have reviewed the pertinent notes and labs in the chart from the past 30 days and (re)examined the patient.  Any updates or changes from those notes are reflected in this note.              # Overweight: Estimated body mass index is 29.3 kg/m  as calculated from the following:    Height as of 4/16/24: 1.651 m (5' 5\").    Weight as of 4/16/24: 79.9 kg (176 lb 1.6 oz).      "

## 2024-05-08 NOTE — OP NOTE
[facility] Breast Surgery Operative Note    PREOPERATIVE DIAGNOSIS: Bilateral breast cancers    POSTOPERATIVE DIAGNOSIS: Same, pathology pending    PROCEDURE:    1.  Bilateral skin sparing mastectomies  2.  Bilateral sentinel lymph node biopsy with excision of tag localized nodes  3. Injection of indocyanine green dye for lymphatic mapping  4.  Port removal  5.  Excision of left axillary skin cyst  6. Reconstruction per Dr. Yee, please see their operative report for details regarding their portion of the procedure.     SURGEON:  Fanny Little MD    ASSISTANT:  Indigo Irwin PA-C  The physician s assistant was medically necessary for their expertise in retraction and suctioning.    ANESTHESIA:  General.    BLOOD LOSS: 40 cc    FINDINGS: Frozen section and bilateral tag localized nodes and additional sentinel nodes was negative    INDICATIONS:   Please see my clinic note for details.    DETAILS OF PROCEDURE:     The patient was taken to the operating room and placed in supine position and general anesthesia by endotracheal tube.   Perioperative antiobiotics were given. SCDs were placed on the lower extremities. A patterson catheter was placed using sterile technique. Bilateral PECS blocks were performed by anesthesia. Prior to coming to the operating room, patient had the bilateral breast injected with radiolabeled sulfur colloid.   The patient was also marked by Dr. Yee with simple ellipse periareolar incisions.     The breasts and axilla were prepped with chloraprep and draped in the usual sterile fashion.  The timeout procedure was performed.      We began by excising the skin cyst in the left axilla.  An elliptical incision was made with a 15 blade surrounding the palpable cyst.  The cyst was removed in its entirety.  We closed the incision using deep dermal 3-0 Vicryl sutures and a running 4-0 Vicryl subcuticular suture.  Steri-Strips were placed.  We then changed our gloves and began the next portion of  the procedure.    We injected indocyanine green dye on bilateral periareolar skin and the deep dermal location using a TB syringe the 3:00 and 12 o'clock position on the left breast and at the 12:00 and 9 o'clock position on the right breast.  This was injected as a dual tracer given patient has had neoadjuvant chemotherapy.  The breast was massaged for several minutes after injection.  Starting on the left side, a skin sparing periareolar ellipse incision was made following the pre operative markings with a #10 scalpel blade and deepened with electrocautery.  The superior flap was raised with electrocautery up to the top edge of the breast tissue just under the clavicle.  The inferior flap was similarly raised using the cautery down to the inframammary fold.  Flaps were raised medially to the lateral aspect of the sternum and laterally to the lateral chest wall. The breast tissue was then elevated off of the pectoralis fascia with cautery. The fascia was excised with the breast tissue.  Once the breast was removed, it was oriented with sutures with a short suture superior and long suture lateral.  The breast was sent to pathology to be placed in formalin and have routine pathology exam.        Attention was then turned to the  left axilla.  The fascia was incised along the edge of the pectoralis muscle.  The Neoprobe was used to identify the site of increased radioactivity.  2 nodes were excised which had counts of over 500.  These nodes were also positive using the SPY PHI system.  The first sentinel node on the left had been previously localized with a radiofrequency ID tag.  The localizer was also used to guide dissection.  Bioptic's of this node revealed the clip and the tag.  The nodes were sent to pathology for review with frozen section.  The remainder of the axilla was clinically negative.  Pathology called in and reported the nodes were negative. The left side was irrigated, hemostasis assured and packed  with a saline-wetted lap pad and covered with a dry towel while attention was turned to the right breast.     A similar  incision was made on the right side with a #10 scalpel blade and deepened with electrocautery.  Again, the flaps were raised with electrocautery and the breast was dissected away from the pectoralis fascia.  The tissue was oriented with a short suture superior and long suture lateral.  The breast was then sent to pathology and placed in formalin for permanent section.      Attention was then turned to the right axilla.  The fascia was incised along the edge of the pectoralis muscle.  The Neoprobe was used to identify the site of increased radioactivity.  The localizer tag was used to guide dissection as well.  2 nodes were excised which had counts of over 500.  These nodes were also positive using the SPY PHI system.  The third sentinel node that was identified and excised was positive with the neoprobe as well as the spy phi system and also was the tag localized node.  Bioptic's of this confirmed biopsy markers and the RFID tag.  A fourth sentinel node was identified in the right axilla which had counts of 65.  The remainder of the axilla was clinically benign and there was no ongoing uptake of either indocyanine green dye or technetium.  The nodes were sent to pathology for review with frozen section. Pathology called in and reported the nodes were negative.     We then removed the port on the right chest.  We were able to access and remove this via the mastectomy incision.  The port capsule was opened using cautery.  The Prolene sutures were identified and cut and removed.  We placed a figure-of-eight 3-0 Vicryl suture around the catheter tract and tied this down as we removed the catheter occluding the tract.  The port was removed in its entirety and discarded.  We closed the port capsule using a running 3-0 Vicryl suture.  The right side was irrigated, hemostasis assured and packed with a  saline-wetted lap pad and covered with a dry towel while attention was turned to the right breast.       Dr. Yee then performed their portion of the procedure with reconstruction. Please see their operative report for details.     [place] Node Biopsy for Breast Cancer - Right  Operation performed with curative intent Yes   Tracer(s) used to identify sentinel nodes in the upfront surgery (non-neoadjuvant) setting N/A   Tracer(s) used to identify sentinel nodes in the neoadjuvant setting Dye and Radioactive tracer   All nodes (colored or non-colored) present at the end of a dye-filled lymphatic channel were removed Yes   All significantly radioactive nodes were removed Yes   All palpably suspicious nodes were removed N/A   Biopsy-proven positive nodes marked with clips prior to chemotherapy were identified and removed Yes     [place] Node Biopsy for Breast Cancer - Left  Operation performed with curative intent Yes   Tracer(s) used to identify sentinel nodes in the upfront surgery (non-neoadjuvant) setting N/A   Tracer(s) used to identify sentinel nodes in the neoadjuvant setting Dye and Radioactive tracer   All nodes (colored or non-colored) present at the end of a dye-filled lymphatic channel were removed Yes   All significantly radioactive nodes were removed Yes   All palpably suspicious nodes were removed N/A   Biopsy-proven positive nodes marked with clips prior to chemotherapy were identified and removed Yes         Fanny Ltitle MD

## 2024-05-08 NOTE — ANESTHESIA PROCEDURE NOTES
Pectoralis Procedure Note    Pre-Procedure   Staff -        Anesthesiologist:  Chai Rojo DO       Performed By: anesthesiologist       Location: OR       Pre-Anesthestic Checklist: patient identified, IV checked, site marked, risks and benefits discussed, informed consent, monitors and equipment checked, pre-op evaluation, at physician/surgeon's request and post-op pain management  Timeout:       Correct Patient: Yes        Correct Procedure: Yes        Correct Site: Yes        Correct Position: Yes        Correct Laterality: Yes        Site Marked: Yes  Procedure Documentation  Procedure: Pectoralis             Pectoralis I       Laterality: bilateral       Patient Position: supine       Patient Prep/Sterile Barriers: sterile gloves, mask, patient draped       Skin prep: Chloraprep       Local skin infiltrated with 3 mL of 1% lidocaine.        Needle Type: insulated and short bevel       Needle Gauge: 21.        Needle Length (Inches): 4        Ultrasound guided       1. Ultrasound was used to identify targeted nerve, plexus, vascular marker, or fascial plane and place a needle adjacent to it in real-time.       2. Ultrasound was used to visualize the spread of anesthetic in close proximity to the above referenced structure.       3. A permanent image is entered into the patient's record.    Assessment/Narrative         The placement was negative for: blood aspirated, painful injection and site bleeding       Paresthesias: No.       Test dose of mL at.         Test dose negative, 3 minutes after injection, for signs of intravascular, subdural, or intrathecal injection.       Bolus given via needle..        Secured via.        Insertion/Infusion Method: Single Shot       Complications: none    Medication(s) Administered   Bupivacaine 0.5% w/ 1:400K Epi (Injection) - Injection   15 mL - 5/8/2024 2:08:00 PM   Comments:  Ultrasound Interpretation, peripheral nerve block    1.  Under ultrasound guidance,  "the needle was inserted and placed in close proximity to the target nerve(s).  2. Ultrasound was also used to visualize the spread of the anesthetic in close proximity to the nerve(s) being blocked.  10ml per side of diluted 0.5%Bupivacaine (20 ml of 0.5% Bupivacaine w/ 1:400K Epi admixed with 10ml NS, 30ml in total), was administered in incremental doses, with intermittent negative aspiration.     3. The nerve(s) appeared anatomically normal.  4. There were no apparent abnormal pathological findings.  5. A permanent ultrasound image was saved in the patient's record.    Pt tolerated the procedure well.     The surgeon has given a verbal order transferring care of this patient to me for the performance of a regional analgesia block for post-op pain control. It is requested of me because I am uniquely trained and qualified to perform this block and the surgeon is neither trained nor qualified to perform this procedure.      FOR [facility] ([facility] [place]) ONLY:   Pain Team Contact information: please page the Pain Team Via [email or web address]. Search \"Pain\". During daytime hours, please page the attending first. At night please page the resident first.      "

## 2024-05-08 NOTE — ANESTHESIA PROCEDURE NOTES
Pectoralis Procedure Note    Pre-Procedure   Staff -        Anesthesiologist:  Chai Rojo DO       Performed By: anesthesiologist       Location: OR       Pre-Anesthestic Checklist: patient identified, IV checked, site marked, risks and benefits discussed, informed consent, monitors and equipment checked, pre-op evaluation, at physician/surgeon's request and post-op pain management  Timeout:       Correct Patient: Yes        Correct Procedure: Yes        Correct Site: Yes        Correct Position: Yes        Correct Laterality: Yes        Site Marked: Yes  Procedure Documentation  Procedure: Pectoralis             Pectoralis II       Laterality: bilateral       Patient Position: supine       Patient Prep/Sterile Barriers: sterile gloves, mask, patient draped       Skin prep: Chloraprep       Local skin infiltrated with 3 mL of 1% lidocaine.        Needle Type: insulated and short bevel       Needle Gauge: 21.        Needle Length (Inches): 4        Ultrasound guided       1. Ultrasound was used to identify targeted nerve, plexus, vascular marker, or fascial plane and place a needle adjacent to it in real-time.       2. Ultrasound was used to visualize the spread of anesthetic in close proximity to the above referenced structure.       3. A permanent image is entered into the patient's record.    Assessment/Narrative         The placement was negative for: blood aspirated, painful injection and site bleeding       Paresthesias: No.       Test dose of mL at.         Test dose negative, 3 minutes after injection, for signs of intravascular, subdural, or intrathecal injection.       Bolus given via needle..        Secured via.        Insertion/Infusion Method: Single Shot       Complications: none    Medication(s) Administered   Bupivacaine 0.5% w/ 1:400K Epi (Injection) - Injection   25 mL - 5/8/2024 2:10:00 PM   Comments:  Ultrasound Interpretation, peripheral nerve block    1.  Under ultrasound  "guidance, the needle was inserted and placed in close proximity to the target nerve(s).  2. Ultrasound was also used to visualize the spread of the anesthetic in close proximity to the nerve(s) being blocked. 20ml per side of diluted 0.5% Bupivacaine (20 ml of 0.5% Bupivacaine w/ 1:400K Epi admixed with 10ml NS, 30ml in total), with intermittent negative aspiration.     3. The nerve(s) appeared anatomically normal.  4. There were no apparent abnormal pathological findings.  5. A permanent ultrasound image was saved in the patient's record.    Pt tolerated the procedure well.     The surgeon has given a verbal order transferring care of this patient to me for the performance of a regional analgesia block for post-op pain control. It is requested of me because I am uniquely trained and qualified to perform this block and the surgeon is neither trained nor qualified to perform this procedure.      FOR [facility] ([facility] [place] [place]) ONLY:   Pain Team Contact information: please page the Pain Team Via [employer]. Search \"Pain\". During daytime hours, please page the attending first. At night please page the resident first.      "

## 2024-05-08 NOTE — ANESTHESIA PROCEDURE NOTES
Airway       Patient location during procedure: OR       Procedure Start/Stop Times: 5/8/2024 2:12 PM  Staff -        Anesthesiologist:  Chai Rojo DO       CRNA: Kimberly Abreu APRN CRNA       Other Anesthesia Staff: Sharon Nava       Performed By: ANDRES and with CRNAs       Procedure performed by resident/fellow/CRNA in presence of a teaching physician.    Consent for Airway        Urgency: elective  Indications and Patient Condition       Indications for airway management: viky-procedural       Induction type:intravenous       Mask difficulty assessment: 1 - vent by mask    Final Airway Details       Final airway type: endotracheal airway       Successful airway: ETT - single  Endotracheal Airway Details        ETT size (mm): 7.0       Cuffed: yes       Successful intubation technique: video laryngoscopy       VL Blade Size: Glidescope 3       Grade View of Cords: 1       Adjucts: stylet       Position: Right       Measured from: gums/teeth       Secured at (cm): 20       Bite block used: None    Post intubation assessment        Placement verified by: capnometry, equal breath sounds and chest rise        Number of attempts at approach: 1       Number of other approaches attempted: 0       Secured with: tape       Ease of procedure: easy       Dentition: Intact and Unchanged    Medication(s) Administered   Medication Administration Time: 5/8/2024 2:12 PM

## 2024-05-09 ENCOUNTER — APPOINTMENT (OUTPATIENT)
Dept: GENERAL RADIOLOGY | Facility: CLINIC | Age: 44
End: 2024-05-09
Attending: STUDENT IN AN ORGANIZED HEALTH CARE EDUCATION/TRAINING PROGRAM
Payer: COMMERCIAL

## 2024-05-09 VITALS
HEIGHT: 66 IN | OXYGEN SATURATION: 94 % | RESPIRATION RATE: 19 BRPM | WEIGHT: 170.4 LBS | DIASTOLIC BLOOD PRESSURE: 70 MMHG | TEMPERATURE: 97.7 F | SYSTOLIC BLOOD PRESSURE: 105 MMHG | HEART RATE: 85 BPM | BODY MASS INDEX: 27.38 KG/M2

## 2024-05-09 LAB — GLUCOSE BLDC GLUCOMTR-MCNC: 109 MG/DL (ref 70–99)

## 2024-05-09 PROCEDURE — 250N000013 HC RX MED GY IP 250 OP 250 PS 637

## 2024-05-09 PROCEDURE — 999N000104 MA BREAST SPECIMEN LEFT OR

## 2024-05-09 PROCEDURE — 82962 GLUCOSE BLOOD TEST: CPT

## 2024-05-09 PROCEDURE — 250N000013 HC RX MED GY IP 250 OP 250 PS 637: Performed by: PHYSICIAN ASSISTANT

## 2024-05-09 RX ORDER — CELECOXIB 200 MG/1
200 CAPSULE ORAL 2 TIMES DAILY PRN
Qty: 30 CAPSULE | Refills: 0 | Status: SHIPPED | OUTPATIENT
Start: 2024-05-09 | End: 2024-06-27

## 2024-05-09 RX ORDER — CEPHALEXIN 500 MG/1
500 CAPSULE ORAL 4 TIMES DAILY
Qty: 40 CAPSULE | Refills: 1 | Status: SHIPPED | OUTPATIENT
Start: 2024-05-09 | End: 2024-06-27

## 2024-05-09 RX ORDER — OXYCODONE HYDROCHLORIDE 5 MG/1
5-10 TABLET ORAL EVERY 6 HOURS PRN
Qty: 20 TABLET | Refills: 0 | Status: SHIPPED | OUTPATIENT
Start: 2024-05-09 | End: 2024-05-12

## 2024-05-09 RX ADMIN — ACETAMINOPHEN 975 MG: 325 TABLET, FILM COATED ORAL at 11:50

## 2024-05-09 RX ADMIN — ACETAMINOPHEN 975 MG: 325 TABLET, FILM COATED ORAL at 00:07

## 2024-05-09 RX ADMIN — OXYCODONE HYDROCHLORIDE 5 MG: 5 TABLET ORAL at 06:05

## 2024-05-09 RX ADMIN — CEPHALEXIN 500 MG: 500 CAPSULE ORAL at 06:05

## 2024-05-09 RX ADMIN — CELECOXIB 200 MG: 100 CAPSULE ORAL at 00:07

## 2024-05-09 RX ADMIN — CEPHALEXIN 500 MG: 500 CAPSULE ORAL at 11:50

## 2024-05-09 RX ADMIN — CELECOXIB 200 MG: 100 CAPSULE ORAL at 09:17

## 2024-05-09 RX ADMIN — METHOCARBAMOL 750 MG: 750 TABLET ORAL at 09:17

## 2024-05-09 RX ADMIN — OXYCODONE HYDROCHLORIDE 10 MG: 5 TABLET ORAL at 11:50

## 2024-05-09 RX ADMIN — ACETAMINOPHEN 975 MG: 325 TABLET, FILM COATED ORAL at 06:05

## 2024-05-09 RX ADMIN — CEPHALEXIN 500 MG: 500 CAPSULE ORAL at 00:07

## 2024-05-09 ASSESSMENT — ACTIVITIES OF DAILY LIVING (ADL)
ADLS_ACUITY_SCORE: 19

## 2024-05-09 NOTE — PLAN OF CARE
Patient VSS are at baseline Yes    Patient able to ambulate as they were prior to admission or with assist devices provided by therapies during their stayYes    Patient able to tolerate oral intake and maintain hydration status Yes    Patient has adequate pain control using oral analgesics Yes    Patient must be voiding adequate amounts Yes    Hypercapnia, Hypoventilation or hypoxia resolved for at least 2 hours without supplemental oxygen Yes    Deficits in sensation, mobility or coordination have resolved if spinal or regional anesthesia used Yes    Patient has returned to baseline mental status Yes    AVS given & reviewed with patient & . DINH teaching completed. All questions answered. Sent home with discharge meds & all belongings.

## 2024-05-09 NOTE — PLAN OF CARE
Goal Outcome Evaluation:                 Outcome Evaluation: Tolerating regular diet. DINH drain intact with small OP.  at bedside.    Date & Time: 5/8/2024   07p-11p  Surgery/POD#: 0   Jered Mastectomy, lymph node removal, reconstruction.     Behavior & Aggression: Green  Fall Risk: Yes  Orientation:AOx4  ABNL VS/O2: VSS on RA  ABNL Labs: N/A  Pain Management:Scheduled tylenol  Bowel/Bladder: No BM. -gas. BS hypoactive. Voiding in BR  Drains: 2 DINH   Wounds/incisions: breast incision x2  Diet:Regular, tolerating well.   Activity Level: SBA  Tests/Procedures: N/A  Anticipated  DC Date: Pending on improvement   Significant Information: Pain management.  at bedside. PIV LR @100 mL/h, will discontinue if pt tolerating intake well.

## 2024-05-09 NOTE — ANESTHESIA POSTPROCEDURE EVALUATION
Patient: Angie Michael    Procedure: Procedure(s):  Bilateral skin sparing mastectomies with bilateral sentinel lymph node biopsy  bilateral tag localized node excision  port removal  Excision Left Axillary Cyst  BILATERAL FIRST STAGE BREAST RECONSTRUCTION WITH TISSUE EXPANDERS AND ACELLULAR DERMAL MATRIX       Anesthesia Type:  General    Note:  Disposition: Admission   Postop Pain Control: Uneventful            Sign Out: Well controlled pain   PONV: No   Neuro/Psych: Uneventful            Sign Out: Acceptable/Baseline neuro status   Airway/Respiratory: Uneventful            Sign Out: Acceptable/Baseline resp. status   CV/Hemodynamics: Uneventful            Sign Out: Acceptable CV status; No obvious hypovolemia; No obvious fluid overload   Other NRE: NONE   DID A NON-ROUTINE EVENT OCCUR? No           Last vitals:  Vitals Value Taken Time   /93 05/08/24 1900   Temp 36.5  C (97.7  F) 05/08/24 1900   Pulse 90 05/08/24 1902   Resp 101 05/08/24 1904   SpO2 96 % 05/08/24 1907   Vitals shown include unfiled device data.    Electronically Signed By: Merary Bess MD  May 8, 2024  9:10 PM

## 2024-05-09 NOTE — ANESTHESIA CARE TRANSFER NOTE
Patient: Angie Michael    Procedure: Procedure(s):  Bilateral skin sparing mastectomies with bilateral sentinel lymph node biopsy  bilateral tag localized node excision  port removal  Excision Left Axillary Cyst  BILATERAL FIRST STAGE BREAST RECONSTRUCTION WITH TISSUE EXPANDERS AND ACELLULAR DERMAL MATRIX       Diagnosis: Bilateral malignant neoplasm of breast in female, estrogen receptor positive, unspecified site of breast (H) [C50.911, Z17.0, C50.912]  Breast cancer (H) [C50.919]  Diagnosis Additional Information: No value filed.    Anesthesia Type:   General     Note:    Oropharynx: oropharynx clear of all foreign objects and spontaneously breathing  Level of Consciousness: drowsy  Oxygen Supplementation: face mask  Level of Supplemental Oxygen (L/min / FiO2): 6  Independent Airway: airway patency satisfactory and stable  Dentition: dentition unchanged  Vital Signs Stable: post-procedure vital signs reviewed and stable  Report to RN Given: handoff report given  Patient transferred to: PACU  Comments: Patient comfortable  Handoff Report: Identifed the Patient, Identified the Reponsible Provider, Reviewed the pertinent medical history, Discussed the surgical course, Reviewed Intra-OP anesthesia mangement and issues during anesthesia, Set expectations for post-procedure period and Allowed opportunity for questions and acknowledgement of understanding      Vitals:  Vitals Value Taken Time   /93 05/08/24 1900   Temp 36.5  C (97.7  F) 05/08/24 1900   Pulse 90 05/08/24 1902   Resp 101 05/08/24 1904   SpO2 96 % 05/08/24 1907   Vitals shown include unfiled device data.    Electronically Signed By: NATE Street CRNA  May 8, 2024  8:17 PM

## 2024-05-09 NOTE — DISCHARGE SUMMARY
[facility] Discharge Summary    Angie Michael MRN# [id number]   Age: 43 year old YOB: 1980     Date of Admission:  5/8/2024  Date of Discharge::  5/9/2024  Admitting Physician:  Vasiliy Yee MD  Discharge Physician:  Manasa Wong PA-C             Admission Diagnoses:   Bilateral malignant neoplasm of breast in female, estrogen receptor positive, unspecified site of breast (H) [C50.911, Z17.0, C50.912]  Breast cancer (H) [C50.919]          Discharge Diagnosis:     Bilateral malignant neoplasm of breast in female, estrogen receptor positive, unspecified site of breast (H) [C50.911, Z17.0, C50.912]  Breast cancer (H) [C50.919]          Procedures:     Procedure(s): Bilateral skin sparing mastectomies  Bilateral sentinel lymph node biopsy with excision of tag localized nodes  Injection of indocyanine green dye for lymphatic mapping  Port removal  Excision of left axillary skin cyst  Bilateral first stage breast reconstruction with tissue expanders placed in the prepectoral plane  Acellular dermal matrix (Alloderm) placement to bilateral breasts  Laser angiography of mastectomy skin flaps                Medications Prior to Admission:     Medications Prior to Admission   Medication Sig Dispense Refill Last Dose    diazepam (VALIUM) 2 MG tablet Take 2 mg by mouth as needed for anxiety (MRIs)   Unknown at PRN    FEROSUL 325 (65 Fe) MG tablet Take 325 mg by mouth daily   5/6/2024 at am    LORazepam (ATIVAN) 0.5 MG tablet Take 0.5 mg by mouth every 6 hours as needed for anxiety   Unknown at PRN    Zinc Acetate, Oral, (ZINC ACETATE PO)    5/6/2024 at pm             Discharge Medications:     Current Discharge Medication List        START taking these medications    Details   acetaminophen (TYLENOL) 325 MG tablet Take 2 tablets (650 mg) by mouth every 6 hours as needed for pain    Associated Diagnoses: Malignant neoplasm of upper-outer quadrant of right breast in female, estrogen receptor  positive (H)      celecoxib (CELEBREX) 200 MG capsule Take 1 capsule (200 mg) by mouth 2 times daily as needed for moderate pain  Qty: 30 capsule, Refills: 0    Associated Diagnoses: Absence of both breasts      cephALEXin (KEFLEX) 500 MG capsule Take 1 capsule (500 mg) by mouth 4 times daily Continue until drains are removed.  Qty: 40 capsule, Refills: 1    Associated Diagnoses: Absence of both breasts      methocarbamol (ROBAXIN) 750 MG tablet Take 1 tablet (750 mg) by mouth 4 times daily as needed for muscle spasms (pain)  Qty: 12 tablet, Refills: 0    Associated Diagnoses: Malignant neoplasm of upper-outer quadrant of right breast in female, estrogen receptor positive (H)      !! oxyCODONE (ROXICODONE) 5 MG tablet Take 1-2 tablets (5-10 mg) by mouth every 6 hours as needed for pain  Qty: 20 tablet, Refills: 0    Associated Diagnoses: Absence of both breasts      !! oxyCODONE (ROXICODONE) 5 MG tablet Take 1-2 tablets (5-10 mg) by mouth every 6 hours as needed for moderate to severe pain  Qty: 10 tablet, Refills: 0    Associated Diagnoses: Malignant neoplasm of upper-outer quadrant of right breast in female, estrogen receptor positive (H)      SENNA-docusate sodium (SENNA S) 8.6-50 MG tablet Take 1-2 tablets by mouth 2 times daily as needed (constipation)  Qty: 20 tablet, Refills: 0    Associated Diagnoses: Malignant neoplasm of upper-outer quadrant of right breast in female, estrogen receptor positive (H)       !! - Potential duplicate medications found. Please discuss with provider.        CONTINUE these medications which have NOT CHANGED    Details   diazepam (VALIUM) 2 MG tablet Take 2 mg by mouth as needed for anxiety (MRIs)      FEROSUL 325 (65 Fe) MG tablet Take 325 mg by mouth daily      LORazepam (ATIVAN) 0.5 MG tablet Take 0.5 mg by mouth every 6 hours as needed for anxiety      Zinc Acetate, Oral, (ZINC ACETATE PO)                    Consultations:   No consultations were requested during this  admission          Brief History of Illness:   Reason for admission requiring a surgical or invasive procedure:   Bilateral malignant neoplasm of breast in female, estrogen receptor positive, unspecified site of breast (H) [C50.911, Z17.0, C50.912]  Breast cancer (H) [C50.919]   The patient underwent the following procedure(s):   Bilateral skin sparing mastectomies  Bilateral sentinel lymph node biopsy with excision of tag localized nodes  Injection of indocyanine green dye for lymphatic mapping  Port removal  Excision of left axillary skin cyst  Bilateral first stage breast reconstruction with tissue expanders placed in the prepectoral plane  Acellular dermal matrix (Alloderm) placement to bilateral breasts  Laser angiography of mastectomy skin flaps   There were no immediate complications during this procedure.    Please refer to the full operative summary for details.           Hospital Course:   The patient's hospital course was unremarkable.  She recovered as anticipated and experienced no post-operative complications. Pain well controlled. Remained hemodynamically stable and afebrile. Surgery went as planned and without complications.           Discharge Instructions and Follow-Up:     Discharge diet: Regular   Discharge activity: No lifting,  or strenuous exercise for 4 week(s)   Discharge follow-up: Follow up with Dr. Yee in 1 weeks   Wound care: Ice to area for comfort  Keep wound clean and dry  May get incision wet in shower but do not soak or scrub  Steri-strips and tegaderms off in 7 days           Discharge Disposition:     Discharged to home      Attestation:  I have reviewed today's vital signs, notes, medications, labs and imaging.  Total time: 30 minutes    Manasa Wong PA-C

## 2024-05-09 NOTE — PLAN OF CARE
Goal Outcome Evaluation:      Plan of Care Reviewed With: patient    Overall Patient Progress: improvingOverall Patient Progress: improving    Outcome Evaluation: Tolerating regular diet. DINH drain intact with small OP.  at bedside.        Date & Time: 5/8/2024   7p-7a  Surgery/POD#: 0   Jered Mastectomy, lymph node removal, reconstruction.     Behavior & Aggression: Green  Fall Risk: Yes  Orientation:AOx4  ABNL VS/O2: VSS on RA  ABNL Labs: N/A  Pain Management:Scheduled tylenol, Oxycodone x1, Dilaudid x1.   Bowel/Bladder: No BM. -gas. BS hypoactive. Voiding in BR.  Drains: 2 DINH bright red OP  Wounds/incisions: breast incision x2  Diet: Regular, tolerating well.   Activity Level: SBA  Tests/Procedures: N/A  Anticipated  DC Date: Pending on improvement   Significant Information: Pain management. PIV LR discontinued. Walked x1 in hallway.

## 2024-05-09 NOTE — PROGRESS NOTES
"General Surgery Note    Stable S/P Bilateral Skin Sparing Mastectomies, bilateral SLN biopsy, port removal, Left axillary cyst excision and Reconstruction by Plastic Surgery  Sidney/Joselito  POD1      Doing well.  Some bilateral axillary soreness that she is using ice for.  Tolerating diet.  Denies nausea.  No flatus yet.    Plastics rounded on earlier and feel she is ready to go home.  Pt agrees, though is nervous about going home to her 2 year old triplets.  We discussed protecting chest and drain/tubes so these do not get inadvertently pulled.    Return to see Plastics team next week for drain removal  Dr Little will call when pathology is back.  Discussed with pt the XR is from the bilateral tags, and this won't likely have a formal read.  Dr Little will discuss at time of pathology if still concerns.  Home in ACE wrap.  Will transition to camisole tomorrow.  Drain care instructions by nursing.  Pt declined stool softeners.  Has leftover at home and agreed to consider taking them tomorrow evening if she has not had return of bowel function by tomorrow.    NAD, very pleasant, A&O  /70 (BP Location: Right arm)   Pulse 85   Temp 97.7  F (36.5  C) (Oral)   Resp 19   Ht 1.676 m (5' 6\")   Wt 77.3 kg (170 lb 6.4 oz)   SpO2 94%   BMI 27.50 kg/m      Intake/Output Summary (Last 24 hours) at 5/9/2024 0937  Last data filed at 5/9/2024 0700  Gross per 24 hour   Intake 3255 ml   Output 2380 ml   Net 875 ml     Chest: ACE wrapped, not too tight  DINH drains: minimal serosang output as was just drained.  120 and 110 mL out since surgery.  UE: bilateral CMS intact, warm well perfused.    Arley Butcher PA-C  "

## 2024-05-09 NOTE — PROGRESS NOTES
"Plastic Surgery POD#1    Patient seen sitting up in bed. Reports some discomfort at surgical sites with adequate relief from pain medications. Ambulating. Voiding. Tolerating PO diet. Denies N/V.     /85 (BP Location: Right arm)   Pulse 95   Temp 98.8  F (37.1  C) (Oral)   Resp 18   Ht 1.676 m (5' 6\")   Wt 77.3 kg (170 lb 6.4 oz)   SpO2 96%   BMI 27.50 kg/m    Incisions cdi with steri-strips in place. Mastectomy skin flaps appear healthy. No erythema or signs of infection. No fluid collections. Drain outputs serosang. Volumes ok.    Plan:  Discussed surgical outcomes with patient.  Discharge home today. Discussed discharge instructions and follow-up. Patient voiced understanding and all questions answered.  Regular diet. Saline lock IVF. Encourage IS use.  Pain management with PO meds.   "

## 2024-05-13 LAB
PATH REPORT.COMMENTS IMP SPEC: ABNORMAL
PATH REPORT.COMMENTS IMP SPEC: ABNORMAL
PATH REPORT.COMMENTS IMP SPEC: YES
PATH REPORT.FINAL DX SPEC: ABNORMAL
PATH REPORT.GROSS SPEC: ABNORMAL
PATH REPORT.INTRAOP OBS SPEC DOC: ABNORMAL
PATH REPORT.MICROSCOPIC SPEC OTHER STN: ABNORMAL
PATH REPORT.RELEVANT HX SPEC: ABNORMAL
PATHOLOGY SYNOPTIC REPORT: ABNORMAL
PHOTO IMAGE: ABNORMAL

## 2024-05-14 ENCOUNTER — TELEPHONE (OUTPATIENT)
Dept: SURGERY | Facility: CLINIC | Age: 44
End: 2024-05-14
Payer: COMMERCIAL

## 2024-05-14 NOTE — TELEPHONE ENCOUNTER
Angie met with Dr. Cortes in November 2023. I spoke with his office and they will give Angie a call to schedule an appointment with him.     Veronica Worley, RN, BSN, PHN  Breast Care Nurse Coordinator  [facility] [place] [place]  [phone]

## 2024-05-14 NOTE — CONFIDENTIAL NOTE
I called Angie and discussed her pathology results. Ill have Veronica help her schedule with radiation oncology.     Fanny Little MD  [facility]  [phone]

## 2024-05-16 NOTE — PROGRESS NOTES
[facility]    Hematology/Oncology Established Patient Note      Today's Date: 5/22/2024    Reason for visit: Bilateral breast cancer.    HISTORY OF PRESENT ILLNESS: Angie Michael is a 43 year old female with BRCA2 gene mutation who presents with the following oncologic history:  1.  10/05/2023: High risk surveillance breast MRI showed right breast 3.6 x 3 x 3.2 cm irregular enhancing mass at 10:00, 0.9 x 0.8 x 0.9 cm second mass at 9:00, 1 x 0.9 x 1.2 cm third mass at 9:30, other suspicious foci of enhancement throughout the breast.  Left breast with 9.9 x 5.2 x 6.7 cm suspicious non-mass enhancement at 10:00-7:00; 1.6 x 1.3 x 1.7 cm enhancing mass at 4:00; 1.8 x 1.6 x 1.9 cm enhancing mass at 12:30; other suspicious foci of enhancement throughout the left breast.  One level 1 suspicious right axillary lymph node and one level 1 suspicious left axillary lymph node..  Developing suspicious interpectoral lymph node.  2.  10/12/2023: Bilateral diagnostic mammogram showed right breast with 3.2 x 2.7 x 4.1 cm irregular mass at 10:00, 6 cm from nipple; 0.9 x 0.6 x 1 cm irregular mass at 11:00, 2 cm from nipple and prominent right axillary lymph node.  Left breast with 1.4 x 1.1 x 1.7 cm mass at 12:00, 2 cm from nipple, 1.8 x 1 x 1.6 cm mass at 3:30, 4 cm from nipple, 1 x 0.9 x 1.1 cm mass at 3:00, 7 cm from nipple, multiple areas of hypoechogenicity with calcifications in the lateral left breast, and prominent left axillary lymph node.  3.  10/12/2023: Biopsy of right breast at 10:00 showed grade 3 invasive lobular carcinoma, ER strongly positive at 81-90%, WV positive at 31-40%, HER2 equivocal with IHC = 2+, FISH negative, Ki-67 = 70%.  Biopsy of right axillary lymph node showed metastatic carcinoma.  Biopsy of left breast at 3:30 showed grade 2 invasive carcinoma with ductal and lobular features, ER positive at %, WV positive at 2%, HER2 equivocal with IHC = 2+, FISH negative, Ki-67 = 26%.   Biopsy of left axillary lymph node showed metastatic carcinoma.  4.  10/17/2023: PET/CT scan showed cluster of hypermetabolic right breast nodules with SUV max 14.9; 2.1 x 1 cm hypermetabolic level 1 right axillary lymph node with SUV max 5.5; hypermetabolic left breast nodule with SUV max 18.8 and several additional less intense hypermetabolic left breast nodules; 1.5 x 1.3 cm hypermetabolic level 1 left axillary lymph node with SUV max 4.  Small sclerotic focus of metabolic activity within the proximal tibial shaft with SUV max 3.7, indeterminate but unlikely to reflect metastatic disease.  Subcentimeter hypermetabolic focus along anterior margin of the left thyroid lobe, likely a thyroid nodule.  5.  11/03/2023: Thyroid ultrasound showed 0.7 x 0.6 x 0.5 cm solid hypoechoic nodule at the junction of the isthmus and left hemithyroid corresponding to increased uptake on PET/CT, consistent with TIRADS 4 nodule and subcentimeter TIRADS 3 nodule in the left inferior thyroid.  6. 11/3/2023: MR right tibia/fibula showed discrete focal 3 cm area of increased T2 signal and enhancement in mid shaft of right tibia with fat signal interspersed throughout this area with significant signal dropout on opposed phase imaging, favoring benign process.  7. 11/21/2023: Started neoadjuvant chemotherapy with dose-dense Adriamycin + Cytoxan with Neulasta support with Dr. Bev Ryan at [facility]. Reacted to [facility].  8. 11/25/2023: Evaluated at [facility] ED with diarrhea and dizziness, latter attributed to olanzapine -- this was discontinued.  9. 1/03/2024: Completed cycle 4 dose-dense AC.  10. 1/16/2024: Breast MRI showed persistent abnormal mass and nonmass enhancement in the left breast measuring 10.2 cm in AP dimension (unchanged).  Previously seen mass at the 12:30 position now measures 1.0 cm in greatest dimension (previously 1.9 cm). Previously seen seen mass at the 4:00 position now measures 1.1 cm in greatest dimension  (previously 1.7 cm). Previously seen enlarged left axillary lymph node now has normal size and morphology. Previously seen intrapectoral lymph node now has normal size and morphology. Previously seen abnormal right breast enhancement has entirely resolved. Previously visualized enlarged right axillary lymph node now has normal size and morphology.  Other previously seen axillary lymph nodes appear stable.  11. 1/17/2024: Start of weekly paclitaxel for planned 12 weeks.  12. 2/9/2024: MR right tibia/fibula showed mid right tibial shaft bone lesion increased from 3.2 cm to 3.8 cm; speckled foci of marrow signal likely areas of red marrow reconversion.  13. 4/03/2024: Completion of cycle 12 paclitaxel.  14. 4/5/2024: MR right tibia/fibula showed stable in size and improved signal alteration of right tibial shaft focal marrow alteration, likely red marrow.  15. 5/08/2024: Bilateral mastectomies with bilateral axillary sentinel lymph node excision under c/o Dr. Fanny Little.  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 Right breast with no residual invasive carcinoma; LCIS present; treatment related changes, 4 right axillary sentinel lymph nodes present, ypT0-N0.    INTERVAL HISTORY:  Angie reports healing well so far from her bilateral mastectomies.    REVIEW OF SYSTEMS:   14 point ROS was reviewed and is negative other than as noted above in HPI.       HOME MEDICATIONS:  Current Outpatient Medications   Medication Sig Dispense Refill    acetaminophen (TYLENOL) 325 MG tablet Take 2 tablets (650 mg) by mouth every 6 hours as needed for pain      celecoxib (CELEBREX) 200 MG capsule Take 1 capsule (200 mg) by mouth 2 times daily as needed for moderate pain 30 capsule 0    cephALEXin (KEFLEX) 500 MG capsule Take 1 capsule  (500 mg) by mouth 4 times daily Continue until drains are removed. 40 capsule 1    diazepam (VALIUM) 2 MG tablet Take 2 mg by mouth as needed for anxiety (MRIs)      FEROSUL 325 (65 Fe) MG tablet Take 325 mg by mouth daily      LORazepam (ATIVAN) 0.5 MG tablet Take 0.5 mg by mouth every 6 hours as needed for anxiety      methocarbamol (ROBAXIN) 750 MG tablet Take 1 tablet (750 mg) by mouth 4 times daily as needed for muscle spasms (pain) 12 tablet 0    Zinc Acetate, Oral, (ZINC ACETATE PO)            ALLERGIES:  Allergies   Allergen Reactions    Aprepitant Difficulty breathing and Shortness Of Breath    Fosaprepitant Unknown     Allergy added per chart review. [facility]    Armoracia Rusticana Ext (Horseradish) Hives    Carboprost GI Disturbance     Diarrhea    Codeine Nausea and Vomiting         PAST MEDICAL HISTORY:  BRCA2 gene mutation.  Bilateral breast cancer as outlined above.  2022 Galleri test - negative for malignancy.    Gynecologic history:  Age of menarche at 15.  3 children/triplets -- 1 boy and twin girls born 2022.  Age of first pregnancy at 41.  Used 10 years of OCPs no HRT, IVF for fertility treatment.      PAST SURGICAL HISTORY:  Past Surgical History:   Procedure Laterality Date    ABDOMEN SURGERY          BIOPSY, BREAST, WITH RADIOFREQUENCY IDENTIFICATION Bilateral 2024    Procedure: bilateral tag localized node excision;  Surgeon: aFnny Little MD;  Location:  OR    ENT SURGERY      tonsillectomy    EXCISE CYST GENERIC (LOCATION) Left 2024    Procedure: Excision Left Axillary Cyst;  Surgeon: Fanny Little MD;  Location:  OR    GYN SURGERY      egg retrieval for IVF    INSERT PORT VASCULAR ACCESS Right 2023    Procedure: INSERTION, VASCULAR ACCESS PORT;  Surgeon: Fanny Little MD;  Location:  OR    MASTECTOMY SIMPLE BILATERAL, SENTINEL NODE BILATERAL, COMBINED Bilateral 2024    Procedure: Bilateral skin sparing mastectomies with bilateral  sentinel lymph node biopsy;  Surgeon: Fanny Little MD;  Location:  OR    ORTHOPEDIC SURGERY      knee arthroscopy    RECONSTRUCT BREAST, INSERT TISSUE EXPANDER BILATERAL, COMBINED Bilateral 5/8/2024    Procedure: BILATERAL FIRST STAGE BREAST RECONSTRUCTION WITH TISSUE EXPANDERS AND ACELLULAR DERMAL MATRIX;  Surgeon: Vasiliy Yee MD;  Location:  OR    REMOVE PORT VASCULAR ACCESS N/A 5/8/2024    Procedure: port removal;  Surgeon: Fanny Little MD;  Location:  OR         SOCIAL HISTORY:  Social History     Socioeconomic History    Marital status:      Spouse name: Not on file    Number of children: Not on file    Years of education: Not on file    Highest education level: Not on file   Occupational History    Not on file   Tobacco Use    Smoking status: Never     Passive exposure: Never    Smokeless tobacco: Never   Vaping Use    Vaping status: Never Used   Substance and Sexual Activity    Alcohol use: Not Currently    Drug use: Not Currently    Sexual activity: Not on file   Other Topics Concern    Parent/sibling w/ CABG, MI or angioplasty before 65F 55M? No   Social History Narrative    Not on file     Social Determinants of Health     Financial Resource Strain: Low Risk  (4/9/2024)    Financial Resource Strain     Within the past 12 months, have you or your family members you live with been unable to get utilities (heat, electricity) when it was really needed?: No   Food Insecurity: Low Risk  (4/9/2024)    Food Insecurity     Within the past 12 months, did you worry that your food would run out before you got money to buy more?: No     Within the past 12 months, did the food you bought just not last and you didn t have money to get more?: No   Transportation Needs: Low Risk  (4/9/2024)    Transportation Needs     Within the past 12 months, has lack of transportation kept you from medical appointments, getting your medicines, non-medical meetings or appointments, work, or from getting  "things that you need?: No   Physical Activity: Insufficiently Active (11/2/2023)    [facility]    Exercise Vital Sign     Days of Exercise per Week: 4 days     Minutes of Exercise per Session: 30 min   Stress: Not on file   Social Connections: Unknown (12/22/2021)    [facility]    Social Connections     Frequency of Communication with Friends and Family: Not on file   Interpersonal Safety: Low Risk  (4/16/2024)    Interpersonal Safety     Do you feel physically and emotionally safe where you currently live?: Yes     Within the past 12 months, have you been hit, slapped, kicked or otherwise physically hurt by someone?: No     Within the past 12 months, have you been humiliated or emotionally abused in other ways by your partner or ex-partner?: No   Housing Stability: Low Risk  (4/9/2024)    Housing Stability     Do you have housing? : Yes     Are you worried about losing your housing?: No         FAMILY HISTORY:  Paternal grandmother and many paternal grandfathers with breast cancer; cousin with DCIS tested positive for BRCA1.  No ovarian, colon, or prostate cancer.      PHYSICAL EXAM:  Vital signs:  /75   Pulse 73   Resp 16   Ht 1.676 m (5' 6\")   Wt 78.4 kg (172 lb 12.8 oz)   LMP 12/01/2023 (Approximate)   SpO2 97%   BMI 27.89 kg/m     GENERAL: No acute distress.  EYES: No scleral icterus. No overt erythema.  RESPIRATORY: No audible cough, wheezing, or labored breathing.  MUSCULOSKELETAL: Range of motion in the neck, shoulders, and arms appear normal.  SKIN: No overt rashes, discolorations, or lesions over the face and neck.  NEUROLOGIC: Alert.  No overt tremors.  PSYCHIATRIC: Normal affect and mood.  Does not appear anxious.       LABS:  CBC RESULTS:   Recent Labs   Lab Test 03/12/24  1455   WBC 3.6*   RBC 3.73*   HGB 12.0   HCT 35.6   MCV 95   MCH 32.2   MCHC 33.7   RDW 13.2   PLT " 258     Last Comprehensive Metabolic Panel:  Sodium   Date Value Ref Range Status   02/27/2024 138 135 - 145 mmol/L Final     Comment:     Reference intervals for this test were updated on 09/26/2023 to more accurately reflect our healthy population. There may be differences in the flagging of prior results with similar values performed with this method. Interpretation of those prior results can be made in the context of the updated reference intervals.      Potassium   Date Value Ref Range Status   02/27/2024 3.9 3.4 - 5.3 mmol/L Final     Chloride   Date Value Ref Range Status   02/27/2024 104 98 - 107 mmol/L Final     Carbon Dioxide (CO2)   Date Value Ref Range Status   02/27/2024 25 22 - 29 mmol/L Final     Anion Gap   Date Value Ref Range Status   02/27/2024 9 7 - 15 mmol/L Final     GLUCOSE BY METER POCT   Date Value Ref Range Status   05/09/2024 109 (H) 70 - 99 mg/dL Final     Urea Nitrogen   Date Value Ref Range Status   02/27/2024 13.9 6.0 - 20.0 mg/dL Final     Creatinine   Date Value Ref Range Status   02/27/2024 0.72 0.51 - 0.95 mg/dL Final     GFR Estimate   Date Value Ref Range Status   02/27/2024 >90 >60 mL/min/1.73m2 Final     Calcium   Date Value Ref Range Status   02/27/2024 9.2 8.6 - 10.0 mg/dL Final     Bilirubin Total   Date Value Ref Range Status   03/12/2024 0.6 <=1.2 mg/dL Final     Alkaline Phosphatase   Date Value Ref Range Status   03/12/2024 52 40 - 150 U/L Final     Comment:     Reference intervals for this test were updated on 11/14/2023 to more accurately reflect our healthy population. There may be differences in the flagging of prior results with similar values performed with this method. Interpretation of those prior results can be made in the context of the updated reference intervals.     ALT   Date Value Ref Range Status   03/12/2024 15 0 - 50 U/L Final     Comment:     Reference intervals for this test were updated on 6/12/2023 to more accurately reflect our healthy population.  There may be differences in the flagging of prior results with similar values performed with this method. Interpretation of those prior results can be made in the context of the updated reference intervals.       AST   Date Value Ref Range Status   03/12/2024 15 0 - 45 U/L Final     Comment:     Reference intervals for this test were updated on 6/12/2023 to more accurately reflect our healthy population. There may be differences in the flagging of prior results with similar values performed with this method. Interpretation of those prior results can be made in the context of the updated reference intervals.     4/2:  WBC 4, ANC 2.5  Hgb 13  Plts 254    IMAGING:  Reviewed as per HPI.    ASSESSMENT/PLAN:  Angie Michael is a 43 year old female with the following issues:  1.  Clinical prognostic stage IIB, cT2-N1-M0, multifocal grade 3 invasive lobular carcinoma of the RIGHT upper outer breast, ER positive 81-90%, AK positive 31-40%, HER-2 FISH negative (IHC = 2+), Ki-67 = 70%, ypT0-N0  2.  Clinical prognostic stage IIA, cT3-N1-M0, multifocal grade 2 invasive ductal and lobular carcinoma of LEFT breast overlapping sites, ER positive %, AK positive 2%, HER-2 FISH negative (IHC = 2+), Ki-67 = 26%, yp(m)T1b-N0  3. BRCA-2 deleterious gene mutation  -- Angie is s/p neoadjuvant chemotherapy with dose-dense AC followed by weekly paclitaxel completed 4/3/2024 and bilateral mastectomies with bilateral axillary sentinel lymph node excision on 5/8/2024.  -- Discussed with Angie that she achieved an outstanding complete response in her right breast cancer and excellent partial response in her left breast with no residual cancer in the lymph nodes.  -- Continue breast reconstruction expansion with Dr. Vasiliy Yee.  - She will meet with Dr. Finesse Cortes again for radiation discussion. She asked about proton beam radiation at [facility] -- I encouraged her to discuss with Dr. Cortes.   - Given her initial extent of disease  bilaterally, she would benefit from adjuvant abemaciclib for 2 years along with hormone blockade therapy as per the MonarchE trial.  --Based on the phase 3 monarchE trial, which demonstrated improvement in invasive disease free survival rate of 86.1% versus 79% when abemaciclib was added to aromatase inhibitor for 2 years compared to tamoxifen or AI alone.  --I discussed potential side effects of abemaciclib, including but not limited to diarrhea, infection, neutropenia, fatigue, leukopenia, thrombocytopenia, nausea, anemia, headache, liver toxicity, increased risk for blood clots. She can commence abemaciclib after completion of radiation.  --Discussed use of loperamide to control diarrhea. Will plan to start abemaciclib 50 mg BID (after completion of radiation) in the first month, then try to escalate to 100 mg BID in month 2. Discussed that efficacy is the same even at lower dose (dose lower than 150 mg BID).  -Adjuvant olaparib (Lynparza) for 1 year would be considered as well.  However, I do note that based on the Tomas trial, those patients who had hormone receptor positive HER2 negative breast cancer were required to have at least 4 pathologically confirmed positive lymph nodes. We also talked about potential risk of MDS with the use of olaparib.   -I advised starting hormone blockade therapy now with ovarian suppression therapy with Zoladex every 4 weeks with an anastrozole.  I discussed the potential side effects of anastrozole, including but not limited to: fatigue, myalgias/arthralgias, bone density loss, decrease sexual drive, vaginal dryness, nausea, headache, difficulty sleeping, hot flashes, night sweats, small cardiovascular risk. She agrees to proceed.  I instructed her to start anastrozole 1 day after the first Zoladex injection.  --She already removed her fallopian tubes and will undergo BSO sometime in the future.  After BSO, she can discontinue Zoladex injections.  She requested a referral for  gynecology -- I recommended she see Dr. Hiral Bowens for BSO. Referral placed.    3.  Right tibia bone lesion  - 11/3/2023 MR right tibia/fibula showed a 3 cm area of increased T2 signal in the midshaft area.  Although the signaling suggested a benign process, this was technically indeterminate.    -- Repeat 2/9/2024 MR right tibia showed interval increase from 3.2 cm to 3.8 cm.  --2/27/2024 biopsy of this bone lesion was negative for malignancy.    --4/5/2024 MRI tibia showed stable in size but improved signal alteration in the lesion, most suggestive of red marrow.  --Given the peculiarity of this finding, I advised a repeat PET scan and MRI in the fall -- August or September 2024 to ensure no suspicious changes in the bone lesion and no new metastatic disease.    4.  Fertility discussion  -Angie has triplets.  She has saved embryos.  She has not yet decided whether to have more children.  - Following completion of chemotherapy followed by radiation, hormone blockade therapy would be advised for 2-3 years before bearing more children.  She has also briefly considered a surrogate if she wishes to have more children.    6. Weight gain  --Discussed again today this is unfortunately common as breast cancer patients undergo chemotherapy and rendered in a chemo-induced menopause.  This then affects metabolism and can cause excess fat accumulation in the central/truncal area of the body.   Angie states she has worked very hard to maintain a healthy weight through diet and prior exercise and has now gained weight after starting chemo.  --She is only 2 weeks postop and will need additional healing time prior to restarting any cardiovascular exercise but discussed she do some easy walking.    Return in 2 months.    Monse Dan MD  [facility] Hematology/Oncology     Total time spent today: 80 minutes in chart review, patient evaluation, counseling, documentation, test and/or medication/prescription orders,  and coordination of care.      The longitudinal plan of care for the diagnosis(es)/condition(s) as documented were addressed during this visit. Due to the added complexity in care, I will continue to support Angie in the subsequent management and with ongoing continuity of care.

## 2024-05-22 ENCOUNTER — TUMOR CONFERENCE (OUTPATIENT)
Dept: ONCOLOGY | Facility: CLINIC | Age: 44
End: 2024-05-22
Payer: COMMERCIAL

## 2024-05-22 ENCOUNTER — ONCOLOGY VISIT (OUTPATIENT)
Dept: ONCOLOGY | Facility: CLINIC | Age: 44
End: 2024-05-22
Attending: INTERNAL MEDICINE
Payer: COMMERCIAL

## 2024-05-22 VITALS
RESPIRATION RATE: 16 BRPM | OXYGEN SATURATION: 97 % | HEART RATE: 73 BPM | WEIGHT: 172.8 LBS | HEIGHT: 66 IN | BODY MASS INDEX: 27.77 KG/M2 | SYSTOLIC BLOOD PRESSURE: 111 MMHG | DIASTOLIC BLOOD PRESSURE: 75 MMHG

## 2024-05-22 DIAGNOSIS — C50.812 MALIGNANT NEOPLASM OF OVERLAPPING SITES OF BOTH BREASTS IN FEMALE, ESTROGEN RECEPTOR POSITIVE (H): Primary | ICD-10-CM

## 2024-05-22 DIAGNOSIS — Z15.01 BRCA2 GENE MUTATION POSITIVE: ICD-10-CM

## 2024-05-22 DIAGNOSIS — Z17.0 MALIGNANT NEOPLASM OF OVERLAPPING SITES OF BOTH BREASTS IN FEMALE, ESTROGEN RECEPTOR POSITIVE (H): Primary | ICD-10-CM

## 2024-05-22 DIAGNOSIS — R63.5 WEIGHT GAIN: ICD-10-CM

## 2024-05-22 DIAGNOSIS — M89.9 BONE LESION: ICD-10-CM

## 2024-05-22 DIAGNOSIS — C50.811 MALIGNANT NEOPLASM OF OVERLAPPING SITES OF BOTH BREASTS IN FEMALE, ESTROGEN RECEPTOR POSITIVE (H): Primary | ICD-10-CM

## 2024-05-22 DIAGNOSIS — Z15.09 BRCA2 GENE MUTATION POSITIVE: ICD-10-CM

## 2024-05-22 PROCEDURE — 99417 PROLNG OP E/M EACH 15 MIN: CPT | Performed by: INTERNAL MEDICINE

## 2024-05-22 PROCEDURE — G0463 HOSPITAL OUTPT CLINIC VISIT: HCPCS | Performed by: INTERNAL MEDICINE

## 2024-05-22 PROCEDURE — G2211 COMPLEX E/M VISIT ADD ON: HCPCS | Performed by: INTERNAL MEDICINE

## 2024-05-22 PROCEDURE — 99215 OFFICE O/P EST HI 40 MIN: CPT | Performed by: INTERNAL MEDICINE

## 2024-05-22 RX ORDER — ANASTROZOLE 1 MG/1
1 TABLET ORAL DAILY
Qty: 90 TABLET | Refills: 3 | Status: SHIPPED | OUTPATIENT
Start: 2024-05-22

## 2024-05-22 ASSESSMENT — PAIN SCALES - GENERAL: PAINLEVEL: NO PAIN (0)

## 2024-05-22 NOTE — PROGRESS NOTES
"Oncology Rooming Note    May 22, 2024 3:23 PM   Angie Michael is a 43 year old female who presents for:    Chief Complaint   Patient presents with    Oncology Clinic Visit     Initial Vitals: Resp 16   Ht 1.676 m (5' 6\")   Wt 78.4 kg (172 lb 12.8 oz)   LMP 12/01/2023 (Approximate)   BMI 27.89 kg/m   Estimated body mass index is 27.89 kg/m  as calculated from the following:    Height as of this encounter: 1.676 m (5' 6\").    Weight as of this encounter: 78.4 kg (172 lb 12.8 oz). Body surface area is 1.91 meters squared.  No Pain (0) Comment: Data Unavailable   Patient's last menstrual period was 12/01/2023 (approximate).  Allergies reviewed: Yes  Medications reviewed: Yes    Medications: Medication refills not needed today.  Pharmacy name entered into [facility]:    [id number] - [place], [place] - [address] [facility] [place], [place] - [address] [address]    Frailty Screening:   Is the patient here for a new oncology consult visit in cancer care? 2. No        Beatriz Aguilar MA            "

## 2024-05-22 NOTE — LETTER
5/22/2024         RE: Angie Michael  [address] [place] [id number]        Dear Colleague,    Thank you for referring your patient, Angie Michael, to the [facility]. Please see a copy of my visit note below.    [facility]    Hematology/Oncology Established Patient Note      Today's Date: 5/22/2024    Reason for visit: Bilateral breast cancer.    HISTORY OF PRESENT ILLNESS: Angie Michael is a 43 year old female with BRCA2 gene mutation who presents with the following oncologic history:  1.  10/05/2023: High risk surveillance breast MRI showed right breast 3.6 x 3 x 3.2 cm irregular enhancing mass at 10:00, 0.9 x 0.8 x 0.9 cm second mass at 9:00, 1 x 0.9 x 1.2 cm third mass at 9:30, other suspicious foci of enhancement throughout the breast.  Left breast with 9.9 x 5.2 x 6.7 cm suspicious non-mass enhancement at 10:00-7:00; 1.6 x 1.3 x 1.7 cm enhancing mass at 4:00; 1.8 x 1.6 x 1.9 cm enhancing mass at 12:30; other suspicious foci of enhancement throughout the left breast.  One level 1 suspicious right axillary lymph node and one level 1 suspicious left axillary lymph node..  Developing suspicious interpectoral lymph node.  2.  10/12/2023: Bilateral diagnostic mammogram showed right breast with 3.2 x 2.7 x 4.1 cm irregular mass at 10:00, 6 cm from nipple; 0.9 x 0.6 x 1 cm irregular mass at 11:00, 2 cm from nipple and prominent right axillary lymph node.  Left breast with 1.4 x 1.1 x 1.7 cm mass at 12:00, 2 cm from nipple, 1.8 x 1 x 1.6 cm mass at 3:30, 4 cm from nipple, 1 x 0.9 x 1.1 cm mass at 3:00, 7 cm from nipple, multiple areas of hypoechogenicity with calcifications in the lateral left breast, and prominent left axillary lymph node.  3.  10/12/2023: Biopsy of right breast at 10:00 showed grade 3 invasive lobular carcinoma, ER strongly positive at 81-90%, AK positive at 31-40%, HER2 equivocal with IHC = 2+, FISH negative, Ki-67 = 70%.  Biopsy of right  axillary lymph node showed metastatic carcinoma.  Biopsy of left breast at 3:30 showed grade 2 invasive carcinoma with ductal and lobular features, ER positive at %, MT positive at 2%, HER2 equivocal with IHC = 2+, FISH negative, Ki-67 = 26%.  Biopsy of left axillary lymph node showed metastatic carcinoma.  4.  10/17/2023: PET/CT scan showed cluster of hypermetabolic right breast nodules with SUV max 14.9; 2.1 x 1 cm hypermetabolic level 1 right axillary lymph node with SUV max 5.5; hypermetabolic left breast nodule with SUV max 18.8 and several additional less intense hypermetabolic left breast nodules; 1.5 x 1.3 cm hypermetabolic level 1 left axillary lymph node with SUV max 4.  Small sclerotic focus of metabolic activity within the proximal tibial shaft with SUV max 3.7, indeterminate but unlikely to reflect metastatic disease.  Subcentimeter hypermetabolic focus along anterior margin of the left thyroid lobe, likely a thyroid nodule.  5.  11/03/2023: Thyroid ultrasound showed 0.7 x 0.6 x 0.5 cm solid hypoechoic nodule at the junction of the isthmus and left hemithyroid corresponding to increased uptake on PET/CT, consistent with TIRADS 4 nodule and subcentimeter TIRADS 3 nodule in the left inferior thyroid.  6. 11/3/2023: MR right tibia/fibula showed discrete focal 3 cm area of increased T2 signal and enhancement in mid shaft of right tibia with fat signal interspersed throughout this area with significant signal dropout on opposed phase imaging, favoring benign process.  7. 11/21/2023: Started neoadjuvant chemotherapy with dose-dense Adriamycin + Cytoxan with Neulasta support with Dr. Bev Ryan at [facility]. Reacted to [facility].  8. 11/25/2023: Evaluated at [facility] ED with diarrhea and dizziness, latter attributed to olanzapine -- this was discontinued.  9. 1/03/2024: Completed cycle 4 dose-dense AC.  10. 1/16/2024: Breast MRI showed persistent abnormal mass and nonmass enhancement in the left  breast measuring 10.2 cm in AP dimension (unchanged).  Previously seen mass at the 12:30 position now measures 1.0 cm in greatest dimension (previously 1.9 cm). Previously seen seen mass at the 4:00 position now measures 1.1 cm in greatest dimension (previously 1.7 cm). Previously seen enlarged left axillary lymph node now has normal size and morphology. Previously seen intrapectoral lymph node now has normal size and morphology. Previously seen abnormal right breast enhancement has entirely resolved. Previously visualized enlarged right axillary lymph node now has normal size and morphology.  Other previously seen axillary lymph nodes appear stable.  11. 1/17/2024: Start of weekly paclitaxel for planned 12 weeks.  12. 2/9/2024: MR right tibia/fibula showed mid right tibial shaft bone lesion increased from 3.2 cm to 3.8 cm; speckled foci of marrow signal likely areas of red marrow reconversion.  13. 4/03/2024: Completion of cycle 12 paclitaxel.  14. 4/5/2024: MR right tibia/fibula showed stable in size and improved signal alteration of right tibial shaft focal marrow alteration, likely red marrow.  15. 5/08/2024: Bilateral mastectomies with bilateral axillary sentinel lymph node excision under c/o Dr. Fanny Little.  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 Right breast with no residual invasive carcinoma; LCIS present; treatment related changes, 4 right axillary sentinel lymph nodes present, ypT0-N0.    INTERVAL HISTORY:  Angie reports healing well so far from her bilateral mastectomies.    REVIEW OF SYSTEMS:   14 point ROS was reviewed and is negative other than as noted above in HPI.       HOME MEDICATIONS:  Current Outpatient Medications   Medication Sig Dispense Refill     acetaminophen (TYLENOL) 325 MG  tablet Take 2 tablets (650 mg) by mouth every 6 hours as needed for pain       celecoxib (CELEBREX) 200 MG capsule Take 1 capsule (200 mg) by mouth 2 times daily as needed for moderate pain 30 capsule 0     cephALEXin (KEFLEX) 500 MG capsule Take 1 capsule (500 mg) by mouth 4 times daily Continue until drains are removed. 40 capsule 1     diazepam (VALIUM) 2 MG tablet Take 2 mg by mouth as needed for anxiety (MRIs)       FEROSUL 325 (65 Fe) MG tablet Take 325 mg by mouth daily       LORazepam (ATIVAN) 0.5 MG tablet Take 0.5 mg by mouth every 6 hours as needed for anxiety       methocarbamol (ROBAXIN) 750 MG tablet Take 1 tablet (750 mg) by mouth 4 times daily as needed for muscle spasms (pain) 12 tablet 0     Zinc Acetate, Oral, (ZINC ACETATE PO)            ALLERGIES:  Allergies   Allergen Reactions     Aprepitant Difficulty breathing and Shortness Of Breath     Fosaprepitant Unknown     Allergy added per chart review. [facility]     Armoracia Rusticana Ext (Horseradish) Hives     Carboprost GI Disturbance     Diarrhea     Codeine Nausea and Vomiting         PAST MEDICAL HISTORY:  BRCA2 gene mutation.  Bilateral breast cancer as outlined above.  2022 Galleri test - negative for malignancy.    Gynecologic history:  Age of menarche at 15.  3 children/triplets -- 1 boy and twin girls born 2022.  Age of first pregnancy at 41.  Used 10 years of OCPs no HRT, IVF for fertility treatment.      PAST SURGICAL HISTORY:  Past Surgical History:   Procedure Laterality Date     ABDOMEN SURGERY           BIOPSY, BREAST, WITH RADIOFREQUENCY IDENTIFICATION Bilateral 2024    Procedure: bilateral tag localized node excision;  Surgeon: Fanny Little MD;  Location:  OR     ENT SURGERY      tonsillectomy     EXCISE CYST GENERIC (LOCATION) Left 2024    Procedure: Excision Left Axillary Cyst;  Surgeon: Fanny Little MD;  Location:  OR     GYN SURGERY      egg retrieval for IVF     INSERT PORT VASCULAR  ACCESS Right 11/20/2023    Procedure: INSERTION, VASCULAR ACCESS PORT;  Surgeon: Fanny Little MD;  Location:  OR     MASTECTOMY SIMPLE BILATERAL, SENTINEL NODE BILATERAL, COMBINED Bilateral 5/8/2024    Procedure: Bilateral skin sparing mastectomies with bilateral sentinel lymph node biopsy;  Surgeon: Fanny Little MD;  Location:  OR     ORTHOPEDIC SURGERY      knee arthroscopy     RECONSTRUCT BREAST, INSERT TISSUE EXPANDER BILATERAL, COMBINED Bilateral 5/8/2024    Procedure: BILATERAL FIRST STAGE BREAST RECONSTRUCTION WITH TISSUE EXPANDERS AND ACELLULAR DERMAL MATRIX;  Surgeon: Vasiliy Yee MD;  Location:  OR     REMOVE PORT VASCULAR ACCESS N/A 5/8/2024    Procedure: port removal;  Surgeon: Fanny Little MD;  Location:  OR         SOCIAL HISTORY:  Social History     Socioeconomic History     Marital status:      Spouse name: Not on file     Number of children: Not on file     Years of education: Not on file     Highest education level: Not on file   Occupational History     Not on file   Tobacco Use     Smoking status: Never     Passive exposure: Never     Smokeless tobacco: Never   Vaping Use     Vaping status: Never Used   Substance and Sexual Activity     Alcohol use: Not Currently     Drug use: Not Currently     Sexual activity: Not on file   Other Topics Concern     Parent/sibling w/ CABG, MI or angioplasty before 65F 55M? No   Social History Narrative     Not on file     Social Determinants of Health     Financial Resource Strain: Low Risk  (4/9/2024)    Financial Resource Strain      Within the past 12 months, have you or your family members you live with been unable to get utilities (heat, electricity) when it was really needed?: No   Food Insecurity: Low Risk  (4/9/2024)    Food Insecurity      Within the past 12 months, did you worry that your food would run out before you got money to buy more?: No      Within the past 12 months, did the food you bought just not last  "and you didn t have money to get more?: No   Transportation Needs: Low Risk  (4/9/2024)    Transportation Needs      Within the past 12 months, has lack of transportation kept you from medical appointments, getting your medicines, non-medical meetings or appointments, work, or from getting things that you need?: No   Physical Activity: Insufficiently Active (11/2/2023)    [facility]    Exercise Vital Sign      Days of Exercise per Week: 4 days      Minutes of Exercise per Session: 30 min   Stress: Not on file   Social Connections: Unknown (12/22/2021)    [facility]    Social Connections      Frequency of Communication with Friends and Family: Not on file   Interpersonal Safety: Low Risk  (4/16/2024)    Interpersonal Safety      Do you feel physically and emotionally safe where you currently live?: Yes      Within the past 12 months, have you been hit, slapped, kicked or otherwise physically hurt by someone?: No      Within the past 12 months, have you been humiliated or emotionally abused in other ways by your partner or ex-partner?: No   Housing Stability: Low Risk  (4/9/2024)    Housing Stability      Do you have housing? : Yes      Are you worried about losing your housing?: No         FAMILY HISTORY:  Paternal grandmother and many paternal grandfathers with breast cancer; cousin with DCIS tested positive for BRCA1.  No ovarian, colon, or prostate cancer.      PHYSICAL EXAM:  Vital signs:  /75   Pulse 73   Resp 16   Ht 1.676 m (5' 6\")   Wt 78.4 kg (172 lb 12.8 oz)   LMP 12/01/2023 (Approximate)   SpO2 97%   BMI 27.89 kg/m     GENERAL: No acute distress.  EYES: No scleral icterus. No overt erythema.  RESPIRATORY: No audible cough, wheezing, or labored breathing.  MUSCULOSKELETAL: Range of motion in the neck, shoulders, and arms appear normal.  SKIN: No overt rashes, discolorations, " or lesions over the face and neck.  NEUROLOGIC: Alert.  No overt tremors.  PSYCHIATRIC: Normal affect and mood.  Does not appear anxious.       LABS:  CBC RESULTS:   Recent Labs   Lab Test 03/12/24  1455   WBC 3.6*   RBC 3.73*   HGB 12.0   HCT 35.6   MCV 95   MCH 32.2   MCHC 33.7   RDW 13.2        Last Comprehensive Metabolic Panel:  Sodium   Date Value Ref Range Status   02/27/2024 138 135 - 145 mmol/L Final     Comment:     Reference intervals for this test were updated on 09/26/2023 to more accurately reflect our healthy population. There may be differences in the flagging of prior results with similar values performed with this method. Interpretation of those prior results can be made in the context of the updated reference intervals.      Potassium   Date Value Ref Range Status   02/27/2024 3.9 3.4 - 5.3 mmol/L Final     Chloride   Date Value Ref Range Status   02/27/2024 104 98 - 107 mmol/L Final     Carbon Dioxide (CO2)   Date Value Ref Range Status   02/27/2024 25 22 - 29 mmol/L Final     Anion Gap   Date Value Ref Range Status   02/27/2024 9 7 - 15 mmol/L Final     GLUCOSE BY METER POCT   Date Value Ref Range Status   05/09/2024 109 (H) 70 - 99 mg/dL Final     Urea Nitrogen   Date Value Ref Range Status   02/27/2024 13.9 6.0 - 20.0 mg/dL Final     Creatinine   Date Value Ref Range Status   02/27/2024 0.72 0.51 - 0.95 mg/dL Final     GFR Estimate   Date Value Ref Range Status   02/27/2024 >90 >60 mL/min/1.73m2 Final     Calcium   Date Value Ref Range Status   02/27/2024 9.2 8.6 - 10.0 mg/dL Final     Bilirubin Total   Date Value Ref Range Status   03/12/2024 0.6 <=1.2 mg/dL Final     Alkaline Phosphatase   Date Value Ref Range Status   03/12/2024 52 40 - 150 U/L Final     Comment:     Reference intervals for this test were updated on 11/14/2023 to more accurately reflect our healthy population. There may be differences in the flagging of prior results with similar values performed with this method.  Interpretation of those prior results can be made in the context of the updated reference intervals.     ALT   Date Value Ref Range Status   03/12/2024 15 0 - 50 U/L Final     Comment:     Reference intervals for this test were updated on 6/12/2023 to more accurately reflect our healthy population. There may be differences in the flagging of prior results with similar values performed with this method. Interpretation of those prior results can be made in the context of the updated reference intervals.       AST   Date Value Ref Range Status   03/12/2024 15 0 - 45 U/L Final     Comment:     Reference intervals for this test were updated on 6/12/2023 to more accurately reflect our healthy population. There may be differences in the flagging of prior results with similar values performed with this method. Interpretation of those prior results can be made in the context of the updated reference intervals.     4/2:  WBC 4, ANC 2.5  Hgb 13  Plts 254    IMAGING:  Reviewed as per HPI.    ASSESSMENT/PLAN:  Angie Michael is a 43 year old female with the following issues:  1.  Clinical prognostic stage IIB, cT2-N1-M0, multifocal grade 3 invasive lobular carcinoma of the RIGHT upper outer breast, ER positive 81-90%, CT positive 31-40%, HER-2 FISH negative (IHC = 2+), Ki-67 = 70%, ypT0-N0  2.  Clinical prognostic stage IIA, cT3-N1-M0, multifocal grade 2 invasive ductal and lobular carcinoma of LEFT breast overlapping sites, ER positive %, CT positive 2%, HER-2 FISH negative (IHC = 2+), Ki-67 = 26%, yp(m)T1b-N0  3. BRCA-2 deleterious gene mutation  -- Angie is s/p neoadjuvant chemotherapy with dose-dense AC followed by weekly paclitaxel completed 4/3/2024 and bilateral mastectomies with bilateral axillary sentinel lymph node excision on 5/8/2024.  -- Discussed with Angie that she achieved an outstanding complete response in her right breast cancer and excellent partial response in her left breast with no residual cancer in  the lymph nodes.  -- Continue breast reconstruction expansion with Dr. Vasiliy Yee.  - She will meet with Dr. Finesse Cortes again for radiation discussion. She asked about proton beam radiation at [facility] -- I encouraged her to discuss with Dr. Cortes.   - Given her initial extent of disease bilaterally, she would benefit from adjuvant abemaciclib for 2 years along with hormone blockade therapy as per the MonarchE trial.  --Based on the phase 3 monarchE trial, which demonstrated improvement in invasive disease free survival rate of 86.1% versus 79% when abemaciclib was added to aromatase inhibitor for 2 years compared to tamoxifen or AI alone.  --I discussed potential side effects of abemaciclib, including but not limited to diarrhea, infection, neutropenia, fatigue, leukopenia, thrombocytopenia, nausea, anemia, headache, liver toxicity, increased risk for blood clots. She can commence abemaciclib after completion of radiation.  --Discussed use of loperamide to control diarrhea. Will plan to start abemaciclib 50 mg BID (after completion of radiation) in the first month, then try to escalate to 100 mg BID in month 2. Discussed that efficacy is the same even at lower dose (dose lower than 150 mg BID).  -Adjuvant olaparib (Lynparza) for 1 year would be considered as well.  However, I do note that based on the Tomas trial, those patients who had hormone receptor positive HER2 negative breast cancer were required to have at least 4 pathologically confirmed positive lymph nodes. We also talked about potential risk of MDS with the use of olaparib.   -I advised starting hormone blockade therapy now with ovarian suppression therapy with Zoladex every 4 weeks with an anastrozole.  I discussed the potential side effects of anastrozole, including but not limited to: fatigue, myalgias/arthralgias, bone density loss, decrease sexual drive, vaginal dryness, nausea, headache, difficulty sleeping, hot flashes, night sweats, small  cardiovascular risk. She agrees to proceed.  I instructed her to start anastrozole 1 day after the first Zoladex injection.  --She already removed her fallopian tubes and will undergo BSO sometime in the future.  After BSO, she can discontinue Zoladex injections.  She requested a referral for gynecology -- I recommended she see Dr. Hiral Bowens for BSO. Referral placed.    3.  Right tibia bone lesion  - 11/3/2023 MR right tibia/fibula showed a 3 cm area of increased T2 signal in the midshaft area.  Although the signaling suggested a benign process, this was technically indeterminate.    -- Repeat 2/9/2024 MR right tibia showed interval increase from 3.2 cm to 3.8 cm.  --2/27/2024 biopsy of this bone lesion was negative for malignancy.    --4/5/2024 MRI tibia showed stable in size but improved signal alteration in the lesion, most suggestive of red marrow.  --Given the peculiarity of this finding, I advised a repeat PET scan and MRI in the fall -- August or September 2024 to ensure no suspicious changes in the bone lesion and no new metastatic disease.    4.  Fertility discussion  -Angie has triplets.  She has saved embryos.  She has not yet decided whether to have more children.  - Following completion of chemotherapy followed by radiation, hormone blockade therapy would be advised for 2-3 years before bearing more children.  She has also briefly considered a surrogate if she wishes to have more children.    6. Weight gain  --Discussed again today this is unfortunately common as breast cancer patients undergo chemotherapy and rendered in a chemo-induced menopause.  This then affects metabolism and can cause excess fat accumulation in the central/truncal area of the body.   Angie states she has worked very hard to maintain a healthy weight through diet and prior exercise and has now gained weight after starting chemo.  --She is only 2 weeks postop and will need additional healing time prior to restarting any  "cardiovascular exercise but discussed she do some easy walking.    Return in 2 months.    Monse Dan MD  [facility] Hematology/Oncology     Total time spent today: 80 minutes in chart review, patient evaluation, counseling, documentation, test and/or medication/prescription orders, and coordination of care.      The longitudinal plan of care for the diagnosis(es)/condition(s) as documented were addressed during this visit. Due to the added complexity in care, I will continue to support Angie in the subsequent management and with ongoing continuity of care.      Oncology Rooming Note    May 22, 2024 3:23 PM   Angie Michael is a 43 year old female who presents for:    Chief Complaint   Patient presents with     Oncology Clinic Visit     Initial Vitals: Resp 16   Ht 1.676 m (5' 6\")   Wt 78.4 kg (172 lb 12.8 oz)   LMP 12/01/2023 (Approximate)   BMI 27.89 kg/m   Estimated body mass index is 27.89 kg/m  as calculated from the following:    Height as of this encounter: 1.676 m (5' 6\").    Weight as of this encounter: 78.4 kg (172 lb 12.8 oz). Body surface area is 1.91 meters squared.  No Pain (0) Comment: Data Unavailable   Patient's last menstrual period was 12/01/2023 (approximate).  Allergies reviewed: Yes  Medications reviewed: Yes    Medications: Medication refills not needed today.  Pharmacy name entered into [facility]:    [id number] - [place], [place] - [address] AT [facility] [address] [facility] [address] [address]    Frailty Screening:   Is the patient here for a new oncology consult visit in cancer care? 2. No        Beatriz Aguilar MA              Again, thank you for allowing me to participate in the care of your patient.        Sincerely,        Monse Dan MD  "

## 2024-05-22 NOTE — PATIENT INSTRUCTIONS
Referral to Dr. Savita Bello for BSO.  Arrange for Zoladex every 4 weeks for next 3 months.  Start anastrozole 1 mg daily one day after first Zoladex injection.  RTC MD in 2 months.

## 2024-05-22 NOTE — TUMOR CONFERENCE
Tumor Conference Information  Tumor Conference: Breast  Specialties Present: Medical oncology, Radiation oncology, Pathology, Radiology, Surgery, Other (see comment) (Comment: Plastic Surgery)  Patient Status: Prospective  Stage: [id number]  Treatment to Date: Biopsy, Surgery, Chemotherapy, Hormonal therapy  Clinical Trials: Not discussed  Genetic Testing Discussed/Recommended?: Yes  Supportive Care Services Discussed/Recommended?: No  Recommended Plan: Follows evidence-based guidelines  Did the review exceed 30 minutes?: did not       [facility]  [facility]  Maddie Rm RN, BSN      Documentation / Disclaimer Cancer Tumor Board Note  Cancer tumor board recommendations do not override what is determined to be reasonable care and treatment, which is dependent on the circumstances of a patient's case; the patient's medical, social, and personal concerns; and the clinical judgment of the oncologist [physician].

## 2024-05-23 ENCOUNTER — PATIENT OUTREACH (OUTPATIENT)
Dept: ONCOLOGY | Facility: CLINIC | Age: 44
End: 2024-05-23
Payer: COMMERCIAL

## 2024-05-23 NOTE — PROGRESS NOTES
"New Patient Hematology / Oncology Nurse Navigator Note     Referral Date: 5/22/24    Referring provider:   Monse Dan MD   [address] [address]   Phone: [phone]   Fax: [phone]       Referring Clinic/Organization: [facility]     Referred to: GynOnc    Requested provider (if applicable): Dr. Barney. \"Referral to Dr. Hiral Barney-Bello only\"    Evaluation for : BRCA-2 mutation; prophylactic BSO      Clinical History (per Nurse review of records provided):    Oncology Visit with Dr. Dan -- BOOKMARKED  1/17/22 Op Note/Path (SALPINGECTOMY) -- BOOKMARKED  10/1/20 PAP/HPV -- BOOKMARKED    Clinical Assessment / Barriers to Care (Per Nurse):  Pt lives in [place], [place]    Records Location: [facility]     Records Needed:   N/A    Additional testing needed prior to consult:   N/A    Referral updates and Plan:   Referral received, chart reviewed by nursing, scheduling instructions updated and routed to NPS for scheduling. Will follow-up as needed as patient is already established with [facility].     Hannah Khan, BSN, RN, PHN, OCN  Hematology/Oncology Nurse Navigator  [facility]  [phone]    "

## 2024-05-28 ENCOUNTER — OFFICE VISIT (OUTPATIENT)
Dept: SURGERY | Facility: CLINIC | Age: 44
End: 2024-05-28
Payer: COMMERCIAL

## 2024-05-28 DIAGNOSIS — Z09 SURGERY FOLLOW-UP EXAMINATION: Primary | ICD-10-CM

## 2024-05-28 PROCEDURE — 99024 POSTOP FOLLOW-UP VISIT: CPT | Performed by: SURGERY

## 2024-05-28 NOTE — PROGRESS NOTES
[facility] Breast Surgery Postoperative Note    S: Angie reports she is improving. She has not needed pain medications for a few days. DINH drains were removed. More tender in the right axilla.     Breasts: bilateral mastectomy incisions are healing well. No erythema or induration.     Pathology: Reviewed and copy given to patient    A/P  Angie Michael is recovering from a bilateral skin sparing mastectomies with bilateral sentinel node biopsy and excision of tag localized for nodes and immediate reconstruction with Dr. Yee on 5/8/2024.  Her final pathology revealed no residual carcinoma in the right breast and negative nodes.  The left breast had multifocal residual invasive ductal carcinoma, grade 2 measuring 10 mm, 6 mm and 1.4 mm with extensive surrounding DCIS.  All margins were negative.  Left axillary lymph nodes were negative.    We presented her at her recent tumor board and consideration of adjuvant radiation was recommended, possibly just on the left side but would discussed bilateral.  She would like a video visit with Dr. Cortes to discuss his thoughts on radiation.  I will plan to see her back in 6 months for follow-up chest wall exam.  She also has ongoing follow-up with Dr. Dan.      Thank you for the opportunity to help in her care.    Fanny Little MD  [facility]  [phone]    Please route or send letter to:  Primary Care Provider (PCP) and Referring Provider

## 2024-05-28 NOTE — LETTER
May 30, 2024          Monse Dan MD  [address]  [place] [address]      RE:   Angie Michael 1980      Dear Colleague,    Thank you for referring your patient, Angie Michael, to [facility] at [place] location. Please see a copy of my visit note below.    Angie reports she is improving. She has not needed pain medications for a few days. DINH drains were removed. More tender in the right axilla.      Breasts: bilateral mastectomy incisions are healing well. No erythema or induration.      Pathology: Reviewed and copy given to patient     A/P  Angie Michael is recovering from a bilateral skin sparing mastectomies with bilateral sentinel node biopsy and excision of tag localized for nodes and immediate reconstruction with Dr. Yee on 5/8/2024.  Her final pathology revealed no residual carcinoma in the right breast and negative nodes.  The left breast had multifocal residual invasive ductal carcinoma, grade 2 measuring 10 mm, 6 mm and 1.4 mm with extensive surrounding DCIS.  All margins were negative.  Left axillary lymph nodes were negative.     We presented her at her recent tumor board and consideration of adjuvant radiation was recommended, possibly just on the left side but would discussed bilateral.  She would like a video visit with Dr. Cortes to discuss his thoughts on radiation.  I will plan to see her back in 6 months for follow-up chest wall exam.  She also has ongoing follow-up with Dr. Dan.       Again, thank you for allowing me to participate in the care of your patient.      Sincerely,      Fanny Little MD

## 2024-05-31 ENCOUNTER — THERAPY VISIT (OUTPATIENT)
Dept: PHYSICAL THERAPY | Facility: CLINIC | Age: 44
End: 2024-05-31
Payer: COMMERCIAL

## 2024-05-31 DIAGNOSIS — M25.619 DECREASED RANGE OF MOTION OF SHOULDER, UNSPECIFIED LATERALITY: ICD-10-CM

## 2024-05-31 DIAGNOSIS — Z90.13 S/P BILATERAL MASTECTOMY: Primary | ICD-10-CM

## 2024-05-31 PROCEDURE — 97161 PT EVAL LOW COMPLEX 20 MIN: CPT | Mod: GP

## 2024-05-31 PROCEDURE — 97110 THERAPEUTIC EXERCISES: CPT | Mod: GP

## 2024-05-31 NOTE — PROGRESS NOTES
PHYSICAL THERAPY EVALUATION  Type of Visit: Evaluation    See electronic medical record for Abuse and Falls Screening details.    Subjective   Pt is 42 y/o female w/ h/o diagnosis of B breast cancer on 10/5/23 and B mastectomy performed 5/8/24. Pt reports to OP PT today d/t recommendation from plastic surgeon Vasiliy Walker MD. Pts biggest concern is reduced ROM of B shoulders overhead and decreased strength. Pt feeling frustrated as wants to return to PLOF as soon as possible, but nervous about harming incision. Still sleeping on wedge as has been nervous about returning to sleeping on back. Very active PLOF including Pilates and three 1 y/o s at home. Pt given precaution of return to all activities as tolerated 4 weeks after surgery.       Presenting condition or subjective complaint: Recovery from double mastectocmy. Pinching in right shoulder blade.  Date of onset: 05/08/24 (date of mastectomy)    Relevant medical history: Anemia; Cancer   Dates & types of surgery: Tonsillectomy, knee surgery, c section, port surgery, double mastectomy    Prior diagnostic imaging/testing results:       Prior therapy history for the same diagnosis, illness or injury: No      Prior Level of Function  Transfers: Independent  Ambulation: Independent  ADL: Independent  IADL:  IND w/ spouse    Living Environment  Social support: With family members   Type of home: House; Multi-level   Stairs to enter the home: No       Ramp: No   Stairs inside the home: Yes   Is there a railing: Yes   Help at home: Home management tasks (cooking, cleaning); Home and Yard maintenance tasks; Other  Equipment owned:       Employment: No    Hobbies/Interests: Being with my kids, volunteering, reading, art [employer]    Patient goals for therapy: Regular household tasks, cooking, laundry etc    Pain assessment: Pain denied     Objective   Cognitive Status Examination  Orientation: Oriented to person, place and time   Level of Consciousness:  Alert  Follows Commands and Answers Questions: 100% of the time  Personal Safety and Judgement: Intact  Memory: Intact    OBSERVATION: incision not observed  POSTURE: WFL   PALPATION:    RANGE OF MOTION:  L shoulder: flex 125 deg, abd 101 deg, ER and IR WFL. R shoulder: flex 110 deg, abd 100 deg, ER and IR WFL.  STRENGTH:  5/5 t/o B UE, however R side increased compensation, not accurate MMT    BED MOBILITY: Independent, able to return to sit from supine position by hooking arm around knee and rocking upwards    TRANSFERS: WFL    GAIT:   Gait Description: Pt ambulates with normal pace and no signs of imbalance    BALANCE: NOT TESTED    SENSATION:  denies N/T    REFLEXES:   COORDINATION:   MUSCLE TONE:       Assessment & Plan   CLINICAL IMPRESSIONS  Medical Diagnosis: s/p bilateral mastectomy    Treatment Diagnosis: Decreased ROM of shoulders, bilaterally   Impression/Assessment: Patient is a 43 year old female with decreased ROM and strength complaints.  The following significant findings have been identified: Decreased ROM/flexibility, Decreased strength, and Impaired posture. These impairments interfere with their ability to perform self care tasks, work tasks, recreational activities, and household chores as compared to previous level of function.     Clinical Decision Making (Complexity):  Clinical Presentation: Stable/Uncomplicated  Clinical Presentation Rationale: based on medical and personal factors listed in PT evaluation  Clinical Decision Making (Complexity): Low complexity    PLAN OF CARE  Treatment Interventions:  Modalities: Hot Pack  Interventions: Manual Therapy, Neuromuscular Re-education, Therapeutic Activity, Therapeutic Exercise    Long Term Goals     PT Goal 1  Goal Identifier: B shoulder ROM  Goal Description: Pt will demonstrate increased ROM of B shoulder overhead motion to at least 160 deg in both flexion and abduction in order to improve ability to perform household tasks  Goal Progress:  eval: L shoulder: flex 125 deg, abd 101 deg, ER and IR WFL. R shoulder: flex 110 deg, abd 100 deg, ER and IR WFL  Target Date: 08/29/24  PT Goal 2  Goal Identifier: Lifting  Goal Description: Pt will be able to safely lift 30 lbs in order to safely lift toddlers at home  Goal Progress: eval: not assessed d/t pt discomfort  Target Date: 08/29/24  PT Goal 3  Goal Identifier: HEP  Goal Description: Pt will be independent with HEP at least 3x/week in order to improve self-management of impairments long-term  Goal Progress: eval: initiated HEP  Target Date: 08/29/24      Frequency of Treatment: 2x/month  Duration of Treatment: 3 months    Recommended Referrals to Other Professionals:   Education Assessment:   Learner/Method: Patient    Risks and benefits of evaluation/treatment have been explained.   Patient/Family/caregiver agrees with Plan of Care.     Evaluation Time:     PT Vitaliy Low Complexity Minutes (45551): 20       Signing Clinician: Jumana Boo, PT

## 2024-06-03 ENCOUNTER — TRANSFERRED RECORDS (OUTPATIENT)
Dept: HEALTH INFORMATION MANAGEMENT | Facility: CLINIC | Age: 44
End: 2024-06-03
Payer: COMMERCIAL

## 2024-06-04 ENCOUNTER — LAB (OUTPATIENT)
Dept: INFUSION THERAPY | Facility: CLINIC | Age: 44
End: 2024-06-04
Attending: INTERNAL MEDICINE
Payer: COMMERCIAL

## 2024-06-04 VITALS
RESPIRATION RATE: 16 BRPM | SYSTOLIC BLOOD PRESSURE: 103 MMHG | OXYGEN SATURATION: 98 % | HEART RATE: 65 BPM | TEMPERATURE: 97.5 F | DIASTOLIC BLOOD PRESSURE: 65 MMHG

## 2024-06-04 DIAGNOSIS — C50.812 MALIGNANT NEOPLASM OF OVERLAPPING SITES OF BOTH BREASTS IN FEMALE, ESTROGEN RECEPTOR POSITIVE (H): Primary | ICD-10-CM

## 2024-06-04 DIAGNOSIS — C50.811 MALIGNANT NEOPLASM OF OVERLAPPING SITES OF BOTH BREASTS IN FEMALE, ESTROGEN RECEPTOR POSITIVE (H): Primary | ICD-10-CM

## 2024-06-04 DIAGNOSIS — Z17.0 MALIGNANT NEOPLASM OF OVERLAPPING SITES OF BOTH BREASTS IN FEMALE, ESTROGEN RECEPTOR POSITIVE (H): Primary | ICD-10-CM

## 2024-06-04 PROCEDURE — 250N000011 HC RX IP 250 OP 636: Mod: JZ | Performed by: INTERNAL MEDICINE

## 2024-06-04 PROCEDURE — 96402 CHEMO HORMON ANTINEOPL SQ/IM: CPT

## 2024-06-04 RX ADMIN — GOSERELIN ACETATE 3.6 MG: 3.6 IMPLANT SUBCUTANEOUS at 10:56

## 2024-06-04 NOTE — PROGRESS NOTES
Infusion Nursing Note:  Angie DESAI Lennoxjaime presents today for Zoladex.    Patient seen by provider today: No   present during visit today: Not Applicable.    Note: N/A.      Intravenous Access:  No Intravenous access/labs at this visit.    Treatment Conditions:  Not Applicable.      Post Infusion Assessment:  Patient tolerated injection without incident.  Site patent and intact, free from redness, edema or discomfort.       Discharge Plan:   Discharge instructions reviewed with: Patient.  Patient and/or family verbalized understanding of discharge instructions and all questions answered.  AVS to patient via [employer].  Patient will return 7/2/24 for next appointment.   Patient discharged in stable condition accompanied by: .  Departure Mode: Ambulatory.      Sneha Pineda RN

## 2024-06-25 NOTE — TELEPHONE ENCOUNTER
RECORDS STATUS - BREAST    RECORDS REQUESTED FROM: [facility] - Internal records   DATE REQUESTED: 6/25

## 2024-06-28 ENCOUNTER — ONCOLOGY VISIT (OUTPATIENT)
Dept: ONCOLOGY | Facility: CLINIC | Age: 44
End: 2024-06-28
Attending: INTERNAL MEDICINE
Payer: COMMERCIAL

## 2024-06-28 ENCOUNTER — PRE VISIT (OUTPATIENT)
Dept: ONCOLOGY | Facility: CLINIC | Age: 44
End: 2024-06-28
Payer: COMMERCIAL

## 2024-06-28 VITALS
HEIGHT: 65 IN | RESPIRATION RATE: 18 BRPM | TEMPERATURE: 98 F | DIASTOLIC BLOOD PRESSURE: 65 MMHG | WEIGHT: 172 LBS | SYSTOLIC BLOOD PRESSURE: 116 MMHG | OXYGEN SATURATION: 96 % | BODY MASS INDEX: 28.66 KG/M2 | HEART RATE: 75 BPM

## 2024-06-28 DIAGNOSIS — Z15.09 BRCA2 GENE MUTATION POSITIVE: ICD-10-CM

## 2024-06-28 DIAGNOSIS — Z15.01 BRCA2 GENE MUTATION POSITIVE: ICD-10-CM

## 2024-06-28 PROCEDURE — G0463 HOSPITAL OUTPT CLINIC VISIT: HCPCS | Performed by: OBSTETRICS & GYNECOLOGY

## 2024-06-28 PROCEDURE — 99417 PROLNG OP E/M EACH 15 MIN: CPT | Performed by: OBSTETRICS & GYNECOLOGY

## 2024-06-28 PROCEDURE — 99215 OFFICE O/P EST HI 40 MIN: CPT | Performed by: OBSTETRICS & GYNECOLOGY

## 2024-06-28 RX ORDER — METRONIDAZOLE 500 MG/100ML
500 INJECTION, SOLUTION INTRAVENOUS
Status: CANCELLED | OUTPATIENT
Start: 2024-06-28

## 2024-06-28 RX ORDER — PHENAZOPYRIDINE HYDROCHLORIDE 100 MG/1
200 TABLET, FILM COATED ORAL ONCE
Status: CANCELLED | OUTPATIENT
Start: 2024-06-28 | End: 2024-06-28

## 2024-06-28 RX ORDER — HEPARIN SODIUM 10000 [USP'U]/ML
5000 INJECTION, SOLUTION INTRAVENOUS; SUBCUTANEOUS
Status: CANCELLED | OUTPATIENT
Start: 2024-06-28

## 2024-06-28 NOTE — PROGRESS NOTES
Progress Note        Dr. Monse Dan MD  [address] [facility] [address]  [place] [address]       RE: Angie Michael  : 1980  MICHEAL: 2024    Dear Dr. Monse Dan:    I had the pleasure of seeing your patient Angie Michael here at the Gynecologic Cancer Clinic at the [facility] on 2024.  As you know she is a very pleasant 43 year old woman with a diagnosis of BRCA2 and hormone receptor positive breast cancer in the setting of known BRCA2 mutation.  Given these findings she was subsequently sent to the Gynecologic Cancer Clinic for new patient consultation.  She is accompanied today by her .    She had a known BRCA2 mutation after testing when her paternal cousin was diagnosed with BRCA mutation. She was undergoing screening with alternating MRI and mammograms but became pregnant with triplets via IVF (she still has embryos at [facility] and [facility]) and screening was delayed.  Recently, she began undergoing screening again and was noted to have multifocal breast cancer.  She underwent neoadjuvant chemotherapy followed by double mastectomy and first stage reconstruction.  She has started zoladex suppression and will be starting RT therapy next week. Her breast surgeon is Dr Rubin and her plastic surgeon is Dr Yee and she has her second stage reconstruction planned for March.  Plan is to start AI and abimiciclib once she has completed RT.        Obstetrics and Gynecology History:  , c/s x 1 for triplets, cholestasis, PEC  She had a salpingectomy at the time of her  section for risk reduction  She is somewhat undecided about future childbearing but is leaning towards not having further children or using a surrogate given her hormone receptor positive breast cancer      Past Medical History:  Past Medical History:   Diagnosis Date    BRCA2 gene mutation positive 2024    History of blood transfusion     Malignant neoplasm of overlapping sites of left breast in female,  estrogen receptor positive (H) 10/10/2023       Past Surgical History:  Past Surgical History:   Procedure Laterality Date    ABDOMEN SURGERY          BIOPSY, BREAST, WITH RADIOFREQUENCY IDENTIFICATION Bilateral 2024    Procedure: bilateral tag localized node excision;  Surgeon: Fanny Little MD;  Location:  OR    ENT SURGERY      tonsillectomy    EXCISE CYST GENERIC (LOCATION) Left 2024    Procedure: Excision Left Axillary Cyst;  Surgeon: Fanny Little MD;  Location:  OR    GYN SURGERY      egg retrieval for IVF    INSERT PORT VASCULAR ACCESS Right 2023    Procedure: INSERTION, VASCULAR ACCESS PORT;  Surgeon: Fanny Little MD;  Location:  OR    MASTECTOMY SIMPLE BILATERAL, SENTINEL NODE BILATERAL, COMBINED Bilateral 2024    Procedure: Bilateral skin sparing mastectomies with bilateral sentinel lymph node biopsy;  Surgeon: Fanny Little MD;  Location:  OR    ORTHOPEDIC SURGERY      knee arthroscopy    RECONSTRUCT BREAST, INSERT TISSUE EXPANDER BILATERAL, COMBINED Bilateral 2024    Procedure: BILATERAL FIRST STAGE BREAST RECONSTRUCTION WITH TISSUE EXPANDERS AND ACELLULAR DERMAL MATRIX;  Surgeon: Vasiliy Yee MD;  Location:  OR    REMOVE PORT VASCULAR ACCESS N/A 2024    Procedure: port removal;  Surgeon: Fanny Little MD;  Location:  OR       Health Maintenance:  Last Pap Smear: 10/1/20              Result: Negative, HPV negative  She has not had a history of abnormal Pap smears.    Last Mammogram: N/A-s/p bilateral mastectomy    Last Colonoscopy: N/A       Social History:  Lives with her  and children, feels safe at home.  She stays at home with her 2.5 year old triplets.  Enjoys reading, art collecting, travel, pilates.  Does have an advanced directive on file and would like her  to be her POA.  Full code.    Family History:   The patient's family history is significant for.  Family History   Problem Relation Age of Onset     "Hyperlipidemia Mother     Other Cancer Father         Leukemia    Breast Cancer Other          Physical Exam:   /65 (BP Location: Right arm, Patient Position: Sitting, Cuff Size: Adult Large)   Pulse 75   Temp 98  F (36.7  C) (Oral)   Resp 18   Ht 1.66 m (5' 5.35\")   Wt 78 kg (172 lb)   SpO2 96%   BMI 28.31 kg/m    GENERAL: alert and no distress  EYES: Eyes grossly normal to inspection.  No discharge or erythema, or obvious scleral/conjunctival abnormalities.  RESP: No audible wheeze, cough, or visible cyanosis.    SKIN: Visible skin clear. No significant rash, abnormal pigmentation or lesions.  NEURO: Cranial nerves grossly intact.  Mentation and speech appropriate for age.  PSYCH: Appropriate affect, tone, and pace of words     Labs:  None      Assessment:    Angie Michael is a 43 year old woman with a diagnosis of hormone receptor positive breast cancer in the setting of BRCA2 mutation positive.     A total of 60 minutes was spent on patient care today.       Plan:     1.)    BRCA2 mutation-We discussed her lifetime risks of both ovarian and breat cancer with this mutation.  We discussed that given this the recommendation is for RRSO at the completion of childbearing given her age.  We discussed that this will dramatically decrease her risk of developing ovarian cancer but that there does still remain a small risk of developing primary peritoneal cancer.  We also discussed that RRSO has been shown to decrease her risk of breast cancer.  We also discussed that there is emerging data that some endometrial cancers may be linked to BRCA and thus discussed the risks and benefits of prophylactic hysterectomy at the time of her surgery and she would like to consider her desire for future childbearing more.  We discussed the risks of premature surgical menopause and that menopausal symptoms are different for every woman but there are several treatment options available if she desires/needs it " post-operatively.  We discussed the risk of BRCA mutation for her family, especially her children and she is aware and in fact she tested her embryos prior to IVF and knows her daughters are both negative but her son is positive.  We discussed the small possibility of an occult cancer at the time of surgery and that if this is found, we would proceed with frozen section and the cancer staging/debulking procedure at that time.  We also discussed the possibility of a microscopic/STIC tumor discovered on final pathology and that in this case she would require a second staging procedure.  Risks, benefits, indications and alternatives were discussed with the patient.  All her questions were answered and consents were signed for laparoscopic removal of uterus, cervix, both ovaries, cystoscopy on 9/25 at [facility].  She will return for pre-operative visit on 9/6    2.) Hormone receptor positive breast cancer-we reviewed her breast oncologists recommendation that she have ovarian suppression indefinitely.  Currently on zoladex but very interested in surgical menopause once she has completed her treatment. She understands the permanent nature of ovarian removal.  We discussed risks/benefits of hysterectomy at the same procedure as it relates to any potential future treatment for her breast cancer.    3.) Genetic risk factors were assessed and the patient has a known pathogenic mutation in the BRCA2 gene    4.) Labs and/or tests ordered include:  None.     5.) Health maintenance issues addressed today include pt is up to date.        Thank you for allowing us to participate in the care of your patient.         Sincerely,    Hiral Barney MD  Gynecologic Oncology  [facility]       [place]

## 2024-06-28 NOTE — LETTER
2024      Angie Michael  [address] [place] [address]      Dear Colleague,    Thank you for referring your patient, Angie Michael, to the [facility]. Please see a copy of my visit note below.    Progress Note        Dr. Monse Dan MD  [address]  [place] [address]       RE: Angie Michael  : 1980  MICHEAL: 2024    Dear Dr. Monse Dan:    I had the pleasure of seeing your patient Angie Michael here at the Gynecologic Cancer Clinic at the [facility] on 2024.  As you know she is a very pleasant 43 year old woman with a diagnosis of BRCA2 and hormone receptor positive breast cancer in the setting of known BRCA2 mutation.  Given these findings she was subsequently sent to the Gynecologic Cancer Clinic for new patient consultation.  She is accompanied today by her .    She had a known BRCA2 mutation after testing when her paternal cousin was diagnosed with BRCA mutation. She was undergoing screening with alternating MRI and mammograms but became pregnant with triplets via IVF (she still has embryos at [facility] and [facility]) and screening was delayed.  Recently, she began undergoing screening again and was noted to have multifocal breast cancer.  She underwent neoadjuvant chemotherapy followed by double mastectomy and first stage reconstruction.  She has started zoladex suppression and will be starting RT therapy next week. Her breast surgeon is Dr Rubin and her plastic surgeon is Dr Yee and she has her second stage reconstruction planned for March.  Plan is to start AI and abimiciclib once she has completed RT.        Obstetrics and Gynecology History:  , c/s x 1 for triplets, cholestasis, PEC  She had a salpingectomy at the time of her  section for risk reduction  She is somewhat undecided about future childbearing but is leaning towards not having further children or using a surrogate given her hormone  receptor positive breast cancer      Past Medical History:  Past Medical History:   Diagnosis Date     BRCA2 gene mutation positive 2024     History of blood transfusion      Malignant neoplasm of overlapping sites of left breast in female, estrogen receptor positive (H) 10/10/2023       Past Surgical History:  Past Surgical History:   Procedure Laterality Date     ABDOMEN SURGERY           BIOPSY, BREAST, WITH RADIOFREQUENCY IDENTIFICATION Bilateral 2024    Procedure: bilateral tag localized node excision;  Surgeon: Fanny Little MD;  Location:  OR     ENT SURGERY      tonsillectomy     EXCISE CYST GENERIC (LOCATION) Left 2024    Procedure: Excision Left Axillary Cyst;  Surgeon: Fanny Little MD;  Location:  OR     GYN SURGERY      egg retrieval for IVF     INSERT PORT VASCULAR ACCESS Right 2023    Procedure: INSERTION, VASCULAR ACCESS PORT;  Surgeon: Fanny Little MD;  Location:  OR     MASTECTOMY SIMPLE BILATERAL, SENTINEL NODE BILATERAL, COMBINED Bilateral 2024    Procedure: Bilateral skin sparing mastectomies with bilateral sentinel lymph node biopsy;  Surgeon: Fanny Little MD;  Location:  OR     ORTHOPEDIC SURGERY      knee arthroscopy     RECONSTRUCT BREAST, INSERT TISSUE EXPANDER BILATERAL, COMBINED Bilateral 2024    Procedure: BILATERAL FIRST STAGE BREAST RECONSTRUCTION WITH TISSUE EXPANDERS AND ACELLULAR DERMAL MATRIX;  Surgeon: Vasiliy Yee MD;  Location:  OR     REMOVE PORT VASCULAR ACCESS N/A 2024    Procedure: port removal;  Surgeon: Fanny Little MD;  Location:  OR       Health Maintenance:  Last Pap Smear: 10/1/20              Result: Negative, HPV negative  She has not had a history of abnormal Pap smears.    Last Mammogram: N/A-s/p bilateral mastectomy    Last Colonoscopy: N/A       Social History:  Lives with her  and children, feels safe at home.  She stays at home with her 2.5 year old triplets.  Enjoys  "reading, art collecting, travel, pilates.  Does have an advanced directive on file and would like her  to be her POA.  Full code.    Family History:   The patient's family history is significant for.  Family History   Problem Relation Age of Onset     Hyperlipidemia Mother      Other Cancer Father         Leukemia     Breast Cancer Other          Physical Exam:   /65 (BP Location: Right arm, Patient Position: Sitting, Cuff Size: Adult Large)   Pulse 75   Temp 98  F (36.7  C) (Oral)   Resp 18   Ht 1.66 m (5' 5.35\")   Wt 78 kg (172 lb)   SpO2 96%   BMI 28.31 kg/m    GENERAL: alert and no distress  EYES: Eyes grossly normal to inspection.  No discharge or erythema, or obvious scleral/conjunctival abnormalities.  RESP: No audible wheeze, cough, or visible cyanosis.    SKIN: Visible skin clear. No significant rash, abnormal pigmentation or lesions.  NEURO: Cranial nerves grossly intact.  Mentation and speech appropriate for age.  PSYCH: Appropriate affect, tone, and pace of words     Labs:  None      Assessment:    Angie Michael is a 43 year old woman with a diagnosis of hormone receptor positive breast cancer in the setting of BRCA2 mutation positive.     A total of 60 minutes was spent on patient care today.       Plan:     1.)    BRCA2 mutation-We discussed her lifetime risks of both ovarian and breat cancer with this mutation.  We discussed that given this the recommendation is for RRSO at the completion of childbearing given her age.  We discussed that this will dramatically decrease her risk of developing ovarian cancer but that there does still remain a small risk of developing primary peritoneal cancer.  We also discussed that RRSO has been shown to decrease her risk of breast cancer.  We also discussed that there is emerging data that some endometrial cancers may be linked to BRCA and thus discussed the risks and benefits of prophylactic hysterectomy at the time of her surgery and she would " like to consider her desire for future childbearing more.  We discussed the risks of premature surgical menopause and that menopausal symptoms are different for every woman but there are several treatment options available if she desires/needs it post-operatively.  We discussed the risk of BRCA mutation for her family, especially her children and she is aware and in fact she tested her embryos prior to IVF and knows her daughters are both negative but her son is positive.  We discussed the small possibility of an occult cancer at the time of surgery and that if this is found, we would proceed with frozen section and the cancer staging/debulking procedure at that time.  We also discussed the possibility of a microscopic/STIC tumor discovered on final pathology and that in this case she would require a second staging procedure.  Risks, benefits, indications and alternatives were discussed with the patient.  All her questions were answered and consents were signed for laparoscopic removal of uterus, cervix, both ovaries, cystoscopy on 9/[facility] at [facility].  She will return for pre-operative visit on 9/6    2.) Hormone receptor positive breast cancer-we reviewed her breast oncologists recommendation that she have ovarian suppression indefinitely.  Currently on zoladex but very interested in surgical menopause once she has completed her treatment. She understands the permanent nature of ovarian removal.  We discussed risks/benefits of hysterectomy at the same procedure as it relates to any potential future treatment for her breast cancer.    3.) Genetic risk factors were assessed and the patient has a known pathogenic mutation in the BRCA2 gene    4.) Labs and/or tests ordered include:  None.     5.) Health maintenance issues addressed today include pt is up to date.        Thank you for allowing us to participate in the care of your patient.         Sincerely,    Hiral Barney MD  Gynecologic Oncology  [facility]  "[facility]       CC  [facility]      Oncology Rooming Note    June 28, 2024 12:11 PM   Angie Michael is a 43 year old female who presents for:    Chief Complaint   Patient presents with     Oncology Clinic Visit     Malignant neoplasm of upper-outer quadrant of right breast in female, estrogen receptor positive (H)     Initial Vitals: /65 (BP Location: Right arm, Patient Position: Sitting, Cuff Size: Adult Large)   Pulse 75   Temp 98  F (36.7  C) (Oral)   Resp 18   Ht 1.66 m (5' 5.35\")   Wt 78 kg (172 lb)   SpO2 96%   BMI 28.31 kg/m   Estimated body mass index is 28.31 kg/m  as calculated from the following:    Height as of this encounter: 1.66 m (5' 5.35\").    Weight as of this encounter: 78 kg (172 lb). Body surface area is 1.9 meters squared.  Data Unavailable Comment: Data Unavailable   No LMP recorded.  Allergies reviewed: Yes  Medications reviewed: Yes    Medications: Medication refills not needed today.  Pharmacy name entered into [employer]:    [id number] - [place], [place] - [address] [facility] [address] [facility] [address] [facility] [place], [place] - [address] [address]    Frailty Screening:   Is the patient here for a new oncology consult visit in cancer care? 2. No      Clinical concerns: no       Hemalatha Claros CMA                Again, thank you for allowing me to participate in the care of your patient.        Sincerely,        Ace Barney MD  "

## 2024-06-28 NOTE — PROGRESS NOTES
"Oncology Rooming Note    June 28, 2024 12:11 PM   Angie Michael is a 43 year old female who presents for:    Chief Complaint   Patient presents with    Oncology Clinic Visit     Malignant neoplasm of upper-outer quadrant of right breast in female, estrogen receptor positive (H)     Initial Vitals: /65 (BP Location: Right arm, Patient Position: Sitting, Cuff Size: Adult Large)   Pulse 75   Temp 98  F (36.7  C) (Oral)   Resp 18   Ht 1.66 m (5' 5.35\")   Wt 78 kg (172 lb)   SpO2 96%   BMI 28.31 kg/m   Estimated body mass index is 28.31 kg/m  as calculated from the following:    Height as of this encounter: 1.66 m (5' 5.35\").    Weight as of this encounter: 78 kg (172 lb). Body surface area is 1.9 meters squared.  Data Unavailable Comment: Data Unavailable   No LMP recorded.  Allergies reviewed: Yes  Medications reviewed: Yes    Medications: Medication refills not needed today.  Pharmacy name entered into [facility]:    [id number] - [place] - [address] [facility] AT [facility] [address] [place] [facility] [place], [place] - [address] [address]    Frailty Screening:   Is the patient here for a new oncology consult visit in cancer care? 2. No      Clinical concerns: no       Hemalatha Claros CMA              "

## 2024-07-02 ENCOUNTER — INFUSION THERAPY VISIT (OUTPATIENT)
Dept: INFUSION THERAPY | Facility: CLINIC | Age: 44
End: 2024-07-02
Attending: INTERNAL MEDICINE
Payer: COMMERCIAL

## 2024-07-02 VITALS
OXYGEN SATURATION: 100 % | HEART RATE: 72 BPM | TEMPERATURE: 97.8 F | RESPIRATION RATE: 16 BRPM | SYSTOLIC BLOOD PRESSURE: 115 MMHG | DIASTOLIC BLOOD PRESSURE: 82 MMHG

## 2024-07-02 DIAGNOSIS — C50.812 MALIGNANT NEOPLASM OF OVERLAPPING SITES OF BOTH BREASTS IN FEMALE, ESTROGEN RECEPTOR POSITIVE (H): Primary | ICD-10-CM

## 2024-07-02 DIAGNOSIS — Z17.0 MALIGNANT NEOPLASM OF OVERLAPPING SITES OF BOTH BREASTS IN FEMALE, ESTROGEN RECEPTOR POSITIVE (H): Primary | ICD-10-CM

## 2024-07-02 DIAGNOSIS — C50.811 MALIGNANT NEOPLASM OF OVERLAPPING SITES OF BOTH BREASTS IN FEMALE, ESTROGEN RECEPTOR POSITIVE (H): Primary | ICD-10-CM

## 2024-07-02 PROCEDURE — 250N000011 HC RX IP 250 OP 636: Performed by: INTERNAL MEDICINE

## 2024-07-02 PROCEDURE — 96402 CHEMO HORMON ANTINEOPL SQ/IM: CPT

## 2024-07-02 RX ADMIN — GOSERELIN ACETATE 3.6 MG: 3.6 IMPLANT SUBCUTANEOUS at 10:03

## 2024-07-02 ASSESSMENT — PAIN SCALES - GENERAL: PAINLEVEL: NO PAIN (0)

## 2024-07-02 NOTE — PROGRESS NOTES
Infusion Nursing Note:  Angie Michael presents today for zoladex.    Patient seen by provider today: No   present during visit today: Not Applicable.    Note: Patient has no concerns to report today, denies hot flashes.      Intravenous Access:  No Intravenous access/labs at this visit.    Treatment Conditions:  Not Applicable.      Post Infusion Assessment:  Patient tolerated injection without incident.  Site patent and intact, free from redness, edema or discomfort.       Discharge Plan:   Discharge instructions reviewed with: Patient.  Patient and/or family verbalized understanding of discharge instructions and all questions answered.  AVS to patient via [employer] [facility].  Patient will return 7/24 for clinic visit with Dr. Dan, 7/30 for next infusion appointment.   Patient discharged in stable condition accompanied by: self.  Departure Mode: Ambulatory.      Ketty Perez RN

## 2024-07-03 ENCOUNTER — MEDICAL CORRESPONDENCE (OUTPATIENT)
Dept: HEALTH INFORMATION MANAGEMENT | Facility: CLINIC | Age: 44
End: 2024-07-03

## 2024-07-03 ENCOUNTER — TRANSFERRED RECORDS (OUTPATIENT)
Dept: HEALTH INFORMATION MANAGEMENT | Facility: CLINIC | Age: 44
End: 2024-07-03
Payer: COMMERCIAL

## 2024-07-03 ENCOUNTER — TELEPHONE (OUTPATIENT)
Dept: ONCOLOGY | Facility: CLINIC | Age: 44
End: 2024-07-03
Payer: COMMERCIAL

## 2024-07-03 NOTE — TELEPHONE ENCOUNTER
Left voicemail for patient regarding scheduling surgery with Dr. Barney.  Provided contact number to discuss.  [phone]    Benton Todd, on 7/3/2024 at 3:52 PM

## 2024-07-05 NOTE — TELEPHONE ENCOUNTER
Received voice mail from patient; called back and spoke with patient.    Spoke with patient, confirmed all scheduled information.     Patient is schedule for surgery with: Dr. Barney    Surgery Date: 9/25/2024     Location: [facility]    H&P: to be completed by surgeon will complete and/or update on day of surgery as needed. 9/6/24, 9:00 AM Dr. Savita Proctor    Post-op: will be scheduled by the clinic    Patient will receive a phone call from hospital pre-admission nurses 1-2 days prior to surgery with arrival time and NPO instructions. Patient aware times are subject to change up until day before surgery.     Patient questions/concerns: N/A     Surgery packet to be sent via [employer] Todd on 7/5/2024 at 2:32 PM

## 2024-07-05 NOTE — TELEPHONE ENCOUNTER
Left voicemail for patient regarding scheduling surgery with Dr. Barney.  Provided contact number to discuss.  [phone]    Benton Todd, on 7/5/2024 at 10:04 AM

## 2024-07-10 DIAGNOSIS — C50.811 MALIGNANT NEOPLASM OF OVERLAPPING SITES OF BOTH BREASTS IN FEMALE, ESTROGEN RECEPTOR POSITIVE (H): Primary | ICD-10-CM

## 2024-07-10 DIAGNOSIS — C50.812 MALIGNANT NEOPLASM OF OVERLAPPING SITES OF BOTH BREASTS IN FEMALE, ESTROGEN RECEPTOR POSITIVE (H): Primary | ICD-10-CM

## 2024-07-10 DIAGNOSIS — Z17.0 MALIGNANT NEOPLASM OF OVERLAPPING SITES OF BOTH BREASTS IN FEMALE, ESTROGEN RECEPTOR POSITIVE (H): Primary | ICD-10-CM

## 2024-07-15 ENCOUNTER — LAB (OUTPATIENT)
Dept: INFUSION THERAPY | Facility: CLINIC | Age: 44
End: 2024-07-15
Attending: INTERNAL MEDICINE
Payer: COMMERCIAL

## 2024-07-15 DIAGNOSIS — M89.9 BONE LESION: ICD-10-CM

## 2024-07-15 DIAGNOSIS — C50.811 MALIGNANT NEOPLASM OF OVERLAPPING SITES OF BOTH BREASTS IN FEMALE, ESTROGEN RECEPTOR POSITIVE (H): ICD-10-CM

## 2024-07-15 DIAGNOSIS — Z17.0 MALIGNANT NEOPLASM OF OVERLAPPING SITES OF BOTH BREASTS IN FEMALE, ESTROGEN RECEPTOR POSITIVE (H): ICD-10-CM

## 2024-07-15 DIAGNOSIS — C50.411 MALIGNANT NEOPLASM OF UPPER-OUTER QUADRANT OF BOTH BREASTS IN FEMALE, ESTROGEN RECEPTOR POSITIVE (H): ICD-10-CM

## 2024-07-15 DIAGNOSIS — Z17.0 MALIGNANT NEOPLASM OF UPPER-OUTER QUADRANT OF BOTH BREASTS IN FEMALE, ESTROGEN RECEPTOR POSITIVE (H): ICD-10-CM

## 2024-07-15 DIAGNOSIS — Z17.0 MALIGNANT NEOPLASM OF UPPER-OUTER QUADRANT OF RIGHT BREAST IN FEMALE, ESTROGEN RECEPTOR POSITIVE (H): ICD-10-CM

## 2024-07-15 DIAGNOSIS — C50.812 MALIGNANT NEOPLASM OF OVERLAPPING SITES OF BOTH BREASTS IN FEMALE, ESTROGEN RECEPTOR POSITIVE (H): ICD-10-CM

## 2024-07-15 DIAGNOSIS — C50.412 MALIGNANT NEOPLASM OF UPPER-OUTER QUADRANT OF BOTH BREASTS IN FEMALE, ESTROGEN RECEPTOR POSITIVE (H): ICD-10-CM

## 2024-07-15 DIAGNOSIS — C50.411 MALIGNANT NEOPLASM OF UPPER-OUTER QUADRANT OF RIGHT BREAST IN FEMALE, ESTROGEN RECEPTOR POSITIVE (H): ICD-10-CM

## 2024-07-15 LAB
ALBUMIN SERPL BCG-MCNC: 4.3 G/DL (ref 3.5–5.2)
ALP SERPL-CCNC: 66 U/L (ref 40–150)
ALT SERPL W P-5'-P-CCNC: 15 U/L (ref 0–50)
ANION GAP SERPL CALCULATED.3IONS-SCNC: 9 MMOL/L (ref 7–15)
AST SERPL W P-5'-P-CCNC: 28 U/L (ref 0–45)
BASOPHILS # BLD AUTO: 0 10E3/UL (ref 0–0.2)
BASOPHILS NFR BLD AUTO: 0 %
BILIRUB SERPL-MCNC: 0.8 MG/DL
BUN SERPL-MCNC: 12.1 MG/DL (ref 6–20)
CALCIUM SERPL-MCNC: 9.7 MG/DL (ref 8.6–10)
CHLORIDE SERPL-SCNC: 105 MMOL/L (ref 98–107)
CREAT SERPL-MCNC: 0.78 MG/DL (ref 0.51–0.95)
DEPRECATED HCO3 PLAS-SCNC: 28 MMOL/L (ref 22–29)
EGFRCR SERPLBLD CKD-EPI 2021: >90 ML/MIN/1.73M2
EOSINOPHIL # BLD AUTO: 0.1 10E3/UL (ref 0–0.7)
EOSINOPHIL NFR BLD AUTO: 2 %
ERYTHROCYTE [DISTWIDTH] IN BLOOD BY AUTOMATED COUNT: 12 % (ref 10–15)
ESTRADIOL SERPL-MCNC: <5 PG/ML
FSH SERPL IRP2-ACNC: 3.6 MIU/ML
GLUCOSE SERPL-MCNC: 96 MG/DL (ref 70–99)
HCT VFR BLD AUTO: 43.1 % (ref 35–47)
HGB BLD-MCNC: 14 G/DL (ref 11.7–15.7)
IMM GRANULOCYTES # BLD: 0 10E3/UL
IMM GRANULOCYTES NFR BLD: 0 %
LYMPHOCYTES # BLD AUTO: 1.1 10E3/UL (ref 0.8–5.3)
LYMPHOCYTES NFR BLD AUTO: 22 %
MCH RBC QN AUTO: 29.2 PG (ref 26.5–33)
MCHC RBC AUTO-ENTMCNC: 32.5 G/DL (ref 31.5–36.5)
MCV RBC AUTO: 90 FL (ref 78–100)
MONOCYTES # BLD AUTO: 0.3 10E3/UL (ref 0–1.3)
MONOCYTES NFR BLD AUTO: 7 %
NEUTROPHILS # BLD AUTO: 3.3 10E3/UL (ref 1.6–8.3)
NEUTROPHILS NFR BLD AUTO: 69 %
NRBC # BLD AUTO: 0 10E3/UL
NRBC BLD AUTO-RTO: 0 /100
PLATELET # BLD AUTO: 212 10E3/UL (ref 150–450)
POTASSIUM SERPL-SCNC: 4.4 MMOL/L (ref 3.4–5.3)
PROT SERPL-MCNC: 7.4 G/DL (ref 6.4–8.3)
RBC # BLD AUTO: 4.79 10E6/UL (ref 3.8–5.2)
SODIUM SERPL-SCNC: 142 MMOL/L (ref 135–145)
WBC # BLD AUTO: 4.7 10E3/UL (ref 4–11)

## 2024-07-15 PROCEDURE — 83001 ASSAY OF GONADOTROPIN (FSH): CPT | Performed by: INTERNAL MEDICINE

## 2024-07-15 PROCEDURE — 80053 COMPREHEN METABOLIC PANEL: CPT | Performed by: NURSE PRACTITIONER

## 2024-07-15 PROCEDURE — 36415 COLL VENOUS BLD VENIPUNCTURE: CPT

## 2024-07-15 PROCEDURE — 82670 ASSAY OF TOTAL ESTRADIOL: CPT | Performed by: INTERNAL MEDICINE

## 2024-07-15 PROCEDURE — 85049 AUTOMATED PLATELET COUNT: CPT | Performed by: NURSE PRACTITIONER

## 2024-07-15 NOTE — PROGRESS NOTES
Medical Assistant Note:  Angie Michael presents today for blood draw.    Patient seen by provider today: No.   present during visit today: Not Applicable.    Concerns: No Concerns.    Procedure:  Lab draw site: rac, Needle type: bf, Gauge: 23.    Post Assessment:  Labs drawn without difficulty: Yes.    Discharge Plan:  Departure Mode: Ambulatory.    Face to Face Time: 5 min.    Candice Marcelo, CMA

## 2024-07-19 NOTE — PROGRESS NOTES
[facility]    Hematology/Oncology Established Patient Note      Today's Date: 7/24/2024    Reason for visit: Bilateral breast cancer, BRCA-2 mutation positive.    HISTORY OF PRESENT ILLNESS: Angie Michael is a 43 year old female with BRCA2 gene mutation who presents with the following oncologic history:  1.  10/05/2023: High risk surveillance breast MRI showed right breast 3.6 x 3 x 3.2 cm irregular enhancing mass at 10:00, 0.9 x 0.8 x 0.9 cm second mass at 9:00, 1 x 0.9 x 1.2 cm third mass at 9:30, other suspicious foci of enhancement throughout the breast.  Left breast with 9.9 x 5.2 x 6.7 cm suspicious non-mass enhancement at 10:00-7:00; 1.6 x 1.3 x 1.7 cm enhancing mass at 4:00; 1.8 x 1.6 x 1.9 cm enhancing mass at 12:30; other suspicious foci of enhancement throughout the left breast.  One level 1 suspicious right axillary lymph node and one level 1 suspicious left axillary lymph node..  Developing suspicious interpectoral lymph node.  2.  10/12/2023: Bilateral diagnostic mammogram showed right breast with 3.2 x 2.7 x 4.1 cm irregular mass at 10:00, 6 cm from nipple; 0.9 x 0.6 x 1 cm irregular mass at 11:00, 2 cm from nipple and prominent right axillary lymph node.  Left breast with 1.4 x 1.1 x 1.7 cm mass at 12:00, 2 cm from nipple, 1.8 x 1 x 1.6 cm mass at 3:30, 4 cm from nipple, 1 x 0.9 x 1.1 cm mass at 3:00, 7 cm from nipple, multiple areas of hypoechogenicity with calcifications in the lateral left breast, and prominent left axillary lymph node.  3.  10/12/2023: Biopsy of right breast at 10:00 showed grade 3 invasive lobular carcinoma, ER strongly positive at 81-90%, MT positive at 31-40%, HER2 equivocal with IHC = 2+, FISH negative, Ki-67 = 70%.  Biopsy of right axillary lymph node showed metastatic carcinoma.  Biopsy of left breast at 3:30 showed grade 2 invasive carcinoma with ductal and lobular features, ER positive at %, MT positive at 2%, HER2 equivocal with IHC = 2+, FISH  negative, Ki-67 = 26%.  Biopsy of left axillary lymph node showed metastatic carcinoma.  4.  10/17/2023: PET/CT scan showed cluster of hypermetabolic right breast nodules with SUV max 14.9; 2.1 x 1 cm hypermetabolic level 1 right axillary lymph node with SUV max 5.5; hypermetabolic left breast nodule with SUV max 18.8 and several additional less intense hypermetabolic left breast nodules; 1.5 x 1.3 cm hypermetabolic level 1 left axillary lymph node with SUV max 4.  Small sclerotic focus of metabolic activity within the proximal tibial shaft with SUV max 3.7, indeterminate but unlikely to reflect metastatic disease.  Subcentimeter hypermetabolic focus along anterior margin of the left thyroid lobe, likely a thyroid nodule.  5.  11/03/2023: Thyroid ultrasound showed 0.7 x 0.6 x 0.5 cm solid hypoechoic nodule at the junction of the isthmus and left hemithyroid corresponding to increased uptake on PET/CT, consistent with TIRADS 4 nodule and subcentimeter TIRADS 3 nodule in the left inferior thyroid.  6. 11/3/2023: MR right tibia/fibula showed discrete focal 3 cm area of increased T2 signal and enhancement in mid shaft of right tibia with fat signal interspersed throughout this area with significant signal dropout on opposed phase imaging, favoring benign process.  7. 11/21/2023: Started neoadjuvant chemotherapy with dose-dense Adriamycin + Cytoxan with Neulasta support with Dr. Bev Ryan at [facility]. Reacted to [facility].  8. 11/25/2023: Evaluated at [facility] ED with diarrhea and dizziness, latter attributed to olanzapine -- this was discontinued.  9. 1/03/2024: Completed cycle 4 dose-dense AC.  10. 1/16/2024: Breast MRI showed persistent abnormal mass and nonmass enhancement in the left breast measuring 10.2 cm in AP dimension (unchanged).  Previously seen mass at the 12:30 position now measures 1.0 cm in greatest dimension (previously 1.9 cm). Previously seen seen mass at the 4:00 position now measures 1.1 cm  in greatest dimension (previously 1.7 cm). Previously seen enlarged left axillary lymph node now has normal size and morphology. Previously seen intrapectoral lymph node now has normal size and morphology. Previously seen abnormal right breast enhancement has entirely resolved. Previously visualized enlarged right axillary lymph node now has normal size and morphology.  Other previously seen axillary lymph nodes appear stable.  11. 1/17/2024: Start of weekly paclitaxel for planned 12 weeks.  12. 2/9/2024: MR right tibia/fibula showed mid right tibial shaft bone lesion increased from 3.2 cm to 3.8 cm; speckled foci of marrow signal likely areas of red marrow reconversion.  13. 4/03/2024: Completion of cycle 12 paclitaxel.  14. 4/5/2024: MR right tibia/fibula showed stable in size and improved signal alteration of right tibial shaft focal marrow alteration, likely red marrow.  15. 5/08/2024: Bilateral mastectomies with bilateral axillary sentinel lymph node excision under c/o Dr. Fanny Little.  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 Right breast with no residual invasive carcinoma; LCIS present; treatment related changes, 4 right axillary sentinel lymph nodes present, ypT0-N0.  16. 6/04/2024: Started Zoladex and anastrozole (latter on 6/11/2024).  17. 7/2/2024-8/7/2024: Adjuvant radiation therapy to left chest wall and regional nodes.    INTERVAL HISTORY:  Angie reports tolerating radiation well so far.  She has been struggling with weight gain and is contemplating starting semaglutide soon.    REVIEW OF SYSTEMS:   14 point ROS was reviewed and is negative other than as noted above in HPI.       HOME MEDICATIONS:  Current Outpatient Medications   Medication Sig Dispense Refill    anastrozole (ARIMIDEX) 1 MG  tablet Take 1 tablet (1 mg) by mouth daily 90 tablet 3         ALLERGIES:  Allergies   Allergen Reactions    Aprepitant Difficulty breathing and Shortness Of Breath    Fosaprepitant Unknown     Allergy added per chart review. [facility]    Armoracia Rusticana Ext (Horseradish) Hives    Carboprost GI Disturbance     Diarrhea    Codeine Nausea and Vomiting         PAST MEDICAL HISTORY:  BRCA2 gene mutation.  Bilateral breast cancer as outlined above.  2022 Galleri test - negative for malignancy.    Gynecologic history:  Age of menarche at 15.  3 children/triplets -- 1 boy and twin girls born 2022.  Age of first pregnancy at 41.  Used 10 years of OCPs no HRT, IVF for fertility treatment.      PAST SURGICAL HISTORY:  Past Surgical History:   Procedure Laterality Date    BIOPSY, BREAST, WITH RADIOFREQUENCY IDENTIFICATION Bilateral 2024    Procedure: bilateral tag localized node excision;  Surgeon: Fanny Little MD;  Location:  OR     SECTION      DILATION AND CURETTAGE      retained placenta post-partum 2 weeks    ENT SURGERY      tonsillectomy    EXCISE CYST GENERIC (LOCATION) Left 2024    Procedure: Excision Left Axillary Cyst;  Surgeon: Fanny Little MD;  Location:  OR    GYN SURGERY      egg retrieval for IVF    INSERT PORT VASCULAR ACCESS Right 2023    Procedure: INSERTION, VASCULAR ACCESS PORT;  Surgeon: Fanny Little MD;  Location:  OR    KNEE SURGERY Left     MASTECTOMY SIMPLE BILATERAL, SENTINEL NODE BILATERAL, COMBINED Bilateral 2024    Procedure: Bilateral skin sparing mastectomies with bilateral sentinel lymph node biopsy;  Surgeon: Fanny Little MD;  Location:  OR    RECONSTRUCT BREAST, INSERT TISSUE EXPANDER BILATERAL, COMBINED Bilateral 2024    Procedure: BILATERAL FIRST STAGE BREAST RECONSTRUCTION WITH TISSUE EXPANDERS AND ACELLULAR DERMAL MATRIX;  Surgeon: Vasiliy Yee MD;  Location:  OR    REMOVE PORT VASCULAR ACCESS N/A  05/08/2024    Procedure: port removal;  Surgeon: Fanny Little MD;  Location:  OR         SOCIAL HISTORY:  Social History     Socioeconomic History    Marital status:      Spouse name: Not on file    Number of children: Not on file    Years of education: Not on file    Highest education level: Not on file   Occupational History    Not on file   Tobacco Use    Smoking status: Never     Passive exposure: Never    Smokeless tobacco: Never   Vaping Use    Vaping status: Never Used   Substance and Sexual Activity    Alcohol use: Not Currently    Drug use: Not Currently    Sexual activity: Not on file   Other Topics Concern    Parent/sibling w/ CABG, MI or angioplasty before 65F 55M? No   Social History Narrative    Not on file     Social Determinants of Health     Financial Resource Strain: Low Risk  (4/9/2024)    Financial Resource Strain     Within the past 12 months, have you or your family members you live with been unable to get utilities (heat, electricity) when it was really needed?: No   Food Insecurity: Low Risk  (4/9/2024)    Food Insecurity     Within the past 12 months, did you worry that your food would run out before you got money to buy more?: No     Within the past 12 months, did the food you bought just not last and you didn t have money to get more?: No   Transportation Needs: Low Risk  (4/9/2024)    Transportation Needs     Within the past 12 months, has lack of transportation kept you from medical appointments, getting your medicines, non-medical meetings or appointments, work, or from getting things that you need?: No   Physical Activity: Insufficiently Active (11/2/2023)    [facility] from [facility], [facility]    Exercise Vital Sign     Days of Exercise per Week: 4 days     Minutes of Exercise per Session: 30 min   Stress: Not on file   Social Connections: Unknown (12/22/2021)    [facility]  "   Social Connections     Frequency of Communication with Friends and Family: Not on file   Interpersonal Safety: Low Risk  (2024)    Interpersonal Safety     Do you feel physically and emotionally safe where you currently live?: Yes     Within the past 12 months, have you been hit, slapped, kicked or otherwise physically hurt by someone?: No     Within the past 12 months, have you been humiliated or emotionally abused in other ways by your partner or ex-partner?: No   Housing Stability: Low Risk  (2024)    Housing Stability     Do you have housing? : Yes     Are you worried about losing your housing?: No         FAMILY HISTORY:  Paternal grandmother and many paternal grandfathers with breast cancer; cousin with DCIS tested positive for BRCA1.  No ovarian, colon, or prostate cancer.      PHYSICAL EXAM:  Vital signs:  /71   Pulse 73   Resp 16   Ht 1.651 m (5' 5\")   Wt 77.9 kg (171 lb 12.8 oz)   SpO2 98%   BMI 28.59 kg/m     ECO  GENERAL: No acute distress.  EYES: No scleral icterus. No overt erythema.  RESPIRATORY: No audible cough, wheezing, or labored breathing.  MUSCULOSKELETAL: Range of motion in the neck, shoulders, and arms appear normal.  SKIN: No overt rashes, discolorations, or lesions over the face and neck.  NEUROLOGIC: Alert.  No overt tremors.  PSYCHIATRIC: Normal affect and mood.  Does not appear anxious.     LABS:  CBC RESULTS:   Recent Labs   Lab Test 07/15/24  0821   WBC 4.7   RBC 4.79   HGB 14.0   HCT 43.1   MCV 90   MCH 29.2   MCHC 32.5   RDW 12.0           Last Comprehensive Metabolic Panel:  Sodium   Date Value Ref Range Status   07/15/2024 142 135 - 145 mmol/L Final     Potassium   Date Value Ref Range Status   07/15/2024 4.4 3.4 - 5.3 mmol/L Final     Chloride   Date Value Ref Range Status   07/15/2024 105 98 - 107 mmol/L Final     Carbon Dioxide (CO2)   Date Value Ref Range Status   07/15/2024 28 22 - 29 mmol/L Final     Anion Gap   Date Value Ref Range Status "   07/15/2024 9 7 - 15 mmol/L Final     Glucose   Date Value Ref Range Status   07/15/2024 96 70 - 99 mg/dL Final     GLUCOSE BY METER POCT   Date Value Ref Range Status   05/09/2024 109 (H) 70 - 99 mg/dL Final     Urea Nitrogen   Date Value Ref Range Status   07/15/2024 12.1 6.0 - 20.0 mg/dL Final     Creatinine   Date Value Ref Range Status   07/15/2024 0.78 0.51 - 0.95 mg/dL Final     GFR Estimate   Date Value Ref Range Status   07/15/2024 >90 >60 mL/min/1.73m2 Final     Comment:     eGFR calculated using 2021 CKD-EPI equation.     Calcium   Date Value Ref Range Status   07/15/2024 9.7 8.6 - 10.0 mg/dL Final     Bilirubin Total   Date Value Ref Range Status   07/15/2024 0.8 <=1.2 mg/dL Final     Alkaline Phosphatase   Date Value Ref Range Status   07/15/2024 66 40 - 150 U/L Final     ALT   Date Value Ref Range Status   07/15/2024 15 0 - 50 U/L Final     Comment:     Reference intervals for this test were updated on 6/12/2023 to more accurately reflect our healthy population. There may be differences in the flagging of prior results with similar values performed with this method. Interpretation of those prior results can be made in the context of the updated reference intervals.       AST   Date Value Ref Range Status   07/15/2024 28 0 - 45 U/L Final     Comment:     Specimen is hemolyzed which can falsely elevate AST. Analysis of a non-hemolyzed specimen may result in a lower value.  Reference intervals for this test were updated on 6/12/2023 to more accurately reflect our healthy population. There may be differences in the flagging of prior results with similar values performed with this method. Interpretation of those prior results can be made in the context of the updated reference intervals.       IMAGING:  Reviewed as per HPI.    ASSESSMENT/PLAN:  Angie Michael is a 43 year old female with the following issues:  1.  Clinical prognostic stage IIB, cT2-N1-M0, multifocal grade 3 invasive lobular carcinoma of  the RIGHT upper outer breast, ER positive 81-90%, AL positive 31-40%, HER-2 FISH negative (IHC = 2+), Ki-67 = 70%, ypT0-N0  2.  Clinical prognostic stage IIA, cT3-N1-M0, multifocal grade 2 invasive ductal and lobular carcinoma of LEFT breast overlapping sites, ER positive %, AL positive 2%, HER-2 FISH negative (IHC = 2+), Ki-67 = 26%, yp(m)T1b-N0  3. BRCA-2 deleterious gene mutation  -- Angie is s/p neoadjuvant chemotherapy with dose-dense AC followed by weekly paclitaxel completed 4/3/2024 and bilateral mastectomies with bilateral axillary sentinel lymph node excision on 5/8/2024.  --Angie achieved an outstanding complete response in her right breast cancer and excellent partial response in her left breast with no residual cancer in the lymph nodes.  -- Continue breast reconstruction with Dr. Vasiliy Yee.  - She proceeded with adjuvant radiation to the left chest wall and regional lymph nodes under care of Dr. Finesse Cortes.   - Given her initial extent of disease bilaterally, she would benefit from adjuvant abemaciclib for 2 years along with hormone blockade therapy as per the MonarchE trial but also olaparib as per the [place] trial.  Optimal sequencing of these drugs is unknown at present time.  -Advised adjuvant olaparib (Lynparza) for 1 year, as per the Mirtha trial.  We also talked about potential risk of MDS with the use of olaparib. Other side effects include fatigue and cytopenias.  Benefit is improvement in DFS and overall survival for BRCA-mutated breast cancer patients.  Angie agrees to start olaparib after completion of radiation therapy.  --Also discussed consideration for adjuvant abemaciclib after completion of olaparib, based on the phase 3 monarchE trial, which demonstrated improvement in invasive disease free survival rate of 86.1% versus 79% when abemaciclib was added to aromatase inhibitor for 2 years compared to tamoxifen or AI alone.  --I discussed potential side effects of  abemaciclib, including but not limited to diarrhea, infection, neutropenia, fatigue, leukopenia, thrombocytopenia, nausea, anemia, headache, liver toxicity, increased risk for blood clots. Discussed ramp-up dosing for abemaciclib.  -Continue hormone blockade therapy now with ovarian suppression therapy with Zoladex every 4 weeks with anastrozole.   --She can discontinue Zoladex after EZRA/BSO with Dr. Savita Bello on 9/25/2024.  --Arrange for Zometa every 6 months for bone health as well as modest reduction in breast cancer recurrence to the bone.  --We had a detailed discussion about benefits versus harms of ctDNA surveillance monitoring to potentially detect recurrent cancer earlier by approximately 15 months (with NeXT Personal DX) before detection on imaging, although overall survival improvement has not been clearly proven, and more clinical trials are needed to determine if earlier therapeutic changes will change outcomes.  Angie is very much interested in ctDNA testing/surveillance.  Discussed the [employer] NeXT Personal DX MRD testing and will determine cost of the test after ordering.    3.  Right tibia bone lesion  - 11/3/2023 MR right tibia/fibula showed a 3 cm area of increased T2 signal in the midshaft area.  Although the signaling suggested a benign process, this was technically indeterminate.    -- Repeat 2/9/2024 MR right tibia showed interval increase from 3.2 cm to 3.8 cm.  --2/27/2024 biopsy of this bone lesion was negative for malignancy.    --4/5/2024 MRI tibia showed stable in size but improved signal alteration in the lesion, most suggestive of red marrow.  --Given the peculiarity of this finding, I advised a repeat PET scan in the 9/2024 to ensure no suspicious changes in the bone lesion and no new metastatic disease. Unable to MRI the bone lesion until her expanders are removed.    4. Relative iron deficiency without anemia  --Recent ferritin 23.  --Discussed she can take oral iron supplement  every other day for 2-3 months.    5. Weight gain  --She plans to start semaglutide with her [facility] medical service.    Return after PET scan.    Monse Dan MD  [facility] Hematology/Oncology     Total time spent today: 80 minutes in chart review, patient evaluation, counseling, documentation, test and/or medication/prescription orders, and coordination of care.      The longitudinal plan of care for the diagnosis(es)/condition(s) as documented were addressed during this visit. Due to the added complexity in care, I will continue to support Angie in the subsequent management and with ongoing continuity of care.

## 2024-07-24 ENCOUNTER — ONCOLOGY VISIT (OUTPATIENT)
Dept: ONCOLOGY | Facility: CLINIC | Age: 44
End: 2024-07-24
Attending: INTERNAL MEDICINE
Payer: COMMERCIAL

## 2024-07-24 VITALS
HEIGHT: 65 IN | SYSTOLIC BLOOD PRESSURE: 110 MMHG | BODY MASS INDEX: 28.62 KG/M2 | HEART RATE: 73 BPM | OXYGEN SATURATION: 98 % | RESPIRATION RATE: 16 BRPM | DIASTOLIC BLOOD PRESSURE: 71 MMHG | WEIGHT: 171.8 LBS

## 2024-07-24 DIAGNOSIS — C50.811 MALIGNANT NEOPLASM OF OVERLAPPING SITES OF BOTH BREASTS IN FEMALE, ESTROGEN RECEPTOR POSITIVE (H): Primary | ICD-10-CM

## 2024-07-24 DIAGNOSIS — M89.9 BONE LESION: ICD-10-CM

## 2024-07-24 DIAGNOSIS — Z15.01 BRCA2 GENE MUTATION POSITIVE: ICD-10-CM

## 2024-07-24 DIAGNOSIS — Z15.09 BRCA2 GENE MUTATION POSITIVE: ICD-10-CM

## 2024-07-24 DIAGNOSIS — C50.812 MALIGNANT NEOPLASM OF OVERLAPPING SITES OF BOTH BREASTS IN FEMALE, ESTROGEN RECEPTOR POSITIVE (H): Primary | ICD-10-CM

## 2024-07-24 DIAGNOSIS — Z17.0 MALIGNANT NEOPLASM OF OVERLAPPING SITES OF BOTH BREASTS IN FEMALE, ESTROGEN RECEPTOR POSITIVE (H): Primary | ICD-10-CM

## 2024-07-24 PROCEDURE — G0463 HOSPITAL OUTPT CLINIC VISIT: HCPCS | Performed by: INTERNAL MEDICINE

## 2024-07-24 PROCEDURE — 99215 OFFICE O/P EST HI 40 MIN: CPT | Performed by: INTERNAL MEDICINE

## 2024-07-24 PROCEDURE — G2211 COMPLEX E/M VISIT ADD ON: HCPCS | Performed by: INTERNAL MEDICINE

## 2024-07-24 PROCEDURE — 99417 PROLNG OP E/M EACH 15 MIN: CPT | Performed by: INTERNAL MEDICINE

## 2024-07-24 ASSESSMENT — PAIN SCALES - GENERAL: PAINLEVEL: NO PAIN (0)

## 2024-07-24 NOTE — LETTER
7/24/2024      Angie Michael  [address] [place] [id number]      Dear Colleague,    Thank you for referring your patient, Angie Michael, to the [facility]. Please see a copy of my visit note below.    [facility]    Hematology/Oncology Established Patient Note      Today's Date: 7/24/2024    Reason for visit: Bilateral breast cancer, BRCA-2 mutation positive.    HISTORY OF PRESENT ILLNESS: Angie Michael is a 43 year old female with BRCA2 gene mutation who presents with the following oncologic history:  1.  10/05/2023: High risk surveillance breast MRI showed right breast 3.6 x 3 x 3.2 cm irregular enhancing mass at 10:00, 0.9 x 0.8 x 0.9 cm second mass at 9:00, 1 x 0.9 x 1.2 cm third mass at 9:30, other suspicious foci of enhancement throughout the breast.  Left breast with 9.9 x 5.2 x 6.7 cm suspicious non-mass enhancement at 10:00-7:00; 1.6 x 1.3 x 1.7 cm enhancing mass at 4:00; 1.8 x 1.6 x 1.9 cm enhancing mass at 12:30; other suspicious foci of enhancement throughout the left breast.  One level 1 suspicious right axillary lymph node and one level 1 suspicious left axillary lymph node..  Developing suspicious interpectoral lymph node.  2.  10/12/2023: Bilateral diagnostic mammogram showed right breast with 3.2 x 2.7 x 4.1 cm irregular mass at 10:00, 6 cm from nipple; 0.9 x 0.6 x 1 cm irregular mass at 11:00, 2 cm from nipple and prominent right axillary lymph node.  Left breast with 1.4 x 1.1 x 1.7 cm mass at 12:00, 2 cm from nipple, 1.8 x 1 x 1.6 cm mass at 3:30, 4 cm from nipple, 1 x 0.9 x 1.1 cm mass at 3:00, 7 cm from nipple, multiple areas of hypoechogenicity with calcifications in the lateral left breast, and prominent left axillary lymph node.  3.  10/12/2023: Biopsy of right breast at 10:00 showed grade 3 invasive lobular carcinoma, ER strongly positive at 81-90%, DC positive at 31-40%, HER2 equivocal with IHC = 2+, FISH negative, Ki-67 = 70%.  Biopsy  of right axillary lymph node showed metastatic carcinoma.  Biopsy of left breast at 3:30 showed grade 2 invasive carcinoma with ductal and lobular features, ER positive at %, CA positive at 2%, HER2 equivocal with IHC = 2+, FISH negative, Ki-67 = 26%.  Biopsy of left axillary lymph node showed metastatic carcinoma.  4.  10/17/2023: PET/CT scan showed cluster of hypermetabolic right breast nodules with SUV max 14.9; 2.1 x 1 cm hypermetabolic level 1 right axillary lymph node with SUV max 5.5; hypermetabolic left breast nodule with SUV max 18.8 and several additional less intense hypermetabolic left breast nodules; 1.5 x 1.3 cm hypermetabolic level 1 left axillary lymph node with SUV max 4.  Small sclerotic focus of metabolic activity within the proximal tibial shaft with SUV max 3.7, indeterminate but unlikely to reflect metastatic disease.  Subcentimeter hypermetabolic focus along anterior margin of the left thyroid lobe, likely a thyroid nodule.  5.  11/03/2023: Thyroid ultrasound showed 0.7 x 0.6 x 0.5 cm solid hypoechoic nodule at the junction of the isthmus and left hemithyroid corresponding to increased uptake on PET/CT, consistent with TIRADS 4 nodule and subcentimeter TIRADS 3 nodule in the left inferior thyroid.  6. 11/3/2023: MR right tibia/fibula showed discrete focal 3 cm area of increased T2 signal and enhancement in mid shaft of right tibia with fat signal interspersed throughout this area with significant signal dropout on opposed phase imaging, favoring benign process.  7. 11/21/2023: Started neoadjuvant chemotherapy with dose-dense Adriamycin + Cytoxan with Neulasta support with Dr. Bev Ryan at [facility]. Reacted to [facility].  8. 11/25/2023: Evaluated at [facility] ED with diarrhea and dizziness, latter attributed to olanzapine -- this was discontinued.  9. 1/03/2024: Completed cycle 4 dose-dense AC.  10. 1/16/2024: Breast MRI showed persistent abnormal mass and nonmass enhancement in  the left breast measuring 10.2 cm in AP dimension (unchanged).  Previously seen mass at the 12:30 position now measures 1.0 cm in greatest dimension (previously 1.9 cm). Previously seen seen mass at the 4:00 position now measures 1.1 cm in greatest dimension (previously 1.7 cm). Previously seen enlarged left axillary lymph node now has normal size and morphology. Previously seen intrapectoral lymph node now has normal size and morphology. Previously seen abnormal right breast enhancement has entirely resolved. Previously visualized enlarged right axillary lymph node now has normal size and morphology.  Other previously seen axillary lymph nodes appear stable.  11. 1/17/2024: Start of weekly paclitaxel for planned 12 weeks.  12. 2/9/2024: MR right tibia/fibula showed mid right tibial shaft bone lesion increased from 3.2 cm to 3.8 cm; speckled foci of marrow signal likely areas of red marrow reconversion.  13. 4/03/2024: Completion of cycle 12 paclitaxel.  14. 4/5/2024: MR right tibia/fibula showed stable in size and improved signal alteration of right tibial shaft focal marrow alteration, likely red marrow.  15. 5/08/2024: Bilateral mastectomies with bilateral axillary sentinel lymph node excision under c/o Dr. Fanny Little.  Pathology showed left breast with grade 2 multifocal invasive ductal carcinoma measuring 10 mm, 6 mm, 1.4 mm; extensive grade 3 DCIS (22 of 37 blocks), negative margins; treatment related changes present; 2 left axillary nodes negative, [id number]. Right breast with no residual invasive carcinoma; LCIS present; treatment related changes, 4 right axillary sentinel lymph nodes present, ypT0-N0.  16. 6/04/2024: Started Zoladex and anastrozole (latter on 6/11/2024).  17. 7/2/2024-8/7/2024: Adjuvant radiation therapy to left chest wall and regional nodes.    INTERVAL HISTORY:  Angie reports tolerating radiation well so far.  She has been struggling with weight gain and is contemplating starting  semaglutide soon.    REVIEW OF SYSTEMS:   14 point ROS was reviewed and is negative other than as noted above in HPI.       HOME MEDICATIONS:  Current Outpatient Medications   Medication Sig Dispense Refill     anastrozole (ARIMIDEX) 1 MG tablet Take 1 tablet (1 mg) by mouth daily 90 tablet 3         ALLERGIES:  Allergies   Allergen Reactions     Aprepitant Difficulty breathing and Shortness Of Breath     Fosaprepitant Unknown     Allergy added per chart review. [facility]     Armoracia Rusticana Ext (Horseradish) Hives     Carboprost GI Disturbance     Diarrhea     Codeine Nausea and Vomiting         PAST MEDICAL HISTORY:  BRCA2 gene mutation.  Bilateral breast cancer as outlined above.  2022 Galleri test - negative for malignancy.    Gynecologic history:  Age of menarche at 15.  3 children/triplets -- 1 boy and twin girls born 2022.  Age of first pregnancy at 41.  Used 10 years of OCPs no HRT, IVF for fertility treatment.      PAST SURGICAL HISTORY:  Past Surgical History:   Procedure Laterality Date     BIOPSY, BREAST, WITH RADIOFREQUENCY IDENTIFICATION Bilateral 2024    Procedure: bilateral tag localized node excision;  Surgeon: Fanny Little MD;  Location:  OR      SECTION       DILATION AND CURETTAGE      retained placenta post-partum 2 weeks     ENT SURGERY      tonsillectomy     EXCISE CYST GENERIC (LOCATION) Left 2024    Procedure: Excision Left Axillary Cyst;  Surgeon: Fanny Little MD;  Location:  OR     GYN SURGERY      egg retrieval for IVF     INSERT PORT VASCULAR ACCESS Right 2023    Procedure: INSERTION, VASCULAR ACCESS PORT;  Surgeon: Fanny Little MD;  Location:  OR     KNEE SURGERY Left      MASTECTOMY SIMPLE BILATERAL, SENTINEL NODE BILATERAL, COMBINED Bilateral 2024    Procedure: Bilateral skin sparing mastectomies with bilateral sentinel lymph node biopsy;  Surgeon: Fanny Little MD;  Location:  OR     RECONSTRUCT BREAST, INSERT  TISSUE EXPANDER BILATERAL, COMBINED Bilateral 05/08/2024    Procedure: BILATERAL FIRST STAGE BREAST RECONSTRUCTION WITH TISSUE EXPANDERS AND ACELLULAR DERMAL MATRIX;  Surgeon: Vasiliy Yee MD;  Location:  OR     REMOVE PORT VASCULAR ACCESS N/A 05/08/2024    Procedure: port removal;  Surgeon: Fanny Little MD;  Location:  OR         SOCIAL HISTORY:  Social History     Socioeconomic History     Marital status:      Spouse name: Not on file     Number of children: Not on file     Years of education: Not on file     Highest education level: Not on file   Occupational History     Not on file   Tobacco Use     Smoking status: Never     Passive exposure: Never     Smokeless tobacco: Never   Vaping Use     Vaping status: Never Used   Substance and Sexual Activity     Alcohol use: Not Currently     Drug use: Not Currently     Sexual activity: Not on file   Other Topics Concern     Parent/sibling w/ CABG, MI or angioplasty before 65F 55M? No   Social History Narrative     Not on file     Social Determinants of Health     Financial Resource Strain: Low Risk  (4/9/2024)    Financial Resource Strain      Within the past 12 months, have you or your family members you live with been unable to get utilities (heat, electricity) when it was really needed?: No   Food Insecurity: Low Risk  (4/9/2024)    Food Insecurity      Within the past 12 months, did you worry that your food would run out before you got money to buy more?: No      Within the past 12 months, did the food you bought just not last and you didn t have money to get more?: No   Transportation Needs: Low Risk  (4/9/2024)    Transportation Needs      Within the past 12 months, has lack of transportation kept you from medical appointments, getting your medicines, non-medical meetings or appointments, work, or from getting things that you need?: No   Physical Activity: Insufficiently Active (11/2/2023)    Received from [facility], [facility]     "Exercise Vital Sign      Days of Exercise per Week: 4 days      Minutes of Exercise per Session: 30 min   Stress: Not on file   Social Connections: Unknown (2021)    Received from [facility], [facility]    Social Connections      Frequency of Communication with Friends and Family: Not on file   Interpersonal Safety: Low Risk  (2024)    Interpersonal Safety      Do you feel physically and emotionally safe where you currently live?: Yes      Within the past 12 months, have you been hit, slapped, kicked or otherwise physically hurt by someone?: No      Within the past 12 months, have you been humiliated or emotionally abused in other ways by your partner or ex-partner?: No   Housing Stability: Low Risk  (2024)    Housing Stability      Do you have housing? : Yes      Are you worried about losing your housing?: No         FAMILY HISTORY:  Paternal grandmother and many paternal grandfathers with breast cancer; cousin with DCIS tested positive for BRCA1.  No ovarian, colon, or prostate cancer.      PHYSICAL EXAM:  Vital signs:  /71   Pulse 73   Resp 16   Ht 1.651 m (5' 5\")   Wt 77.9 kg (171 lb 12.8 oz)   SpO2 98%   BMI 28.59 kg/m     ECO  GENERAL: No acute distress.  EYES: No scleral icterus. No overt erythema.  RESPIRATORY: No audible cough, wheezing, or labored breathing.  MUSCULOSKELETAL: Range of motion in the neck, shoulders, and arms appear normal.  SKIN: No overt rashes, discolorations, or lesions over the face and neck.  NEUROLOGIC: Alert.  No overt tremors.  PSYCHIATRIC: Normal affect and mood.  Does not appear anxious.     LABS:  CBC RESULTS:   Recent Labs   Lab Test 07/15/24  0821   WBC 4.7   RBC 4.79   HGB 14.0   HCT 43.1   MCV 90   MCH 29.2   MCHC 32.5   RDW 12.0           Last Comprehensive Metabolic Panel:  Sodium   Date Value Ref Range Status   07/15/2024 142 135 - 145 mmol/L Final     Potassium   Date " Value Ref Range Status   07/15/2024 4.4 3.4 - 5.3 mmol/L Final     Chloride   Date Value Ref Range Status   07/15/2024 105 98 - 107 mmol/L Final     Carbon Dioxide (CO2)   Date Value Ref Range Status   07/15/2024 28 22 - 29 mmol/L Final     Anion Gap   Date Value Ref Range Status   07/15/2024 9 7 - 15 mmol/L Final     Glucose   Date Value Ref Range Status   07/15/2024 96 70 - 99 mg/dL Final     GLUCOSE BY METER POCT   Date Value Ref Range Status   05/09/2024 109 (H) 70 - 99 mg/dL Final     Urea Nitrogen   Date Value Ref Range Status   07/15/2024 12.1 6.0 - 20.0 mg/dL Final     Creatinine   Date Value Ref Range Status   07/15/2024 0.78 0.51 - 0.95 mg/dL Final     GFR Estimate   Date Value Ref Range Status   07/15/2024 >90 >60 mL/min/1.73m2 Final     Comment:     eGFR calculated using 2021 CKD-EPI equation.     Calcium   Date Value Ref Range Status   07/15/2024 9.7 8.6 - 10.0 mg/dL Final     Bilirubin Total   Date Value Ref Range Status   07/15/2024 0.8 <=1.2 mg/dL Final     Alkaline Phosphatase   Date Value Ref Range Status   07/15/2024 66 40 - 150 U/L Final     ALT   Date Value Ref Range Status   07/15/2024 15 0 - 50 U/L Final     Comment:     Reference intervals for this test were updated on 6/12/2023 to more accurately reflect our healthy population. There may be differences in the flagging of prior results with similar values performed with this method. Interpretation of those prior results can be made in the context of the updated reference intervals.       AST   Date Value Ref Range Status   07/15/2024 28 0 - 45 U/L Final     Comment:     Specimen is hemolyzed which can falsely elevate AST. Analysis of a non-hemolyzed specimen may result in a lower value.  Reference intervals for this test were updated on 6/12/2023 to more accurately reflect our healthy population. There may be differences in the flagging of prior results with similar values performed with this method. Interpretation of those prior results can  be made in the context of the updated reference intervals.       IMAGING:  Reviewed as per HPI.    ASSESSMENT/PLAN:  Angie Michael is a 43 year old female with the following issues:  1.  Clinical prognostic stage IIB, cT2-N1-M0, multifocal grade 3 invasive lobular carcinoma of the RIGHT upper outer breast, ER positive 81-90%, ID positive 31-40%, HER-2 FISH negative (IHC = 2+), Ki-67 = 70%, ypT0-N0  2.  Clinical prognostic stage IIA, cT3-N1-M0, multifocal grade 2 invasive ductal and lobular carcinoma of LEFT breast overlapping sites, ER positive %, ID positive 2%, HER-2 FISH negative (IHC = 2+), Ki-67 = 26%, yp(m)T1b-N0  3. BRCA-2 deleterious gene mutation  -- Angie is s/p neoadjuvant chemotherapy with dose-dense AC followed by weekly paclitaxel completed 4/3/2024 and bilateral mastectomies with bilateral axillary sentinel lymph node excision on 5/8/2024.  --Angie achieved an outstanding complete response in her right breast cancer and excellent partial response in her left breast with no residual cancer in the lymph nodes.  -- Continue breast reconstruction with Dr. Vasiliy Yee.  - She proceeded with adjuvant radiation to the left chest wall and regional lymph nodes under care of Dr. Finesse Cortes.   - Given her initial extent of disease bilaterally, she would benefit from adjuvant abemaciclib for 2 years along with hormone blockade therapy as per the MonarchE trial but also olaparib as per the Mirtha trial.  Optimal sequencing of these drugs is unknown at present time.  -Advised adjuvant olaparib (Lynparza) for 1 year, as per the [place] trial.  We also talked about potential risk of MDS with the use of olaparib. Other side effects include fatigue and cytopenias.  Benefit is improvement in DFS and overall survival for BRCA-mutated breast cancer patients.  Angie agrees to start olaparib after completion of radiation therapy.  --Also discussed consideration for adjuvant abemaciclib after completion of  olaparib, based on the phase 3 monarchE trial, which demonstrated improvement in invasive disease free survival rate of 86.1% versus 79% when abemaciclib was added to aromatase inhibitor for 2 years compared to tamoxifen or AI alone.  --I discussed potential side effects of abemaciclib, including but not limited to diarrhea, infection, neutropenia, fatigue, leukopenia, thrombocytopenia, nausea, anemia, headache, liver toxicity, increased risk for blood clots. Discussed ramp-up dosing for abemaciclib.  -Continue hormone blockade therapy now with ovarian suppression therapy with Zoladex every 4 weeks with anastrozole.   --She can discontinue Zoladex after EZRA/BSO with Dr. Savita Bello on 9/25/2024.  --Arrange for Zometa every 6 months for bone health as well as modest reduction in breast cancer recurrence to the bone.  --We had a detailed discussion about benefits versus harms of ctDNA surveillance monitoring to potentially detect recurrent cancer earlier by approximately 15 months (with NeXT Personal DX) before detection on imaging, although overall survival improvement has not been clearly proven, and more clinical trials are needed to determine if earlier therapeutic changes will change outcomes.  Angie is very much interested in ctDNA testing/surveillance.  Discussed the [employer] NeXT Personal DX MRD testing and will determine cost of the test after ordering.    3.  Right tibia bone lesion  - 11/3/2023 MR right tibia/fibula showed a 3 cm area of increased T2 signal in the midshaft area.  Although the signaling suggested a benign process, this was technically indeterminate.    -- Repeat 2/9/2024 MR right tibia showed interval increase from 3.2 cm to 3.8 cm.  --2/27/2024 biopsy of this bone lesion was negative for malignancy.    --4/5/2024 MRI tibia showed stable in size but improved signal alteration in the lesion, most suggestive of red marrow.  --Given the peculiarity of this finding, I advised a repeat PET scan in  "the 9/2024 to ensure no suspicious changes in the bone lesion and no new metastatic disease. Unable to MRI the bone lesion until her expanders are removed.    4. Relative iron deficiency without anemia  --Recent ferritin 23.  --Discussed she can take oral iron supplement every other day for 2-3 months.    5. Weight gain  --She plans to start semaglutide with her [facility] medical service.    Return after PET scan.    Monse Dan MD  [facility] Hematology/Oncology     Total time spent today: 80 minutes in chart review, patient evaluation, counseling, documentation, test and/or medication/prescription orders, and coordination of care.      The longitudinal plan of care for the diagnosis(es)/condition(s) as documented were addressed during this visit. Due to the added complexity in care, I will continue to support Angie in the subsequent management and with ongoing continuity of care.      Oncology Rooming Note    July 24, 2024 9:17 AM   Angie Michael is a 43 year old female who presents for:    Chief Complaint   Patient presents with     Oncology Clinic Visit     Initial Vitals: There were no vitals taken for this visit. Estimated body mass index is 28.31 kg/m  as calculated from the following:    Height as of 6/28/24: 1.66 m (5' 5.35\").    Weight as of 6/28/24: 78 kg (172 lb). There is no height or weight on file to calculate BSA.  Data Unavailable Comment: Data Unavailable   No LMP recorded.  Allergies reviewed: Yes  Medications reviewed: Yes    Medications: Medication refills not needed today.  Pharmacy name entered into [employer]:    [id number] - [place], [place] - [address] [facility] [address] [facility] [place] [address] [address]    Frailty Screening:   Is the patient here for a new oncology consult visit in cancer care? 2. No        Beatriz Aguilar MA              Again, thank you for allowing me to participate in the care of your patient.  "       Sincerely,        Monse Dan MD

## 2024-07-24 NOTE — PROGRESS NOTES
"Oncology Rooming Note    July 24, 2024 9:17 AM   Angie Michael is a 43 year old female who presents for:    Chief Complaint   Patient presents with    Oncology Clinic Visit     Initial Vitals: There were no vitals taken for this visit. Estimated body mass index is 28.31 kg/m  as calculated from the following:    Height as of 6/28/24: 1.66 m (5' 5.35\").    Weight as of 6/28/24: 78 kg (172 lb). There is no height or weight on file to calculate BSA.  Data Unavailable Comment: Data Unavailable   No LMP recorded.  Allergies reviewed: Yes  Medications reviewed: Yes    Medications: Medication refills not needed today.  Pharmacy name entered into [facility]:    [id number] - [place], [place] - [address] [facility] [address] [place] [facility] [place] - [address] [address]    Frailty Screening:   Is the patient here for a new oncology consult visit in cancer care? 2. No        Beatriz Aguilar MA            "

## 2024-07-24 NOTE — PATIENT INSTRUCTIONS
Start Lynparza (olaparib) after completion of radiation (after 8/7/2024).  Arrange for Zometa every 6 months.  Arrange for PET/CT whole body scan in 9/2024.  RTC MD on 9/18/2024 after PET scan.

## 2024-07-25 RX ORDER — HEPARIN SODIUM,PORCINE 10 UNIT/ML
5-20 VIAL (ML) INTRAVENOUS DAILY PRN
OUTPATIENT
Start: 2024-08-09

## 2024-07-25 RX ORDER — ALBUTEROL SULFATE 0.83 MG/ML
2.5 SOLUTION RESPIRATORY (INHALATION)
OUTPATIENT
Start: 2024-08-09

## 2024-07-25 RX ORDER — METHYLPREDNISOLONE SODIUM SUCCINATE 125 MG/2ML
125 INJECTION, POWDER, LYOPHILIZED, FOR SOLUTION INTRAMUSCULAR; INTRAVENOUS
Start: 2024-08-09

## 2024-07-25 RX ORDER — ALBUTEROL SULFATE 90 UG/1
1-2 AEROSOL, METERED RESPIRATORY (INHALATION)
Start: 2024-08-09

## 2024-07-25 RX ORDER — EPINEPHRINE 1 MG/ML
0.3 INJECTION, SOLUTION INTRAMUSCULAR; SUBCUTANEOUS EVERY 5 MIN PRN
OUTPATIENT
Start: 2024-08-09

## 2024-07-25 RX ORDER — ZOLEDRONIC ACID 0.04 MG/ML
4 INJECTION, SOLUTION INTRAVENOUS ONCE
OUTPATIENT
Start: 2024-08-09 | End: 2024-08-09

## 2024-07-25 RX ORDER — HEPARIN SODIUM (PORCINE) LOCK FLUSH IV SOLN 100 UNIT/ML 100 UNIT/ML
5 SOLUTION INTRAVENOUS
OUTPATIENT
Start: 2024-08-09

## 2024-07-25 RX ORDER — MEPERIDINE HYDROCHLORIDE 25 MG/ML
25 INJECTION INTRAMUSCULAR; INTRAVENOUS; SUBCUTANEOUS EVERY 30 MIN PRN
OUTPATIENT
Start: 2024-08-09

## 2024-07-25 RX ORDER — DIPHENHYDRAMINE HYDROCHLORIDE 50 MG/ML
50 INJECTION INTRAMUSCULAR; INTRAVENOUS
Start: 2024-08-09

## 2024-07-26 ENCOUNTER — TELEPHONE (OUTPATIENT)
Dept: PHARMACY | Facility: CLINIC | Age: 44
End: 2024-07-26
Payer: COMMERCIAL

## 2024-07-26 NOTE — TELEPHONE ENCOUNTER
PA Initiation    Medication: LYNPARZA 150 MG PO TABS  Insurance Company: [employer] - Phone [phone] Fax [phone]  Pharmacy Filling the Rx: [facility] - [place], [place] - [facility] [address]  Filling Pharmacy Phone:    Filling Pharmacy Fax:    Start Date: 7/26/2024

## 2024-07-29 ENCOUNTER — DOCUMENTATION ONLY (OUTPATIENT)
Dept: PHARMACY | Facility: CLINIC | Age: 44
End: 2024-07-29
Payer: COMMERCIAL

## 2024-07-29 DIAGNOSIS — Z17.0 MALIGNANT NEOPLASM OF UPPER-OUTER QUADRANT OF RIGHT BREAST IN FEMALE, ESTROGEN RECEPTOR POSITIVE (H): Primary | ICD-10-CM

## 2024-07-29 DIAGNOSIS — C50.411 MALIGNANT NEOPLASM OF UPPER-OUTER QUADRANT OF RIGHT BREAST IN FEMALE, ESTROGEN RECEPTOR POSITIVE (H): Primary | ICD-10-CM

## 2024-07-29 DIAGNOSIS — Z15.09 BRCA2 GENE MUTATION POSITIVE: ICD-10-CM

## 2024-07-29 DIAGNOSIS — Z15.01 BRCA2 GENE MUTATION POSITIVE: ICD-10-CM

## 2024-07-29 NOTE — PROGRESS NOTES
Oral Chemotherapy Monitoring Program     Placed call to patient in follow up of oral chemotherapy. Left message requesting call back. No drug names were mentioned. Will update when response received.    Kuldip Rosenbaum  Pharmacy Intern   [facility]   [phone]

## 2024-07-29 NOTE — TELEPHONE ENCOUNTER
Prior Authorization Approval    Medication: LYNPARZA 150 MG PO TABS  Authorization Effective Date: 7/26/2024  Authorization Expiration Date: 7/26/2025  Approved Dose/Quantity: 120/30  Reference #:     Insurance Company: [employer] - Phone [phone] Fax [phone]  Expected CoPay: $ 0  CoPay Card Available:      Financial Assistance Needed: no  Which Pharmacy is filling the prescription: [facility] - [place], [place] - [address]  Pharmacy Notified: yes  Patient Notified: yes, split fill policy applies

## 2024-07-30 ENCOUNTER — LAB (OUTPATIENT)
Dept: INFUSION THERAPY | Facility: CLINIC | Age: 44
End: 2024-07-30
Attending: INTERNAL MEDICINE
Payer: COMMERCIAL

## 2024-07-30 VITALS
HEART RATE: 74 BPM | RESPIRATION RATE: 18 BRPM | SYSTOLIC BLOOD PRESSURE: 110 MMHG | TEMPERATURE: 99 F | DIASTOLIC BLOOD PRESSURE: 76 MMHG

## 2024-07-30 DIAGNOSIS — Z17.0 MALIGNANT NEOPLASM OF OVERLAPPING SITES OF BOTH BREASTS IN FEMALE, ESTROGEN RECEPTOR POSITIVE (H): Primary | ICD-10-CM

## 2024-07-30 DIAGNOSIS — C50.812 MALIGNANT NEOPLASM OF OVERLAPPING SITES OF BOTH BREASTS IN FEMALE, ESTROGEN RECEPTOR POSITIVE (H): Primary | ICD-10-CM

## 2024-07-30 DIAGNOSIS — C50.811 MALIGNANT NEOPLASM OF OVERLAPPING SITES OF BOTH BREASTS IN FEMALE, ESTROGEN RECEPTOR POSITIVE (H): Primary | ICD-10-CM

## 2024-07-30 PROCEDURE — 250N000011 HC RX IP 250 OP 636: Performed by: INTERNAL MEDICINE

## 2024-07-30 PROCEDURE — 96402 CHEMO HORMON ANTINEOPL SQ/IM: CPT

## 2024-07-30 RX ADMIN — GOSERELIN ACETATE 3.6 MG: 3.6 IMPLANT SUBCUTANEOUS at 09:46

## 2024-07-30 NOTE — PROGRESS NOTES
Infusion Nursing Note:  Angieyasmin Michael presents today for zoladex.    Patient seen by provider today: No   present during visit today: Not Applicable.    Note: N/A.      Intravenous Access:  No Intravenous access/labs at this visit.    Treatment Conditions:  Not Applicable.      Post Infusion Assessment:  Patient tolerated injection without incident.       Discharge Plan:   Patient and/or family verbalized understanding of discharge instructions and all questions answered.  Copy of AVS reviewed with patient and/or family.  Patient will return in a month for next appointment.  Patient discharged in stable condition accompanied by: self.  Departure Mode: Ambulatory.      Dayan Renteria RN

## 2024-08-01 ENCOUNTER — DOCUMENTATION ONLY (OUTPATIENT)
Dept: ONCOLOGY | Facility: CLINIC | Age: 44
End: 2024-08-01
Payer: COMMERCIAL

## 2024-08-01 DIAGNOSIS — Z15.01 BRCA2 GENE MUTATION POSITIVE: ICD-10-CM

## 2024-08-01 DIAGNOSIS — C50.411 MALIGNANT NEOPLASM OF UPPER-OUTER QUADRANT OF RIGHT BREAST IN FEMALE, ESTROGEN RECEPTOR POSITIVE (H): Primary | ICD-10-CM

## 2024-08-01 DIAGNOSIS — Z15.09 BRCA2 GENE MUTATION POSITIVE: ICD-10-CM

## 2024-08-01 DIAGNOSIS — Z17.0 MALIGNANT NEOPLASM OF UPPER-OUTER QUADRANT OF RIGHT BREAST IN FEMALE, ESTROGEN RECEPTOR POSITIVE (H): Primary | ICD-10-CM

## 2024-08-01 RX ORDER — ONDANSETRON 8 MG/1
8 TABLET, FILM COATED ORAL EVERY 8 HOURS PRN
Qty: 30 TABLET | Refills: 2 | Status: SHIPPED | OUTPATIENT
Start: 2024-08-06 | End: 2024-09-30

## 2024-08-01 NOTE — NURSING NOTE
NeXT Personal DX testing submitted via online portal. Pathology report, insurance card and office note uploaded to portal    Adele Chowdary RN

## 2024-08-05 ENCOUNTER — TELEPHONE (OUTPATIENT)
Dept: ONCOLOGY | Facility: CLINIC | Age: 44
End: 2024-08-05
Payer: COMMERCIAL

## 2024-08-05 DIAGNOSIS — Z17.0 MALIGNANT NEOPLASM OF UPPER-OUTER QUADRANT OF RIGHT BREAST IN FEMALE, ESTROGEN RECEPTOR POSITIVE (H): Primary | ICD-10-CM

## 2024-08-05 DIAGNOSIS — C50.411 MALIGNANT NEOPLASM OF UPPER-OUTER QUADRANT OF RIGHT BREAST IN FEMALE, ESTROGEN RECEPTOR POSITIVE (H): Primary | ICD-10-CM

## 2024-08-05 NOTE — ORAL ONC MGMT
"Oral Chemotherapy Monitoring Program    Lab Monitoring Plan    Subjective/Objective:  Angie Michael is a 44 year old female contacted by phone for an initial visit for oral chemotherapy education. She is finishing radiation on 8/7 and starting lynparza on 8/8 per Dr. Dan.         8/5/2024     3:00 PM   ORAL CHEMOTHERAPY   Assessment Type New Teach   Diagnosis Code Breast Cancer   Providers Dr. Dan   Clinic Name/Location [facility]   Drug Name Lynparza (olaparib)   Dose 300 mg   Current Schedule BID   Cycle Details Continuous   Planned next cycle start date 8/8/2024   Any new drug interactions? Yes   Pharmacist Intervention? Yes   Intervention(s) Drug changed (non-chemo)   Is the dose as ordered appropriate for the patient? Yes       Last PHQ-2 Score on record:       3/19/2024     2:25 PM   PHQ-2 ( 1999 Pfizer)   Q1: Little interest or pleasure in doing things 0   Q2: Feeling down, depressed or hopeless 0   PHQ-2 Score 0       Vitals:  BP:   BP Readings from Last 1 Encounters:   07/30/24 110/76     Wt Readings from Last 1 Encounters:   07/24/24 77.9 kg (171 lb 12.8 oz)     Estimated body surface area is 1.89 meters squared as calculated from the following:    Height as of 7/24/24: 1.651 m (5' 5\").    Weight as of 7/24/24: 77.9 kg (171 lb 12.8 oz).    Labs:  _  Result Component Current Result Ref Range   Sodium 142 (7/15/2024) 135 - 145 mmol/L     _  Result Component Current Result Ref Range   Potassium 4.4 (7/15/2024) 3.4 - 5.3 mmol/L     _  Result Component Current Result Ref Range   Calcium 9.7 (7/15/2024) 8.6 - 10.0 mg/dL     No results found for Mag within last 30 days.     No results found for Phos within last 30 days.     _  Result Component Current Result Ref Range   Albumin 4.3 (7/15/2024) 3.5 - 5.2 g/dL     _  Result Component Current Result Ref Range   Urea Nitrogen 12.1 (7/15/2024) 6.0 - 20.0 mg/dL     _  Result Component Current Result Ref Range   Creatinine 0.78 (7/15/2024) 0.51 - 0.95 mg/dL "     _  Result Component Current Result Ref Range   AST 28 (7/15/2024) 0 - 45 U/L     _  Result Component Current Result Ref Range   ALT 15 (7/15/2024) 0 - 50 U/L     _  Result Component Current Result Ref Range   Bilirubin Total 0.8 (7/15/2024) <=1.2 mg/dL     _  Result Component Current Result Ref Range   WBC Count 4.7 (7/15/2024) 4.0 - 11.0 10e3/uL     _  Result Component Current Result Ref Range   Hemoglobin 14.0 (7/15/2024) 11.7 - 15.7 g/dL     _  Result Component Current Result Ref Range   Platelet Count 212 (7/15/2024) 150 - 450 10e3/uL     No results found for ANC within last 30 days.     _  Result Component Current Result Ref Range   Absolute Neutrophils 3.3 (7/15/2024) 1.6 - 8.3 10e3/uL        Assessment:  Patient is appropriate to start therapy.    Patient would like to utilize supplements in addition to her lynparza. She previously sent a list of supplements and I advised her to not take Cholisma and CR as these can interact with lynparza. She is planning to send a list of additional supplements for us to run a drug interaction check on.     Plan:  Basic chemotherapy teaching was reviewed with the patient including indication, start date of therapy, dose, administration, adverse effects, missed doses, food and drug interactions, monitoring, side effect management, office contact information, and safe handling. Written materials were provided and all questions answered.    Follow-Up:  1 week after starting therapy     Nestor Kan, PharmD, MS  Hematology/Oncology Clinical Pharmacist  [facility]  [phone]

## 2024-08-07 ENCOUNTER — LAB REQUISITION (OUTPATIENT)
Dept: LAB | Facility: CLINIC | Age: 44
End: 2024-08-07
Payer: COMMERCIAL

## 2024-08-07 ENCOUNTER — TRANSFERRED RECORDS (OUTPATIENT)
Dept: HEALTH INFORMATION MANAGEMENT | Facility: CLINIC | Age: 44
End: 2024-08-07
Payer: COMMERCIAL

## 2024-08-09 ENCOUNTER — HOSPITAL ENCOUNTER (OUTPATIENT)
Dept: BONE DENSITY | Facility: CLINIC | Age: 44
Discharge: HOME | End: 2024-08-09
Attending: INTERNAL MEDICINE | Admitting: INTERNAL MEDICINE
Payer: COMMERCIAL

## 2024-08-09 DIAGNOSIS — Z17.0 MALIGNANT NEOPLASM OF UPPER-OUTER QUADRANT OF RIGHT BREAST IN FEMALE, ESTROGEN RECEPTOR POSITIVE (H): ICD-10-CM

## 2024-08-09 DIAGNOSIS — C50.411 MALIGNANT NEOPLASM OF UPPER-OUTER QUADRANT OF RIGHT BREAST IN FEMALE, ESTROGEN RECEPTOR POSITIVE (H): ICD-10-CM

## 2024-08-09 PROCEDURE — 77080 DXA BONE DENSITY AXIAL: CPT

## 2024-08-15 ENCOUNTER — DOCUMENTATION ONLY (OUTPATIENT)
Dept: PHARMACY | Facility: CLINIC | Age: 44
End: 2024-08-15
Payer: COMMERCIAL

## 2024-08-15 NOTE — PROGRESS NOTES
Oral Chemotherapy Monitoring Program    Subjective/Objective:  Angie Michael is a 44 year old female contacted by phone for a follow-up visit for oral chemotherapy.  Patient endorsed a start date of 8/8/24, no missed doses, and adherence to the medication regimen. The patient stated she has been experiencing about 2 hot flashes a day, she is having some trouble sleeping, and that she is also having a little bit of diarrhea, although it is not concerning to her. She knows to use imodium or Pepto if needed. She is not experiencing any other side effects at the time. We talked about her supplements and I clarified that she should not take turmeric, milk thistle, or ashwagandha while on Lynparza, although it is ok to take them when on the anastrozole alone after coming off Lynparza. All other questions answered.         8/5/2024     3:00 PM 8/15/2024    10:00 AM   ORAL CHEMOTHERAPY   Assessment Type New Teach New Teach   Diagnosis Code Breast Cancer Breast Cancer   Providers Dr. Sy Dan   Clinic Name/Location \[facility]""   Drug Name Lynparza (olaparib) Lynparza (olaparib)   Dose 300 mg 300 mg   Current Schedule BID BID   Cycle Details Continuous Continuous   Start Date of Last Cycle  8/8/2024   Planned next cycle start date 8/8/2024 9/7/2024   Doses missed in last 2 weeks  0   Adherence Assessment  Adherent   Adverse Effects  No AE identified during assessment   Any new drug interactions? Yes No   Pharmacist Intervention? Yes    Intervention(s) Drug changed (non-chemo)    Is the dose as ordered appropriate for the patient? Yes        Last PHQ-2 Score on record:       3/19/2024     2:25 PM   PHQ-2 ( 1999 Pfizer)   Q1: Little interest or pleasure in doing things 0   Q2: Feeling down, depressed or hopeless 0   PHQ-2 Score 0       Vitals:  BP:   BP Readings from Last 1 Encounters:   07/30/24 110/76     Wt Readings from Last 1 Encounters:   07/24/24 77.9 kg (171 lb 12.8 oz)     Estimated body surface area is  "1.89 meters squared as calculated from the following:    Height as of 7/24/24: 1.651 m (5' 5\").    Weight as of 7/24/24: 77.9 kg (171 lb 12.8 oz).    Labs:  No results found for NA within last 30 days.     No results found for K within last 30 days.     No results found for CA within last 30 days.     No results found for Mag within last 30 days.     No results found for Phos within last 30 days.     No results found for ALBUMIN within last 30 days.     No results found for BUN within last 30 days.     No results found for CR within last 30 days.     No results found for AST within last 30 days.     No results found for ALT within last 30 days.     No results found for BILITOTAL within last 30 days.     No results found for WBC within last 30 days.     No results found for HGB within last 30 days.     No results found for PLT within last 30 days.     No results found for ANC within last 30 days.     No results found for ANC within last 30 days.          Assessment/Plan:  Overall, patient seems to be tolerating Lynparza well. She knows to reach out to any concerns and she will send another zip file to us with even more supplements. She is very compliant about stopping certain supplements if needed and understands the reasoning behind it it seems.     Continue Lynparza   Labs 8/27    Refill Due:  9/7    Kuldip Rosenbaum  Pharmacy Intern   [facility]   [phone]      "

## 2024-08-26 DIAGNOSIS — C50.811 MALIGNANT NEOPLASM OF OVERLAPPING SITES OF BOTH BREASTS IN FEMALE, ESTROGEN RECEPTOR POSITIVE (H): Primary | ICD-10-CM

## 2024-08-26 DIAGNOSIS — Z15.01 BRCA2 GENE MUTATION POSITIVE: ICD-10-CM

## 2024-08-26 DIAGNOSIS — C50.812 MALIGNANT NEOPLASM OF OVERLAPPING SITES OF BOTH BREASTS IN FEMALE, ESTROGEN RECEPTOR POSITIVE (H): Primary | ICD-10-CM

## 2024-08-26 DIAGNOSIS — Z17.0 MALIGNANT NEOPLASM OF UPPER-OUTER QUADRANT OF RIGHT BREAST IN FEMALE, ESTROGEN RECEPTOR POSITIVE (H): Primary | ICD-10-CM

## 2024-08-26 DIAGNOSIS — Z15.09 BRCA2 GENE MUTATION POSITIVE: ICD-10-CM

## 2024-08-26 DIAGNOSIS — Z17.0 MALIGNANT NEOPLASM OF OVERLAPPING SITES OF BOTH BREASTS IN FEMALE, ESTROGEN RECEPTOR POSITIVE (H): Primary | ICD-10-CM

## 2024-08-26 DIAGNOSIS — C50.411 MALIGNANT NEOPLASM OF UPPER-OUTER QUADRANT OF RIGHT BREAST IN FEMALE, ESTROGEN RECEPTOR POSITIVE (H): Primary | ICD-10-CM

## 2024-08-29 ENCOUNTER — LAB (OUTPATIENT)
Dept: INFUSION THERAPY | Facility: CLINIC | Age: 44
End: 2024-08-29
Attending: INTERNAL MEDICINE
Payer: COMMERCIAL

## 2024-08-29 ENCOUNTER — DOCUMENTATION ONLY (OUTPATIENT)
Dept: ONCOLOGY | Facility: CLINIC | Age: 44
End: 2024-08-29

## 2024-08-29 DIAGNOSIS — Z17.0 MALIGNANT NEOPLASM OF UPPER-OUTER QUADRANT OF RIGHT BREAST IN FEMALE, ESTROGEN RECEPTOR POSITIVE (H): ICD-10-CM

## 2024-08-29 DIAGNOSIS — Z17.0 MALIGNANT NEOPLASM OF UPPER-OUTER QUADRANT OF BOTH BREASTS IN FEMALE, ESTROGEN RECEPTOR POSITIVE (H): ICD-10-CM

## 2024-08-29 DIAGNOSIS — C50.812 MALIGNANT NEOPLASM OF OVERLAPPING SITES OF BOTH BREASTS IN FEMALE, ESTROGEN RECEPTOR POSITIVE (H): Primary | ICD-10-CM

## 2024-08-29 DIAGNOSIS — C50.811 MALIGNANT NEOPLASM OF OVERLAPPING SITES OF BOTH BREASTS IN FEMALE, ESTROGEN RECEPTOR POSITIVE (H): Primary | ICD-10-CM

## 2024-08-29 DIAGNOSIS — C50.411 MALIGNANT NEOPLASM OF UPPER-OUTER QUADRANT OF BOTH BREASTS IN FEMALE, ESTROGEN RECEPTOR POSITIVE (H): ICD-10-CM

## 2024-08-29 DIAGNOSIS — Z17.0 MALIGNANT NEOPLASM OF OVERLAPPING SITES OF BOTH BREASTS IN FEMALE, ESTROGEN RECEPTOR POSITIVE (H): Primary | ICD-10-CM

## 2024-08-29 DIAGNOSIS — C50.412 MALIGNANT NEOPLASM OF UPPER-OUTER QUADRANT OF BOTH BREASTS IN FEMALE, ESTROGEN RECEPTOR POSITIVE (H): ICD-10-CM

## 2024-08-29 DIAGNOSIS — C50.411 MALIGNANT NEOPLASM OF UPPER-OUTER QUADRANT OF RIGHT BREAST IN FEMALE, ESTROGEN RECEPTOR POSITIVE (H): ICD-10-CM

## 2024-08-29 DIAGNOSIS — M89.9 BONE LESION: ICD-10-CM

## 2024-08-29 LAB
ALBUMIN SERPL BCG-MCNC: 4.3 G/DL (ref 3.5–5.2)
ALP SERPL-CCNC: 64 U/L (ref 40–150)
ALT SERPL W P-5'-P-CCNC: 16 U/L (ref 0–50)
ANION GAP SERPL CALCULATED.3IONS-SCNC: 9 MMOL/L (ref 7–15)
AST SERPL W P-5'-P-CCNC: 20 U/L (ref 0–45)
BASOPHILS # BLD AUTO: 0 10E3/UL (ref 0–0.2)
BASOPHILS NFR BLD AUTO: 1 %
BILIRUB SERPL-MCNC: 0.9 MG/DL
BUN SERPL-MCNC: 13.8 MG/DL (ref 6–20)
CALCIUM SERPL-MCNC: 9.8 MG/DL (ref 8.8–10.4)
CHLORIDE SERPL-SCNC: 102 MMOL/L (ref 98–107)
CREAT SERPL-MCNC: 0.88 MG/DL (ref 0.51–0.95)
EGFRCR SERPLBLD CKD-EPI 2021: 83 ML/MIN/1.73M2
EOSINOPHIL # BLD AUTO: 0.1 10E3/UL (ref 0–0.7)
EOSINOPHIL NFR BLD AUTO: 2 %
ERYTHROCYTE [DISTWIDTH] IN BLOOD BY AUTOMATED COUNT: 12.8 % (ref 10–15)
GLUCOSE SERPL-MCNC: 110 MG/DL (ref 70–99)
HCO3 SERPL-SCNC: 27 MMOL/L (ref 22–29)
HCT VFR BLD AUTO: 41.5 % (ref 35–47)
HGB BLD-MCNC: 14.1 G/DL (ref 11.7–15.7)
IMM GRANULOCYTES # BLD: 0 10E3/UL
IMM GRANULOCYTES NFR BLD: 0 %
LYMPHOCYTES # BLD AUTO: 0.8 10E3/UL (ref 0.8–5.3)
LYMPHOCYTES NFR BLD AUTO: 19 %
MCH RBC QN AUTO: 30.7 PG (ref 26.5–33)
MCHC RBC AUTO-ENTMCNC: 34 G/DL (ref 31.5–36.5)
MCV RBC AUTO: 90 FL (ref 78–100)
MONOCYTES # BLD AUTO: 0.2 10E3/UL (ref 0–1.3)
MONOCYTES NFR BLD AUTO: 5 %
NEUTROPHILS # BLD AUTO: 3.2 10E3/UL (ref 1.6–8.3)
NEUTROPHILS NFR BLD AUTO: 74 %
NRBC # BLD AUTO: 0 10E3/UL
NRBC BLD AUTO-RTO: 0 /100
PLATELET # BLD AUTO: 192 10E3/UL (ref 150–450)
POTASSIUM SERPL-SCNC: 4.4 MMOL/L (ref 3.4–5.3)
PROT SERPL-MCNC: 7.2 G/DL (ref 6.4–8.3)
RBC # BLD AUTO: 4.6 10E6/UL (ref 3.8–5.2)
SODIUM SERPL-SCNC: 138 MMOL/L (ref 135–145)
WBC # BLD AUTO: 4.3 10E3/UL (ref 4–11)

## 2024-08-29 PROCEDURE — 84155 ASSAY OF PROTEIN SERUM: CPT | Performed by: NURSE PRACTITIONER

## 2024-08-29 PROCEDURE — 82374 ASSAY BLOOD CARBON DIOXIDE: CPT | Performed by: NURSE PRACTITIONER

## 2024-08-29 PROCEDURE — 85041 AUTOMATED RBC COUNT: CPT | Performed by: NURSE PRACTITIONER

## 2024-08-29 PROCEDURE — 36415 COLL VENOUS BLD VENIPUNCTURE: CPT

## 2024-08-29 PROCEDURE — 250N000011 HC RX IP 250 OP 636: Performed by: INTERNAL MEDICINE

## 2024-08-29 PROCEDURE — 96402 CHEMO HORMON ANTINEOPL SQ/IM: CPT

## 2024-08-29 RX ADMIN — GOSERELIN ACETATE 3.6 MG: 3.6 IMPLANT SUBCUTANEOUS at 13:58

## 2024-08-29 NOTE — PROGRESS NOTES
Nursing Note:  Angie Michael presents today for Zoladex, peripheral lab draw.    Patient seen by provider today: No   present during visit today: Not Applicable.    Note: Patient reports to hot flashes with the Zoladex.  Is having a total hysterectomy and oophorectomy in September so per patient report, today is her last Zoladex injection.  Tolerated one Zoladex injection without issue to right abdomen.  Per patient request, ice applied to abdomen prior to injection.     Patient also had orders to draw labs for [employer] lab kit. Patient hesitant to sign paperwork required for the [employer] kit.  Labs drawn but not sent.  Paperwork sent home with the patient so she could review them and decided wether or not she wants to proceed with the [employer] testing.  Patient will be in touch with Adele Chowdary RN regarding this. Labs also collected f as ordered for pharmacy monitoring.    Intravenous Access:  Lab draw site left arm, Needle type butterfly.    Discharge Plan:   Patient was sent to home in stable condition.    Sharon Carroll RN

## 2024-08-29 NOTE — PROGRESS NOTES
Oral Chemotherapy Monitoring Program  Lab Follow Up     Patient currently on Olaparib therapy.   Reviewed lab results from today.     Lab Results   Component Value Date    WBC 4.3 08/29/2024     Lab Results   Component Value Date    RBC 4.60 08/29/2024     Lab Results   Component Value Date    HGB 14.1 08/29/2024     Lab Results   Component Value Date    HCT 41.5 08/29/2024     Lab Results   Component Value Date    MCV 90 08/29/2024     Lab Results   Component Value Date    MCH 30.7 08/29/2024     Lab Results   Component Value Date    MCHC 34.0 08/29/2024     Lab Results   Component Value Date    RDW 12.8 08/29/2024     Lab Results   Component Value Date     08/29/2024       Last Comprehensive Metabolic Panel:  Sodium   Date Value Ref Range Status   08/29/2024 138 135 - 145 mmol/L Final     Potassium   Date Value Ref Range Status   08/29/2024 4.4 3.4 - 5.3 mmol/L Final     Chloride   Date Value Ref Range Status   08/29/2024 102 98 - 107 mmol/L Final     Carbon Dioxide (CO2)   Date Value Ref Range Status   08/29/2024 27 22 - 29 mmol/L Final     Anion Gap   Date Value Ref Range Status   08/29/2024 9 7 - 15 mmol/L Final     Glucose   Date Value Ref Range Status   08/29/2024 110 (H) 70 - 99 mg/dL Final     GLUCOSE BY METER POCT   Date Value Ref Range Status   05/09/2024 109 (H) 70 - 99 mg/dL Final     Urea Nitrogen   Date Value Ref Range Status   08/29/2024 13.8 6.0 - 20.0 mg/dL Final     Creatinine   Date Value Ref Range Status   08/29/2024 0.88 0.51 - 0.95 mg/dL Final     GFR Estimate   Date Value Ref Range Status   08/29/2024 83 >60 mL/min/1.73m2 Final     Comment:     eGFR calculated using 2021 CKD-EPI equation.     Calcium   Date Value Ref Range Status   08/29/2024 9.8 8.8 - 10.4 mg/dL Final     Comment:     Reference intervals for this test were updated on 7/16/2024 to reflect our healthy population more accurately. There may be differences in the flagging of prior results with similar values performed with  this method. Those prior results can be interpreted in the context of the updated reference intervals.     Bilirubin Total   Date Value Ref Range Status   08/29/2024 0.9 <=1.2 mg/dL Final     Alkaline Phosphatase   Date Value Ref Range Status   08/29/2024 64 40 - 150 U/L Final     ALT   Date Value Ref Range Status   08/29/2024 16 0 - 50 U/L Final     AST   Date Value Ref Range Status   08/29/2024 20 0 - 45 U/L Final     Assessment & Plan:  CMP and CBC reviewed, ok to continue Olaparib therapy. Continue monthly lab monitoring CMP and CBC or per Dr. Dan.      Follow-Up:  9/24: lab appt  9/30: Dr. Dan appt    Evaristo LouiseD  [facility]  [phone]

## 2024-09-04 NOTE — PROGRESS NOTES
Progress Note        Dr. Monse Dan MD  [address] [facility] [address]  [place] [address]       RE: Angie Michael  : 1980  MICHEAL: Sep 6, 2024    HPI:  Angie Michael is 44 year old with pathogenic BRCA2 mutation positive and hormone receptor positive breast cancer. She is unaccompanied today.  She has not had any significant changes to her health since our last visit.    She had a known BRCA2 mutation after testing when her paternal cousin was diagnosed with BRCA mutation. She was undergoing screening with alternating MRI and mammograms but became pregnant with triplets via IVF (she still has embryos at [facility]) and screening was delayed.  She began undergoing screening again and was noted to have multifocal breast cancer.  She underwent neoadjuvant chemotherapy followed by double mastectomy and first stage reconstruction.  She has started zoladex suppression and has completed RT. Her breast surgeon is Dr Rubin and her plastic surgeon is Dr Yee and she has her second stage reconstruction planned for March.  Plan is to start AI and abimiciclib once she has completed RT.    4/3/23:  US pelvis (personally reviewed):  Uterus: Measures 7.2 x 3.4 x 5.9 cm. Redemonstration of the echogenic lesion within the low endometrial canal measuring 8 x 8 x 4 mm, no internal vascularity. Endometrium: Measures up to 0.4 cm in thickness.  Right Ovary: Measures 3.3 x 1.5 x 1.6 cm and appears unremarkable Right Ovary Blood Flow: Present. Left Ovary: Measures 2.8 x 2.1 x 2.3 cm and appears unremarkable Left Ovary Blood Flow: Present. Free Fluid: no significant free fluid.         Obstetrics and Gynecology History:  , c/s x 1 for triplets, cholestasis, PEC  She had a salpingectomy at the time of her  section for risk reduction  She has decided she has completed child bearing      Past Medical History:  Past Medical History:   Diagnosis Date    BRCA2 gene mutation positive 2024    History of blood  transfusion     Malignant neoplasm of overlapping sites of left breast in female, estrogen receptor positive (H) 10/10/2023       Past Surgical History:  Past Surgical History:   Procedure Laterality Date    BIOPSY, BREAST, WITH RADIOFREQUENCY IDENTIFICATION Bilateral 2024    Procedure: bilateral tag localized node excision;  Surgeon: Fanny Little MD;  Location:  OR     SECTION      DILATION AND CURETTAGE      retained placenta post-partum 2 weeks    ENT SURGERY      tonsillectomy    EXCISE CYST GENERIC (LOCATION) Left 2024    Procedure: Excision Left Axillary Cyst;  Surgeon: Fanny Little MD;  Location:  OR    GYN SURGERY      egg retrieval for IVF    INSERT PORT VASCULAR ACCESS Right 2023    Procedure: INSERTION, VASCULAR ACCESS PORT;  Surgeon: Fanny Little MD;  Location:  OR    KNEE SURGERY Left     MASTECTOMY SIMPLE BILATERAL, SENTINEL NODE BILATERAL, COMBINED Bilateral 2024    Procedure: Bilateral skin sparing mastectomies with bilateral sentinel lymph node biopsy;  Surgeon: Fanny Little MD;  Location:  OR    RECONSTRUCT BREAST, INSERT TISSUE EXPANDER BILATERAL, COMBINED Bilateral 2024    Procedure: BILATERAL FIRST STAGE BREAST RECONSTRUCTION WITH TISSUE EXPANDERS AND ACELLULAR DERMAL MATRIX;  Surgeon: Vasiliy Yee MD;  Location:  OR    REMOVE PORT VASCULAR ACCESS N/A 2024    Procedure: port removal;  Surgeon: Fanny Little MD;  Location:  OR       Health Maintenance:  Last Pap Smear: 10/1/20              Result: Negative, HPV negative  She has not had a history of abnormal Pap smears.    Last Mammogram: N/A-s/p bilateral mastectomy    Last Colonoscopy: N/A       Social History:  Lives with her  and children, feels safe at home.  She stays at home with her 2.5 year old triplets.  Enjoys reading, art collecting, travel, pilates.  Does have an advanced directive on file and would like her  to be her POA.  Full  code.    Family History:   The patient's family history is significant for.  Family History   Problem Relation Age of Onset    Hyperlipidemia Mother     Other Cancer Father         Leukemia    Breast Cancer Other          Physical Exam:   /70   Pulse 64   Resp 16   Wt 76.2 kg (168 lb)   SpO2 100%   BMI 27.96 kg/m    GENERAL: alert and no distress  CV:  RRR  Resp:  LCTA b/l    Labs:  None      Assessment:    Angie Michael is a 44 year old woman with a diagnosis of hormone receptor positive breast cancer in the setting of BRCA2 mutation positive.     A total of 30 minutes was spent on patient care today.       Plan:     1.)    BRCA2 mutation-Briefly reviewd lifetime risks of both ovarian and breat cancer with this mutation.  We discussed that given this the recommendation is for RRSO at the completion of childbearing given her age.  We discussed that this will dramatically decrease her risk of developing ovarian cancer but that there does still remain a small risk of developing primary peritoneal cancer.  We also discussed that RRSO would be beneficial for hormonal suppression given her hormone receptor positive breast cancer.  We also discussed that there is emerging data that some endometrial cancers may be linked to BRCA and thus discussed the risks and benefits of prophylactic hysterectomy at the time of her surgery and she would like to proceed with hysterectomy at the time of the procedure.  We discussed the risks of premature surgical menopause but that her symptoms are unlikely to change dramatically following surgery given she is currently on suppression with zoladex. We discussed the small possibility of an occult cancer at the time of surgery and that if this is found, we would proceed with frozen section and the cancer staging/debulking procedure at that time.  We also discussed the possibility of a microscopic/STIC tumor discovered on final pathology and that in this case she would require a  second staging procedure.  Risks, benefits, indications and alternatives were discussed with the patient.  All her questions were answered and she will undergo laparoscopic removal of uterus, cervix, both ovaries, cystoscopy on 9/25 at [facility].      2.) Hormone receptor positive breast cancer-we reviewed her breast oncologists recommendation that she have ovarian suppression indefinitely.  She will no longer require zoladex suppression following surgery    3.) Genetic risk factors were assessed and the patient has a known pathogenic mutation in the BRCA2 gene    4.) Labs and/or tests ordered include:  CBC, CMP, , T/S to be drawn at her next scheduled lab draw on 9/24     5.) Health maintenance issues addressed today include pt is up to date.    6.) Pre-operative teaching was completed today        Thank you for allowing us to participate in the care of your patient.         Sincerely,    Hiral Barney MD  Gynecologic Oncology  [facility]       [place]

## 2024-09-06 ENCOUNTER — ONCOLOGY VISIT (OUTPATIENT)
Dept: ONCOLOGY | Facility: CLINIC | Age: 44
End: 2024-09-06
Attending: OBSTETRICS & GYNECOLOGY
Payer: COMMERCIAL

## 2024-09-06 VITALS
WEIGHT: 168 LBS | RESPIRATION RATE: 16 BRPM | HEART RATE: 64 BPM | DIASTOLIC BLOOD PRESSURE: 70 MMHG | OXYGEN SATURATION: 100 % | SYSTOLIC BLOOD PRESSURE: 102 MMHG | BODY MASS INDEX: 27.96 KG/M2

## 2024-09-06 DIAGNOSIS — Z15.09 BRCA2 GENE MUTATION POSITIVE: Primary | ICD-10-CM

## 2024-09-06 DIAGNOSIS — Z15.01 BRCA2 GENE MUTATION POSITIVE: Primary | ICD-10-CM

## 2024-09-06 PROCEDURE — 99214 OFFICE O/P EST MOD 30 MIN: CPT | Performed by: OBSTETRICS & GYNECOLOGY

## 2024-09-06 PROCEDURE — G0463 HOSPITAL OUTPT CLINIC VISIT: HCPCS | Performed by: OBSTETRICS & GYNECOLOGY

## 2024-09-06 ASSESSMENT — PAIN SCALES - GENERAL: PAINLEVEL: NO PAIN (0)

## 2024-09-06 NOTE — LETTER
2024      Angie Michael  [address] [facility] [place] [address]      Dear Colleague,    Thank you for referring your patient, Angie Michael, to the [facility]. Please see a copy of my visit note below.    Progress Note        Dr. Monse Dan MD  [address]  [place] [address]       RE: Angie Michael  : 1980  MICHEAL: Sep 6, 2024    HPI:  Angie Michael is 44 year old with pathogenic BRCA2 mutation positive and hormone receptor positive breast cancer. She is unaccompanied today.  She has not had any significant changes to her health since our last visit.    She had a known BRCA2 mutation after testing when her paternal cousin was diagnosed with BRCA mutation. She was undergoing screening with alternating MRI and mammograms but became pregnant with triplets via IVF (she still has embryos at [facility]) and screening was delayed.  She began undergoing screening again and was noted to have multifocal breast cancer.  She underwent neoadjuvant chemotherapy followed by double mastectomy and first stage reconstruction.  She has started zoladex suppression and has completed RT. Her breast surgeon is Dr Rubin and her plastic surgeon is Dr Yee and she has her second stage reconstruction planned for March.  Plan is to start AI and abimiciclib once she has completed RT.    4/3/23:  US pelvis (personally reviewed):  Uterus: Measures 7.2 x 3.4 x 5.9 cm. Redemonstration of the echogenic lesion within the low endometrial canal measuring 8 x 8 x 4 mm, no internal vascularity. Endometrium: Measures up to 0.4 cm in thickness.  Right Ovary: Measures 3.3 x 1.5 x 1.6 cm and appears unremarkable Right Ovary Blood Flow: Present. Left Ovary: Measures 2.8 x 2.1 x 2.3 cm and appears unremarkable Left Ovary Blood Flow: Present. Free Fluid: no significant free fluid.         Obstetrics and Gynecology History:  , c/s x 1 for triplets, cholestasis, PEC  She had a salpingectomy at  the time of her  section for risk reduction  She has decided she has completed child bearing      Past Medical History:  Past Medical History:   Diagnosis Date     BRCA2 gene mutation positive 2024     History of blood transfusion      Malignant neoplasm of overlapping sites of left breast in female, estrogen receptor positive (H) 10/10/2023       Past Surgical History:  Past Surgical History:   Procedure Laterality Date     BIOPSY, BREAST, WITH RADIOFREQUENCY IDENTIFICATION Bilateral 2024    Procedure: bilateral tag localized node excision;  Surgeon: Fanny Little MD;  Location:  OR      SECTION       DILATION AND CURETTAGE      retained placenta post-partum 2 weeks     ENT SURGERY      tonsillectomy     EXCISE CYST GENERIC (LOCATION) Left 2024    Procedure: Excision Left Axillary Cyst;  Surgeon: Fanny Little MD;  Location:  OR     GYN SURGERY      egg retrieval for IVF     INSERT PORT VASCULAR ACCESS Right 2023    Procedure: INSERTION, VASCULAR ACCESS PORT;  Surgeon: Fanny Little MD;  Location:  OR     KNEE SURGERY Left      MASTECTOMY SIMPLE BILATERAL, SENTINEL NODE BILATERAL, COMBINED Bilateral 2024    Procedure: Bilateral skin sparing mastectomies with bilateral sentinel lymph node biopsy;  Surgeon: Fanny Little MD;  Location:  OR     RECONSTRUCT BREAST, INSERT TISSUE EXPANDER BILATERAL, COMBINED Bilateral 2024    Procedure: BILATERAL FIRST STAGE BREAST RECONSTRUCTION WITH TISSUE EXPANDERS AND ACELLULAR DERMAL MATRIX;  Surgeon: Vasiliy Yee MD;  Location:  OR     REMOVE PORT VASCULAR ACCESS N/A 2024    Procedure: port removal;  Surgeon: Fanny Little MD;  Location:  OR       Health Maintenance:  Last Pap Smear: 10/1/20              Result: Negative, HPV negative  She has not had a history of abnormal Pap smears.    Last Mammogram: N/A-s/p bilateral mastectomy    Last Colonoscopy: N/A       Social History:  Lives  with her  and children, feels safe at home.  She stays at home with her 2.5 year old triplets.  Enjoys reading, art collecting, travel, pilates.  Does have an advanced directive on file and would like her  to be her POA.  Full code.    Family History:   The patient's family history is significant for.  Family History   Problem Relation Age of Onset     Hyperlipidemia Mother      Other Cancer Father         Leukemia     Breast Cancer Other          Physical Exam:   /70   Pulse 64   Resp 16   Wt 76.2 kg (168 lb)   SpO2 100%   BMI 27.96 kg/m    GENERAL: alert and no distress  CV:  RRR  Resp:  LCTA b/l    Labs:  None      Assessment:    Angie Michael is a 44 year old woman with a diagnosis of hormone receptor positive breast cancer in the setting of BRCA2 mutation positive.     A total of 30 minutes was spent on patient care today.       Plan:     1.)    BRCA2 mutation-Briefly reviewd lifetime risks of both ovarian and breat cancer with this mutation.  We discussed that given this the recommendation is for RRSO at the completion of childbearing given her age.  We discussed that this will dramatically decrease her risk of developing ovarian cancer but that there does still remain a small risk of developing primary peritoneal cancer.  We also discussed that RRSO would be beneficial for hormonal suppression given her hormone receptor positive breast cancer.  We also discussed that there is emerging data that some endometrial cancers may be linked to BRCA and thus discussed the risks and benefits of prophylactic hysterectomy at the time of her surgery and she would like to proceed with hysterectomy at the time of the procedure.  We discussed the risks of premature surgical menopause but that her symptoms are unlikely to change dramatically following surgery given she is currently on suppression with zoladex. We discussed the small possibility of an occult cancer at the time of surgery and that if  this is found, we would proceed with frozen section and the cancer staging/debulking procedure at that time.  We also discussed the possibility of a microscopic/STIC tumor discovered on final pathology and that in this case she would require a second staging procedure.  Risks, benefits, indications and alternatives were discussed with the patient.  All her questions were answered and she will undergo laparoscopic removal of uterus, cervix, both ovaries, cystoscopy on 9/25 at [facility].      2.) Hormone receptor positive breast cancer-we reviewed her breast oncologists recommendation that she have ovarian suppression indefinitely.  She will no longer require zoladex suppression following surgery    3.) Genetic risk factors were assessed and the patient has a known pathogenic mutation in the BRCA2 gene    4.) Labs and/or tests ordered include:  CBC, CMP, , T/S to be drawn at her next scheduled lab draw on 9/24     5.) Health maintenance issues addressed today include pt is up to date.    6.) Pre-operative teaching was completed today        Thank you for allowing us to participate in the care of your patient.         Sincerely,    Hiral Barney MD  Gynecologic Oncology  [facility]       [place]      Again, thank you for allowing me to participate in the care of your patient.        Sincerely,        Ace Barney MD

## 2024-09-07 NOTE — PATIENT INSTRUCTIONS
You have been scheduled for surgery on: 9/25 at [facility]    Diagnosis:  BRCA2 mutation positive, hormone receptor positive breast cancer    The surgical procedure is: laparoscopic removal of uterus, cervix, both ovaries, cystoscopy    Anticipated length of surgery: 1-2 hrs.  You will be in the recovery room for approximately 2-3 hrs after surgery.  Your family will not be able to see you until after you leave the recovery room.    Length of hospital stay:  This is an outpatient, extended stay surgery.  You will be discharged same day if you meet criteria for discharge.  If you have a larger surgical incision, you will need to be in the hospital 2-3 days.  ______________________________________________________________________    Preparation for Surgery:    To Schedule   1.  Labs:  CBC, CMP, T/S, , orders placed, please perform at lab visit on 9/24   2.  Post-operative exam with me 2-3 weeks following surgery      Postoperative Restrictions:  No heavy lifting >20lbs for six weeks, nothing in the vagina (no tampons, no intercourse, no douching) for eight weeks.     Postoperative visit:  Return to clinic 2-3 weeks after surgery for post operative visit.      Please contact the clinic with any questions or concerns.    Hiral Barney MD  Gynecologic Oncology  [facility]

## 2024-09-09 ENCOUNTER — HOSPITAL ENCOUNTER (OUTPATIENT)
Dept: PET IMAGING | Facility: CLINIC | Age: 44
Discharge: HOME | End: 2024-09-09
Attending: INTERNAL MEDICINE | Admitting: INTERNAL MEDICINE
Payer: COMMERCIAL

## 2024-09-09 DIAGNOSIS — C50.812 MALIGNANT NEOPLASM OF OVERLAPPING SITES OF BOTH BREASTS IN FEMALE, ESTROGEN RECEPTOR POSITIVE (H): ICD-10-CM

## 2024-09-09 DIAGNOSIS — Z17.0 MALIGNANT NEOPLASM OF OVERLAPPING SITES OF BOTH BREASTS IN FEMALE, ESTROGEN RECEPTOR POSITIVE (H): ICD-10-CM

## 2024-09-09 DIAGNOSIS — C50.811 MALIGNANT NEOPLASM OF OVERLAPPING SITES OF BOTH BREASTS IN FEMALE, ESTROGEN RECEPTOR POSITIVE (H): ICD-10-CM

## 2024-09-09 DIAGNOSIS — M89.9 BONE LESION: ICD-10-CM

## 2024-09-09 PROCEDURE — 343N000001 HC RX 343: Performed by: INTERNAL MEDICINE

## 2024-09-09 PROCEDURE — 250N000011 HC RX IP 250 OP 636: Performed by: INTERNAL MEDICINE

## 2024-09-09 PROCEDURE — A9552 F18 FDG: HCPCS | Performed by: INTERNAL MEDICINE

## 2024-09-09 PROCEDURE — 78816 PET IMAGE W/CT FULL BODY: CPT | Mod: PS

## 2024-09-09 PROCEDURE — 71260 CT THORAX DX C+: CPT

## 2024-09-09 RX ORDER — FLUDEOXYGLUCOSE F 18 200 MCI/ML
10-18 INJECTION, SOLUTION INTRAVENOUS ONCE
Status: COMPLETED | OUTPATIENT
Start: 2024-09-09 | End: 2024-09-09

## 2024-09-09 RX ORDER — IOPAMIDOL 755 MG/ML
10-135 INJECTION, SOLUTION INTRAVASCULAR ONCE
Status: COMPLETED | OUTPATIENT
Start: 2024-09-09 | End: 2024-09-09

## 2024-09-09 RX ADMIN — IOPAMIDOL 105 ML: 755 INJECTION, SOLUTION INTRAVENOUS at 08:01

## 2024-09-09 RX ADMIN — FLUDEOXYGLUCOSE F 18 12.44 MILLICURIE: 200 INJECTION, SOLUTION INTRAVENOUS at 08:00

## 2024-09-23 LAB
ABO/RH(D): NORMAL
ANTIBODY SCREEN: NEGATIVE
SPECIMEN EXPIRATION DATE: NORMAL

## 2024-09-24 ENCOUNTER — ANESTHESIA EVENT (OUTPATIENT)
Dept: SURGERY | Facility: CLINIC | Age: 44
End: 2024-09-24
Payer: COMMERCIAL

## 2024-09-24 ENCOUNTER — LAB (OUTPATIENT)
Dept: INFUSION THERAPY | Facility: CLINIC | Age: 44
End: 2024-09-24
Attending: INTERNAL MEDICINE
Payer: COMMERCIAL

## 2024-09-24 ENCOUNTER — DOCUMENTATION ONLY (OUTPATIENT)
Dept: PHARMACY | Facility: CLINIC | Age: 44
End: 2024-09-24

## 2024-09-24 DIAGNOSIS — Z15.01 BRCA2 GENE MUTATION POSITIVE: ICD-10-CM

## 2024-09-24 DIAGNOSIS — Z17.0 MALIGNANT NEOPLASM OF UPPER-OUTER QUADRANT OF RIGHT BREAST IN FEMALE, ESTROGEN RECEPTOR POSITIVE (H): ICD-10-CM

## 2024-09-24 DIAGNOSIS — C50.411 MALIGNANT NEOPLASM OF UPPER-OUTER QUADRANT OF RIGHT BREAST IN FEMALE, ESTROGEN RECEPTOR POSITIVE (H): ICD-10-CM

## 2024-09-24 DIAGNOSIS — Z15.09 BRCA2 GENE MUTATION POSITIVE: ICD-10-CM

## 2024-09-24 LAB
ALBUMIN SERPL BCG-MCNC: 4.4 G/DL (ref 3.5–5.2)
ALP SERPL-CCNC: 58 U/L (ref 40–150)
ALT SERPL W P-5'-P-CCNC: 13 U/L (ref 0–50)
ANION GAP SERPL CALCULATED.3IONS-SCNC: 9 MMOL/L (ref 7–15)
AST SERPL W P-5'-P-CCNC: 14 U/L (ref 0–45)
BASOPHILS # BLD AUTO: 0 10E3/UL (ref 0–0.2)
BASOPHILS NFR BLD AUTO: 1 %
BILIRUB SERPL-MCNC: 1.1 MG/DL
BUN SERPL-MCNC: 12.8 MG/DL (ref 6–20)
CALCIUM SERPL-MCNC: 9.5 MG/DL (ref 8.8–10.4)
CANCER AG125 SERPL-ACNC: 12 U/ML
CHLORIDE SERPL-SCNC: 104 MMOL/L (ref 98–107)
CREAT SERPL-MCNC: 0.94 MG/DL (ref 0.51–0.95)
EGFRCR SERPLBLD CKD-EPI 2021: 76 ML/MIN/1.73M2
EOSINOPHIL # BLD AUTO: 0.1 10E3/UL (ref 0–0.7)
EOSINOPHIL NFR BLD AUTO: 2 %
ERYTHROCYTE [DISTWIDTH] IN BLOOD BY AUTOMATED COUNT: 14.7 % (ref 10–15)
GLUCOSE SERPL-MCNC: 84 MG/DL (ref 70–99)
HCO3 SERPL-SCNC: 27 MMOL/L (ref 22–29)
HCT VFR BLD AUTO: 40.2 % (ref 35–47)
HGB BLD-MCNC: 13.5 G/DL (ref 11.7–15.7)
IMM GRANULOCYTES # BLD: 0 10E3/UL
IMM GRANULOCYTES NFR BLD: 0 %
LYMPHOCYTES # BLD AUTO: 0.7 10E3/UL (ref 0.8–5.3)
LYMPHOCYTES NFR BLD AUTO: 19 %
MCH RBC QN AUTO: 31 PG (ref 26.5–33)
MCHC RBC AUTO-ENTMCNC: 33.6 G/DL (ref 31.5–36.5)
MCV RBC AUTO: 92 FL (ref 78–100)
MONOCYTES # BLD AUTO: 0.2 10E3/UL (ref 0–1.3)
MONOCYTES NFR BLD AUTO: 6 %
NEUTROPHILS # BLD AUTO: 2.5 10E3/UL (ref 1.6–8.3)
NEUTROPHILS NFR BLD AUTO: 72 %
NRBC # BLD AUTO: 0 10E3/UL
NRBC BLD AUTO-RTO: 0 /100
PLATELET # BLD AUTO: 188 10E3/UL (ref 150–450)
POTASSIUM SERPL-SCNC: 3.9 MMOL/L (ref 3.4–5.3)
PROT SERPL-MCNC: 6.8 G/DL (ref 6.4–8.3)
RBC # BLD AUTO: 4.35 10E6/UL (ref 3.8–5.2)
SODIUM SERPL-SCNC: 140 MMOL/L (ref 135–145)
WBC # BLD AUTO: 3.5 10E3/UL (ref 4–11)

## 2024-09-24 PROCEDURE — 80053 COMPREHEN METABOLIC PANEL: CPT | Performed by: INTERNAL MEDICINE

## 2024-09-24 PROCEDURE — 86900 BLOOD TYPING SEROLOGIC ABO: CPT | Performed by: OBSTETRICS & GYNECOLOGY

## 2024-09-24 PROCEDURE — 36415 COLL VENOUS BLD VENIPUNCTURE: CPT

## 2024-09-24 PROCEDURE — 85025 COMPLETE CBC W/AUTO DIFF WBC: CPT | Performed by: INTERNAL MEDICINE

## 2024-09-24 PROCEDURE — 86304 IMMUNOASSAY TUMOR CA 125: CPT | Performed by: OBSTETRICS & GYNECOLOGY

## 2024-09-24 NOTE — PROGRESS NOTES
[facility]    Hematology/Oncology Established Patient Note      Today's Date: 9/30/2024    Reason for visit: Bilateral breast cancer, BRCA-2 mutation positive.    HISTORY OF PRESENT ILLNESS: Angie Michael is a 44 year old female with BRCA2 gene mutation who presents with the following oncologic history:  1.  10/05/2023: High risk surveillance breast MRI showed right breast 3.6 x 3 x 3.2 cm irregular enhancing mass at 10:00, 0.9 x 0.8 x 0.9 cm second mass at 9:00, 1 x 0.9 x 1.2 cm third mass at 9:30, other suspicious foci of enhancement throughout the breast.  Left breast with 9.9 x 5.2 x 6.7 cm suspicious non-mass enhancement at 10:00-7:00; 1.6 x 1.3 x 1.7 cm enhancing mass at 4:00; 1.8 x 1.6 x 1.9 cm enhancing mass at 12:30; other suspicious foci of enhancement throughout the left breast.  One level 1 suspicious right axillary lymph node and one level 1 suspicious left axillary lymph node..  Developing suspicious interpectoral lymph node.  2.  10/12/2023: Bilateral diagnostic mammogram showed right breast with 3.2 x 2.7 x 4.1 cm irregular mass at 10:00, 6 cm from nipple; 0.9 x 0.6 x 1 cm irregular mass at 11:00, 2 cm from nipple and prominent right axillary lymph node.  Left breast with 1.4 x 1.1 x 1.7 cm mass at 12:00, 2 cm from nipple, 1.8 x 1 x 1.6 cm mass at 3:30, 4 cm from nipple, 1 x 0.9 x 1.1 cm mass at 3:00, 7 cm from nipple, multiple areas of hypoechogenicity with calcifications in the lateral left breast, and prominent left axillary lymph node.  3.  10/12/2023: Biopsy of right breast at 10:00 showed grade 3 invasive lobular carcinoma, ER strongly positive at 81-90%, NH positive at 31-40%, HER2 equivocal with IHC = 2+, FISH negative, Ki-67 = 70%.  Biopsy of right axillary lymph node showed metastatic carcinoma.  Biopsy of left breast at 3:30 showed grade 2 invasive carcinoma with ductal and lobular features, ER positive at %, NH positive at 2%, HER2 equivocal with IHC = 2+, FISH  negative, Ki-67 = 26%.  Biopsy of left axillary lymph node showed metastatic carcinoma.  4.  10/17/2023: PET/CT scan showed cluster of hypermetabolic right breast nodules with SUV max 14.9; 2.1 x 1 cm hypermetabolic level 1 right axillary lymph node with SUV max 5.5; hypermetabolic left breast nodule with SUV max 18.8 and several additional less intense hypermetabolic left breast nodules; 1.5 x 1.3 cm hypermetabolic level 1 left axillary lymph node with SUV max 4.  Small sclerotic focus of metabolic activity within the proximal tibial shaft with SUV max 3.7, indeterminate but unlikely to reflect metastatic disease.  Subcentimeter hypermetabolic focus along anterior margin of the left thyroid lobe, likely a thyroid nodule.  5.  11/03/2023: Thyroid ultrasound showed 0.7 x 0.6 x 0.5 cm solid hypoechoic nodule at the junction of the isthmus and left hemithyroid corresponding to increased uptake on PET/CT, consistent with TIRADS 4 nodule and subcentimeter TIRADS 3 nodule in the left inferior thyroid.  6. 11/3/2023: MR right tibia/fibula showed discrete focal 3 cm area of increased T2 signal and enhancement in mid shaft of right tibia with fat signal interspersed throughout this area with significant signal dropout on opposed phase imaging, favoring benign process.  7. 11/21/2023: Started neoadjuvant chemotherapy with dose-dense Adriamycin + Cytoxan with Neulasta support with Dr. Bev Ryan at [facility]. Reacted to [facility]end.  8. 11/25/2023: Evaluated at [facility] ED with diarrhea and dizziness, latter attributed to olanzapine -- this was discontinued.  9. 1/03/2024: Completed cycle 4 dose-dense AC.  10. 1/16/2024: Breast MRI showed persistent abnormal mass and nonmass enhancement in the left breast measuring 10.2 cm in AP dimension (unchanged).  Previously seen mass at the 12:30 position now measures 1.0 cm in greatest dimension (previously 1.9 cm). Previously seen seen mass at the 4:00 position now measures 1.1 cm  in greatest dimension (previously 1.7 cm). Previously seen enlarged left axillary lymph node now has normal size and morphology. Previously seen intrapectoral lymph node now has normal size and morphology. Previously seen abnormal right breast enhancement has entirely resolved. Previously visualized enlarged right axillary lymph node now has normal size and morphology.  Other previously seen axillary lymph nodes appear stable.  11. 1/17/2024: Start of weekly paclitaxel for planned 12 weeks.  12. 2/9/2024: MR right tibia/fibula showed mid right tibial shaft bone lesion increased from 3.2 cm to 3.8 cm; speckled foci of marrow signal likely areas of red marrow reconversion.  13. 4/03/2024: Completion of cycle 12 paclitaxel.  14. 4/5/2024: MR right tibia/fibula showed stable in size and improved signal alteration of right tibial shaft focal marrow alteration, likely red marrow.  15. 5/08/2024: Bilateral mastectomies with bilateral axillary sentinel lymph node excision under c/o Dr. Fanny Little.  Pathology showed left breast with grade 2 multifocal invasive ductal carcinoma measuring 10 mm, 6 mm, 1.4 mm; extensive grade 3 DCIS (22 of 37 blocks), negative margins; treatment related changes present; 2 left axillary nodes negative, [id number]. Right breast with no residual invasive carcinoma; LCIS present; treatment related changes, 4 right axillary sentinel lymph nodes present, ypT0-N0.  16. 6/04/2024: Started Zoladex and anastrozole (latter on 6/11/2024).  17. 7/2/2024-8/7/2024: Adjuvant radiation therapy to left chest wall and regional nodes.  18.  8/8/2024: Started adjuvant olaparib 300 mg twice daily.  19. 9/9/2024: PET/CT scan showed no evidence of FDG avid malignancy; subcentimeter mildly FDG avid right axillary lymph node favored reactive; stable mild focal FDG uptake of the right mid tibial diaphysis favored benign; persistent FDG uptake within subcentimeter left isthmus thyroid nodule.   20. 9/25/2024:  Laparoscopic resection of uterus, cervix, bilateral ovaries under care of Dr. Hiral Bello. Pathology pending.    INTERVAL HISTORY:  Angie reports feeling hot at night and abrupt stomach upset and abrupt diarrhea twice per week.      REVIEW OF SYSTEMS:   14 point ROS was reviewed and is negative other than as noted above in HPI.       HOME MEDICATIONS:  Current Outpatient Medications   Medication Sig Dispense Refill    anastrozole (ARIMIDEX) 1 MG tablet Take 1 tablet (1 mg) by mouth daily 90 tablet 3    olaparib (LYNPARZA) 150 MG tablet Take 2 tablets (300 mg) by mouth 2 times daily 120 tablet 0    ondansetron (ZOFRAN) 8 MG tablet Take 1 tablet (8 mg) by mouth every 8 hours as needed for nausea (Patient not taking: Reported on 2024) 30 tablet 2         ALLERGIES:  Allergies   Allergen Reactions    Aprepitant Difficulty breathing and Shortness Of Breath    Fosaprepitant Unknown     Allergy added per chart review. [facility]    Armoracia Rusticana Ext (Horseradish) Hives    Carboprost GI Disturbance     Diarrhea    Codeine Nausea and Vomiting         PAST MEDICAL HISTORY:  BRCA2 gene mutation.  Bilateral breast cancer as outlined above.  2022 Galleri test - negative for malignancy.    Gynecologic history:  Age of menarche at 15.  3 children/triplets -- 1 boy and twin girls born 2022.  Age of first pregnancy at 41.  Used 10 years of OCPs no HRT, IVF for fertility treatment.      PAST SURGICAL HISTORY:  Past Surgical History:   Procedure Laterality Date    BIOPSY, BREAST, WITH RADIOFREQUENCY IDENTIFICATION Bilateral 2024    Procedure: bilateral tag localized node excision;  Surgeon: Fanny Little MD;  Location:  OR     SECTION      DILATION AND CURETTAGE      retained placenta post-partum 2 weeks    ENT SURGERY      tonsillectomy    EXCISE CYST GENERIC (LOCATION) Left 2024    Procedure: Excision Left Axillary Cyst;  Surgeon: Fanny Little MD;  Location:  OR    GYN  SURGERY      egg retrieval for IVF    INSERT PORT VASCULAR ACCESS Right 11/20/2023    Procedure: INSERTION, VASCULAR ACCESS PORT;  Surgeon: Fanny Little MD;  Location:  OR    KNEE SURGERY Left     MASTECTOMY SIMPLE BILATERAL, SENTINEL NODE BILATERAL, COMBINED Bilateral 05/08/2024    Procedure: Bilateral skin sparing mastectomies with bilateral sentinel lymph node biopsy;  Surgeon: Fanny Little MD;  Location:  OR    RECONSTRUCT BREAST, INSERT TISSUE EXPANDER BILATERAL, COMBINED Bilateral 05/08/2024    Procedure: BILATERAL FIRST STAGE BREAST RECONSTRUCTION WITH TISSUE EXPANDERS AND ACELLULAR DERMAL MATRIX;  Surgeon: Vasiliy Yee MD;  Location:  OR    REMOVE PORT VASCULAR ACCESS N/A 05/08/2024    Procedure: port removal;  Surgeon: Fanny Little MD;  Location:  OR         SOCIAL HISTORY:  Social History     Socioeconomic History    Marital status:      Spouse name: Not on file    Number of children: Not on file    Years of education: Not on file    Highest education level: Not on file   Occupational History    Not on file   Tobacco Use    Smoking status: Never     Passive exposure: Never    Smokeless tobacco: Never   Vaping Use    Vaping status: Never Used   Substance and Sexual Activity    Alcohol use: Not Currently    Drug use: Not Currently    Sexual activity: Not on file   Other Topics Concern    Parent/sibling w/ CABG, MI or angioplasty before 65F 55M? No   Social History Narrative    Not on file     Social Determinants of Health     Financial Resource Strain: Low Risk  (4/9/2024)    Financial Resource Strain     Within the past 12 months, have you or your family members you live with been unable to get utilities (heat, electricity) when it was really needed?: No   Food Insecurity: Low Risk  (4/9/2024)    Food Insecurity     Within the past 12 months, did you worry that your food would run out before you got money to buy more?: No     Within the past 12 months, did the food you  bought just not last and you didn t have money to get more?: No   Transportation Needs: Low Risk  (2024)    Transportation Needs     Within the past 12 months, has lack of transportation kept you from medical appointments, getting your medicines, non-medical meetings or appointments, work, or from getting things that you need?: No   Physical Activity: Insufficiently Active (2023)    [facility], [facility]    Exercise Vital Sign     Days of Exercise per Week: 4 days     Minutes of Exercise per Session: 30 min   Stress: Not on file   Social Connections: Unknown (2021)    [facility], [facility]    Social Connections     Frequency of Communication with Friends and Family: Not on file   Interpersonal Safety: Low Risk  (2024)    Interpersonal Safety     Do you feel physically and emotionally safe where you currently live?: Yes     Within the past 12 months, have you been hit, slapped, kicked or otherwise physically hurt by someone?: No     Within the past 12 months, have you been humiliated or emotionally abused in other ways by your partner or ex-partner?: No   Housing Stability: Low Risk  (2024)    Housing Stability     Do you have housing? : Yes     Are you worried about losing your housing?: No         FAMILY HISTORY:  Paternal grandmother and many paternal grandfathers with breast cancer; cousin with DCIS tested positive for BRCA1.  No ovarian, colon, or prostate cancer.      PHYSICAL EXAM:  Vital signs:  /77   Pulse 107   Temp 98.2  F (36.8  C) (Oral)   Resp 16   Wt 75.8 kg (167 lb)   SpO2 96%   BMI 26.95 kg/m     ECO  GENERAL: No acute distress.  EYES: No scleral icterus. No overt erythema.  LYMPH: No palpable lymphadenopathy in the cervical, supraclavicular, or axillary regions.  Bilateral breasts are surgically absent with expanders in place and no fluid or masses or skin  erythema.  RESPIRATORY: No audible cough, wheezing, or labored breathing.  MUSCULOSKELETAL: Range of motion in the neck, shoulders, and arms appear normal.  SKIN: No overt rashes, discolorations, or lesions over the face and neck.  NEUROLOGIC: Alert.  No overt tremors.    LABS:  CBC RESULTS:   Recent Labs   Lab Test 09/24/24  0937   WBC 3.5*   RBC 4.35   HGB 13.5   HCT 40.2   MCV 92   MCH 31.0   MCHC 33.6   RDW 14.7           Last Comprehensive Metabolic Panel:  Sodium   Date Value Ref Range Status   09/24/2024 140 135 - 145 mmol/L Final     Potassium   Date Value Ref Range Status   09/24/2024 3.9 3.4 - 5.3 mmol/L Final     Chloride   Date Value Ref Range Status   09/24/2024 104 98 - 107 mmol/L Final     Carbon Dioxide (CO2)   Date Value Ref Range Status   09/24/2024 27 22 - 29 mmol/L Final     Anion Gap   Date Value Ref Range Status   09/24/2024 9 7 - 15 mmol/L Final     Glucose   Date Value Ref Range Status   09/24/2024 84 70 - 99 mg/dL Final     GLUCOSE BY METER POCT   Date Value Ref Range Status   05/09/2024 109 (H) 70 - 99 mg/dL Final     Urea Nitrogen   Date Value Ref Range Status   09/24/2024 12.8 6.0 - 20.0 mg/dL Final     Creatinine   Date Value Ref Range Status   09/24/2024 0.94 0.51 - 0.95 mg/dL Final     GFR Estimate   Date Value Ref Range Status   09/24/2024 76 >60 mL/min/1.73m2 Final     Comment:     eGFR calculated using 2021 CKD-EPI equation.     Calcium   Date Value Ref Range Status   09/24/2024 9.5 8.8 - 10.4 mg/dL Final     Comment:     Reference intervals for this test were updated on 7/16/2024 to reflect our healthy population more accurately. There may be differences in the flagging of prior results with similar values performed with this method. Those prior results can be interpreted in the context of the updated reference intervals.     Bilirubin Total   Date Value Ref Range Status   09/24/2024 1.1 <=1.2 mg/dL Final     Alkaline Phosphatase   Date Value Ref Range Status   09/24/2024 58  40 - 150 U/L Final     ALT   Date Value Ref Range Status   09/24/2024 13 0 - 50 U/L Final     AST   Date Value Ref Range Status   09/24/2024 14 0 - 45 U/L Final       IMAGING:  Reviewed as per HPI.    ASSESSMENT/PLAN:  Angie Michael is a 44 year old female with the following issues:  1.  Clinical prognostic stage IIB, cT2-N1-M0, multifocal grade 3 invasive lobular carcinoma of the RIGHT upper outer breast, ER positive 81-90%, MN positive 31-40%, HER-2 FISH negative (IHC = 2+), Ki-67 = 70%, ypT0-N0  2.  Clinical prognostic stage IIA, cT3-N1-M0, multifocal grade 2 invasive ductal and lobular carcinoma of LEFT breast overlapping sites, ER positive %, MN positive 2%, HER-2 FISH negative (IHC = 2+), Ki-67 = 26%, yp(m)T1b-N0  3. BRCA-2 deleterious gene mutation  -- Angie is s/p neoadjuvant chemotherapy with dose-dense AC followed by weekly paclitaxel completed 4/3/2024 and bilateral mastectomies with bilateral axillary sentinel lymph node excision on 5/8/2024.  --Angie achieved an outstanding complete response in her right breast cancer and excellent partial response in her left breast with no residual cancer in the lymph nodes.  --She completed adjuvant radiation to the left chest wall and regional lymph nodes under care of Dr. Finesse Cortes on 8/7/2024.  --I personally reviewed her 9/9/2024 PET/CT scan which showed mild inflammatory uptake in the mastectomy areas and likely reactive subcentimeter right axillary node, which was not palpable on exam today.  There is stable mild focal FDG uptake of the right mid tibial diaphysis and persistent uptake at the left isthmus thyroid nodule.  --Started adjuvant olaparib 8/8/2024 for planned 1 year, as per the [facility]ortense trial.  Monitor CBC with differential given potential risk of MDS with the use of olaparib. Other side effects include fatigue and cytopenias.  Benefit reiterated is improvement in DFS and overall survival for BRCA-mutated breast cancer patients.  She has had  some dyspepsia and rather abrupt diarrhea occurring twice per week -- for now, she is willing to tolerate.  - Given her initial extent of disease bilaterally, she would benefit from adjuvant abemaciclib for 2 years along with hormone blockade therapy as per the MonarchE trial.  Although optimal sequencing of olaparib and abemaciclib is unknown at present time, plan to commence abemaciclib (with ramp up dosing) after completion of olaparib.  --Phase 3 monarchE trial demonstrated improvement invasive disease free survival rate of 86.1% versus 79% when abemaciclib was added to aromatase inhibitor for 2 years compared to tamoxifen or AI alone.  -Continue hormone blockade therapy now with ovarian suppression therapy with Zoladex every 4 weeks with anastrozole.   --She can discontinue Zoladex now that she has undergone EZRA/BSO 9/25/2024.  --Arrange for Zometa every 6 months for bone health as well as modest reduction in breast cancer recurrence to the bone.  --Angie expressed not feeling comfortable about lack of detection and other additional adjuvant therapies to prevent risk of recurrence.  --We detailed discussion today about benefits versus harms of ctDNA surveillance monitoring to potentially detect recurrent cancer earlier than by scans, although overall survival improvement has not been clearly proven, and more clinical trials are needed to determine if earlier therapeutic changes will change outcomes.  Such testing has not been endorsed by  ASCO or NCCN guidelines.  Discussed that [employer] Personal DX MRD testing has not been successful due to inadequate specimen.  Discussed option of ctDNA testing with [facility].  She would like to proceed.  I proposed tentatively monitoring every 3-6 months.  --As noted below, will plan for PET/CT scan in 6 months to reevaluate the right tibial bone lesion as well as rule out recurrence, but reiterated limiting scans unless symptoms concerning for cancer recurrence arise or  if ctDNA testing turns positive.    3.  Right tibia bone lesion  - 11/3/2023 MR right tibia/fibula showed a 3 cm area of increased T2 signal in the midshaft area.  Although the signaling suggested a benign process, this was technically indeterminate.    -- Repeat 2/9/2024 MR right tibia showed interval increase from 3.2 cm to 3.8 cm.  --2/27/2024 biopsy of this bone lesion was negative for malignancy.    --4/5/2024 MRI tibia showed stable in size but improved signal alteration in the lesion, most suggestive of red marrow.  --9/9/2024 PET scan showed mild stable FDG uptake at the right tibia bone lesion.  --Given the peculiarity of this finding, repeat PET scan in 6 months. Unable to MRI the bone lesion until her expanders are removed. She would like to delay MRI until 4/2025.    4. Relative iron deficiency without anemia  --Recent outside ferritin 23.  --She can take oral iron supplement every other day for 2-3 months.    5. Weight gain  --She is going to try some conservative measures first before trying semaglutide.    She will be partially away in [place].    Return in 3 months.    Monse Dan MD  [facility] Hematology/Oncology     Total time spent today: 85 minutes in chart review, patient evaluation, counseling, documentation, test and/or medication/prescription orders, and coordination of care.      The longitudinal plan of care for the diagnosis(es)/condition(s) as documented were addressed during this visit. Due to the added complexity in care, I will continue to support Angie in the subsequent management and with ongoing continuity of care.

## 2024-09-24 NOTE — PROGRESS NOTES
Oral Chemotherapy Monitoring Program  Lab Follow Up    Reviewed lab results from 9/24/24.        8/5/2024     3:00 PM 8/15/2024    10:00 AM 8/26/2024     3:00 PM 8/29/2024     3:00 PM 9/24/2024    12:00 PM   ORAL CHEMOTHERAPY   Assessment Type New Teach New Teach Refill Lab Monitoring;Chart Review Lab Monitoring;Chart Review   Diagnosis Code Breast Cancer Breast Cancer Breast Cancer Breast Cancer Breast Cancer   Providers Dr. Sy Dan   Clinic Name/Location [facility]   Drug Name Lynparza (olaparib) Lynparza (olaparib) Lynparza (olaparib) Lynparza (olaparib) Lynparza (olaparib)   Dose 300 mg 300 mg 300 mg 300 mg 300 mg   Current Schedule BID BID BID BID BID   Cycle Details Continuous Continuous Continuous Continuous Continuous   Start Date of Last Cycle  8/8/2024 8/8/2024 9/7/2024   Planned next cycle start date 8/8/2024 9/7/2024   10/7/2024   Doses missed in last 2 weeks  0 0     Adherence Assessment  Adherent  Adherent    Adverse Effects  No AE identified during assessment  No AE identified during assessment No AE identified during assessment   Any new drug interactions? Yes No No No    Pharmacist Intervention? Yes       Intervention(s) Drug changed (non-chemo)       Is the dose as ordered appropriate for the patient? Yes  Yes Yes Yes       Labs:  _  Result Component Current Result Ref Range   Sodium 140 (9/24/2024) 135 - 145 mmol/L     _  Result Component Current Result Ref Range   Potassium 3.9 (9/24/2024) 3.4 - 5.3 mmol/L     _  Result Component Current Result Ref Range   Calcium 9.5 (9/24/2024) 8.8 - 10.4 mg/dL     No results found for Mag within last 30 days.     No results found for Phos within last 30 days.     _  Result Component Current Result Ref Range   Albumin 4.4 (9/24/2024) 3.5 - 5.2 g/dL     _  Result Component Current Result Ref Range   Urea Nitrogen 12.8 (9/24/2024) 6.0 - 20.0 mg/dL     _  Result Component Current Result  Ref Range   Creatinine 0.94 (9/24/2024) 0.51 - 0.95 mg/dL     _  Result Component Current Result Ref Range   AST 14 (9/24/2024) 0 - 45 U/L     _  Result Component Current Result Ref Range   ALT 13 (9/24/2024) 0 - 50 U/L     _  Result Component Current Result Ref Range   Bilirubin Total 1.1 (9/24/2024) <=1.2 mg/dL     _  Result Component Current Result Ref Range   WBC Count 3.5 (L) (9/24/2024) 4.0 - 11.0 10e3/uL     _  Result Component Current Result Ref Range   Hemoglobin 13.5 (9/24/2024) 11.7 - 15.7 g/dL     _  Result Component Current Result Ref Range   Platelet Count 188 (9/24/2024) 150 - 450 10e3/uL     No results found for ANC within last 30 days.     _  Result Component Current Result Ref Range   Absolute Neutrophils 2.5 (9/24/2024) 1.6 - 8.3 10e3/uL        Assessment  Patient labs look overall good. There are no concerning abnormalities.     Plan   Continue Raghu Dan on 9/30     Kuldip Camden  Pharmacy Intern   [facility]   [phone]       - NICM s/p AICD  - Monitor is and os. Daily weights. Optimize lytes.  - C/w spironolactone and coreg. Uptitrate as tolerated.  - On milrinone at 0.375 mcg/kg/min  - c/w entresto   - c/w PO lasix 40 mg po daily   - HF team assistance greatly appreciated  - pap smear-- normal cytopathology

## 2024-09-24 NOTE — PROGRESS NOTES
Nursing Note:  Angie Michael presents today for Peripheral lasb.    Patient seen by provider today: No   present during visit today: Not Applicable.    Note: N/A.    Intravenous Access:  Lab draw site right AC, Needle type butterfly, Gauge 23.  Labs drawn without difficulty.    Discharge Plan:   Patient was sent to home for future appointment.    Dayan Renteria RN

## 2024-09-25 ENCOUNTER — HOSPITAL ENCOUNTER (OUTPATIENT)
Facility: CLINIC | Age: 44
Discharge: HOME | End: 2024-09-25
Attending: OBSTETRICS & GYNECOLOGY | Admitting: OBSTETRICS & GYNECOLOGY
Payer: COMMERCIAL

## 2024-09-25 ENCOUNTER — ANESTHESIA (OUTPATIENT)
Dept: SURGERY | Facility: CLINIC | Age: 44
End: 2024-09-25
Payer: COMMERCIAL

## 2024-09-25 VITALS
TEMPERATURE: 97.9 F | DIASTOLIC BLOOD PRESSURE: 83 MMHG | HEART RATE: 64 BPM | HEIGHT: 66 IN | WEIGHT: 169 LBS | SYSTOLIC BLOOD PRESSURE: 116 MMHG | OXYGEN SATURATION: 97 % | RESPIRATION RATE: 16 BRPM | BODY MASS INDEX: 27.16 KG/M2

## 2024-09-25 DIAGNOSIS — Z90.710 STATUS POST HYSTERECTOMY: Primary | ICD-10-CM

## 2024-09-25 LAB
BKR LAB AP ADD'L TEST STATUS: NORMAL
BKR PATH ADDL TEST FINAL COMMENTS: NORMAL
HCG UR QL: NEGATIVE

## 2024-09-25 PROCEDURE — 250N000011 HC RX IP 250 OP 636: Performed by: ANESTHESIOLOGY

## 2024-09-25 PROCEDURE — 258N000003 HC RX IP 258 OP 636: Performed by: ANESTHESIOLOGY

## 2024-09-25 PROCEDURE — 999N000141 HC STATISTIC PRE-PROCEDURE NURSING ASSESSMENT: Performed by: OBSTETRICS & GYNECOLOGY

## 2024-09-25 PROCEDURE — 250N000025 HC SEVOFLURANE, PER MIN: Performed by: OBSTETRICS & GYNECOLOGY

## 2024-09-25 PROCEDURE — 360N000077 HC SURGERY LEVEL 4, PER MIN: Performed by: OBSTETRICS & GYNECOLOGY

## 2024-09-25 PROCEDURE — 81025 URINE PREGNANCY TEST: CPT | Performed by: OBSTETRICS & GYNECOLOGY

## 2024-09-25 PROCEDURE — 370N000017 HC ANESTHESIA TECHNICAL FEE, PER MIN: Performed by: OBSTETRICS & GYNECOLOGY

## 2024-09-25 PROCEDURE — 250N000013 HC RX MED GY IP 250 OP 250 PS 637: Performed by: ANESTHESIOLOGY

## 2024-09-25 PROCEDURE — 710N000012 HC RECOVERY PHASE 2, PER MINUTE: Performed by: OBSTETRICS & GYNECOLOGY

## 2024-09-25 PROCEDURE — 88307 TISSUE EXAM BY PATHOLOGIST: CPT | Mod: TC | Performed by: OBSTETRICS & GYNECOLOGY

## 2024-09-25 PROCEDURE — 272N000001 HC OR GENERAL SUPPLY STERILE: Performed by: OBSTETRICS & GYNECOLOGY

## 2024-09-25 PROCEDURE — 88305 TISSUE EXAM BY PATHOLOGIST: CPT | Mod: 26 | Performed by: PATHOLOGY

## 2024-09-25 PROCEDURE — 250N000009 HC RX 250: Performed by: ANESTHESIOLOGY

## 2024-09-25 PROCEDURE — 88307 TISSUE EXAM BY PATHOLOGIST: CPT | Mod: 26 | Performed by: STUDENT IN AN ORGANIZED HEALTH CARE EDUCATION/TRAINING PROGRAM

## 2024-09-25 PROCEDURE — 250N000009 HC RX 250: Performed by: OBSTETRICS & GYNECOLOGY

## 2024-09-25 PROCEDURE — 250N000011 HC RX IP 250 OP 636: Performed by: OBSTETRICS & GYNECOLOGY

## 2024-09-25 PROCEDURE — 250N000013 HC RX MED GY IP 250 OP 250 PS 637: Performed by: OBSTETRICS & GYNECOLOGY

## 2024-09-25 PROCEDURE — 258N000001 HC RX 258: Performed by: OBSTETRICS & GYNECOLOGY

## 2024-09-25 PROCEDURE — 710N000009 HC RECOVERY PHASE 1, LEVEL 1, PER MIN: Performed by: OBSTETRICS & GYNECOLOGY

## 2024-09-25 PROCEDURE — 88305 TISSUE EXAM BY PATHOLOGIST: CPT | Mod: TC | Performed by: OBSTETRICS & GYNECOLOGY

## 2024-09-25 PROCEDURE — 250N000013 HC RX MED GY IP 250 OP 250 PS 637

## 2024-09-25 RX ORDER — AMOXICILLIN 250 MG
1-2 CAPSULE ORAL 2 TIMES DAILY PRN
Qty: 30 TABLET | Refills: 0 | Status: SHIPPED | OUTPATIENT
Start: 2024-09-25 | End: 2024-09-30

## 2024-09-25 RX ORDER — KETOROLAC TROMETHAMINE 30 MG/ML
30 INJECTION, SOLUTION INTRAMUSCULAR; INTRAVENOUS EVERY 6 HOURS PRN
Status: DISCONTINUED | OUTPATIENT
Start: 2024-09-25 | End: 2024-09-25 | Stop reason: HOSPADM

## 2024-09-25 RX ORDER — FENTANYL CITRATE 0.05 MG/ML
50 INJECTION, SOLUTION INTRAMUSCULAR; INTRAVENOUS EVERY 5 MIN PRN
Status: DISCONTINUED | OUTPATIENT
Start: 2024-09-25 | End: 2024-09-25 | Stop reason: HOSPADM

## 2024-09-25 RX ORDER — ONDANSETRON 4 MG/1
4 TABLET, ORALLY DISINTEGRATING ORAL EVERY 30 MIN PRN
Status: DISCONTINUED | OUTPATIENT
Start: 2024-09-25 | End: 2024-09-25 | Stop reason: HOSPADM

## 2024-09-25 RX ORDER — PROPOFOL 10 MG/ML
INJECTION, EMULSION INTRAVENOUS CONTINUOUS PRN
Status: DISCONTINUED | OUTPATIENT
Start: 2024-09-25 | End: 2024-09-25

## 2024-09-25 RX ORDER — NALOXONE HYDROCHLORIDE 0.4 MG/ML
0.1 INJECTION, SOLUTION INTRAMUSCULAR; INTRAVENOUS; SUBCUTANEOUS
Status: DISCONTINUED | OUTPATIENT
Start: 2024-09-25 | End: 2024-09-25 | Stop reason: HOSPADM

## 2024-09-25 RX ORDER — ONDANSETRON 2 MG/ML
INJECTION INTRAMUSCULAR; INTRAVENOUS PRN
Status: DISCONTINUED | OUTPATIENT
Start: 2024-09-25 | End: 2024-09-25

## 2024-09-25 RX ORDER — FENTANYL CITRATE 0.05 MG/ML
25 INJECTION, SOLUTION INTRAMUSCULAR; INTRAVENOUS EVERY 5 MIN PRN
Status: DISCONTINUED | OUTPATIENT
Start: 2024-09-25 | End: 2024-09-25 | Stop reason: HOSPADM

## 2024-09-25 RX ORDER — HYDROMORPHONE HCL IN WATER/PF 6 MG/30 ML
0.4 PATIENT CONTROLLED ANALGESIA SYRINGE INTRAVENOUS EVERY 5 MIN PRN
Status: DISCONTINUED | OUTPATIENT
Start: 2024-09-25 | End: 2024-09-25 | Stop reason: HOSPADM

## 2024-09-25 RX ORDER — METRONIDAZOLE 500 MG/100ML
500 INJECTION, SOLUTION INTRAVENOUS
Status: COMPLETED | OUTPATIENT
Start: 2024-09-25 | End: 2024-09-25

## 2024-09-25 RX ORDER — FENTANYL CITRATE 50 UG/ML
INJECTION, SOLUTION INTRAMUSCULAR; INTRAVENOUS PRN
Status: DISCONTINUED | OUTPATIENT
Start: 2024-09-25 | End: 2024-09-25

## 2024-09-25 RX ORDER — SODIUM CHLORIDE, SODIUM LACTATE, POTASSIUM CHLORIDE, CALCIUM CHLORIDE 600; 310; 30; 20 MG/100ML; MG/100ML; MG/100ML; MG/100ML
INJECTION, SOLUTION INTRAVENOUS CONTINUOUS
Status: DISCONTINUED | OUTPATIENT
Start: 2024-09-25 | End: 2024-09-25 | Stop reason: HOSPADM

## 2024-09-25 RX ORDER — CEFAZOLIN SODIUM/WATER 2 G/20 ML
2 SYRINGE (ML) INTRAVENOUS SEE ADMIN INSTRUCTIONS
Status: DISCONTINUED | OUTPATIENT
Start: 2024-09-25 | End: 2024-09-25 | Stop reason: HOSPADM

## 2024-09-25 RX ORDER — HYDROXYZINE HYDROCHLORIDE 25 MG/1
25 TABLET, FILM COATED ORAL ONCE
Status: COMPLETED | OUTPATIENT
Start: 2024-09-25 | End: 2024-09-25

## 2024-09-25 RX ORDER — CEFAZOLIN SODIUM/WATER 2 G/20 ML
2 SYRINGE (ML) INTRAVENOUS
Status: COMPLETED | OUTPATIENT
Start: 2024-09-25 | End: 2024-09-25

## 2024-09-25 RX ORDER — MAGNESIUM HYDROXIDE 1200 MG/15ML
LIQUID ORAL PRN
Status: DISCONTINUED | OUTPATIENT
Start: 2024-09-25 | End: 2024-09-25 | Stop reason: HOSPADM

## 2024-09-25 RX ORDER — IBUPROFEN 400 MG/1
800 TABLET, FILM COATED ORAL ONCE
Status: DISCONTINUED | OUTPATIENT
Start: 2024-09-25 | End: 2024-09-25 | Stop reason: HOSPADM

## 2024-09-25 RX ORDER — DEXAMETHASONE SODIUM PHOSPHATE 4 MG/ML
4 INJECTION, SOLUTION INTRA-ARTICULAR; INTRALESIONAL; INTRAMUSCULAR; INTRAVENOUS; SOFT TISSUE
Status: DISCONTINUED | OUTPATIENT
Start: 2024-09-25 | End: 2024-09-25 | Stop reason: HOSPADM

## 2024-09-25 RX ORDER — DEXAMETHASONE SODIUM PHOSPHATE 4 MG/ML
INJECTION, SOLUTION INTRA-ARTICULAR; INTRALESIONAL; INTRAMUSCULAR; INTRAVENOUS; SOFT TISSUE PRN
Status: DISCONTINUED | OUTPATIENT
Start: 2024-09-25 | End: 2024-09-25

## 2024-09-25 RX ORDER — LIDOCAINE HYDROCHLORIDE 20 MG/ML
INJECTION, SOLUTION INFILTRATION; PERINEURAL PRN
Status: DISCONTINUED | OUTPATIENT
Start: 2024-09-25 | End: 2024-09-25

## 2024-09-25 RX ORDER — ONDANSETRON 4 MG/1
4 TABLET, ORALLY DISINTEGRATING ORAL EVERY 8 HOURS PRN
Qty: 4 TABLET | Refills: 0 | Status: SHIPPED | OUTPATIENT
Start: 2024-09-25 | End: 2024-09-30

## 2024-09-25 RX ORDER — HEPARIN SODIUM 5000 [USP'U]/.5ML
5000 INJECTION, SOLUTION INTRAVENOUS; SUBCUTANEOUS
Status: COMPLETED | OUTPATIENT
Start: 2024-09-25 | End: 2024-09-25

## 2024-09-25 RX ORDER — PROPOFOL 10 MG/ML
INJECTION, EMULSION INTRAVENOUS PRN
Status: DISCONTINUED | OUTPATIENT
Start: 2024-09-25 | End: 2024-09-25

## 2024-09-25 RX ORDER — ACETAMINOPHEN 325 MG/1
975 TABLET ORAL ONCE
Status: DISCONTINUED | OUTPATIENT
Start: 2024-09-25 | End: 2024-09-25 | Stop reason: HOSPADM

## 2024-09-25 RX ORDER — PHENAZOPYRIDINE HYDROCHLORIDE 200 MG/1
200 TABLET, FILM COATED ORAL ONCE
Status: COMPLETED | OUTPATIENT
Start: 2024-09-25 | End: 2024-09-25

## 2024-09-25 RX ORDER — SODIUM CHLORIDE, SODIUM LACTATE, POTASSIUM CHLORIDE, CALCIUM CHLORIDE 600; 310; 30; 20 MG/100ML; MG/100ML; MG/100ML; MG/100ML
INJECTION, SOLUTION INTRAVENOUS CONTINUOUS PRN
Status: DISCONTINUED | OUTPATIENT
Start: 2024-09-25 | End: 2024-09-25

## 2024-09-25 RX ORDER — HYDROMORPHONE HCL IN WATER/PF 6 MG/30 ML
0.2 PATIENT CONTROLLED ANALGESIA SYRINGE INTRAVENOUS EVERY 5 MIN PRN
Status: DISCONTINUED | OUTPATIENT
Start: 2024-09-25 | End: 2024-09-25 | Stop reason: HOSPADM

## 2024-09-25 RX ORDER — LIDOCAINE 40 MG/G
CREAM TOPICAL
Status: DISCONTINUED | OUTPATIENT
Start: 2024-09-25 | End: 2024-09-25 | Stop reason: HOSPADM

## 2024-09-25 RX ORDER — OXYCODONE HYDROCHLORIDE 5 MG/1
5 TABLET ORAL
Status: COMPLETED | OUTPATIENT
Start: 2024-09-25 | End: 2024-09-25

## 2024-09-25 RX ORDER — OXYCODONE HYDROCHLORIDE 5 MG/1
5 TABLET ORAL EVERY 6 HOURS PRN
Qty: 6 TABLET | Refills: 0 | Status: SHIPPED | OUTPATIENT
Start: 2024-09-25 | End: 2024-09-28

## 2024-09-25 RX ORDER — SCOLOPAMINE TRANSDERMAL SYSTEM 1 MG/1
1 PATCH, EXTENDED RELEASE TRANSDERMAL ONCE
Status: DISCONTINUED | OUTPATIENT
Start: 2024-09-25 | End: 2024-09-25 | Stop reason: HOSPADM

## 2024-09-25 RX ORDER — ONDANSETRON 2 MG/ML
4 INJECTION INTRAMUSCULAR; INTRAVENOUS EVERY 30 MIN PRN
Status: DISCONTINUED | OUTPATIENT
Start: 2024-09-25 | End: 2024-09-25 | Stop reason: HOSPADM

## 2024-09-25 RX ADMIN — FENTANYL CITRATE 25 MCG: 50 INJECTION, SOLUTION INTRAMUSCULAR; INTRAVENOUS at 09:46

## 2024-09-25 RX ADMIN — SCOPALAMINE 1 PATCH: 1 PATCH, EXTENDED RELEASE TRANSDERMAL at 07:21

## 2024-09-25 RX ADMIN — Medication 200 MG: at 08:41

## 2024-09-25 RX ADMIN — PROPOFOL 250 MG: 10 INJECTION, EMULSION INTRAVENOUS at 07:37

## 2024-09-25 RX ADMIN — FENTANYL CITRATE 25 MCG: 50 INJECTION, SOLUTION INTRAMUSCULAR; INTRAVENOUS at 09:28

## 2024-09-25 RX ADMIN — FENTANYL CITRATE 50 MCG: 50 INJECTION INTRAMUSCULAR; INTRAVENOUS at 07:37

## 2024-09-25 RX ADMIN — HYDROXYZINE HYDROCHLORIDE 25 MG: 25 TABLET, FILM COATED ORAL at 07:21

## 2024-09-25 RX ADMIN — ROCURONIUM BROMIDE 50 MG: 50 INJECTION, SOLUTION INTRAVENOUS at 07:37

## 2024-09-25 RX ADMIN — PHENYLEPHRINE HYDROCHLORIDE 100 MCG: 10 INJECTION INTRAVENOUS at 07:55

## 2024-09-25 RX ADMIN — OXYCODONE HYDROCHLORIDE 5 MG: 5 TABLET ORAL at 09:59

## 2024-09-25 RX ADMIN — FENTANYL CITRATE 50 MCG: 50 INJECTION, SOLUTION INTRAMUSCULAR; INTRAVENOUS at 09:03

## 2024-09-25 RX ADMIN — FENTANYL CITRATE 50 MCG: 50 INJECTION INTRAMUSCULAR; INTRAVENOUS at 08:10

## 2024-09-25 RX ADMIN — ONDANSETRON 4 MG: 2 INJECTION INTRAMUSCULAR; INTRAVENOUS at 08:29

## 2024-09-25 RX ADMIN — SODIUM CHLORIDE, POTASSIUM CHLORIDE, SODIUM LACTATE AND CALCIUM CHLORIDE: 600; 310; 30; 20 INJECTION, SOLUTION INTRAVENOUS at 07:31

## 2024-09-25 RX ADMIN — HEPARIN SODIUM 5000 UNITS: 5000 INJECTION, SOLUTION INTRAVENOUS; SUBCUTANEOUS at 06:51

## 2024-09-25 RX ADMIN — DEXAMETHASONE SODIUM PHOSPHATE 4 MG: 4 INJECTION, SOLUTION INTRA-ARTICULAR; INTRALESIONAL; INTRAMUSCULAR; INTRAVENOUS; SOFT TISSUE at 07:55

## 2024-09-25 RX ADMIN — PROPOFOL 25 MCG/KG/MIN: 10 INJECTION, EMULSION INTRAVENOUS at 07:50

## 2024-09-25 RX ADMIN — LIDOCAINE HYDROCHLORIDE 100 MG: 20 INJECTION, SOLUTION INFILTRATION; PERINEURAL at 07:37

## 2024-09-25 RX ADMIN — PHENAZOPYRIDINE 200 MG: 200 TABLET ORAL at 06:50

## 2024-09-25 RX ADMIN — KETOROLAC TROMETHAMINE 30 MG: 30 INJECTION, SOLUTION INTRAMUSCULAR at 09:32

## 2024-09-25 RX ADMIN — SODIUM CHLORIDE, POTASSIUM CHLORIDE, SODIUM LACTATE AND CALCIUM CHLORIDE: 600; 310; 30; 20 INJECTION, SOLUTION INTRAVENOUS at 07:21

## 2024-09-25 RX ADMIN — PHENYLEPHRINE HYDROCHLORIDE 0.2 MCG/KG/MIN: 10 INJECTION INTRAVENOUS at 08:00

## 2024-09-25 RX ADMIN — METRONIDAZOLE 500 MG: 500 INJECTION, SOLUTION INTRAVENOUS at 06:49

## 2024-09-25 RX ADMIN — MIDAZOLAM 2 MG: 1 INJECTION INTRAMUSCULAR; INTRAVENOUS at 07:31

## 2024-09-25 RX ADMIN — Medication 2 G: at 07:33

## 2024-09-25 ASSESSMENT — ACTIVITIES OF DAILY LIVING (ADL)
ADLS_ACUITY_SCORE: 35
ADLS_ACUITY_SCORE: 36
ADLS_ACUITY_SCORE: 35
ADLS_ACUITY_SCORE: 36

## 2024-09-25 ASSESSMENT — ENCOUNTER SYMPTOMS
SEIZURES: 0
DYSRHYTHMIAS: 0

## 2024-09-25 ASSESSMENT — LIFESTYLE VARIABLES: TOBACCO_USE: 0

## 2024-09-25 NOTE — OP NOTE
Gynecologic Oncology Operative Report    2024  Angie Michael  [id number]    PREOPERATIVE DIAGNOSIS: Pathogenic BRCA2 mutation positive    POSTOPERATIVE DIAGNOSIS: Same, final pathology pending    PROCEDURES: Total laparoscopic hysterectomy, bilateral oophorectomy, cystoscopy    SURGEON: Hiral Barney MD     ASSISTANTS:  Renetta Figueroa MD, PGY-3, Wyatt Souza MD, Fellow.     ANESTHESIA: General endotracheal     ESTIMATED BLOOD LOSS: 20 cc     IV FLUIDS: 500 cc crystalloid    URINE OUTPUT: 100 cc clear urine     INDICATIONS: Angie Michael is a 44 year old who had a known BRCA2 mutation.  She was found to have a breast cancer.  She underwent neoadjuvant chemotherapy followed by bilateral mastectomy.  She then completed RT.  She has begun her reconstruction procedures with plastic surgery and is on zoladex.  She has previously had bilateral salpingectomy at the time of her previous  section.  She now desires completion risk reducing surgery.  Following a thorough discussion of the risks, benefits, indications and alternatives she consented to the above procedure.    FINDINGS: On EUA the patient had normal external genitalia and a normal sized, mobile uterus without palpable adnexal masses. On laparoscopy, the uterus and bilateral ovaries were grossly normal in appearance.  There was still a small amount of fimbriated tube bilaterally as well as approximately 1 cm of tube at the uterine cornua.  The remainder of the abdominal and pelvic survey were unremarkable.  On cystoscopy there was no evidence of bladder injury or foreign body and there was brisk efflux noted from the bilateral ureteral orifices.     SPECIMENS:   Pelvic washings  Uterus, cervix, bilateral ovaries    COMPLICATIONS: None.     CONDITION: Stable to PACU.     PROCEDURE:   Consent was reviewed with the patient in the preoperative setting and confirmed. She received prophylactic antibiotics. In addition, she received heparin for venous  thrombosis prevention. The patient was transferred to the operating room and placed in dorsal supine position. General anesthetic was obtained in the usual manner without noted difficulties. The patient was then positioned onto Juan stirrups and an exam under anesthesia was performed with findings as described above.  The patient was prepped and draped for the above-mentioned procedure and Powers catheter was then placed under sterile techniques.  Timeout was called at which point the patient's name, procedure and operative site was confirmed by the operative team. Initially, the instruments for the vaginal manipulator were positioned, a speculum was placed in the vagina. The anterior lip of the cervix was grasped, the uterus sounded to 8 cm and cervix was serially dilated.  The BLOVESare vaginal manipulator was then inserted and the vaginal balloon was then inserted to obtain intraabdominal pressure.     Attention was then turned to the upper abdomen. Initially, an incision was made at the umbilicus and the Veress needle introduced through this stab incision. The abdomen was insufflated with an opening pressure of 2 mmHg. The Veress needle was removed. The initial midline 5 mm port was inserted without difficulties and initial survey revealed no damage to underlying structures.  The left lateral 12 mm and right lateral 5 mm ports were then placed under direct visualization without any injury noted to underlying structures. At this point, the patient was placed into steep Trendelenburg. The pelvis was inspected as well as the upper abdomen with findings as noted above. Pelvic washings were obtained and sent for cytology. Bowels were packed up into the upper abdomen with gentle traction.     Attention was then turned to the pelvis. The round ligaments were identified bilaterally. They were divided using cautery and the retroperitoneal space was entered by dissecting the peritoneum lateral and cephalad to the IP  ligaments. The ureters were identified and a defect was made underneath the IP ligament and above the ureter. The IP ligament was serially cauterized and then divided sharply. The rest of the peritoneum was skeletonized down to the level of the uterine arteries which were then cauterized and divided.  The bladder flap was created using cautery down to just below the level of the uterine manipulator cup. The rest of the parametrial tissue was dissected off of the uterus serially cauterized and divided sharply. A colpotomy was made on the anterior side and carried around to the posterior side of the uterine manipulator cup using the electrocautery. The uterus was removed through the vagina and sent for pathology. The vaginal cuff was then closed using a V-Lock suture and the EndoStitch device with excellent hemostasis and reapproximation.   Next, we performed cystoscopy using 30-degree angled scope and noted normal bladder mucosa, no evidence of foreign body and brisk efflux from the bilateral ureteral orifices. At this point, the vaginal balloon was removed from the vagina. All laparoscopic instruments and ports were now removed and CO2 allowed to escape from the abdomen. Skin was reapproximated with 4-0 Vicryl sutures and Dermabond applied. The patient tolerated the procedure well and was taken to the recovery room in stable condition.    Sponge, lap and needle counts were reported as correct x2 and instrument count was correct x1.   Hiral Barney MD  Gynecologic Oncology  [facility]

## 2024-09-25 NOTE — ANESTHESIA PREPROCEDURE EVALUATION
Anesthesia Pre-Procedure Evaluation    Patient: Angie Michael   MRN: [id number] : 1980        Procedure : Procedure(s):  laparoscopic removal of uterus, cervix, both ovaries, cystoscopy-BILATERAL          Past Medical History:   Diagnosis Date    BRCA2 gene mutation positive 2024    History of blood transfusion     Malignant neoplasm of overlapping sites of left breast in female, estrogen receptor positive (H) 10/10/2023      Past Surgical History:   Procedure Laterality Date    BIOPSY, BREAST, WITH RADIOFREQUENCY IDENTIFICATION Bilateral 2024    Procedure: bilateral tag localized node excision;  Surgeon: Fanny Little MD;  Location:  OR     SECTION      DILATION AND CURETTAGE      retained placenta post-partum 2 weeks    ENT SURGERY      tonsillectomy    EXCISE CYST GENERIC (LOCATION) Left 2024    Procedure: Excision Left Axillary Cyst;  Surgeon: Fanny Little MD;  Location:  OR    GYN SURGERY      egg retrieval for IVF    INSERT PORT VASCULAR ACCESS Right 2023    Procedure: INSERTION, VASCULAR ACCESS PORT;  Surgeon: Fanny Little MD;  Location:  OR    KNEE SURGERY Left     MASTECTOMY SIMPLE BILATERAL, SENTINEL NODE BILATERAL, COMBINED Bilateral 2024    Procedure: Bilateral skin sparing mastectomies with bilateral sentinel lymph node biopsy;  Surgeon: Fanny Little MD;  Location:  OR    RECONSTRUCT BREAST, INSERT TISSUE EXPANDER BILATERAL, COMBINED Bilateral 2024    Procedure: BILATERAL FIRST STAGE BREAST RECONSTRUCTION WITH TISSUE EXPANDERS AND ACELLULAR DERMAL MATRIX;  Surgeon: Vasiliy Yee MD;  Location:  OR    REMOVE PORT VASCULAR ACCESS N/A 2024    Procedure: port removal;  Surgeon: Fanny Little MD;  Location:  OR      Allergies   Allergen Reactions    Aprepitant Difficulty breathing and Shortness Of Breath    Fosaprepitant Unknown     Allergy added per chart review. [facility]    Armoracia Rusticana Ext  (Horseradish) Hives    Carboprost GI Disturbance     Diarrhea    Codeine Nausea and Vomiting      Social History     Tobacco Use    Smoking status: Never     Passive exposure: Never    Smokeless tobacco: Never   Substance Use Topics    Alcohol use: Not Currently      Wt Readings from Last 1 Encounters:   09/06/24 76.2 kg (168 lb)          Prior to Admission medications    Medication Sig Start Date End Date Taking? Authorizing Provider   anastrozole (ARIMIDEX) 1 MG tablet Take 1 tablet (1 mg) by mouth daily 5/22/24   Monse Dan MD   olaparib (LYNPARZA) 150 MG tablet Take 2 tablets (300 mg) by mouth 2 times daily 9/7/24 10/7/24  Monse Dan MD   ondansetron (ZOFRAN) 8 MG tablet Take 1 tablet (8 mg) by mouth every 8 hours as needed for nausea  Patient not taking: Reported on 9/6/2024 8/6/24   oMnse Dan MD     Anesthesia Evaluation   Pt has had prior anesthetic. Type: General.    History of anesthetic complications (she believes she requires a lot of anesthesia given recollection during MAC procedures)       ROS/MED HX  ENT/Pulmonary:    (-) tobacco use, asthma and sleep apnea   Neurologic:    (-) no seizures, no CVA and migraines   Cardiovascular:    (-) hypertension, CAD, RHODES, arrhythmias, valvular problems/murmurs, dyslipidemia and murmur   METS/Exercise Tolerance:     Hematologic:    (-) history of blood clots and anemia   Musculoskeletal:    (-) arthritis   GI/Hepatic:    (-) GERD and liver disease   Renal/Genitourinary:    (-) renal disease and nephrolithiasis   Endo:    (-) Type I DM, Type II DM, thyroid disease and obesity   Psychiatric/Substance Use:    (-) psychiatric history   Infectious Disease:    (-) Recent Fever   Malignancy: Comment: BRCA2+  (+) Malignancy, History of Breast.Breast CA Active status post.      Other:            Physical Exam    Airway        Mallampati: II   TM distance: > 3 FB   Neck ROM: full   Mouth opening: > 3 cm    Respiratory Devices and Support         Dental        (+) Completely normal teeth      Cardiovascular   cardiovascular exam normal       Rhythm and rate: regular and normal (-) no murmur    Pulmonary   pulmonary exam normal        breath sounds clear to auscultation           OUTSIDE LABS:  CBC:   Lab Results   Component Value Date    WBC 3.5 (L) 09/24/2024    WBC 4.3 08/29/2024    HGB 13.5 09/24/2024    HGB 14.1 08/29/2024    HCT 40.2 09/24/2024    HCT 41.5 08/29/2024     09/24/2024     08/29/2024     BMP:   Lab Results   Component Value Date     09/24/2024     08/29/2024    POTASSIUM 3.9 09/24/2024    POTASSIUM 4.4 08/29/2024    CHLORIDE 104 09/24/2024    CHLORIDE 102 08/29/2024    CO2 27 09/24/2024    CO2 27 08/29/2024    BUN 12.8 09/24/2024    BUN 13.8 08/29/2024    CR 0.94 09/24/2024    CR 0.88 08/29/2024    GLC 84 09/24/2024     (H) 08/29/2024     COAGS:   Lab Results   Component Value Date    INR 1.05 02/27/2024     POC:   Lab Results   Component Value Date    HCG Negative 05/08/2024     HEPATIC:   Lab Results   Component Value Date    ALBUMIN 4.4 09/24/2024    PROTTOTAL 6.8 09/24/2024    ALT 13 09/24/2024    AST 14 09/24/2024    ALKPHOS 58 09/24/2024    BILITOTAL 1.1 09/24/2024     OTHER:   Lab Results   Component Value Date    GWENDOLYN 9.5 09/24/2024       Anesthesia Plan    ASA Status:  3       Anesthesia Type: General.     - Airway: ETT   Induction: Propofol.   Maintenance: Balanced.        Consents    Anesthesia Plan(s) and associated risks, benefits, and realistic alternatives discussed. Questions answered and patient/representative(s) expressed understanding.     - Discussed:     - Discussed with:  Patient            Postoperative Care    Pain management: Multi-modal analgesia.   PONV prophylaxis: Ondansetron (or other 5HT-3), Dexamethasone or Solumedrol, Background Propofol Infusion, Scopolamine patch     Comments:               Merary Bess MD    I have reviewed the pertinent notes and labs in the chart from the past  30 days and (re)examined the patient.  Any updates or changes from those notes are reflected in this note.

## 2024-09-25 NOTE — PROGRESS NOTES
Gynecologic Oncology Postoperative Progress Note  9/25/2024    S: Patient reports she is doing fine postoperatively. Pain is improving. Able to ambulate to bathroom and void without difficulty. Tolerating water without nausea or vomiting. Denies chest pain, shortness of breath, dizziness, or other concerns at this time.     O:  Vitals:    09/25/24 0940 09/25/24 0945 09/25/24 0946 09/25/24 1000   BP:  113/73 113/73 110/68   Pulse: 61 55 56 64   Resp: 17 12 12 12   Temp:    97.5  F (36.4  C)   TempSrc:       SpO2: 96% 97% 98% 97%   Weight:       Height:         Gen: NAD  Cardio: Regular rate, well perfused  Resp: Non-labored breathing  Abdomen: Soft, appropriately tender, incisions c/d/i covered with Dermabond    A: 44 year old POD#0 s/p TLH, RAFAEL, cystoscopy. Doing well in early postoperative period and meeting milestones for discharge.    Dz: BRCA2 mutation  FEN: Advance as tolerated  Pain: Tylenol, ibuprofen, oxycodone PRN  GI: PRN Zofran  : Voiding spontaneously      Dispo: Home    Renetta Figueroa MD  OB/GYN PGY-3  09/25/2024 10:33 AM    To contact the [facility] Gynecology Oncology team:  Weekdays (Mon-Fri 6 AM to 6 PM) Pager: [phone]  Overnight and Weekend Pager: [phone]

## 2024-09-25 NOTE — ANESTHESIA PROCEDURE NOTES
Airway       Patient location during procedure: OR       Procedure Start/Stop Times: 9/25/2024 7:42 AM  Staff -        Anesthesiologist:  Merary Bess MD       CRNA: Saige Chawla APRN CRNA       Other Anesthesia Staff: Alda Martinez       Performed By: SRNA and with CRNAs       Procedure performed by resident/fellow/CRNA in presence of a teaching physician.    Consent for Airway        Urgency: elective  Indications and Patient Condition       Indications for airway management: viky-procedural       Induction type:intravenous       Mask difficulty assessment: 2 - vent by mask + OA or adjuvant +/- NMBA    Final Airway Details       Final airway type: endotracheal airway       Successful airway: ETT - single  Endotracheal Airway Details        ETT size (mm): 7.0       Cuffed: yes       Successful intubation technique: direct laryngoscopy       DL Blade Type: Martinez 2       Grade View of Cords: 1       Adjucts: stylet       Position: Right       Measured from: lips       Secured at (cm): 20       Bite block used: None    Post intubation assessment        Placement verified by: capnometry, equal breath sounds and chest rise        Number of attempts at approach: 1       Number of other approaches attempted: 0       Secured with: commercial tube luna and tape       Ease of procedure: easy       Dentition: Intact and Unchanged       Dental guard used and removed. Dental Guard Type: Standard White.    Medication(s) Administered   Medication Administration Time: 9/25/2024 7:42 AM

## 2024-09-25 NOTE — ANESTHESIA CARE TRANSFER NOTE
Patient: Angie Michael    Procedure: Procedure(s):  laparoscopic removal of uterus, cervix, both ovaries, cystoscopy-BILATERAL       Diagnosis: BRCA2 gene mutation positive [Z15.01, Z15.09]  Diagnosis Additional Information: No value filed.    Anesthesia Type:   General     Note:    Oropharynx: oropharynx clear of all foreign objects and spontaneously breathing  Level of Consciousness: drowsy  Oxygen Supplementation: face mask  Level of Supplemental Oxygen (L/min / FiO2): 6  Independent Airway: airway patency satisfactory and stable  Dentition: dentition unchanged  Vital Signs Stable: post-procedure vital signs reviewed and stable  Report to RN Given: handoff report given  Patient transferred to: Phase II    Handoff Report: Identifed the Patient, Identified the Reponsible Provider, Reviewed the pertinent medical history, Discussed the surgical course, Reviewed Intra-OP anesthesia mangement and issues during anesthesia, Set expectations for post-procedure period and Allowed opportunity for questions and acknowledgement of understanding      Vitals:  Vitals Value Taken Time   /77 09/25/24 0851   Temp     Pulse 70 09/25/24 0852   Resp 19 09/25/24 0852   SpO2 96 % 09/25/24 0852   Vitals shown include unfiled device data.    Electronically Signed By: NATE Engle CRNA  September 25, 2024  8:53 AM

## 2024-09-25 NOTE — ADDENDUM NOTE
Addendum  created 09/25/24 1136 by Saige Chawla APRN CRNA    Clinical Note Signed, Intraprocedure Blocks edited, SmartForm saved

## 2024-09-25 NOTE — ANESTHESIA POSTPROCEDURE EVALUATION
Patient: Angie Michael    Procedure: Procedure(s):  laparoscopic removal of uterus, cervix, both ovaries, cystoscopy-BILATERAL       Anesthesia Type:  General    Note:  Disposition: Outpatient   Postop Pain Control: Uneventful            Sign Out: Well controlled pain   PONV: No   Neuro/Psych: Uneventful            Sign Out: Acceptable/Baseline neuro status   Airway/Respiratory: Uneventful            Sign Out: Acceptable/Baseline resp. status   CV/Hemodynamics: Uneventful            Sign Out: Acceptable CV status; No obvious hypovolemia; No obvious fluid overload   Other NRE: NONE   DID A NON-ROUTINE EVENT OCCUR? No           Last vitals:  Vitals Value Taken Time   /68 09/25/24 1000   Temp 36.4  C (97.5  F) 09/25/24 1000   Pulse 67 09/25/24 1012   Resp 20 09/25/24 1012   SpO2 93 % 09/25/24 1012   Vitals shown include unfiled device data.    Electronically Signed By: Merary Bess MD  September 25, 2024  10:56 AM

## 2024-09-25 NOTE — DISCHARGE INSTRUCTIONS
Same Day Surgery Discharge Instructions for  Sedation and General Anesthesia     It's not unusual to feel dizzy, light-headed or faint for up to 24 hours after surgery or while taking pain medication.  If you have these symptoms: sit for a few minutes before standing and have someone assist you when you get up to walk or use the bathroom.    You should rest and relax for the next 24 hours. We recommend you make arrangements to have an adult stay with you for at least 24 hours after your discharge.  Avoid hazardous and strenuous activity.    DO NOT DRIVE any vehicle or operate mechanical equipment for 24 hours following the end of your surgery.  Even though you may feel normal, your reactions may be affected by the medication you have received.    Do not drink alcoholic beverages for 24 hours following surgery.     Slowly progress to your regular diet as you feel able. It's not unusual to feel nauseated and/or vomit after receiving anesthesia.  If you develop these symptoms, drink clear liquids (apple juice, ginger ale, broth, 7-up, etc. ) until you feel better.  If your nausea and vomiting persists for 24 hours, please notify your surgeon.      All narcotic pain medications, along with inactivity and anesthesia, can cause constipation. Drinking plenty of liquids and increasing fiber intake will help.    For any questions of a medical nature, call your surgeon.    Do not make important decisions for 24 hours.    If you had general anesthesia, you may have a sore throat for a couple of days related to the breathing tube used during surgery.  You may use Cepacol lozenges to help with this discomfort.  If it worsens or if you develop a fever, contact your surgeon.     If you feel your pain is not well managed with the pain medications prescribed by your surgeon, please contact your surgeon's office to let them know so they can address your concerns.     Today you received Toradol, an antiinflammatory medication similar  to Ibuprofen.  You should not take other antiinflammatory medication, such as Ibuprofen, Motrin, Advil, Aleve, Naprosyn, etc until 3:30 PM.     While you were at the hospital today you were given a medication called Pyridium.  This is used to treat pain, burning, increased urination, and increased urge to urinate.    Pyridium will most likely darken the color of your urine to an orange or red color. Darkened urine may also cause stains to your underwear, which may or may not be removed by laundering.       **If you have questions or concerns about your procedure,   call Dr. Bowens [phone]**

## 2024-09-26 LAB
PATH REPORT.COMMENTS IMP SPEC: NORMAL
PATH REPORT.FINAL DX SPEC: NORMAL
PATH REPORT.GROSS SPEC: NORMAL
PATH REPORT.MICROSCOPIC SPEC OTHER STN: NORMAL

## 2024-09-27 DIAGNOSIS — Z15.01 BRCA2 GENE MUTATION POSITIVE: ICD-10-CM

## 2024-09-27 DIAGNOSIS — Z15.09 BRCA2 GENE MUTATION POSITIVE: ICD-10-CM

## 2024-09-27 DIAGNOSIS — C50.411 MALIGNANT NEOPLASM OF UPPER-OUTER QUADRANT OF RIGHT BREAST IN FEMALE, ESTROGEN RECEPTOR POSITIVE (H): Primary | ICD-10-CM

## 2024-09-27 DIAGNOSIS — Z17.0 MALIGNANT NEOPLASM OF UPPER-OUTER QUADRANT OF RIGHT BREAST IN FEMALE, ESTROGEN RECEPTOR POSITIVE (H): Primary | ICD-10-CM

## 2024-09-30 ENCOUNTER — ONCOLOGY VISIT (OUTPATIENT)
Dept: ONCOLOGY | Facility: CLINIC | Age: 44
End: 2024-09-30
Attending: OBSTETRICS & GYNECOLOGY
Payer: COMMERCIAL

## 2024-09-30 VITALS
OXYGEN SATURATION: 96 % | HEART RATE: 107 BPM | DIASTOLIC BLOOD PRESSURE: 77 MMHG | RESPIRATION RATE: 16 BRPM | BODY MASS INDEX: 26.95 KG/M2 | WEIGHT: 167 LBS | TEMPERATURE: 98.2 F | SYSTOLIC BLOOD PRESSURE: 101 MMHG

## 2024-09-30 DIAGNOSIS — Z15.09 BRCA2 GENE MUTATION POSITIVE: ICD-10-CM

## 2024-09-30 DIAGNOSIS — Z15.01 BRCA2 GENE MUTATION POSITIVE: ICD-10-CM

## 2024-09-30 DIAGNOSIS — Z17.0 MALIGNANT NEOPLASM OF OVERLAPPING SITES OF BOTH BREASTS IN FEMALE, ESTROGEN RECEPTOR POSITIVE (H): Primary | ICD-10-CM

## 2024-09-30 DIAGNOSIS — Z17.0 MALIGNANT NEOPLASM OF UPPER-OUTER QUADRANT OF RIGHT BREAST IN FEMALE, ESTROGEN RECEPTOR POSITIVE (H): Primary | ICD-10-CM

## 2024-09-30 DIAGNOSIS — C50.812 MALIGNANT NEOPLASM OF OVERLAPPING SITES OF BOTH BREASTS IN FEMALE, ESTROGEN RECEPTOR POSITIVE (H): Primary | ICD-10-CM

## 2024-09-30 DIAGNOSIS — C50.411 MALIGNANT NEOPLASM OF UPPER-OUTER QUADRANT OF RIGHT BREAST IN FEMALE, ESTROGEN RECEPTOR POSITIVE (H): Primary | ICD-10-CM

## 2024-09-30 DIAGNOSIS — C50.811 MALIGNANT NEOPLASM OF OVERLAPPING SITES OF BOTH BREASTS IN FEMALE, ESTROGEN RECEPTOR POSITIVE (H): Primary | ICD-10-CM

## 2024-09-30 DIAGNOSIS — M89.9 BONE LESION: ICD-10-CM

## 2024-09-30 PROCEDURE — 99417 PROLNG OP E/M EACH 15 MIN: CPT | Performed by: INTERNAL MEDICINE

## 2024-09-30 PROCEDURE — G0463 HOSPITAL OUTPT CLINIC VISIT: HCPCS | Performed by: INTERNAL MEDICINE

## 2024-09-30 PROCEDURE — 99215 OFFICE O/P EST HI 40 MIN: CPT | Performed by: INTERNAL MEDICINE

## 2024-09-30 PROCEDURE — G2211 COMPLEX E/M VISIT ADD ON: HCPCS | Performed by: INTERNAL MEDICINE

## 2024-09-30 ASSESSMENT — PAIN SCALES - GENERAL: PAINLEVEL: MILD PAIN (2)

## 2024-09-30 NOTE — LETTER
9/30/2024      Angie Michael  [address] [place] [id number]      Dear Colleague,    Thank you for referring your patient, Angie Michael, to the [facility]. Please see a copy of my visit note below.    [facility]    Hematology/Oncology Established Patient Note      Today's Date: 9/30/2024    Reason for visit: Bilateral breast cancer, BRCA-2 mutation positive.    HISTORY OF PRESENT ILLNESS: Angie Michael is a 44 year old female with BRCA2 gene mutation who presents with the following oncologic history:  1.  10/05/2023: High risk surveillance breast MRI showed right breast 3.6 x 3 x 3.2 cm irregular enhancing mass at 10:00, 0.9 x 0.8 x 0.9 cm second mass at 9:00, 1 x 0.9 x 1.2 cm third mass at 9:30, other suspicious foci of enhancement throughout the breast.  Left breast with 9.9 x 5.2 x 6.7 cm suspicious non-mass enhancement at 10:00-7:00; 1.6 x 1.3 x 1.7 cm enhancing mass at 4:00; 1.8 x 1.6 x 1.9 cm enhancing mass at 12:30; other suspicious foci of enhancement throughout the left breast.  One level 1 suspicious right axillary lymph node and one level 1 suspicious left axillary lymph node..  Developing suspicious interpectoral lymph node.  2.  10/12/2023: Bilateral diagnostic mammogram showed right breast with 3.2 x 2.7 x 4.1 cm irregular mass at 10:00, 6 cm from nipple; 0.9 x 0.6 x 1 cm irregular mass at 11:00, 2 cm from nipple and prominent right axillary lymph node.  Left breast with 1.4 x 1.1 x 1.7 cm mass at 12:00, 2 cm from nipple, 1.8 x 1 x 1.6 cm mass at 3:30, 4 cm from nipple, 1 x 0.9 x 1.1 cm mass at 3:00, 7 cm from nipple, multiple areas of hypoechogenicity with calcifications in the lateral left breast, and prominent left axillary lymph node.  3.  10/12/2023: Biopsy of right breast at 10:00 showed grade 3 invasive lobular carcinoma, ER strongly positive at 81-90%, WV positive at 31-40%, HER2 equivocal with IHC = 2+, FISH negative, Ki-67 = 70%.  Biopsy  of right axillary lymph node showed metastatic carcinoma.  Biopsy of left breast at 3:30 showed grade 2 invasive carcinoma with ductal and lobular features, ER positive at %, FL positive at 2%, HER2 equivocal with IHC = 2+, FISH negative, Ki-67 = 26%.  Biopsy of left axillary lymph node showed metastatic carcinoma.  4.  10/17/2023: PET/CT scan showed cluster of hypermetabolic right breast nodules with SUV max 14.9; 2.1 x 1 cm hypermetabolic level 1 right axillary lymph node with SUV max 5.5; hypermetabolic left breast nodule with SUV max 18.8 and several additional less intense hypermetabolic left breast nodules; 1.5 x 1.3 cm hypermetabolic level 1 left axillary lymph node with SUV max 4.  Small sclerotic focus of metabolic activity within the proximal tibial shaft with SUV max 3.7, indeterminate but unlikely to reflect metastatic disease.  Subcentimeter hypermetabolic focus along anterior margin of the left thyroid lobe, likely a thyroid nodule.  5.  11/03/2023: Thyroid ultrasound showed 0.7 x 0.6 x 0.5 cm solid hypoechoic nodule at the junction of the isthmus and left hemithyroid corresponding to increased uptake on PET/CT, consistent with TIRADS 4 nodule and subcentimeter TIRADS 3 nodule in the left inferior thyroid.  6. 11/3/2023: MR right tibia/fibula showed discrete focal 3 cm area of increased T2 signal and enhancement in mid shaft of right tibia with fat signal interspersed throughout this area with significant signal dropout on opposed phase imaging, favoring benign process.  7. 11/21/2023: Started neoadjuvant chemotherapy with dose-dense Adriamycin + Cytoxan with Neulasta support with Dr. Bev Ryan at [facility]. Reacted to [facility].  8. 11/25/2023: Evaluated at [facility] ED with diarrhea and dizziness, latter attributed to olanzapine -- this was discontinued.  9. 1/03/2024: Completed cycle 4 dose-dense AC.  10. 1/16/2024: Breast MRI showed persistent abnormal mass and nonmass enhancement in  the left breast measuring 10.2 cm in AP dimension (unchanged).  Previously seen mass at the 12:30 position now measures 1.0 cm in greatest dimension (previously 1.9 cm). Previously seen seen mass at the 4:00 position now measures 1.1 cm in greatest dimension (previously 1.7 cm). Previously seen enlarged left axillary lymph node now has normal size and morphology. Previously seen intrapectoral lymph node now has normal size and morphology. Previously seen abnormal right breast enhancement has entirely resolved. Previously visualized enlarged right axillary lymph node now has normal size and morphology.  Other previously seen axillary lymph nodes appear stable.  11. 1/17/2024: Start of weekly paclitaxel for planned 12 weeks.  12. 2/9/2024: MR right tibia/fibula showed mid right tibial shaft bone lesion increased from 3.2 cm to 3.8 cm; speckled foci of marrow signal likely areas of red marrow reconversion.  13. 4/03/2024: Completion of cycle 12 paclitaxel.  14. 4/5/2024: MR right tibia/fibula showed stable in size and improved signal alteration of right tibial shaft focal marrow alteration, likely red marrow.  15. 5/08/2024: Bilateral mastectomies with bilateral axillary sentinel lymph node excision under c/o Dr. Fanny Little.  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 Right breast with no residual invasive carcinoma; LCIS present; treatment related changes, 4 right axillary sentinel lymph nodes present, ypT0-N0.  16. 6/04/2024: Started Zoladex and anastrozole (latter on 6/11/2024).  17. 7/2/2024-8/7/2024: Adjuvant radiation therapy to left chest wall and regional nodes.  18.  8/8/2024: Started adjuvant olaparib 300 mg twice daily.  19. 9/9/2024: PET/CT scan showed no evidence of FDG avid malignancy; subcentimeter  mildly FDG avid right axillary lymph node favored reactive; stable mild focal FDG uptake of the right mid tibial diaphysis favored benign; persistent FDG uptake within subcentimeter left isthmus thyroid nodule.   20. 2024: Laparoscopic resection of uterus, cervix, bilateral ovaries under care of Dr. Hiral Bello. Pathology pending.    INTERVAL HISTORY:  Angie reports feeling hot at night and abrupt stomach upset and abrupt diarrhea twice per week.      REVIEW OF SYSTEMS:   14 point ROS was reviewed and is negative other than as noted above in HPI.       HOME MEDICATIONS:  Current Outpatient Medications   Medication Sig Dispense Refill     anastrozole (ARIMIDEX) 1 MG tablet Take 1 tablet (1 mg) by mouth daily 90 tablet 3     olaparib (LYNPARZA) 150 MG tablet Take 2 tablets (300 mg) by mouth 2 times daily 120 tablet 0     ondansetron (ZOFRAN) 8 MG tablet Take 1 tablet (8 mg) by mouth every 8 hours as needed for nausea (Patient not taking: Reported on 2024) 30 tablet 2         ALLERGIES:  Allergies   Allergen Reactions     Aprepitant Difficulty breathing and Shortness Of Breath     Fosaprepitant Unknown     Allergy added per chart review. [facility]     Armoracia Rusticana Ext (Horseradish) Hives     Carboprost GI Disturbance     Diarrhea     Codeine Nausea and Vomiting         PAST MEDICAL HISTORY:  BRCA2 gene mutation.  Bilateral breast cancer as outlined above.  2022 Galleri test - negative for malignancy.    Gynecologic history:  Age of menarche at 15.  3 children/triplets -- 1 boy and twin girls born 2022.  Age of first pregnancy at 41.  Used 10 years of OCPs no HRT, IVF for fertility treatment.      PAST SURGICAL HISTORY:  Past Surgical History:   Procedure Laterality Date     BIOPSY, BREAST, WITH RADIOFREQUENCY IDENTIFICATION Bilateral 2024    Procedure: bilateral tag localized node excision;  Surgeon: Fanny Little MD;  Location: [facility] OR      SECTION       DILATION AND  CURETTAGE      retained placenta post-partum 2 weeks     ENT SURGERY      tonsillectomy     EXCISE CYST GENERIC (LOCATION) Left 05/08/2024    Procedure: Excision Left Axillary Cyst;  Surgeon: Fanny Little MD;  Location:  OR     GYN SURGERY      egg retrieval for IVF     INSERT PORT VASCULAR ACCESS Right 11/20/2023    Procedure: INSERTION, VASCULAR ACCESS PORT;  Surgeon: Fanny Little MD;  Location:  OR     KNEE SURGERY Left      MASTECTOMY SIMPLE BILATERAL, SENTINEL NODE BILATERAL, COMBINED Bilateral 05/08/2024    Procedure: Bilateral skin sparing mastectomies with bilateral sentinel lymph node biopsy;  Surgeon: Fanny Little MD;  Location:  OR     RECONSTRUCT BREAST, INSERT TISSUE EXPANDER BILATERAL, COMBINED Bilateral 05/08/2024    Procedure: BILATERAL FIRST STAGE BREAST RECONSTRUCTION WITH TISSUE EXPANDERS AND ACELLULAR DERMAL MATRIX;  Surgeon: Vasiliy Yee MD;  Location:  OR     REMOVE PORT VASCULAR ACCESS N/A 05/08/2024    Procedure: port removal;  Surgeon: Fanny Little MD;  Location:  OR         SOCIAL HISTORY:  Social History     Socioeconomic History     Marital status:      Spouse name: Not on file     Number of children: Not on file     Years of education: Not on file     Highest education level: Not on file   Occupational History     Not on file   Tobacco Use     Smoking status: Never     Passive exposure: Never     Smokeless tobacco: Never   Vaping Use     Vaping status: Never Used   Substance and Sexual Activity     Alcohol use: Not Currently     Drug use: Not Currently     Sexual activity: Not on file   Other Topics Concern     Parent/sibling w/ CABG, MI or angioplasty before 65F 55M? No   Social History Narrative     Not on file     Social Determinants of Health     Financial Resource Strain: Low Risk  (4/9/2024)    Financial Resource Strain      Within the past 12 months, have you or your family members you live with been unable to get utilities (heat,  electricity) when it was really needed?: No   Food Insecurity: Low Risk  (4/9/2024)    Food Insecurity      Within the past 12 months, did you worry that your food would run out before you got money to buy more?: No      Within the past 12 months, did the food you bought just not last and you didn t have money to get more?: No   Transportation Needs: Low Risk  (4/9/2024)    Transportation Needs      Within the past 12 months, has lack of transportation kept you from medical appointments, getting your medicines, non-medical meetings or appointments, work, or from getting things that you need?: No   Physical Activity: Insufficiently Active (11/2/2023)    Received from [facility], [facility]    Exercise Vital Sign      Days of Exercise per Week: 4 days      Minutes of Exercise per Session: 30 min   Stress: Not on file   Social Connections: Unknown (12/22/2021)    Received from [employer] [facility], [facility]    Social Connections      Frequency of Communication with Friends and Family: Not on file   Interpersonal Safety: Low Risk  (4/16/2024)    Interpersonal Safety      Do you feel physically and emotionally safe where you currently live?: Yes      Within the past 12 months, have you been hit, slapped, kicked or otherwise physically hurt by someone?: No      Within the past 12 months, have you been humiliated or emotionally abused in other ways by your partner or ex-partner?: No   Housing Stability: Low Risk  (4/9/2024)    Housing Stability      Do you have housing? : Yes      Are you worried about losing your housing?: No         FAMILY HISTORY:  Paternal grandmother and many paternal grandfathers with breast cancer; cousin with DCIS tested positive for BRCA1.  No ovarian, colon, or prostate cancer.      PHYSICAL EXAM:  Vital signs:  /77   Pulse 107   Temp 98.2  F (36.8  C) (Oral)   Resp 16   Wt 75.8 kg (167 lb)   SpO2 96%   BMI 26.95 kg/m      ECO  GENERAL: No acute distress.  EYES: No scleral icterus. No overt erythema.  LYMPH: No palpable lymphadenopathy in the cervical, supraclavicular, or axillary regions.  Bilateral breasts are surgically absent with expanders in place and no fluid or masses or skin erythema.  RESPIRATORY: No audible cough, wheezing, or labored breathing.  MUSCULOSKELETAL: Range of motion in the neck, shoulders, and arms appear normal.  SKIN: No overt rashes, discolorations, or lesions over the face and neck.  NEUROLOGIC: Alert.  No overt tremors.    LABS:  CBC RESULTS:   Recent Labs   Lab Test 24  0937   WBC 3.5*   RBC 4.35   HGB 13.5   HCT 40.2   MCV 92   MCH 31.0   MCHC 33.6   RDW 14.7           Last Comprehensive Metabolic Panel:  Sodium   Date Value Ref Range Status   2024 140 135 - 145 mmol/L Final     Potassium   Date Value Ref Range Status   2024 3.9 3.4 - 5.3 mmol/L Final     Chloride   Date Value Ref Range Status   2024 104 98 - 107 mmol/L Final     Carbon Dioxide (CO2)   Date Value Ref Range Status   2024 27 22 - 29 mmol/L Final     Anion Gap   Date Value Ref Range Status   2024 9 7 - 15 mmol/L Final     Glucose   Date Value Ref Range Status   2024 84 70 - 99 mg/dL Final     GLUCOSE BY METER POCT   Date Value Ref Range Status   2024 109 (H) 70 - 99 mg/dL Final     Urea Nitrogen   Date Value Ref Range Status   2024 12.8 6.0 - 20.0 mg/dL Final     Creatinine   Date Value Ref Range Status   2024 0.94 0.51 - 0.95 mg/dL Final     GFR Estimate   Date Value Ref Range Status   2024 76 >60 mL/min/1.73m2 Final     Comment:     eGFR calculated using  CKD-EPI equation.     Calcium   Date Value Ref Range Status   2024 9.5 8.8 - 10.4 mg/dL Final     Comment:     Reference intervals for this test were updated on 2024 to reflect our healthy population more accurately. There may be differences in the flagging of prior results with similar values  performed with this method. Those prior results can be interpreted in the context of the updated reference intervals.     Bilirubin Total   Date Value Ref Range Status   09/24/2024 1.1 <=1.2 mg/dL Final     Alkaline Phosphatase   Date Value Ref Range Status   09/24/2024 58 40 - 150 U/L Final     ALT   Date Value Ref Range Status   09/24/2024 13 0 - 50 U/L Final     AST   Date Value Ref Range Status   09/24/2024 14 0 - 45 U/L Final       IMAGING:  Reviewed as per HPI.    ASSESSMENT/PLAN:  Angie Michael is a 44 year old female with the following issues:  1.  Clinical prognostic stage IIB, cT2-N1-M0, multifocal grade 3 invasive lobular carcinoma of the RIGHT upper outer breast, ER positive 81-90%, ID positive 31-40%, HER-2 FISH negative (IHC = 2+), Ki-67 = 70%, ypT0-N0  2.  Clinical prognostic stage IIA, cT3-N1-M0, multifocal grade 2 invasive ductal and lobular carcinoma of LEFT breast overlapping sites, ER positive %, ID positive 2%, HER-2 FISH negative (IHC = 2+), Ki-67 = 26%, yp(m)T1b-N0  3. BRCA-2 deleterious gene mutation  -- Angie is s/p neoadjuvant chemotherapy with dose-dense AC followed by weekly paclitaxel completed 4/3/2024 and bilateral mastectomies with bilateral axillary sentinel lymph node excision on 5/8/2024.  --Angie achieved an outstanding complete response in her right breast cancer and excellent partial response in her left breast with no residual cancer in the lymph nodes.  --She completed adjuvant radiation to the left chest wall and regional lymph nodes under care of Dr. Finesse Cortes on 8/7/2024.  --I personally reviewed her 9/9/2024 PET/CT scan which showed mild inflammatory uptake in the mastectomy areas and likely reactive subcentimeter right axillary node, which was not palpable on exam today.  There is stable mild focal FDG uptake of the right mid tibial diaphysis and persistent uptake at the left isthmus thyroid nodule.  --Started adjuvant olaparib 8/8/2024 for planned 1 year, as per  the [place] trial.  Monitor CBC with differential given potential risk of MDS with the use of olaparib. Other side effects include fatigue and cytopenias.  Benefit reiterated is improvement in DFS and overall survival for BRCA-mutated breast cancer patients.  She has had some dyspepsia and rather abrupt diarrhea occurring twice per week -- for now, she is willing to tolerate.  - Given her initial extent of disease bilaterally, she would benefit from adjuvant abemaciclib for 2 years along with hormone blockade therapy as per the MonarchE trial.  Although optimal sequencing of olaparib and abemaciclib is unknown at present time, plan to commence abemaciclib (with ramp up dosing) after completion of olaparib.  --Phase 3 monarchE trial demonstrated improvement invasive disease free survival rate of 86.1% versus 79% when abemaciclib was added to aromatase inhibitor for 2 years compared to tamoxifen or AI alone.  -Continue hormone blockade therapy now with ovarian suppression therapy with Zoladex every 4 weeks with anastrozole.   --She can discontinue Zoladex now that she has undergone EZRA/BSO 9/25/2024.  --Arrange for Zometa every 6 months for bone health as well as modest reduction in breast cancer recurrence to the bone.  --Angie expressed not feeling comfortable about lack of detection and other additional adjuvant therapies to prevent risk of recurrence.  --We detailed discussion today about benefits versus harms of ctDNA surveillance monitoring to potentially detect recurrent cancer earlier than by scans, although overall survival improvement has not been clearly proven, and more clinical trials are needed to determine if earlier therapeutic changes will change outcomes.  Such testing has not been endorsed by  ASCO or NCCN guidelines.  Discussed that [employer] Personal DX MRD testing has not been successful due to inadequate specimen.  Discussed option of ctDNA testing with [facility].  She would like to proceed.   I proposed tentatively monitoring every 3-6 months.  --As noted below, will plan for PET/CT scan in 6 months to reevaluate the right tibial bone lesion as well as rule out recurrence, but reiterated limiting scans unless symptoms concerning for cancer recurrence arise or if ctDNA testing turns positive.    3.  Right tibia bone lesion  - 11/3/2023 MR right tibia/fibula showed a 3 cm area of increased T2 signal in the midshaft area.  Although the signaling suggested a benign process, this was technically indeterminate.    -- Repeat 2/9/2024 MR right tibia showed interval increase from 3.2 cm to 3.8 cm.  --2/27/2024 biopsy of this bone lesion was negative for malignancy.    --4/5/2024 MRI tibia showed stable in size but improved signal alteration in the lesion, most suggestive of red marrow.  --9/9/2024 PET scan showed mild stable FDG uptake at the right tibia bone lesion.  --Given the peculiarity of this finding, repeat PET scan in 6 months. Unable to MRI the bone lesion until her expanders are removed. She would like to delay MRI until 4/2025.    4. Relative iron deficiency without anemia  --Recent outside ferritin 23.  --She can take oral iron supplement every other day for 2-3 months.    5. Weight gain  --She is going to try some conservative measures first before trying semaglutide.    She will be partially away in [place].    Return in 3 months.    Monse Dan MD  [facility] Hematology/Oncology     Total time spent today: 85 minutes in chart review, patient evaluation, counseling, documentation, test and/or medication/prescription orders, and coordination of care.      The longitudinal plan of care for the diagnosis(es)/condition(s) as documented were addressed during this visit. Due to the added complexity in care, I will continue to support Angie in the subsequent management and with ongoing continuity of care.      Again, thank you for allowing me to participate in the care of your patient.         Sincerely,        Monse Dan MD

## 2024-09-30 NOTE — NURSING NOTE
"Oncology Rooming Note    September 30, 2024 9:17 AM   Angie Michael is a 44 year old female who presents for:    Chief Complaint   Patient presents with    Oncology Clinic Visit     Initial Vitals: /77   Pulse 107   Temp 98.2  F (36.8  C) (Oral)   Resp 16   Wt 75.8 kg (167 lb)   SpO2 96%   BMI 26.95 kg/m   Estimated body mass index is 26.95 kg/m  as calculated from the following:    Height as of 9/25/24: 1.676 m (5' 6\").    Weight as of this encounter: 75.8 kg (167 lb). Body surface area is 1.88 meters squared.  Mild Pain (2) Comment: Data Unavailable   No LMP recorded.  Allergies reviewed: Yes  Medications reviewed: Yes    Medications: Medication refills not needed today.  Pharmacy name entered into [facility]:    [id number] - [place], [place] - [address] [facility] [address] [facility] [address] [facility]  [facility] - [place], [place] - [address]    Frailty Screening:   Is the patient here for a new oncology consult visit in cancer care? 2. No      Clinical concerns: follow up        Suzie Gaytan            "

## 2024-10-04 ENCOUNTER — ONCOLOGY VISIT (OUTPATIENT)
Dept: ONCOLOGY | Facility: CLINIC | Age: 44
End: 2024-10-04
Attending: OBSTETRICS & GYNECOLOGY
Payer: COMMERCIAL

## 2024-10-04 VITALS
SYSTOLIC BLOOD PRESSURE: 101 MMHG | DIASTOLIC BLOOD PRESSURE: 69 MMHG | OXYGEN SATURATION: 95 % | TEMPERATURE: 98.4 F | WEIGHT: 171.6 LBS | BODY MASS INDEX: 27.7 KG/M2 | RESPIRATION RATE: 18 BRPM | HEART RATE: 76 BPM

## 2024-10-04 DIAGNOSIS — Z15.01 BRCA2 GENE MUTATION POSITIVE: Primary | ICD-10-CM

## 2024-10-04 DIAGNOSIS — Z15.09 BRCA2 GENE MUTATION POSITIVE: Primary | ICD-10-CM

## 2024-10-04 PROCEDURE — 99212 OFFICE O/P EST SF 10 MIN: CPT | Mod: 24 | Performed by: OBSTETRICS & GYNECOLOGY

## 2024-10-04 PROCEDURE — G0463 HOSPITAL OUTPT CLINIC VISIT: HCPCS | Performed by: OBSTETRICS & GYNECOLOGY

## 2024-10-04 ASSESSMENT — PAIN SCALES - GENERAL: PAINLEVEL: NO PAIN (0)

## 2024-10-04 NOTE — LETTER
10/4/2024      Angie Michael  [address] [place] [address]      Dear Colleague,    Thank you for referring your patient, Angie Michael, to the [facility]. Please see a copy of my visit note below.    Progress Note        Dr. Monse Dan MD  [address]  [place] [address]       RE: Angie Michael  : 1980  MICHEAL: Oct 4, 2024    HPI:  Angie Michael is 44 year old with pathogenic BRCA2 mutation positive and hormone receptor positive breast cancer. She is unaccompanied today.  She is recovering as expected and has no specific post-operative concerns.    She had a known BRCA2 mutation after testing when her paternal cousin was diagnosed with BRCA mutation. She was undergoing screening with alternating MRI and mammograms but became pregnant with triplets via IVF (she still has embryos at [facility] and [facility]) and screening was delayed.  She began undergoing screening again and was noted to have multifocal breast cancer.  She underwent neoadjuvant chemotherapy followed by double mastectomy and first stage reconstruction.  She has started zoladex suppression and has completed RT. Her breast surgeon is Dr Rubin and her plastic surgeon is Dr Yee and she has her second stage reconstruction planned for March.  Plan is to start AI and abimiciclib once she has completed RT.    4/3/23:  US pelvis:  Uterus: Measures 7.2 x 3.4 x 5.9 cm. Redemonstration of the echogenic lesion within the low endometrial canal measuring 8 x 8 x 4 mm, no internal vascularity. Endometrium: Measures up to 0.4 cm in thickness.  Right Ovary: Measures 3.3 x 1.5 x 1.6 cm and appears unremarkable Right Ovary Blood Flow: Present. Left Ovary: Measures 2.8 x 2.1 x 2.3 cm and appears unremarkable Left Ovary Blood Flow: Present. Free Fluid: no significant free fluid.     24:  Total laparoscopic hysterectomy, bilateral oophorectomy, cystoscopy    Pathology:  Benign    Obstetrics and Gynecology  History:  , c/s x 1 for triplets, cholestasis, PEC  She had a salpingectomy at the time of her  section for risk reduction  She has decided she has completed child bearing      Past Medical History:  Past Medical History:   Diagnosis Date     BRCA2 gene mutation positive 2024     History of blood transfusion      Malignant neoplasm of overlapping sites of left breast in female, estrogen receptor positive (H) 10/10/2023       Past Surgical History:  Past Surgical History:   Procedure Laterality Date     BIOPSY, BREAST, WITH RADIOFREQUENCY IDENTIFICATION Bilateral 2024    Procedure: bilateral tag localized node excision;  Surgeon: Fanny Little MD;  Location:  OR      SECTION       DILATION AND CURETTAGE      retained placenta post-partum 2 weeks     ENT SURGERY      tonsillectomy     EXCISE CYST GENERIC (LOCATION) Left 2024    Procedure: Excision Left Axillary Cyst;  Surgeon: Fanny Little MD;  Location:  OR     GYN SURGERY      egg retrieval for IVF     INSERT PORT VASCULAR ACCESS Right 2023    Procedure: INSERTION, VASCULAR ACCESS PORT;  Surgeon: Fanny Little MD;  Location:  OR     KNEE SURGERY Left      LAPAROSCOPIC HYSTERECTOMY TOTAL, BILATERAL SALPINGO-OOPHORECTOMY, NODE DISSECTION, COMBINED Bilateral 2024    Procedure: laparoscopic removal of uterus, cervix, both ovaries, cystoscopy-BILATERAL;  Surgeon: Hiral Davis MD;  Location:  OR     MASTECTOMY SIMPLE BILATERAL, SENTINEL NODE BILATERAL, COMBINED Bilateral 2024    Procedure: Bilateral skin sparing mastectomies with bilateral sentinel lymph node biopsy;  Surgeon: Fanny Little MD;  Location:  OR     RECONSTRUCT BREAST, INSERT TISSUE EXPANDER BILATERAL, COMBINED Bilateral 2024    Procedure: BILATERAL FIRST STAGE BREAST RECONSTRUCTION WITH TISSUE EXPANDERS AND ACELLULAR DERMAL MATRIX;  Surgeon: Vasiliy Yee MD;  Location:  OR     REMOVE PORT VASCULAR  ACCESS N/A 05/08/2024    Procedure: port removal;  Surgeon: Fanny Little MD;  Location:  OR       Health Maintenance:  Last Pap Smear: No need for further pap smear exams  She has not had a history of abnormal Pap smears.    Last Mammogram: N/A-s/p bilateral mastectomy    Last Colonoscopy: N/A       Social History:  Lives with her  and children, feels safe at home.  She stays at home with her 2.5 year old triplets.  Enjoys reading, art collecting, travel, pilates.  Does have an advanced directive on file and would like her  to be her POA.  Full code.    Family History:   The patient's family history is significant for.  Family History   Problem Relation Age of Onset     Hyperlipidemia Mother      Other Cancer Father         Leukemia     Breast Cancer Other          Physical Exam:   /69 (BP Location: Left arm, Patient Position: Sitting, Cuff Size: Adult Large)   Pulse 76   Temp 98.4  F (36.9  C) (Oral)   Resp 18   Wt 77.8 kg (171 lb 9.6 oz)   SpO2 95%   BMI 27.70 kg/m    GENERAL: alert and no distress  Abd:  Port sites without erythema/induration or drainage  :  Well healing vaginal cuff     Labs:  None      Assessment:    Angie Michael is a 44 year old woman with a diagnosis of hormone receptor positive breast cancer in the setting of BRCA2 mutation positive.     A total of 20 minutes was spent on patient care today.       Plan:     1.)    BRCA2 mutation-We reviewed her surgical findings and pathology.  Given the benign findings, she no longer needs follow up with the gynecologic oncology clinic.  She also no longer needs screening pap smears.  She should continue to have routine annual exams including a pelvic exam with her primary gynecologist or PCP.  She was instructed to call if any concerns arise.     2.) Hormone receptor positive breast cancer-She will no longer require zoladex suppression following surgery    3.) Genetic risk factors were assessed and the patient has a  "known pathogenic mutation in the BRCA2 gene    4.) Labs and/or tests ordered include:  None     5.) Health maintenance issues addressed today include pt is up to date.            Thank you for allowing us to participate in the care of your patient.         Sincerely,    Hiral Barney MD  Gynecologic Oncology  [facility]       [place]      Oncology Rooming Note    October 4, 2024 2:06 PM   Angie Michael is a 44 year old female who presents for:    Chief Complaint   Patient presents with     Oncology Clinic Visit     Malignant neoplasm of upper-outer quadrant of right breast in female, estrogen receptor positive (H)     Initial Vitals: /69 (BP Location: Left arm, Patient Position: Sitting, Cuff Size: Adult Large)   Pulse 76   Temp 98.4  F (36.9  C) (Oral)   Resp 18   Wt 77.8 kg (171 lb 9.6 oz)   SpO2 95%   BMI 27.70 kg/m   Estimated body mass index is 27.7 kg/m  as calculated from the following:    Height as of 9/25/24: 1.676 m (5' 6\").    Weight as of this encounter: 77.8 kg (171 lb 9.6 oz). Body surface area is 1.9 meters squared.  No Pain (0) Comment: Data Unavailable   No LMP recorded.  Allergies reviewed: Yes  Medications reviewed: Yes    Medications: Medication refills not needed today.  Pharmacy name entered into [facility]:    [id number] - [place], [place] - [address] AT [facility] OF  & [facility] [address] MAIL/SPECIALTY PHARMACY - [facility] [address]    Frailty Screening:   Is the patient here for a new oncology consult visit in cancer care? 2. No      Clinical concerns: no     Hemalatha Claros CMA                Again, thank you for allowing me to participate in the care of your patient.        Sincerely,        Ace Barney MD  "

## 2024-10-04 NOTE — PROGRESS NOTES
Progress Note        Dr. Monse Dan MD  [address] [facility] [address]  [place] [address]       RE: Angie Michael  : 1980  MICHEAL: Oct 4, 2024    HPI:  Angie Michael is 44 year old with pathogenic BRCA2 mutation positive and hormone receptor positive breast cancer. She is unaccompanied today.  She is recovering as expected and has no specific post-operative concerns.    She had a known BRCA2 mutation after testing when her paternal cousin was diagnosed with BRCA mutation. She was undergoing screening with alternating MRI and mammograms but became pregnant with triplets via IVF (she still has embryos at [facility] and [facility]) and screening was delayed.  She began undergoing screening again and was noted to have multifocal breast cancer.  She underwent neoadjuvant chemotherapy followed by double mastectomy and first stage reconstruction.  She has started zoladex suppression and has completed RT. Her breast surgeon is Dr Rubin and her plastic surgeon is Dr Yee and she has her second stage reconstruction planned for March.  Plan is to start AI and abimiciclib once she has completed RT.    4/3/23:  US pelvis:  Uterus: Measures 7.2 x 3.4 x 5.9 cm. Redemonstration of the echogenic lesion within the low endometrial canal measuring 8 x 8 x 4 mm, no internal vascularity. Endometrium: Measures up to 0.4 cm in thickness.  Right Ovary: Measures 3.3 x 1.5 x 1.6 cm and appears unremarkable Right Ovary Blood Flow: Present. Left Ovary: Measures 2.8 x 2.1 x 2.3 cm and appears unremarkable Left Ovary Blood Flow: Present. Free Fluid: no significant free fluid.     24:  Total laparoscopic hysterectomy, bilateral oophorectomy, cystoscopy    Pathology:  Benign    Obstetrics and Gynecology History:  , c/s x 1 for triplets, cholestasis, PEC  She had a salpingectomy at the time of her  section for risk reduction  She has decided she has completed child bearing      Past Medical History:  Past Medical History:    Diagnosis Date    BRCA2 gene mutation positive 2024    History of blood transfusion     Malignant neoplasm of overlapping sites of left breast in female, estrogen receptor positive (H) 10/10/2023       Past Surgical History:  Past Surgical History:   Procedure Laterality Date    BIOPSY, BREAST, WITH RADIOFREQUENCY IDENTIFICATION Bilateral 2024    Procedure: bilateral tag localized node excision;  Surgeon: Fanny Little MD;  Location:  OR     SECTION      DILATION AND CURETTAGE      retained placenta post-partum 2 weeks    ENT SURGERY      tonsillectomy    EXCISE CYST GENERIC (LOCATION) Left 2024    Procedure: Excision Left Axillary Cyst;  Surgeon: Fanny Little MD;  Location:  OR    GYN SURGERY      egg retrieval for IVF    INSERT PORT VASCULAR ACCESS Right 2023    Procedure: INSERTION, VASCULAR ACCESS PORT;  Surgeon: Fanny Little MD;  Location:  OR    KNEE SURGERY Left     LAPAROSCOPIC HYSTERECTOMY TOTAL, BILATERAL SALPINGO-OOPHORECTOMY, NODE DISSECTION, COMBINED Bilateral 2024    Procedure: laparoscopic removal of uterus, cervix, both ovaries, cystoscopy-BILATERAL;  Surgeon: Hiral Davis MD;  Location:  OR    MASTECTOMY SIMPLE BILATERAL, SENTINEL NODE BILATERAL, COMBINED Bilateral 2024    Procedure: Bilateral skin sparing mastectomies with bilateral sentinel lymph node biopsy;  Surgeon: Fanny Little MD;  Location:  OR    RECONSTRUCT BREAST, INSERT TISSUE EXPANDER BILATERAL, COMBINED Bilateral 2024    Procedure: BILATERAL FIRST STAGE BREAST RECONSTRUCTION WITH TISSUE EXPANDERS AND ACELLULAR DERMAL MATRIX;  Surgeon: Vasiliy Yee MD;  Location:  OR    REMOVE PORT VASCULAR ACCESS N/A 2024    Procedure: port removal;  Surgeon: Fanny Little MD;  Location:  OR       Health Maintenance:  Last Pap Smear: No need for further pap smear exams  She has not had a history of abnormal Pap smears.    Last  Mammogram: N/A-s/p bilateral mastectomy    Last Colonoscopy: N/A       Social History:  Lives with her  and children, feels safe at home.  She stays at home with her 2.5 year old triplets.  Enjoys reading, art collecting, travel, pilates.  Does have an advanced directive on file and would like her  to be her POA.  Full code.    Family History:   The patient's family history is significant for.  Family History   Problem Relation Age of Onset    Hyperlipidemia Mother     Other Cancer Father         Leukemia    Breast Cancer Other          Physical Exam:   /69 (BP Location: Left arm, Patient Position: Sitting, Cuff Size: Adult Large)   Pulse 76   Temp 98.4  F (36.9  C) (Oral)   Resp 18   Wt 77.8 kg (171 lb 9.6 oz)   SpO2 95%   BMI 27.70 kg/m    GENERAL: alert and no distress  Abd:  Port sites without erythema/induration or drainage  :  Well healing vaginal cuff     Labs:  None      Assessment:    Angie Michael is a 44 year old woman with a diagnosis of hormone receptor positive breast cancer in the setting of BRCA2 mutation positive.     A total of 20 minutes was spent on patient care today.       Plan:     1.)    BRCA2 mutation-We reviewed her surgical findings and pathology.  Given the benign findings, she no longer needs follow up with the gynecologic oncology clinic.  She also no longer needs screening pap smears.  She should continue to have routine annual exams including a pelvic exam with her primary gynecologist or PCP.  She was instructed to call if any concerns arise.     2.) Hormone receptor positive breast cancer-She will no longer require zoladex suppression following surgery    3.) Genetic risk factors were assessed and the patient has a known pathogenic mutation in the BRCA2 gene    4.) Labs and/or tests ordered include:  None     5.) Health maintenance issues addressed today include pt is up to date.            Thank you for allowing us to participate in the care of your  patient.         Sincerely,    Hiral Barney MD  Gynecologic Oncology  [facility]       [place]

## 2024-10-04 NOTE — PROGRESS NOTES
"Oncology Rooming Note    October 4, 2024 2:06 PM   Angie Michael is a 44 year old female who presents for:    Chief Complaint   Patient presents with    Oncology Clinic Visit     Malignant neoplasm of upper-outer quadrant of right breast in female, estrogen receptor positive (H)     Initial Vitals: /69 (BP Location: Left arm, Patient Position: Sitting, Cuff Size: Adult Large)   Pulse 76   Temp 98.4  F (36.9  C) (Oral)   Resp 18   Wt 77.8 kg (171 lb 9.6 oz)   SpO2 95%   BMI 27.70 kg/m   Estimated body mass index is 27.7 kg/m  as calculated from the following:    Height as of 9/25/24: 1.676 m (5' 6\").    Weight as of this encounter: 77.8 kg (171 lb 9.6 oz). Body surface area is 1.9 meters squared.  No Pain (0) Comment: Data Unavailable   No LMP recorded.  Allergies reviewed: Yes  Medications reviewed: Yes    Medications: Medication refills not needed today.  Pharmacy name entered into [facility]:    [facility] #[id number] - [place], [place] - [address] AT [facility] OF [facility] [address] & [facility] [place], [place] - [address] [facility]  [place] [facility] - [place], [place] - [address]    Frailty Screening:   Is the patient here for a new oncology consult visit in cancer care? 2. No      Clinical concerns: no     Hemalatha Claros CMA              "

## 2024-10-14 DIAGNOSIS — Z15.01 BRCA2 GENE MUTATION POSITIVE: ICD-10-CM

## 2024-10-14 DIAGNOSIS — Z15.09 BRCA2 GENE MUTATION POSITIVE: ICD-10-CM

## 2024-10-14 DIAGNOSIS — Z17.0 MALIGNANT NEOPLASM OF UPPER-OUTER QUADRANT OF RIGHT BREAST IN FEMALE, ESTROGEN RECEPTOR POSITIVE (H): Primary | ICD-10-CM

## 2024-10-14 DIAGNOSIS — C50.411 MALIGNANT NEOPLASM OF UPPER-OUTER QUADRANT OF RIGHT BREAST IN FEMALE, ESTROGEN RECEPTOR POSITIVE (H): Primary | ICD-10-CM

## 2024-10-22 ENCOUNTER — DOCUMENTATION ONLY (OUTPATIENT)
Dept: ONCOLOGY | Facility: CLINIC | Age: 44
End: 2024-10-22
Payer: COMMERCIAL

## 2024-10-22 ENCOUNTER — LAB (OUTPATIENT)
Dept: INFUSION THERAPY | Facility: CLINIC | Age: 44
End: 2024-10-22
Attending: OBSTETRICS & GYNECOLOGY
Payer: COMMERCIAL

## 2024-10-22 ENCOUNTER — DOCUMENTATION ONLY (OUTPATIENT)
Dept: PHARMACY | Facility: CLINIC | Age: 44
End: 2024-10-22

## 2024-10-22 DIAGNOSIS — Z17.0 MALIGNANT NEOPLASM OF OVERLAPPING SITES OF BOTH BREASTS IN FEMALE, ESTROGEN RECEPTOR POSITIVE (H): ICD-10-CM

## 2024-10-22 DIAGNOSIS — C50.811 MALIGNANT NEOPLASM OF OVERLAPPING SITES OF BOTH BREASTS IN FEMALE, ESTROGEN RECEPTOR POSITIVE (H): ICD-10-CM

## 2024-10-22 DIAGNOSIS — C50.812 MALIGNANT NEOPLASM OF OVERLAPPING SITES OF BOTH BREASTS IN FEMALE, ESTROGEN RECEPTOR POSITIVE (H): ICD-10-CM

## 2024-10-22 LAB
ALBUMIN SERPL BCG-MCNC: 4.4 G/DL (ref 3.5–5.2)
ALP SERPL-CCNC: 67 U/L (ref 40–150)
ALT SERPL W P-5'-P-CCNC: 13 U/L (ref 0–50)
ANION GAP SERPL CALCULATED.3IONS-SCNC: 9 MMOL/L (ref 7–15)
AST SERPL W P-5'-P-CCNC: 19 U/L (ref 0–45)
BASOPHILS # BLD AUTO: 0 10E3/UL (ref 0–0.2)
BASOPHILS NFR BLD AUTO: 0 %
BILIRUB SERPL-MCNC: 1 MG/DL
BUN SERPL-MCNC: 14.5 MG/DL (ref 6–20)
CALCIUM SERPL-MCNC: 9.4 MG/DL (ref 8.8–10.4)
CHLORIDE SERPL-SCNC: 103 MMOL/L (ref 98–107)
CREAT SERPL-MCNC: 0.98 MG/DL (ref 0.51–0.95)
EGFRCR SERPLBLD CKD-EPI 2021: 73 ML/MIN/1.73M2
EOSINOPHIL # BLD AUTO: 0.1 10E3/UL (ref 0–0.7)
EOSINOPHIL NFR BLD AUTO: 2 %
ERYTHROCYTE [DISTWIDTH] IN BLOOD BY AUTOMATED COUNT: 15.2 % (ref 10–15)
GLUCOSE SERPL-MCNC: 113 MG/DL (ref 70–99)
HCO3 SERPL-SCNC: 28 MMOL/L (ref 22–29)
HCT VFR BLD AUTO: 37.3 % (ref 35–47)
HGB BLD-MCNC: 12.7 G/DL (ref 11.7–15.7)
IMM GRANULOCYTES # BLD: 0 10E3/UL
IMM GRANULOCYTES NFR BLD: 0 %
LYMPHOCYTES # BLD AUTO: 0.9 10E3/UL (ref 0.8–5.3)
LYMPHOCYTES NFR BLD AUTO: 21 %
MCH RBC QN AUTO: 32 PG (ref 26.5–33)
MCHC RBC AUTO-ENTMCNC: 34 G/DL (ref 31.5–36.5)
MCV RBC AUTO: 94 FL (ref 78–100)
MONOCYTES # BLD AUTO: 0.2 10E3/UL (ref 0–1.3)
MONOCYTES NFR BLD AUTO: 5 %
NEUTROPHILS # BLD AUTO: 3.2 10E3/UL (ref 1.6–8.3)
NEUTROPHILS NFR BLD AUTO: 72 %
NRBC # BLD AUTO: 0 10E3/UL
NRBC BLD AUTO-RTO: 0 /100
PLATELET # BLD AUTO: 217 10E3/UL (ref 150–450)
POTASSIUM SERPL-SCNC: 4.1 MMOL/L (ref 3.4–5.3)
PROT SERPL-MCNC: 7.3 G/DL (ref 6.4–8.3)
RBC # BLD AUTO: 3.97 10E6/UL (ref 3.8–5.2)
SODIUM SERPL-SCNC: 140 MMOL/L (ref 135–145)
WBC # BLD AUTO: 4.5 10E3/UL (ref 4–11)

## 2024-10-22 PROCEDURE — 84155 ASSAY OF PROTEIN SERUM: CPT | Performed by: INTERNAL MEDICINE

## 2024-10-22 PROCEDURE — 82947 ASSAY GLUCOSE BLOOD QUANT: CPT | Performed by: INTERNAL MEDICINE

## 2024-10-22 PROCEDURE — 36415 COLL VENOUS BLD VENIPUNCTURE: CPT

## 2024-10-22 PROCEDURE — 85014 HEMATOCRIT: CPT | Performed by: INTERNAL MEDICINE

## 2024-10-22 PROCEDURE — 85004 AUTOMATED DIFF WBC COUNT: CPT | Performed by: INTERNAL MEDICINE

## 2024-10-22 NOTE — NURSING NOTE
[facility] MRD submitted via online portal. Pathology report, facesheet and insurance card uploaded to portal.    Lab draw being done 10/22/24. Instructions provided to CMA along with requisition form and path report. To be sent via [employer] not through lab.    Adele Chowdary RN

## 2024-10-22 NOTE — PROGRESS NOTES
Oral Chemotherapy Monitoring Program  Lab Follow Up    Reviewed lab results from 10/22/24.        8/5/2024     3:00 PM 8/15/2024    10:00 AM 8/26/2024     3:00 PM 8/29/2024     3:00 PM 9/24/2024    12:00 PM 9/30/2024    10:00 AM 10/22/2024     2:00 PM   ORAL CHEMOTHERAPY   Assessment Type New Teach New Teach Refill Lab Monitoring;Chart Review Lab Monitoring;Chart Review Refill Lab Monitoring   Diagnosis Code Breast Cancer Breast Cancer Breast Cancer Breast Cancer Breast Cancer Breast Cancer Breast Cancer   Providers Dr. Sy Dan   Clinic Name/Location [facility]   Drug Name Lynparza (olaparib) Lynparza (olaparib) Lynparza (olaparib) Lynparza (olaparib) Lynparza (olaparib) Lynparza (olaparib) Lynparza (olaparib)   Dose 300 mg 300 mg 300 mg 300 mg 300 mg 300 mg 300 mg   Current Schedule BID BID BID BID BID BID BID   Cycle Details Continuous Continuous Continuous Continuous Continuous Continuous Continuous   Start Date of Last Cycle  8/8/2024 8/8/2024 9/7/2024     Planned next cycle start date 8/8/2024 9/7/2024   10/7/2024     Doses missed in last 2 weeks  0 0       Adherence Assessment  Adherent  Adherent      Adverse Effects  No AE identified during assessment  No AE identified during assessment No AE identified during assessment  Increased Creatinine   Increased Creatinine       Grade 1   Pharmacist intervention(Increased creatinine)       No   Any new drug interactions? Yes No No No      Pharmacist Intervention? Yes         Intervention(s) Drug changed (non-chemo)         Is the dose as ordered appropriate for the patient? Yes  Yes Yes Yes         Labs:  _  Result Component Current Result Ref Range   Sodium 140 (10/22/2024) 135 - 145 mmol/L     _  Result Component Current Result Ref Range   Potassium 4.1 (10/22/2024) 3.4 - 5.3 mmol/L     _  Result Component Current Result Ref Range   Calcium 9.4  (10/22/2024) 8.8 - 10.4 mg/dL     No results found for Mag within last 30 days.     No results found for Phos within last 30 days.     _  Result Component Current Result Ref Range   Albumin 4.4 (10/22/2024) 3.5 - 5.2 g/dL     _  Result Component Current Result Ref Range   Urea Nitrogen 14.5 (10/22/2024) 6.0 - 20.0 mg/dL     _  Result Component Current Result Ref Range   Creatinine 0.98 (H) (10/22/2024) 0.51 - 0.95 mg/dL     _  Result Component Current Result Ref Range   AST 19 (10/22/2024) 0 - 45 U/L     _  Result Component Current Result Ref Range   ALT 13 (10/22/2024) 0 - 50 U/L     _  Result Component Current Result Ref Range   Bilirubin Total 1.0 (10/22/2024) <=1.2 mg/dL     _  Result Component Current Result Ref Range   WBC Count 4.5 (10/22/2024) 4.0 - 11.0 10e3/uL     _  Result Component Current Result Ref Range   Hemoglobin 12.7 (10/22/2024) 11.7 - 15.7 g/dL     _  Result Component Current Result Ref Range   Platelet Count 217 (10/22/2024) 150 - 450 10e3/uL     _  Result Component Current Result Ref Range   Absolute Neutrophils 3.2 (10/22/2024) 1.6 - 8.3 10e3/uL        Estimated Creatinine Clearance: 75.5 mL/min (A) (based on SCr of 0.98 mg/dL (H)).      Assessment & Plan:  No concerning abnormalities. Serum creatinine is slightly increased but does not require any dose adjustments. Continue to monitor. Ok to proceed with olaparib.    Questions answered to patient's satisfaction.    Follow-Up:  4 weeks with labs.    Rachel Moniz, PharmD  Hematology/Oncology Pharmacist  [facility]  [phone]

## 2024-11-07 ENCOUNTER — LAB REQUISITION (OUTPATIENT)
Dept: LAB | Facility: CLINIC | Age: 44
End: 2024-11-07
Payer: COMMERCIAL

## 2024-11-14 DIAGNOSIS — Z15.01 BRCA2 GENE MUTATION POSITIVE: ICD-10-CM

## 2024-11-14 DIAGNOSIS — Z17.0 MALIGNANT NEOPLASM OF UPPER-OUTER QUADRANT OF RIGHT BREAST IN FEMALE, ESTROGEN RECEPTOR POSITIVE (H): Primary | ICD-10-CM

## 2024-11-14 DIAGNOSIS — C50.411 MALIGNANT NEOPLASM OF UPPER-OUTER QUADRANT OF RIGHT BREAST IN FEMALE, ESTROGEN RECEPTOR POSITIVE (H): Primary | ICD-10-CM

## 2024-11-14 DIAGNOSIS — Z15.09 BRCA2 GENE MUTATION POSITIVE: ICD-10-CM

## 2024-11-18 DIAGNOSIS — Z15.09 BRCA2 GENE MUTATION POSITIVE: ICD-10-CM

## 2024-11-18 DIAGNOSIS — Z17.0 MALIGNANT NEOPLASM OF UPPER-OUTER QUADRANT OF RIGHT BREAST IN FEMALE, ESTROGEN RECEPTOR POSITIVE (H): Primary | ICD-10-CM

## 2024-11-18 DIAGNOSIS — C50.411 MALIGNANT NEOPLASM OF UPPER-OUTER QUADRANT OF RIGHT BREAST IN FEMALE, ESTROGEN RECEPTOR POSITIVE (H): Primary | ICD-10-CM

## 2024-11-18 DIAGNOSIS — Z15.01 BRCA2 GENE MUTATION POSITIVE: ICD-10-CM

## 2024-11-21 ENCOUNTER — DOCUMENTATION ONLY (OUTPATIENT)
Dept: PHARMACY | Facility: CLINIC | Age: 44
End: 2024-11-21
Payer: COMMERCIAL

## 2024-11-21 NOTE — PROGRESS NOTES
Oral Chemotherapy Monitoring Program  Lab Follow Up    Reviewed lab results from 11/20/24.        8/26/2024     3:00 PM 8/29/2024     3:00 PM 9/24/2024    12:00 PM 9/30/2024    10:00 AM 10/22/2024     2:00 PM 11/18/2024    11:00 AM 11/21/2024     6:00 AM   ORAL CHEMOTHERAPY   Assessment Type Refill Lab Monitoring;Chart Review Lab Monitoring;Chart Review Refill Lab Monitoring Refill Lab Monitoring   Diagnosis Code Breast Cancer Breast Cancer Breast Cancer Breast Cancer Breast Cancer Breast Cancer Breast Cancer   Providers Dr. Sy Dan   Clinic Name/Location [facility]   Drug Name Lynparza (olaparib) Lynparza (olaparib) Lynparza (olaparib) Lynparza (olaparib) Lynparza (olaparib) Lynparza (olaparib) Lynparza (olaparib)   Dose 300 mg 300 mg 300 mg 300 mg 300 mg 300 mg 300 mg   Current Schedule BID BID BID BID BID BID BID   Cycle Details Continuous Continuous Continuous Continuous Continuous Continuous Continuous   Start Date of Last Cycle  8/8/2024 9/7/2024       Planned next cycle start date   10/7/2024       Doses missed in last 2 weeks 0         Adherence Assessment  Adherent        Adverse Effects  No AE identified during assessment No AE identified during assessment  Increased Creatinine     Increased Creatinine     Grade 1     Pharmacist intervention(Increased creatinine)     No     Any new drug interactions? No No        Is the dose as ordered appropriate for the patient? Yes Yes Yes           Labs:  _  Result Component Current Result Ref Range   Sodium 140 (10/22/2024) 135 - 145 mmol/L     _  Result Component Current Result Ref Range   Potassium 4.1 (10/22/2024) 3.4 - 5.3 mmol/L     _  Result Component Current Result Ref Range   Calcium 9.4 (10/22/2024) 8.8 - 10.4 mg/dL     No results found for Mag within last 30 days.     No results found for Phos within last 30 days.     _  Result Component  Current Result Ref Range   Albumin 4.4 (10/22/2024) 3.5 - 5.2 g/dL     _  Result Component Current Result Ref Range   Urea Nitrogen 14.5 (10/22/2024) 6.0 - 20.0 mg/dL     _  Result Component Current Result Ref Range   Creatinine 0.98 (H) (10/22/2024) 0.51 - 0.95 mg/dL     _  Result Component Current Result Ref Range   AST 19 (10/22/2024) 0 - 45 U/L     _  Result Component Current Result Ref Range   ALT 13 (10/22/2024) 0 - 50 U/L     _  Result Component Current Result Ref Range   Bilirubin Total 1.0 (10/22/2024) <=1.2 mg/dL     _  Result Component Current Result Ref Range   WBC Count 4.5 (10/22/2024) 4.0 - 11.0 10e3/uL     _  Result Component Current Result Ref Range   Hemoglobin 12.7 (10/22/2024) 11.7 - 15.7 g/dL     _  Result Component Current Result Ref Range   Platelet Count 217 (10/22/2024) 150 - 450 10e3/uL     No results found for ANC within last 30 days.     _  Result Component Current Result Ref Range   Absolute Neutrophils 3.2 (10/22/2024) 1.6 - 8.3 10e3/uL        Assessment & Plan:  No concerning abnormalities. Proceed with olaparib. Repeat labs in one month.     Questions answered to patient's satisfaction.    Follow-Up:  1 month with labs.    Savannah Caro PharmD  November 21, 2024

## 2024-11-26 ENCOUNTER — OFFICE VISIT (OUTPATIENT)
Dept: SURGERY | Facility: CLINIC | Age: 44
End: 2024-11-26
Payer: COMMERCIAL

## 2024-11-26 VITALS
OXYGEN SATURATION: 99 % | SYSTOLIC BLOOD PRESSURE: 92 MMHG | HEART RATE: 64 BPM | DIASTOLIC BLOOD PRESSURE: 64 MMHG | WEIGHT: 171 LBS | HEIGHT: 66 IN | BODY MASS INDEX: 27.48 KG/M2

## 2024-11-26 DIAGNOSIS — Z90.13 STATUS POST BILATERAL MASTECTOMY: Primary | ICD-10-CM

## 2024-11-26 PROCEDURE — 99213 OFFICE O/P EST LOW 20 MIN: CPT | Performed by: SURGERY

## 2024-11-27 NOTE — PROGRESS NOTES
[facility] Follow Up Note    CHIEF COMPLAINT:  History of bilateral breast cancer    HISTORY OF PRESENT ILLNESS:  Angie Michael is a 44 year old female who is seen in follow up for history of bilateral breast cancer.    She is status post bilateral skin sparing mastectomies with bilateral sentinel node biopsy and excision of tag localized for nodes and immediate reconstruction with Dr. Yee on 5/8/2024.  Her final pathology revealed no residual carcinoma in the right breast and negative nodes.  The left breast had multifocal residual invasive ductal carcinoma, grade 2 measuring 10 mm, 6 mm and 1.4 mm with extensive surrounding DCIS.  All margins were negative.  Left axillary lymph nodes were negative.     She had adjuvant radiation on the left with Dr. Cortes.  This went very well and she has had minimal skin changes.  She is on blockade under the care of Dr. Dan.  She had a PET/CT on 9/9/2024 which revealed no convincing evidence of malignancy postsurgical changes were noted.  Subcentimeter right axillary lymph nodes are favored to be reactive.  Focal FDG uptake in the mid tibia is favored to be benign and has been followed on prior imaging and has not changed.  She was noted to have persistent uptake in a thyroid nodule and thyroid ultrasound was recommended and she had previously had this done in November 2023.    She reports she is generally doing well.  She has her expanders in place and is tolerating them fine.  She does feel tight.  She worked with [employer] after surgery and is trying to get back in the habit of doing her daily stretching and exercises.  She is worried about ongoing follow-up and options and would like another opinion about this.      Past Medical History:   Diagnosis Date    BRCA2 gene mutation positive 04/16/2024    History of blood transfusion     Malignant neoplasm of overlapping sites of left breast in female, estrogen receptor positive (H) 10/10/2023        Past Surgical History:   Procedure Laterality Date    BIOPSY, BREAST, WITH RADIOFREQUENCY IDENTIFICATION Bilateral 2024    Procedure: bilateral tag localized node excision;  Surgeon: Fanny Little MD;  Location:  OR     SECTION      DILATION AND CURETTAGE      retained placenta post-partum 2 weeks    ENT SURGERY      tonsillectomy    EXCISE CYST GENERIC (LOCATION) Left 2024    Procedure: Excision Left Axillary Cyst;  Surgeon: Fanny Little MD;  Location:  OR    GYN SURGERY      egg retrieval for IVF    INSERT PORT VASCULAR ACCESS Right 2023    Procedure: INSERTION, VASCULAR ACCESS PORT;  Surgeon: Fanny Little MD;  Location:  OR    KNEE SURGERY Left     LAPAROSCOPIC HYSTERECTOMY TOTAL, BILATERAL SALPINGO-OOPHORECTOMY, NODE DISSECTION, COMBINED Bilateral 2024    Procedure: laparoscopic removal of uterus, cervix, both ovaries, cystoscopy-BILATERAL;  Surgeon: Hiral Davis MD;  Location:  OR    MASTECTOMY SIMPLE BILATERAL, SENTINEL NODE BILATERAL, COMBINED Bilateral 2024    Procedure: Bilateral skin sparing mastectomies with bilateral sentinel lymph node biopsy;  Surgeon: Fanny Little MD;  Location:  OR    RECONSTRUCT BREAST, INSERT TISSUE EXPANDER BILATERAL, COMBINED Bilateral 2024    Procedure: BILATERAL FIRST STAGE BREAST RECONSTRUCTION WITH TISSUE EXPANDERS AND ACELLULAR DERMAL MATRIX;  Surgeon: Vasiliy Yee MD;  Location:  OR    REMOVE PORT VASCULAR ACCESS N/A 2024    Procedure: port removal;  Surgeon: Fanny Little MD;  Location:  OR       Family History   Problem Relation Age of Onset    Hyperlipidemia Mother     Other Cancer Father         Leukemia    Breast Cancer Other        Social History     Tobacco Use    Smoking status: Never     Passive exposure: Never    Smokeless tobacco: Never   Substance Use Topics    Alcohol use: Not Currently     Comment: rarely       Patient Active Problem List  "  Diagnosis    Malignant neoplasm of upper-outer quadrant of right breast in female, estrogen receptor positive (H)    BRCA2 gene mutation positive    Iron deficiency anemia    Malignant neoplasm of overlapping sites of both breasts in female, estrogen receptor positive (H)    Absence of both breasts    Surgery follow-up examination     Allergies   Allergen Reactions    Aprepitant Difficulty breathing and Shortness Of Breath    Fosaprepitant Unknown     Allergy added per chart review. [facility]    Armoracia Rusticana Ext (Horseradish) Hives    Carboprost GI Disturbance     Diarrhea    Codeine Nausea and Vomiting     Current Outpatient Medications   Medication Sig Dispense Refill    anastrozole (ARIMIDEX) 1 MG tablet Take 1 tablet (1 mg) by mouth daily 90 tablet 3    [START ON 12/6/2024] olaparib (LYNPARZA) 150 MG tablet Take 2 tablets (300 mg) by mouth 2 times daily 120 tablet 0     Vitals: BP 92/64   Pulse 64   Ht 1.676 m (5' 6\")   Wt 77.6 kg (171 lb)   SpO2 99%   BMI 27.60 kg/m    BMI= Body mass index is 27.6 kg/m .    EXAM:  GENERAL: healthy, alert and no distress   BREAST:'s are surgically absent.  Well-healed transverse mastectomy scars.  Extremely mild radiation changes on the left chest.  Initially I could not tell which side had radiation.  There are no masses palpable on her chest wall.  Very minimal tightness in the left axilla.  No cording palpable on exam today.  There is no axillary or supraclavicular lymphadenopathy.  CARDIOVASCULAR:  RRR  RESPIRATORY: nonlabored breathing  NECK: Neck supple. No adenopathy. Thyroid symmetric, normal size  SKIN: No suspicious lesions or rashes  LYMPH: Normal cervical lymph nodes      ASSESSMENT/PLAN:  Angie Michael is approximately 6 months status post bilateral mastectomies with bilateral sentinel lymph node biopsies and adjuvant left chest wall radiation following neoadjuvant chemotherapy and now adjuvant hormone blockade and Lynparza.  Her clinical chest " wall exam is benign.  There are no areas of concern.  She is scheduled to have implant placement potentially in May 2025 with Dr. Lala.  I would like to see her back for chest wall exam approximately 2 months after her surgery.  We discussed typical follow-up after bilateral mastectomies from an imaging perspective and that I ultimately defer to medical oncology to guide her on this.  She is interested in another opinion about options for ongoing follow-up and would like to see Dr. Hicks.  I did send Dr. Hicks message to help coordinate this.  She will call me with any questions or concerns in the interim.        20 minutes total time spent on the date of this encounter doing: chart review, review of test results, patient visit, physical exam, education, counseling, developing plan of care, and documenting.      Fanny Little MD  [facility]  [phone]        Please route or send letter to:  Primary Care Provider (PCP) and Referring Provider

## 2024-12-06 ENCOUNTER — VIRTUAL VISIT (OUTPATIENT)
Dept: ONCOLOGY | Facility: CLINIC | Age: 44
End: 2024-12-06
Attending: INTERNAL MEDICINE
Payer: COMMERCIAL

## 2024-12-06 VITALS — WEIGHT: 171 LBS | HEIGHT: 66 IN | BODY MASS INDEX: 27.48 KG/M2

## 2024-12-06 DIAGNOSIS — Z17.0 MALIGNANT NEOPLASM OF UPPER-OUTER QUADRANT OF RIGHT BREAST IN FEMALE, ESTROGEN RECEPTOR POSITIVE (H): Primary | ICD-10-CM

## 2024-12-06 DIAGNOSIS — C50.411 MALIGNANT NEOPLASM OF UPPER-OUTER QUADRANT OF RIGHT BREAST IN FEMALE, ESTROGEN RECEPTOR POSITIVE (H): Primary | ICD-10-CM

## 2024-12-06 ASSESSMENT — PAIN SCALES - GENERAL: PAINLEVEL_OUTOF10: NO PAIN (0)

## 2024-12-06 NOTE — LETTER
12/6/2024      Angie Michael  [address] [place] [id number]      Dear Colleague,    Thank you for referring your patient, Angie Michael, to the [facility]. Please see a copy of my visit note below.      Is the patient currently in the state of [place]? YES    Visit mode:VIDEO    If the visit is dropped, the patient can be reconnected by:VIDEO VISIT: Send to e-mail at: [email or web address]    Will anyone else be joining the visit? NO  (If patient encounters technical issues they should call [phone]    Are changes needed to the allergy or medication list? No    Are refills needed on medications prescribed by this physician? NO    Rooming Documentation:  Questionnaire(s) completed    Reason for visit: Video Visit (Follow Up )    Delfina MAST       Virtual Visit Details    Type of service:  Video Visit     Originating Location (pt. Location): Home    Distant Location (provider location):  On-site  Platform used for Video Visit: [facility]    Hematology/Oncology Established Patient Note      Today's Date: 12/6/2024    Reason for visit: Bilateral breast cancer, BRCA-2 mutation positive.    HISTORY OF PRESENT ILLNESS: Angie Michael is a 44 year old female with BRCA2 gene mutation who presents with the following oncologic history:  1.  10/05/2023: High risk surveillance breast MRI showed right breast 3.6 x 3 x 3.2 cm irregular enhancing mass at 10:00, 0.9 x 0.8 x 0.9 cm second mass at 9:00, 1 x 0.9 x 1.2 cm third mass at 9:30, other suspicious foci of enhancement throughout the breast.  Left breast with 9.9 x 5.2 x 6.7 cm suspicious non-mass enhancement at 10:00-7:00; 1.6 x 1.3 x 1.7 cm enhancing mass at 4:00; 1.8 x 1.6 x 1.9 cm enhancing mass at 12:30; other suspicious foci of enhancement throughout the left breast.  One level 1 suspicious right axillary lymph node and one level 1 suspicious left axillary lymph node..  Developing suspicious  interpectoral lymph node.  2.  10/12/2023: Bilateral diagnostic mammogram showed right breast with 3.2 x 2.7 x 4.1 cm irregular mass at 10:00, 6 cm from nipple; 0.9 x 0.6 x 1 cm irregular mass at 11:00, 2 cm from nipple and prominent right axillary lymph node.  Left breast with 1.4 x 1.1 x 1.7 cm mass at 12:00, 2 cm from nipple, 1.8 x 1 x 1.6 cm mass at 3:30, 4 cm from nipple, 1 x 0.9 x 1.1 cm mass at 3:00, 7 cm from nipple, multiple areas of hypoechogenicity with calcifications in the lateral left breast, and prominent left axillary lymph node.  3.  10/12/2023: Biopsy of right breast at 10:00 showed grade 3 invasive lobular carcinoma, ER strongly positive at 81-90%, MD positive at 31-40%, HER2 equivocal with IHC = 2+, FISH negative, Ki-67 = 70%.  Biopsy of right axillary lymph node showed metastatic carcinoma.  Biopsy of left breast at 3:30 showed grade 2 invasive carcinoma with ductal and lobular features, ER positive at %, MD positive at 2%, HER2 equivocal with IHC = 2+, FISH negative, Ki-67 = 26%.  Biopsy of left axillary lymph node showed metastatic carcinoma.  4.  10/17/2023: PET/CT scan showed cluster of hypermetabolic right breast nodules with SUV max 14.9; 2.1 x 1 cm hypermetabolic level 1 right axillary lymph node with SUV max 5.5; hypermetabolic left breast nodule with SUV max 18.8 and several additional less intense hypermetabolic left breast nodules; 1.5 x 1.3 cm hypermetabolic level 1 left axillary lymph node with SUV max 4.  Small sclerotic focus of metabolic activity within the proximal tibial shaft with SUV max 3.7, indeterminate but unlikely to reflect metastatic disease.  Subcentimeter hypermetabolic focus along anterior margin of the left thyroid lobe, likely a thyroid nodule.  5.  11/03/2023: Thyroid ultrasound showed 0.7 x 0.6 x 0.5 cm solid hypoechoic nodule at the junction of the isthmus and left hemithyroid corresponding to increased uptake on PET/CT, consistent with TIRADS 4 nodule  and subcentimeter TIRADS 3 nodule in the left inferior thyroid.  6. 11/3/2023: MR right tibia/fibula showed discrete focal 3 cm area of increased T2 signal and enhancement in mid shaft of right tibia with fat signal interspersed throughout this area with significant signal dropout on opposed phase imaging, favoring benign process.  7. 11/21/2023: Started neoadjuvant chemotherapy with dose-dense Adriamycin + Cytoxan with Neulasta support with Dr. Bev Ryan at [facility]. Reacted to [facility].  8. 11/25/2023: Evaluated at [facility] ED with diarrhea and dizziness, latter attributed to olanzapine -- this was discontinued.  9. 1/03/2024: Completed cycle 4 dose-dense AC.  10. 1/16/2024: Breast MRI showed persistent abnormal mass and nonmass enhancement in the left breast measuring 10.2 cm in AP dimension (unchanged).  Previously seen mass at the 12:30 position now measures 1.0 cm in greatest dimension (previously 1.9 cm). Previously seen seen mass at the 4:00 position now measures 1.1 cm in greatest dimension (previously 1.7 cm). Previously seen enlarged left axillary lymph node now has normal size and morphology. Previously seen intrapectoral lymph node now has normal size and morphology. Previously seen abnormal right breast enhancement has entirely resolved. Previously visualized enlarged right axillary lymph node now has normal size and morphology.  Other previously seen axillary lymph nodes appear stable.  11. 1/17/2024: Start of weekly paclitaxel for planned 12 weeks.  12. 2/9/2024: MR right tibia/fibula showed mid right tibial shaft bone lesion increased from 3.2 cm to 3.8 cm; speckled foci of marrow signal likely areas of red marrow reconversion.  13. 4/03/2024: Completion of cycle 12 paclitaxel.  14. 4/5/2024: MR right tibia/fibula showed stable in size and improved signal alteration of right tibial shaft focal marrow alteration, likely red marrow.  15. 5/08/2024: Bilateral mastectomies with bilateral  axillary sentinel lymph node excision under c/o Dr. Fanny Little.  Pathology showed left breast with grade 2 multifocal invasive ductal carcinoma measuring 10 mm, 6 mm, 1.4 mm; extensive grade 3 DCIS (22 of 37 blocks), negative margins; treatment related changes present; 2 left axillary nodes negative, [id number]. Right breast with no residual invasive carcinoma; LCIS present; treatment related changes, 4 right axillary sentinel lymph nodes present, ypT0-N0.  16. 6/04/2024: Started Zoladex and anastrozole (latter on 6/11/2024).  17. 7/2/2024-8/7/2024: Adjuvant radiation therapy to left chest wall and regional nodes.  18.  8/8/2024: Started adjuvant olaparib 300 mg twice daily.  19. 9/9/2024: PET/CT scan showed no evidence of FDG avid malignancy; subcentimeter mildly FDG avid right axillary lymph node favored reactive; stable mild focal FDG uptake of the right mid tibial diaphysis favored benign; persistent FDG uptake within subcentimeter left isthmus thyroid nodule.   20. 9/25/2024: Laparoscopic resection of uterus, cervix, bilateral ovaries under care of Dr. Hiral Bello. Pathology negative     INTERVAL HISTORY:  Angie is here for second opinion.  Overall she is not doing that great on the Verzenio has issues with diarrhea and extreme fatigue.      REVIEW OF SYSTEMS:   14 point ROS was reviewed and is negative other than as noted above in HPI.       HOME MEDICATIONS:  Current Outpatient Medications   Medication Sig Dispense Refill     anastrozole (ARIMIDEX) 1 MG tablet Take 1 tablet (1 mg) by mouth daily 90 tablet 3     olaparib (LYNPARZA) 150 MG tablet Take 2 tablets (300 mg) by mouth 2 times daily 120 tablet 0         ALLERGIES:  Allergies   Allergen Reactions     Aprepitant Difficulty breathing and Shortness Of Breath     Fosaprepitant Unknown     Allergy added per chart review. [facility]     Armaria Lopezticana Ext (Horseradish) Hives     Carboprost GI Disturbance     Diarrhea     Codeine Nausea  and Vomiting         PAST MEDICAL HISTORY:  BRCA2 gene mutation.  Bilateral breast cancer as outlined above.  2022 Galleri test - negative for malignancy.    Gynecologic history:  Age of menarche at 15.  3 children/triplets -- 1 boy and twin girls born 2022.  Age of first pregnancy at 41.  Used 10 years of OCPs no HRT, IVF for fertility treatment.      PAST SURGICAL HISTORY:  Past Surgical History:   Procedure Laterality Date     BIOPSY, BREAST, WITH RADIOFREQUENCY IDENTIFICATION Bilateral 2024    Procedure: bilateral tag localized node excision;  Surgeon: Fanny Little MD;  Location:  OR      SECTION       DILATION AND CURETTAGE      retained placenta post-partum 2 weeks     ENT SURGERY      tonsillectomy     EXCISE CYST GENERIC (LOCATION) Left 2024    Procedure: Excision Left Axillary Cyst;  Surgeon: Fanny Little MD;  Location:  OR     GYN SURGERY      egg retrieval for IVF     INSERT PORT VASCULAR ACCESS Right 2023    Procedure: INSERTION, VASCULAR ACCESS PORT;  Surgeon: Fanny Little MD;  Location:  OR     KNEE SURGERY Left      LAPAROSCOPIC HYSTERECTOMY TOTAL, BILATERAL SALPINGO-OOPHORECTOMY, NODE DISSECTION, COMBINED Bilateral 2024    Procedure: laparoscopic removal of uterus, cervix, both ovaries, cystoscopy-BILATERAL;  Surgeon: Hiral Davis MD;  Location:  OR     MASTECTOMY SIMPLE BILATERAL, SENTINEL NODE BILATERAL, COMBINED Bilateral 2024    Procedure: Bilateral skin sparing mastectomies with bilateral sentinel lymph node biopsy;  Surgeon: Fanny Little MD;  Location:  OR     RECONSTRUCT BREAST, INSERT TISSUE EXPANDER BILATERAL, COMBINED Bilateral 2024    Procedure: BILATERAL FIRST STAGE BREAST RECONSTRUCTION WITH TISSUE EXPANDERS AND ACELLULAR DERMAL MATRIX;  Surgeon: Vasiliy Yee MD;  Location:  OR     REMOVE PORT VASCULAR ACCESS N/A 2024    Procedure: port removal;  Surgeon: Fanny Little MD;   Location:  OR         SOCIAL HISTORY:  Social History     Socioeconomic History     Marital status:      Spouse name: Not on file     Number of children: Not on file     Years of education: Not on file     Highest education level: Not on file   Occupational History     Not on file   Tobacco Use     Smoking status: Never     Passive exposure: Never     Smokeless tobacco: Never   Vaping Use     Vaping status: Never Used   Substance and Sexual Activity     Alcohol use: Not Currently     Comment: rarely     Drug use: Not Currently     Sexual activity: Not on file   Other Topics Concern     Parent/sibling w/ CABG, MI or angioplasty before 65F 55M? No   Social History Narrative     Not on file     Social Drivers of Health     Financial Resource Strain: Low Risk  (4/9/2024)    Financial Resource Strain      Within the past 12 months, have you or your family members you live with been unable to get utilities (heat, electricity) when it was really needed?: No   Food Insecurity: Low Risk  (4/9/2024)    Food Insecurity      Within the past 12 months, did you worry that your food would run out before you got money to buy more?: No      Within the past 12 months, did the food you bought just not last and you didn t have money to get more?: No   Transportation Needs: Low Risk  (4/9/2024)    Transportation Needs      Within the past 12 months, has lack of transportation kept you from medical appointments, getting your medicines, non-medical meetings or appointments, work, or from getting things that you need?: No   Physical Activity: Insufficiently Active (11/2/2023)    [facility]    Exercise Vital Sign      Days of Exercise per Week: 4 days      Minutes of Exercise per Session: 30 min   Stress: Not on file   Social Connections: Unknown (12/22/2021)    Received from [facility], [facility] [employer] [facility]    Social Connections      Frequency of  "Communication with Friends and Family: Not on file   Interpersonal Safety: Low Risk  (2024)    Interpersonal Safety      Do you feel physically and emotionally safe where you currently live?: Yes      Within the past 12 months, have you been hit, slapped, kicked or otherwise physically hurt by someone?: No      Within the past 12 months, have you been humiliated or emotionally abused in other ways by your partner or ex-partner?: No   Housing Stability: Low Risk  (2024)    Housing Stability      Do you have housing? : Yes      Are you worried about losing your housing?: No         FAMILY HISTORY:  Paternal grandmother and many paternal grandfathers with breast cancer; cousin with DCIS tested positive for BRCA1.  No ovarian, colon, or prostate cancer.      PHYSICAL EXAM:  Vital signs:  Ht 1.676 m (5' 6\")   Wt 77.6 kg (171 lb)   BMI 27.60 kg/m     ECO  GENERAL: No acute distress.  EYES: No scleral icterus. No overt erythema.  LYMPH: No palpable lymphadenopathy in the cervical, supraclavicular, or axillary regions.  Bilateral breasts are surgically absent with expanders in place and no fluid or masses or skin erythema.  RESPIRATORY: No audible cough, wheezing, or labored breathing.  MUSCULOSKELETAL: Range of motion in the neck, shoulders, and arms appear normal.  SKIN: No overt rashes, discolorations, or lesions over the face and neck.  NEUROLOGIC: Alert.  No overt tremors.            ASSESSMENT/PLAN:     Angie is a very pleasant 44-year-old female who presented initially with stage IIb grade 3 invasive lobular carcinoma ER/AR positive HER2/markus negative Ki-67 70%, BRCA2 positive      Status post neoadjuvant chemotherapy with dose dense AC followed by Taxol and bilateral mastectomies with bilateral axillary lymph node excision on May 2024    She had a complete response in the right breast and excellent partial response in her left breast with no residual cancer in the lymph nodes.  Status post radiation " therapy now doing adjuvant therapy with olaparib    After detailed discussion I do not think the patient is tolerating the higher dose well with significant amount of GI upset and fatigue.  I did discuss with her that she should have a discussion with Dr. Dan about dose reduction which she is extremely effective.  Also discussed with the patient that based on the ELIZABETH trial my recommendation would be to think about doing Ribociclib for 3 years instead of Verzenio as she has some underlying GI issues now and Verzenio appears to be a more difficult drug to tolerate then Ribociclib based on my clinical experience.    Also discussed with the patient that several patients had dose reductions with continued excellent response therefore I think it is reasonable to have that discussion if she is not tolerating AI with CDK 4 inhibitors upfront with close follow-up.    Angie is concerned about imaging and I discussed with the patient that per NCCN guidelines we do not generally do routine imaging and also against NCCN guidelines I do cancer markers in my practice which I have found to be helpful but again she is getting excellent care from Dr. Dan and most oncologist do not follow these markers.  I did discuss with the patient that anecdotally I have had better luck with getting markers and finding oligometastatic disease therefore have treated those patients aggressively over the years.  Discussed with her that is personally my preference and it is not on the guidelines again.    Patient is emotionally struggling with his high dosing and her quality of life.  Have encouraged her to discuss this further with Dr. Dan.  Per her request I will have my nurse follow-up with her.  I think she needs treatment of some underlying anxiety related to the risk of recurrence which I think after she completes all therapy is very low    I have encouraged her to discuss this further with her oncologist.  She has had excellent care  thus far and she will return back to Dr. Dan with further consideration of dose reductions on the olaparib    We also discussed the bone lesion in the right tibia that she had and I reviewed the recent MRI along with previous imaging and to me it appears benign and I would recommend continued surveillance of this in a delayed fashion maybe in 6 to 8 months getting another MRI to make sure it is stable.        She will return to us as needed.  I wish her well      All questions answered to her satisfaction      Total time spent on day of visit, including review of tests, obtaining/reviewing separately obtained history, ordering medications/tests/procedures, communicating with PCP/consultants, and documenting in electronic medical record: 75 minutes       Again, thank you for allowing me to participate in the care of your patient.        Sincerely,        Flynn Hicks MD

## 2024-12-06 NOTE — LETTER
12/6/2024      Angie Michael  [address] [place] [id number]      Dear Colleague,    Thank you for referring your patient, Angie Michael, to the [facility]. Please see a copy of my visit note below.      Is the patient currently in the state of [place]? YES    Visit mode:VIDEO    If the visit is dropped, the patient can be reconnected by:VIDEO VISIT: Send to e-mail at: [email or web address]    Will anyone else be joining the visit? NO  (If patient encounters technical issues they should call [phone]    Are changes needed to the allergy or medication list? No    Are refills needed on medications prescribed by this physician? NO    Rooming Documentation:  Questionnaire(s) completed    Reason for visit: Video Visit (Follow Up )    Delfina MAST       Virtual Visit Details    Type of service:  Video Visit     Originating Location (pt. Location): Home    Distant Location (provider location):  On-site  Platform used for Video Visit: [facility]    Hematology/Oncology Established Patient Note      Today's Date: 12/6/2024    Reason for visit: Bilateral breast cancer, BRCA-2 mutation positive.    HISTORY OF PRESENT ILLNESS: Angie Michael is a 44 year old female with BRCA2 gene mutation who presents with the following oncologic history:  1.  10/05/2023: High risk surveillance breast MRI showed right breast 3.6 x 3 x 3.2 cm irregular enhancing mass at 10:00, 0.9 x 0.8 x 0.9 cm second mass at 9:00, 1 x 0.9 x 1.2 cm third mass at 9:30, other suspicious foci of enhancement throughout the breast.  Left breast with 9.9 x 5.2 x 6.7 cm suspicious non-mass enhancement at 10:00-7:00; 1.6 x 1.3 x 1.7 cm enhancing mass at 4:00; 1.8 x 1.6 x 1.9 cm enhancing mass at 12:30; other suspicious foci of enhancement throughout the left breast.  One level 1 suspicious right axillary lymph node and one level 1 suspicious left axillary lymph node..  Developing suspicious  interpectoral lymph node.  2.  10/12/2023: Bilateral diagnostic mammogram showed right breast with 3.2 x 2.7 x 4.1 cm irregular mass at 10:00, 6 cm from nipple; 0.9 x 0.6 x 1 cm irregular mass at 11:00, 2 cm from nipple and prominent right axillary lymph node.  Left breast with 1.4 x 1.1 x 1.7 cm mass at 12:00, 2 cm from nipple, 1.8 x 1 x 1.6 cm mass at 3:30, 4 cm from nipple, 1 x 0.9 x 1.1 cm mass at 3:00, 7 cm from nipple, multiple areas of hypoechogenicity with calcifications in the lateral left breast, and prominent left axillary lymph node.  3.  10/12/2023: Biopsy of right breast at 10:00 showed grade 3 invasive lobular carcinoma, ER strongly positive at 81-90%, AR positive at 31-40%, HER2 equivocal with IHC = 2+, FISH negative, Ki-67 = 70%.  Biopsy of right axillary lymph node showed metastatic carcinoma.  Biopsy of left breast at 3:30 showed grade 2 invasive carcinoma with ductal and lobular features, ER positive at %, AR positive at 2%, HER2 equivocal with IHC = 2+, FISH negative, Ki-67 = 26%.  Biopsy of left axillary lymph node showed metastatic carcinoma.  4.  10/17/2023: PET/CT scan showed cluster of hypermetabolic right breast nodules with SUV max 14.9; 2.1 x 1 cm hypermetabolic level 1 right axillary lymph node with SUV max 5.5; hypermetabolic left breast nodule with SUV max 18.8 and several additional less intense hypermetabolic left breast nodules; 1.5 x 1.3 cm hypermetabolic level 1 left axillary lymph node with SUV max 4.  Small sclerotic focus of metabolic activity within the proximal tibial shaft with SUV max 3.7, indeterminate but unlikely to reflect metastatic disease.  Subcentimeter hypermetabolic focus along anterior margin of the left thyroid lobe, likely a thyroid nodule.  5.  11/03/2023: Thyroid ultrasound showed 0.7 x 0.6 x 0.5 cm solid hypoechoic nodule at the junction of the isthmus and left hemithyroid corresponding to increased uptake on PET/CT, consistent with TIRADS 4 nodule  and subcentimeter TIRADS 3 nodule in the left inferior thyroid.  6. 11/3/2023: MR right tibia/fibula showed discrete focal 3 cm area of increased T2 signal and enhancement in mid shaft of right tibia with fat signal interspersed throughout this area with significant signal dropout on opposed phase imaging, favoring benign process.  7. 11/21/2023: Started neoadjuvant chemotherapy with dose-dense Adriamycin + Cytoxan with Neulasta support with Dr. Bev Ryan at [facility]. Reacted to [place].  8. 11/25/2023: Evaluated at [facility] ED with diarrhea and dizziness, latter attributed to olanzapine -- this was discontinued.  9. 1/03/2024: Completed cycle 4 dose-dense AC.  10. 1/16/2024: Breast MRI showed persistent abnormal mass and nonmass enhancement in the left breast measuring 10.2 cm in AP dimension (unchanged).  Previously seen mass at the 12:30 position now measures 1.0 cm in greatest dimension (previously 1.9 cm). Previously seen seen mass at the 4:00 position now measures 1.1 cm in greatest dimension (previously 1.7 cm). Previously seen enlarged left axillary lymph node now has normal size and morphology. Previously seen intrapectoral lymph node now has normal size and morphology. Previously seen abnormal right breast enhancement has entirely resolved. Previously visualized enlarged right axillary lymph node now has normal size and morphology.  Other previously seen axillary lymph nodes appear stable.  11. 1/17/2024: Start of weekly paclitaxel for planned 12 weeks.  12. 2/9/2024: MR right tibia/fibula showed mid right tibial shaft bone lesion increased from 3.2 cm to 3.8 cm; speckled foci of marrow signal likely areas of red marrow reconversion.  13. 4/03/2024: Completion of cycle 12 paclitaxel.  14. 4/5/2024: MR right tibia/fibula showed stable in size and improved signal alteration of right tibial shaft focal marrow alteration, likely red marrow.  15. 5/08/2024: Bilateral mastectomies with bilateral  axillary sentinel lymph node excision under c/o Dr. Fanny Little.  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 Right breast with no residual invasive carcinoma; LCIS present; treatment related changes, 4 right axillary sentinel lymph nodes present, [id number].  16. 6/04/2024: Started Zoladex and anastrozole (latter on 6/11/2024).  17. 7/2/2024-8/7/2024: Adjuvant radiation therapy to left chest wall and regional nodes.  18.  8/8/2024: Started adjuvant olaparib 300 mg twice daily.  19. 9/9/2024: PET/CT scan showed no evidence of FDG avid malignancy; subcentimeter mildly FDG avid right axillary lymph node favored reactive; stable mild focal FDG uptake of the right mid tibial diaphysis favored benign; persistent FDG uptake within subcentimeter left isthmus thyroid nodule.   20. 9/25/2024: Laparoscopic resection of uterus, cervix, bilateral ovaries under care of Dr. Hiral Bello. Pathology negative     INTERVAL HISTORY:  Angie is here for second opinion.  Overall she is not doing that great on the Verzenio has issues with diarrhea and extreme fatigue.      REVIEW OF SYSTEMS:   14 point ROS was reviewed and is negative other than as noted above in HPI.       HOME MEDICATIONS:  Current Outpatient Medications   Medication Sig Dispense Refill     anastrozole (ARIMIDEX) 1 MG tablet Take 1 tablet (1 mg) by mouth daily 90 tablet 3     olaparib (LYNPARZA) 150 MG tablet Take 2 tablets (300 mg) by mouth 2 times daily 120 tablet 0         ALLERGIES:  Allergies   Allergen Reactions     Aprepitant Difficulty breathing and Shortness Of Breath     Fosaprepitant Unknown     Allergy added per chart review. [facility]     Armaria Lopezticana Ext (Horseradish) Hives     Carboprost GI Disturbance     Diarrhea     Codeine Nausea  and Vomiting         PAST MEDICAL HISTORY:  BRCA2 gene mutation.  Bilateral breast cancer as outlined above.  2022 Galleri test - negative for malignancy.    Gynecologic history:  Age of menarche at 15.  3 children/triplets -- 1 boy and twin girls born 2022.  Age of first pregnancy at 41.  Used 10 years of OCPs no HRT, IVF for fertility treatment.      PAST SURGICAL HISTORY:  Past Surgical History:   Procedure Laterality Date     BIOPSY, BREAST, WITH RADIOFREQUENCY IDENTIFICATION Bilateral 2024    Procedure: bilateral tag localized node excision;  Surgeon: Fanny Little MD;  Location:  OR      SECTION       DILATION AND CURETTAGE      retained placenta post-partum 2 weeks     ENT SURGERY      tonsillectomy     EXCISE CYST GENERIC (LOCATION) Left 2024    Procedure: Excision Left Axillary Cyst;  Surgeon: Fanny Little MD;  Location:  OR     GYN SURGERY      egg retrieval for IVF     INSERT PORT VASCULAR ACCESS Right 2023    Procedure: INSERTION, VASCULAR ACCESS PORT;  Surgeon: Fanny Little MD;  Location:  OR     KNEE SURGERY Left      LAPAROSCOPIC HYSTERECTOMY TOTAL, BILATERAL SALPINGO-OOPHORECTOMY, NODE DISSECTION, COMBINED Bilateral 2024    Procedure: laparoscopic removal of uterus, cervix, both ovaries, cystoscopy-BILATERAL;  Surgeon: Hiral Davis MD;  Location:  OR     MASTECTOMY SIMPLE BILATERAL, SENTINEL NODE BILATERAL, COMBINED Bilateral 2024    Procedure: Bilateral skin sparing mastectomies with bilateral sentinel lymph node biopsy;  Surgeon: Fanny Little MD;  Location:  OR     RECONSTRUCT BREAST, INSERT TISSUE EXPANDER BILATERAL, COMBINED Bilateral 2024    Procedure: BILATERAL FIRST STAGE BREAST RECONSTRUCTION WITH TISSUE EXPANDERS AND ACELLULAR DERMAL MATRIX;  Surgeon: Vasiliy Yee MD;  Location:  OR     REMOVE PORT VASCULAR ACCESS N/A 2024    Procedure: port removal;  Surgeon: Fanny Little MD;   Location:  OR         SOCIAL HISTORY:  Social History     Socioeconomic History     Marital status:      Spouse name: Not on file     Number of children: Not on file     Years of education: Not on file     Highest education level: Not on file   Occupational History     Not on file   Tobacco Use     Smoking status: Never     Passive exposure: Never     Smokeless tobacco: Never   Vaping Use     Vaping status: Never Used   Substance and Sexual Activity     Alcohol use: Not Currently     Comment: rarely     Drug use: Not Currently     Sexual activity: Not on file   Other Topics Concern     Parent/sibling w/ CABG, MI or angioplasty before 65F 55M? No   Social History Narrative     Not on file     Social Drivers of Health     Financial Resource Strain: Low Risk  (4/9/2024)    Financial Resource Strain      Within the past 12 months, have you or your family members you live with been unable to get utilities (heat, electricity) when it was really needed?: No   Food Insecurity: Low Risk  (4/9/2024)    Food Insecurity      Within the past 12 months, did you worry that your food would run out before you got money to buy more?: No      Within the past 12 months, did the food you bought just not last and you didn t have money to get more?: No   Transportation Needs: Low Risk  (4/9/2024)    Transportation Needs      Within the past 12 months, has lack of transportation kept you from medical appointments, getting your medicines, non-medical meetings or appointments, work, or from getting things that you need?: No   Physical Activity: Insufficiently Active (11/2/2023)    [facility]    Exercise Vital Sign      Days of Exercise per Week: 4 days      Minutes of Exercise per Session: 30 min   Stress: Not on file   Social Connections: Unknown (12/22/2021)    Received from [facility], [facility]    Social Connections      Frequency of  "Communication with Friends and Family: Not on file   Interpersonal Safety: Low Risk  (2024)    Interpersonal Safety      Do you feel physically and emotionally safe where you currently live?: Yes      Within the past 12 months, have you been hit, slapped, kicked or otherwise physically hurt by someone?: No      Within the past 12 months, have you been humiliated or emotionally abused in other ways by your partner or ex-partner?: No   Housing Stability: Low Risk  (2024)    Housing Stability      Do you have housing? : Yes      Are you worried about losing your housing?: No         FAMILY HISTORY:  Paternal grandmother and many paternal grandfathers with breast cancer; cousin with DCIS tested positive for BRCA1.  No ovarian, colon, or prostate cancer.      PHYSICAL EXAM:  Vital signs:  Ht 1.676 m (5' 6\")   Wt 77.6 kg (171 lb)   BMI 27.60 kg/m     ECO  GENERAL: No acute distress.  EYES: No scleral icterus. No overt erythema.  LYMPH: No palpable lymphadenopathy in the cervical, supraclavicular, or axillary regions.  Bilateral breasts are surgically absent with expanders in place and no fluid or masses or skin erythema.  RESPIRATORY: No audible cough, wheezing, or labored breathing.  MUSCULOSKELETAL: Range of motion in the neck, shoulders, and arms appear normal.  SKIN: No overt rashes, discolorations, or lesions over the face and neck.  NEUROLOGIC: Alert.  No overt tremors.            ASSESSMENT/PLAN:     Angie is a very pleasant 44-year-old female who presented initially with stage IIb grade 3 invasive lobular carcinoma ER/RI positive HER2/markus negative Ki-67 70%, BRCA2 positive      Status post neoadjuvant chemotherapy with dose dense AC followed by Taxol and bilateral mastectomies with bilateral axillary lymph node excision on May 2024    She had a complete response in the right breast and excellent partial response in her left breast with no residual cancer in the lymph nodes.  Status post radiation " therapy now doing adjuvant therapy with olaparib    After detailed discussion I do not think the patient is tolerating the higher dose well with significant amount of GI upset and fatigue.  I did discuss with her that she should have a discussion with Dr. Dan about dose reduction which she is extremely effective.  Also discussed with the patient that based on the ELIZABETH trial my recommendation would be to think about doing Ribociclib for 3 years instead of Verzenio as she has some underlying GI issues now and Verzenio appears to be a more difficult drug to tolerate then Ribociclib based on my clinical experience.    Also discussed with the patient that several patients had dose reductions with continued excellent response therefore I think it is reasonable to have that discussion if she is not tolerating AI with CDK 4 inhibitors upfront with close follow-up.    Angie is concerned about imaging and I discussed with the patient that per NCCN guidelines we do not generally do routine imaging and also against NCCN guidelines I do cancer markers in my practice which I have found to be helpful but again she is getting excellent care from Dr. Dan and most oncologist do not follow these markers.  I did discuss with the patient that anecdotally I have had better luck with getting markers and finding oligometastatic disease therefore have treated those patients aggressively over the years.  Discussed with her that is personally my preference and it is not on the guidelines again.    Patient is emotionally struggling with his high dosing and her quality of life.  Have encouraged her to discuss this further with Dr. Dan.  Per her request I will have my nurse follow-up with her.  I think she needs treatment of some underlying anxiety related to the risk of recurrence which I think after she completes all therapy is very low    I have encouraged her to discuss this further with her oncologist.  She has had excellent care  thus far and she will return back to Dr. Dan with further consideration of dose reductions on the olaparib    We also discussed the bone lesion in the right tibia that she had and I reviewed the recent MRI along with previous imaging and to me it appears benign and I would recommend continued surveillance of this in a delayed fashion maybe in 6 to 8 months getting another MRI to make sure it is stable.        She will return to us as needed.  I wish her well      All questions answered to her satisfaction      Total time spent on day of visit, including review of tests, obtaining/reviewing separately obtained history, ordering medications/tests/procedures, communicating with PCP/consultants, and documenting in electronic medical record: 75 minutes       Again, thank you for allowing me to participate in the care of your patient.        Sincerely,        Flynn Hicks MD

## 2024-12-06 NOTE — PROGRESS NOTES
Virtual Visit Details    Type of service:  Video Visit     Originating Location (pt. Location): Home    Distant Location (provider location):  On-site  Platform used for Video Visit: Patricia

## 2024-12-06 NOTE — PROGRESS NOTES
Is the patient currently in the state of [place]? YES    Visit mode:VIDEO    If the visit is dropped, the patient can be reconnected by:VIDEO VISIT: Send to e-mail at: [email or web address]    Will anyone else be joining the visit? NO  (If patient encounters technical issues they should call [phone]    Are changes needed to the allergy or medication list? No    Are refills needed on medications prescribed by this physician? NO    Rooming Documentation:  Questionnaire(s) completed    Reason for visit: Video Visit (Follow Up )    Delfina MAST

## 2024-12-13 NOTE — PROGRESS NOTES
[facility]    Hematology/Oncology Established Patient Note      Today's Date: 12/6/2024    Reason for visit: Bilateral breast cancer, BRCA-2 mutation positive.    HISTORY OF PRESENT ILLNESS: Angie Michael is a 44 year old female with BRCA2 gene mutation who presents with the following oncologic history:  1.  10/05/2023: High risk surveillance breast MRI showed right breast 3.6 x 3 x 3.2 cm irregular enhancing mass at 10:00, 0.9 x 0.8 x 0.9 cm second mass at 9:00, 1 x 0.9 x 1.2 cm third mass at 9:30, other suspicious foci of enhancement throughout the breast.  Left breast with 9.9 x 5.2 x 6.7 cm suspicious non-mass enhancement at 10:00-7:00; 1.6 x 1.3 x 1.7 cm enhancing mass at 4:00; 1.8 x 1.6 x 1.9 cm enhancing mass at 12:30; other suspicious foci of enhancement throughout the left breast.  One level 1 suspicious right axillary lymph node and one level 1 suspicious left axillary lymph node..  Developing suspicious interpectoral lymph node.  2.  10/12/2023: Bilateral diagnostic mammogram showed right breast with 3.2 x 2.7 x 4.1 cm irregular mass at 10:00, 6 cm from nipple; 0.9 x 0.6 x 1 cm irregular mass at 11:00, 2 cm from nipple and prominent right axillary lymph node.  Left breast with 1.4 x 1.1 x 1.7 cm mass at 12:00, 2 cm from nipple, 1.8 x 1 x 1.6 cm mass at 3:30, 4 cm from nipple, 1 x 0.9 x 1.1 cm mass at 3:00, 7 cm from nipple, multiple areas of hypoechogenicity with calcifications in the lateral left breast, and prominent left axillary lymph node.  3.  10/12/2023: Biopsy of right breast at 10:00 showed grade 3 invasive lobular carcinoma, ER strongly positive at 81-90%, AK positive at 31-40%, HER2 equivocal with IHC = 2+, FISH negative, Ki-67 = 70%.  Biopsy of right axillary lymph node showed metastatic carcinoma.  Biopsy of left breast at 3:30 showed grade 2 invasive carcinoma with ductal and lobular features, ER positive at %, AK positive at 2%, HER2 equivocal with IHC = 2+, FISH  negative, Ki-67 = 26%.  Biopsy of left axillary lymph node showed metastatic carcinoma.  4.  10/17/2023: PET/CT scan showed cluster of hypermetabolic right breast nodules with SUV max 14.9; 2.1 x 1 cm hypermetabolic level 1 right axillary lymph node with SUV max 5.5; hypermetabolic left breast nodule with SUV max 18.8 and several additional less intense hypermetabolic left breast nodules; 1.5 x 1.3 cm hypermetabolic level 1 left axillary lymph node with SUV max 4.  Small sclerotic focus of metabolic activity within the proximal tibial shaft with SUV max 3.7, indeterminate but unlikely to reflect metastatic disease.  Subcentimeter hypermetabolic focus along anterior margin of the left thyroid lobe, likely a thyroid nodule.  5.  11/03/2023: Thyroid ultrasound showed 0.7 x 0.6 x 0.5 cm solid hypoechoic nodule at the junction of the isthmus and left hemithyroid corresponding to increased uptake on PET/CT, consistent with TIRADS 4 nodule and subcentimeter TIRADS 3 nodule in the left inferior thyroid.  6. 11/3/2023: MR right tibia/fibula showed discrete focal 3 cm area of increased T2 signal and enhancement in mid shaft of right tibia with fat signal interspersed throughout this area with significant signal dropout on opposed phase imaging, favoring benign process.  7. 11/21/2023: Started neoadjuvant chemotherapy with dose-dense Adriamycin + Cytoxan with Neulasta support with Dr. Bev Ryan at [facility]. Reacted to [place].  8. 11/25/2023: Evaluated at [facility] ED with diarrhea and dizziness, latter attributed to olanzapine -- this was discontinued.  9. 1/03/2024: Completed cycle 4 dose-dense AC.  10. 1/16/2024: Breast MRI showed persistent abnormal mass and nonmass enhancement in the left breast measuring 10.2 cm in AP dimension (unchanged).  Previously seen mass at the 12:30 position now measures 1.0 cm in greatest dimension (previously 1.9 cm). Previously seen seen mass at the 4:00 position now measures 1.1 cm  in greatest dimension (previously 1.7 cm). Previously seen enlarged left axillary lymph node now has normal size and morphology. Previously seen intrapectoral lymph node now has normal size and morphology. Previously seen abnormal right breast enhancement has entirely resolved. Previously visualized enlarged right axillary lymph node now has normal size and morphology.  Other previously seen axillary lymph nodes appear stable.  11. 1/17/2024: Start of weekly paclitaxel for planned 12 weeks.  12. 2/9/2024: MR right tibia/fibula showed mid right tibial shaft bone lesion increased from 3.2 cm to 3.8 cm; speckled foci of marrow signal likely areas of red marrow reconversion.  13. 4/03/2024: Completion of cycle 12 paclitaxel.  14. 4/5/2024: MR right tibia/fibula showed stable in size and improved signal alteration of right tibial shaft focal marrow alteration, likely red marrow.  15. 5/08/2024: Bilateral mastectomies with bilateral axillary sentinel lymph node excision under c/o Dr. Fanny Little.  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 Right breast with no residual invasive carcinoma; LCIS present; treatment related changes, 4 right axillary sentinel lymph nodes present, [id number].  16. 6/04/2024: Started Zoladex and anastrozole (latter on 6/11/2024).  17. 7/2/2024-8/7/2024: Adjuvant radiation therapy to left chest wall and regional nodes.  18.  8/8/2024: Started adjuvant olaparib 300 mg twice daily.  19. 9/9/2024: PET/CT scan showed no evidence of FDG avid malignancy; subcentimeter mildly FDG avid right axillary lymph node favored reactive; stable mild focal FDG uptake of the right mid tibial diaphysis favored benign; persistent FDG uptake within subcentimeter left isthmus thyroid nodule.   20. 9/25/2024:  Laparoscopic resection of uterus, cervix, bilateral ovaries under care of Dr. Hiral Bello. Pathology negative     INTERVAL HISTORY:  Angie is here for second opinion.  Overall she is not doing that great on the Verzenio has issues with diarrhea and extreme fatigue.      REVIEW OF SYSTEMS:   14 point ROS was reviewed and is negative other than as noted above in HPI.       HOME MEDICATIONS:  Current Outpatient Medications   Medication Sig Dispense Refill    anastrozole (ARIMIDEX) 1 MG tablet Take 1 tablet (1 mg) by mouth daily 90 tablet 3    olaparib (LYNPARZA) 150 MG tablet Take 2 tablets (300 mg) by mouth 2 times daily 120 tablet 0         ALLERGIES:  Allergies   Allergen Reactions    Aprepitant Difficulty breathing and Shortness Of Breath    Fosaprepitant Unknown     Allergy added per chart review. [facility]    Armoracia Rusticana Ext (Horseradish) Hives    Carboprost GI Disturbance     Diarrhea    Codeine Nausea and Vomiting         PAST MEDICAL HISTORY:  BRCA2 gene mutation.  Bilateral breast cancer as outlined above.  2022 Galleri test - negative for malignancy.    Gynecologic history:  Age of menarche at 15.  3 children/triplets -- 1 boy and twin girls born 2022.  Age of first pregnancy at 41.  Used 10 years of OCPs no HRT, IVF for fertility treatment.      PAST SURGICAL HISTORY:  Past Surgical History:   Procedure Laterality Date    BIOPSY, BREAST, WITH RADIOFREQUENCY IDENTIFICATION Bilateral 2024    Procedure: bilateral tag localized node excision;  Surgeon: Fanny Little MD;  Location:  OR     SECTION      DILATION AND CURETTAGE      retained placenta post-partum 2 weeks    ENT SURGERY      tonsillectomy    EXCISE CYST GENERIC (LOCATION) Left 2024    Procedure: Excision Left Axillary Cyst;  Surgeon: Fanny Little MD;  Location:  OR    GYN SURGERY      egg retrieval for IVF    INSERT PORT VASCULAR ACCESS Right 2023    Procedure: INSERTION, VASCULAR  ACCESS PORT;  Surgeon: Fanny Little MD;  Location:  OR    KNEE SURGERY Left     LAPAROSCOPIC HYSTERECTOMY TOTAL, BILATERAL SALPINGO-OOPHORECTOMY, NODE DISSECTION, COMBINED Bilateral 9/25/2024    Procedure: laparoscopic removal of uterus, cervix, both ovaries, cystoscopy-BILATERAL;  Surgeon: Hiral Davis MD;  Location:  OR    MASTECTOMY SIMPLE BILATERAL, SENTINEL NODE BILATERAL, COMBINED Bilateral 05/08/2024    Procedure: Bilateral skin sparing mastectomies with bilateral sentinel lymph node biopsy;  Surgeon: Fanny Little MD;  Location:  OR    RECONSTRUCT BREAST, INSERT TISSUE EXPANDER BILATERAL, COMBINED Bilateral 05/08/2024    Procedure: BILATERAL FIRST STAGE BREAST RECONSTRUCTION WITH TISSUE EXPANDERS AND ACELLULAR DERMAL MATRIX;  Surgeon: Vasiliy Yee MD;  Location:  OR    REMOVE PORT VASCULAR ACCESS N/A 05/08/2024    Procedure: port removal;  Surgeon: Fanny Little MD;  Location:  OR         SOCIAL HISTORY:  Social History     Socioeconomic History    Marital status:      Spouse name: Not on file    Number of children: Not on file    Years of education: Not on file    Highest education level: Not on file   Occupational History    Not on file   Tobacco Use    Smoking status: Never     Passive exposure: Never    Smokeless tobacco: Never   Vaping Use    Vaping status: Never Used   Substance and Sexual Activity    Alcohol use: Not Currently     Comment: rarely    Drug use: Not Currently    Sexual activity: Not on file   Other Topics Concern    Parent/sibling w/ CABG, MI or angioplasty before 65F 55M? No   Social History Narrative    Not on file     Social Drivers of Health     Financial Resource Strain: Low Risk  (4/9/2024)    Financial Resource Strain     Within the past 12 months, have you or your family members you live with been unable to get utilities (heat, electricity) when it was really needed?: No   Food Insecurity: Low Risk  (4/9/2024)    Food Insecurity  "    Within the past 12 months, did you worry that your food would run out before you got money to buy more?: No     Within the past 12 months, did the food you bought just not last and you didn t have money to get more?: No   Transportation Needs: Low Risk  (2024)    Transportation Needs     Within the past 12 months, has lack of transportation kept you from medical appointments, getting your medicines, non-medical meetings or appointments, work, or from getting things that you need?: No   Physical Activity: Insufficiently Active (2023)    [facility]    Exercise Vital Sign     Days of Exercise per Week: 4 days     Minutes of Exercise per Session: 30 min   Stress: Not on file   Social Connections: Unknown (2021)    Received from [facility]    Social Connections     Frequency of Communication with Friends and Family: Not on file   Interpersonal Safety: Low Risk  (2024)    Interpersonal Safety     Do you feel physically and emotionally safe where you currently live?: Yes     Within the past 12 months, have you been hit, slapped, kicked or otherwise physically hurt by someone?: No     Within the past 12 months, have you been humiliated or emotionally abused in other ways by your partner or ex-partner?: No   Housing Stability: Low Risk  (2024)    Housing Stability     Do you have housing? : Yes     Are you worried about losing your housing?: No         FAMILY HISTORY:  Paternal grandmother and many paternal grandfathers with breast cancer; cousin with DCIS tested positive for BRCA1.  No ovarian, colon, or prostate cancer.      PHYSICAL EXAM:  Vital signs:  Ht 1.676 m (5' 6\")   Wt 77.6 kg (171 lb)   BMI 27.60 kg/m     ECO  GENERAL: No acute distress.  EYES: No scleral icterus. No overt erythema.  LYMPH: No palpable lymphadenopathy in the cervical, supraclavicular, or axillary regions.  " Bilateral breasts are surgically absent with expanders in place and no fluid or masses or skin erythema.  RESPIRATORY: No audible cough, wheezing, or labored breathing.  MUSCULOSKELETAL: Range of motion in the neck, shoulders, and arms appear normal.  SKIN: No overt rashes, discolorations, or lesions over the face and neck.  NEUROLOGIC: Alert.  No overt tremors.            ASSESSMENT/PLAN:     Angie is a very pleasant 44-year-old female who presented initially with stage IIb grade 3 invasive lobular carcinoma ER/ID positive HER2/markus negative Ki-67 70%, BRCA2 positive      Status post neoadjuvant chemotherapy with dose dense AC followed by Taxol and bilateral mastectomies with bilateral axillary lymph node excision on May 2024    She had a complete response in the right breast and excellent partial response in her left breast with no residual cancer in the lymph nodes.  Status post radiation therapy now doing adjuvant therapy with olaparib    After detailed discussion I do not think the patient is tolerating the higher dose well with significant amount of GI upset and fatigue.  I did discuss with her that she should have a discussion with Dr. Dan about dose reduction which she is extremely effective.  Also discussed with the patient that based on the ELIZABETH trial my recommendation would be to think about doing Ribociclib for 3 years instead of Verzenio as she has some underlying GI issues now and Verzenio appears to be a more difficult drug to tolerate then Ribociclib based on my clinical experience.    Also discussed with the patient that several patients had dose reductions with continued excellent response therefore I think it is reasonable to have that discussion if she is not tolerating AI with CDK 4 inhibitors upfront with close follow-up.    Angie is concerned about imaging and I discussed with the patient that per NCCN guidelines we do not generally do routine imaging and also against NCCN guidelines I do  cancer markers in my practice which I have found to be helpful but again she is getting excellent care from Dr. Dan and most oncologist do not follow these markers.  I did discuss with the patient that anecdotally I have had better luck with getting markers and finding oligometastatic disease therefore have treated those patients aggressively over the years.  Discussed with her that is personally my preference and it is not on the guidelines again.    Patient is emotionally struggling with his high dosing and her quality of life.  Have encouraged her to discuss this further with Dr. Dan.  Per her request I will have my nurse follow-up with her.  I think she needs treatment of some underlying anxiety related to the risk of recurrence which I think after she completes all therapy is very low    I have encouraged her to discuss this further with her oncologist.  She has had excellent care thus far and she will return back to Dr. Dan with further consideration of dose reductions on the olaparib    We also discussed the bone lesion in the right tibia that she had and I reviewed the recent MRI along with previous imaging and to me it appears benign and I would recommend continued surveillance of this in a delayed fashion maybe in 6 to 8 months getting another MRI to make sure it is stable.        She will return to us as needed.  I wish her well      All questions answered to her satisfaction      Total time spent on day of visit, including review of tests, obtaining/reviewing separately obtained history, ordering medications/tests/procedures, communicating with PCP/consultants, and documenting in electronic medical record: 75 minutes

## 2024-12-18 DIAGNOSIS — Z15.01 BRCA2 GENE MUTATION POSITIVE: ICD-10-CM

## 2024-12-18 DIAGNOSIS — Z17.0 MALIGNANT NEOPLASM OF UPPER-OUTER QUADRANT OF RIGHT BREAST IN FEMALE, ESTROGEN RECEPTOR POSITIVE (H): Primary | ICD-10-CM

## 2024-12-18 DIAGNOSIS — C50.411 MALIGNANT NEOPLASM OF UPPER-OUTER QUADRANT OF RIGHT BREAST IN FEMALE, ESTROGEN RECEPTOR POSITIVE (H): Primary | ICD-10-CM

## 2024-12-18 DIAGNOSIS — Z15.09 BRCA2 GENE MUTATION POSITIVE: ICD-10-CM

## 2024-12-18 NOTE — OR NURSING
Post-Op Assessment Note    CV Status:  Stable  Pain Score: 0    Pain management: adequate       Mental Status:  Alert and awake   Hydration Status:  Euvolemic   PONV Controlled:  None   Airway Patency:  Patent     Post Op Vitals Reviewed: Yes    No anethesia notable event occurred.    Staff: CRNA, Anesthesiologist           Last Filed PACU Vitals:  Vitals Value Taken Time   Temp 97.5    Pulse 85    /70    Resp 20    SpO2 97%        Modified Tanvir:  No data recorded       VSS. A&O. Pain 4/10. Dressing to bilat breasts CDI. bilat DINH drains intact with small amount of sanguinous fluid. No n/v, lazara PO. Report to gen/surg RN. Family updated. Pt transferred to [id number].

## 2024-12-23 ENCOUNTER — VIRTUAL VISIT (OUTPATIENT)
Dept: ONCOLOGY | Facility: CLINIC | Age: 44
End: 2024-12-23
Attending: INTERNAL MEDICINE
Payer: COMMERCIAL

## 2024-12-23 DIAGNOSIS — Z17.0 MALIGNANT NEOPLASM OF UPPER-OUTER QUADRANT OF RIGHT BREAST IN FEMALE, ESTROGEN RECEPTOR POSITIVE (H): Primary | ICD-10-CM

## 2024-12-23 DIAGNOSIS — C50.411 MALIGNANT NEOPLASM OF UPPER-OUTER QUADRANT OF RIGHT BREAST IN FEMALE, ESTROGEN RECEPTOR POSITIVE (H): Primary | ICD-10-CM

## 2024-12-23 PROCEDURE — 99215 OFFICE O/P EST HI 40 MIN: CPT | Mod: 95 | Performed by: INTERNAL MEDICINE

## 2024-12-23 NOTE — PROGRESS NOTES
[facility]    Hematology/Oncology Established Patient Note      Today's Date:  12/23/2024    Reason for visit: Bilateral breast cancer, BRCA-2 mutation positive.    HISTORY OF PRESENT ILLNESS: Angie Michael is a 44 year old female with BRCA2 gene mutation who presents with the following oncologic history:  1.  10/05/2023: High risk surveillance breast MRI showed right breast 3.6 x 3 x 3.2 cm irregular enhancing mass at 10:00, 0.9 x 0.8 x 0.9 cm second mass at 9:00, 1 x 0.9 x 1.2 cm third mass at 9:30, other suspicious foci of enhancement throughout the breast.  Left breast with 9.9 x 5.2 x 6.7 cm suspicious non-mass enhancement at 10:00-7:00; 1.6 x 1.3 x 1.7 cm enhancing mass at 4:00; 1.8 x 1.6 x 1.9 cm enhancing mass at 12:30; other suspicious foci of enhancement throughout the left breast.  One level 1 suspicious right axillary lymph node and one level 1 suspicious left axillary lymph node..  Developing suspicious interpectoral lymph node.  2.  10/12/2023: Bilateral diagnostic mammogram showed right breast with 3.2 x 2.7 x 4.1 cm irregular mass at 10:00, 6 cm from nipple; 0.9 x 0.6 x 1 cm irregular mass at 11:00, 2 cm from nipple and prominent right axillary lymph node.  Left breast with 1.4 x 1.1 x 1.7 cm mass at 12:00, 2 cm from nipple, 1.8 x 1 x 1.6 cm mass at 3:30, 4 cm from nipple, 1 x 0.9 x 1.1 cm mass at 3:00, 7 cm from nipple, multiple areas of hypoechogenicity with calcifications in the lateral left breast, and prominent left axillary lymph node.  3.  10/12/2023: Biopsy of right breast at 10:00 showed grade 3 invasive lobular carcinoma, ER strongly positive at 81-90%, NV positive at 31-40%, HER2 equivocal with IHC = 2+, FISH negative, Ki-67 = 70%.  Biopsy of right axillary lymph node showed metastatic carcinoma.  Biopsy of left breast at 3:30 showed grade 2 invasive carcinoma with ductal and lobular features, ER positive at %, NV positive at 2%, HER2 equivocal with IHC = 2+, FISH  negative, Ki-67 = 26%.  Biopsy of left axillary lymph node showed metastatic carcinoma.  4.  10/17/2023: PET/CT scan showed cluster of hypermetabolic right breast nodules with SUV max 14.9; 2.1 x 1 cm hypermetabolic level 1 right axillary lymph node with SUV max 5.5; hypermetabolic left breast nodule with SUV max 18.8 and several additional less intense hypermetabolic left breast nodules; 1.5 x 1.3 cm hypermetabolic level 1 left axillary lymph node with SUV max 4.  Small sclerotic focus of metabolic activity within the proximal tibial shaft with SUV max 3.7, indeterminate but unlikely to reflect metastatic disease.  Subcentimeter hypermetabolic focus along anterior margin of the left thyroid lobe, likely a thyroid nodule.  5.  11/03/2023: Thyroid ultrasound showed 0.7 x 0.6 x 0.5 cm solid hypoechoic nodule at the junction of the isthmus and left hemithyroid corresponding to increased uptake on PET/CT, consistent with TIRADS 4 nodule and subcentimeter TIRADS 3 nodule in the left inferior thyroid.  6. 11/3/2023: MR right tibia/fibula showed discrete focal 3 cm area of increased T2 signal and enhancement in mid shaft of right tibia with fat signal interspersed throughout this area with significant signal dropout on opposed phase imaging, favoring benign process.  7. 11/21/2023: Started neoadjuvant chemotherapy with dose-dense Adriamycin + Cytoxan with Neulasta support with Dr. Bev Ryan at [facility]. Reacted to [facility].  8. 11/25/2023: Evaluated at [facility] ED with diarrhea and dizziness, latter attributed to olanzapine -- this was discontinued.  9. 1/03/2024: Completed cycle 4 dose-dense AC.  10. 1/16/2024: Breast MRI showed persistent abnormal mass and nonmass enhancement in the left breast measuring 10.2 cm in AP dimension (unchanged).  Previously seen mass at the 12:30 position now measures 1.0 cm in greatest dimension (previously 1.9 cm). Previously seen seen mass at the 4:00 position now measures 1.1 cm  in greatest dimension (previously 1.7 cm). Previously seen enlarged left axillary lymph node now has normal size and morphology. Previously seen intrapectoral lymph node now has normal size and morphology. Previously seen abnormal right breast enhancement has entirely resolved. Previously visualized enlarged right axillary lymph node now has normal size and morphology.  Other previously seen axillary lymph nodes appear stable.  11. 1/17/2024: Start of weekly paclitaxel for planned 12 weeks.  12. 2/9/2024: MR right tibia/fibula showed mid right tibial shaft bone lesion increased from 3.2 cm to 3.8 cm; speckled foci of marrow signal likely areas of red marrow reconversion.  13. 4/03/2024: Completion of cycle 12 paclitaxel.  14. 4/5/2024: MR right tibia/fibula showed stable in size and improved signal alteration of right tibial shaft focal marrow alteration, likely red marrow.  15. 5/08/2024: Bilateral mastectomies with bilateral axillary sentinel lymph node excision under c/o Dr. Fanny Little.  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 Right breast with no residual invasive carcinoma; LCIS present; treatment related changes, 4 right axillary sentinel lymph nodes present, [id number].  16. 6/04/2024: Started Zoladex and anastrozole (latter on 6/11/2024).  17. 7/2/2024-8/7/2024: Adjuvant radiation therapy to left chest wall and regional nodes.  18.  8/8/2024: Started adjuvant olaparib 300 mg twice daily.  19. 9/9/2024: PET/CT scan showed no evidence of FDG avid malignancy; subcentimeter mildly FDG avid right axillary lymph node favored reactive; stable mild focal FDG uptake of the right mid tibial diaphysis favored benign; persistent FDG uptake within subcentimeter left isthmus thyroid nodule.   20. 9/25/2024:  Laparoscopic resection of uterus, cervix, bilateral ovaries under care of Dr. Hiral Bello. Pathology negative     INTERVAL HISTORY:  Angie is here for follow up. Flying for the first time which was better. Not sleeping well. Some hot flashes at night. Not not interested in medication management of this.  Main complaints with the lab of her fatigue and GI upset.      REVIEW OF SYSTEMS:   14 point ROS was reviewed and is negative other than as noted above in HPI.       HOME MEDICATIONS:  Current Outpatient Medications   Medication Sig Dispense Refill    anastrozole (ARIMIDEX) 1 MG tablet Take 1 tablet (1 mg) by mouth daily 90 tablet 3    [START ON 2025] olaparib (LYNPARZA) 150 MG tablet Take 2 tablets (300 mg) by mouth 2 times daily 120 tablet 0    olaparib (LYNPARZA) 150 MG tablet Take 2 tablets (300 mg) by mouth 2 times daily 120 tablet 0         ALLERGIES:  Allergies   Allergen Reactions    Aprepitant Difficulty breathing and Shortness Of Breath    Fosaprepitant Unknown     Allergy added per chart review. [facility]    Armoracia Rusticana Ext (Horseradish) Hives    Carboprost GI Disturbance     Diarrhea    Codeine Nausea and Vomiting         PAST MEDICAL HISTORY:  BRCA2 gene mutation.  Bilateral breast cancer as outlined above.  2022 Galleri test - negative for malignancy.    Gynecologic history:  Age of menarche at 15.  3 children/triplets -- 1 boy and twin girls born 2022.  Age of first pregnancy at 41.  Used 10 years of OCPs no HRT, IVF for fertility treatment.      PAST SURGICAL HISTORY:  Past Surgical History:   Procedure Laterality Date    BIOPSY, BREAST, WITH RADIOFREQUENCY IDENTIFICATION Bilateral 2024    Procedure: bilateral tag localized node excision;  Surgeon: Fanny Little MD;  Location: [facility] OR     SECTION      DILATION AND CURETTAGE      retained placenta post-partum 2 weeks    ENT SURGERY      tonsillectomy    EXCISE CYST GENERIC (LOCATION) Left 2024     Procedure: Excision Left Axillary Cyst;  Surgeon: Fanny Little MD;  Location:  OR    GYN SURGERY      egg retrieval for IVF    INSERT PORT VASCULAR ACCESS Right 11/20/2023    Procedure: INSERTION, VASCULAR ACCESS PORT;  Surgeon: Fanny Little MD;  Location:  OR    KNEE SURGERY Left     LAPAROSCOPIC HYSTERECTOMY TOTAL, BILATERAL SALPINGO-OOPHORECTOMY, NODE DISSECTION, COMBINED Bilateral 9/25/2024    Procedure: laparoscopic removal of uterus, cervix, both ovaries, cystoscopy-BILATERAL;  Surgeon: Hiral Davis MD;  Location:  OR    MASTECTOMY SIMPLE BILATERAL, SENTINEL NODE BILATERAL, COMBINED Bilateral 05/08/2024    Procedure: Bilateral skin sparing mastectomies with bilateral sentinel lymph node biopsy;  Surgeon: Fanny Little MD;  Location:  OR    RECONSTRUCT BREAST, INSERT TISSUE EXPANDER BILATERAL, COMBINED Bilateral 05/08/2024    Procedure: BILATERAL FIRST STAGE BREAST RECONSTRUCTION WITH TISSUE EXPANDERS AND ACELLULAR DERMAL MATRIX;  Surgeon: Vasiliy Yee MD;  Location:  OR    REMOVE PORT VASCULAR ACCESS N/A 05/08/2024    Procedure: port removal;  Surgeon: Fanny Little MD;  Location:  OR         SOCIAL HISTORY:  Social History     Socioeconomic History    Marital status:      Spouse name: Not on file    Number of children: Not on file    Years of education: Not on file    Highest education level: Not on file   Occupational History    Not on file   Tobacco Use    Smoking status: Never     Passive exposure: Never    Smokeless tobacco: Never   Vaping Use    Vaping status: Never Used   Substance and Sexual Activity    Alcohol use: Not Currently     Comment: rarely    Drug use: Not Currently    Sexual activity: Not on file   Other Topics Concern    Parent/sibling w/ CABG, MI or angioplasty before 65F 55M? No   Social History Narrative    Not on file     Social Drivers of Health     Financial Resource Strain: Low Risk  (4/9/2024)    Financial Resource Strain      Within the past 12 months, have you or your family members you live with been unable to get utilities (heat, electricity) when it was really needed?: No   Food Insecurity: Low Risk  (2024)    Food Insecurity     Within the past 12 months, did you worry that your food would run out before you got money to buy more?: No     Within the past 12 months, did the food you bought just not last and you didn t have money to get more?: No   Transportation Needs: Low Risk  (2024)    Transportation Needs     Within the past 12 months, has lack of transportation kept you from medical appointments, getting your medicines, non-medical meetings or appointments, work, or from getting things that you need?: No   Physical Activity: Insufficiently Active (2023)    [facility]    Exercise Vital Sign     Days of Exercise per Week: 4 days     Minutes of Exercise per Session: 30 min   Stress: Not on file   Social Connections: Unknown (2021)    Received from [employer] [facility], [facility]    Social Connections     Frequency of Communication with Friends and Family: Not on file   Interpersonal Safety: Low Risk  (2024)    Interpersonal Safety     Do you feel physically and emotionally safe where you currently live?: Yes     Within the past 12 months, have you been hit, slapped, kicked or otherwise physically hurt by someone?: No     Within the past 12 months, have you been humiliated or emotionally abused in other ways by your partner or ex-partner?: No   Housing Stability: Low Risk  (2024)    Housing Stability     Do you have housing? : Yes     Are you worried about losing your housing?: No         FAMILY HISTORY:  Paternal grandmother and many paternal grandfathers with breast cancer; cousin with DCIS tested positive for BRCA1.  No ovarian, colon, or prostate cancer.      PHYSICAL EXAM:  Vital signs:     ECO  GENERAL: No  acute distress.  EYES: No scleral icterus. No overt erythema.  LYMPH: No palpable lymphadenopathy in the cervical, supraclavicular, or axillary regions.  Bilateral breasts are surgically absent with expanders in place and no fluid or masses or skin erythema.  RESPIRATORY: No audible cough, wheezing, or labored breathing.  MUSCULOSKELETAL: Range of motion in the neck, shoulders, and arms appear normal.  SKIN: No overt rashes, discolorations, or lesions over the face and neck.  NEUROLOGIC: Alert.  No overt tremors.            ASSESSMENT/PLAN:     Angie is a very pleasant 44-year-old female who presented initially with stage IIb grade 3 invasive lobular carcinoma ER/UT positive HER2/markus negative Ki-67 70%, BRCA2 positive      Status post neoadjuvant chemotherapy with dose dense AC followed by Taxol and bilateral mastectomies with bilateral axillary lymph node excision on May 2024       1 Breast cancer  -continue olaparib dose reduce to 300 in am and 150 in the pm markers and MD visit in mid February  -Continue anastrozole  -kisquali after lymparza  -reconstruction in the upcoming year, will time with lymparza.       2 weight loss  We discussed metformin but would recommend holding of that or naltrexone which she has a prescription of for low because of issues causing GI upset with that        Total time spent on day of visit, including review of tests, obtaining/reviewing separately obtained history, ordering medications/tests/procedures, communicating with PCP/consultants, and documenting in electronic medical record: 60 minutes               Total time spent on day of visit, including review of tests, obtaining/reviewing separately obtained history, ordering medications/tests/procedures, communicating with PCP/consultants, and documenting in electronic medical record 60  minutes

## 2024-12-23 NOTE — LETTER
12/23/2024      Angie Michael  [address] [place] [id number]      Dear Colleague,    Thank you for referring your patient, Angie Michael, to the [facility]. Please see a copy of my visit note below.    Virtual Visit Details    Type of service:  Video Visit     Originating Location (pt. Location): Home    Distant Location (provider location):  On-site  Platform used for Video Visit: [facility]    Hematology/Oncology Established Patient Note      Today's Date:  12/23/2024    Reason for visit: Bilateral breast cancer, BRCA-2 mutation positive.    HISTORY OF PRESENT ILLNESS: Angie Michael is a 44 year old female with BRCA2 gene mutation who presents with the following oncologic history:  1.  10/05/2023: High risk surveillance breast MRI showed right breast 3.6 x 3 x 3.2 cm irregular enhancing mass at 10:00, 0.9 x 0.8 x 0.9 cm second mass at 9:00, 1 x 0.9 x 1.2 cm third mass at 9:30, other suspicious foci of enhancement throughout the breast.  Left breast with 9.9 x 5.2 x 6.7 cm suspicious non-mass enhancement at 10:00-7:00; 1.6 x 1.3 x 1.7 cm enhancing mass at 4:00; 1.8 x 1.6 x 1.9 cm enhancing mass at 12:30; other suspicious foci of enhancement throughout the left breast.  One level 1 suspicious right axillary lymph node and one level 1 suspicious left axillary lymph node..  Developing suspicious interpectoral lymph node.  2.  10/12/2023: Bilateral diagnostic mammogram showed right breast with 3.2 x 2.7 x 4.1 cm irregular mass at 10:00, 6 cm from nipple; 0.9 x 0.6 x 1 cm irregular mass at 11:00, 2 cm from nipple and prominent right axillary lymph node.  Left breast with 1.4 x 1.1 x 1.7 cm mass at 12:00, 2 cm from nipple, 1.8 x 1 x 1.6 cm mass at 3:30, 4 cm from nipple, 1 x 0.9 x 1.1 cm mass at 3:00, 7 cm from nipple, multiple areas of hypoechogenicity with calcifications in the lateral left breast, and prominent left axillary lymph node.  3.  10/12/2023:  Biopsy of right breast at 10:00 showed grade 3 invasive lobular carcinoma, ER strongly positive at 81-90%, TX positive at 31-40%, HER2 equivocal with IHC = 2+, FISH negative, Ki-67 = 70%.  Biopsy of right axillary lymph node showed metastatic carcinoma.  Biopsy of left breast at 3:30 showed grade 2 invasive carcinoma with ductal and lobular features, ER positive at %, TX positive at 2%, HER2 equivocal with IHC = 2+, FISH negative, Ki-67 = 26%.  Biopsy of left axillary lymph node showed metastatic carcinoma.  4.  10/17/2023: PET/CT scan showed cluster of hypermetabolic right breast nodules with SUV max 14.9; 2.1 x 1 cm hypermetabolic level 1 right axillary lymph node with SUV max 5.5; hypermetabolic left breast nodule with SUV max 18.8 and several additional less intense hypermetabolic left breast nodules; 1.5 x 1.3 cm hypermetabolic level 1 left axillary lymph node with SUV max 4.  Small sclerotic focus of metabolic activity within the proximal tibial shaft with SUV max 3.7, indeterminate but unlikely to reflect metastatic disease.  Subcentimeter hypermetabolic focus along anterior margin of the left thyroid lobe, likely a thyroid nodule.  5.  11/03/2023: Thyroid ultrasound showed 0.7 x 0.6 x 0.5 cm solid hypoechoic nodule at the junction of the isthmus and left hemithyroid corresponding to increased uptake on PET/CT, consistent with TIRADS 4 nodule and subcentimeter TIRADS 3 nodule in the left inferior thyroid.  6. 11/3/2023: MR right tibia/fibula showed discrete focal 3 cm area of increased T2 signal and enhancement in mid shaft of right tibia with fat signal interspersed throughout this area with significant signal dropout on opposed phase imaging, favoring benign process.  7. 11/21/2023: Started neoadjuvant chemotherapy with dose-dense Adriamycin + Cytoxan with Neulasta support with Dr. Bev Ryan at [facility]. Reacted to [place].  8. 11/25/2023: Evaluated at [facility] ED with diarrhea and  dizziness, latter attributed to olanzapine -- this was discontinued.  9. 1/03/2024: Completed cycle 4 dose-dense AC.  10. 1/16/2024: Breast MRI showed persistent abnormal mass and nonmass enhancement in the left breast measuring 10.2 cm in AP dimension (unchanged).  Previously seen mass at the 12:30 position now measures 1.0 cm in greatest dimension (previously 1.9 cm). Previously seen seen mass at the 4:00 position now measures 1.1 cm in greatest dimension (previously 1.7 cm). Previously seen enlarged left axillary lymph node now has normal size and morphology. Previously seen intrapectoral lymph node now has normal size and morphology. Previously seen abnormal right breast enhancement has entirely resolved. Previously visualized enlarged right axillary lymph node now has normal size and morphology.  Other previously seen axillary lymph nodes appear stable.  11. 1/17/2024: Start of weekly paclitaxel for planned 12 weeks.  12. 2/9/2024: MR right tibia/fibula showed mid right tibial shaft bone lesion increased from 3.2 cm to 3.8 cm; speckled foci of marrow signal likely areas of red marrow reconversion.  13. 4/03/2024: Completion of cycle 12 paclitaxel.  14. 4/5/2024: MR right tibia/fibula showed stable in size and improved signal alteration of right tibial shaft focal marrow alteration, likely red marrow.  15. 5/08/2024: Bilateral mastectomies with bilateral axillary sentinel lymph node excision under c/o Dr. Fanny Little.  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 Right breast with no residual invasive carcinoma; LCIS present; treatment related changes, 4 right axillary sentinel lymph nodes present, ypT0-N0.  16. 6/04/2024: Started Zoladex and anastrozole (latter on 6/11/2024).  17. 7/2/2024-8/7/2024: Adjuvant  radiation therapy to left chest wall and regional nodes.  18.  8/8/2024: Started adjuvant olaparib 300 mg twice daily.  19. 9/9/2024: PET/CT scan showed no evidence of FDG avid malignancy; subcentimeter mildly FDG avid right axillary lymph node favored reactive; stable mild focal FDG uptake of the right mid tibial diaphysis favored benign; persistent FDG uptake within subcentimeter left isthmus thyroid nodule.   20. 9/25/2024: Laparoscopic resection of uterus, cervix, bilateral ovaries under care of Dr. Hiral Bello. Pathology negative     INTERVAL HISTORY:  Angie is here for follow up. Flying for the first time which was better. Not sleeping well. Some hot flashes at night. Not not interested in medication management of this.  Main complaints with the lab of her fatigue and GI upset.      REVIEW OF SYSTEMS:   14 point ROS was reviewed and is negative other than as noted above in HPI.       HOME MEDICATIONS:  Current Outpatient Medications   Medication Sig Dispense Refill     anastrozole (ARIMIDEX) 1 MG tablet Take 1 tablet (1 mg) by mouth daily 90 tablet 3     [START ON 1/5/2025] olaparib (LYNPARZA) 150 MG tablet Take 2 tablets (300 mg) by mouth 2 times daily 120 tablet 0     olaparib (LYNPARZA) 150 MG tablet Take 2 tablets (300 mg) by mouth 2 times daily 120 tablet 0         ALLERGIES:  Allergies   Allergen Reactions     Aprepitant Difficulty breathing and Shortness Of Breath     Fosaprepitant Unknown     Allergy added per chart review. [facility]     Armoracia Rusticana Ext (Horseradish) Hives     Carboprost GI Disturbance     Diarrhea     Codeine Nausea and Vomiting         PAST MEDICAL HISTORY:  BRCA2 gene mutation.  Bilateral breast cancer as outlined above.  9/2022 Galleri test - negative for malignancy.    Gynecologic history:  Age of menarche at 15.  3 children/triplets -- 1 boy and twin girls born 1/2022.  Age of first pregnancy at 41.  Used 10 years of OCPs no HRT, IVF for fertility  treatment.      PAST SURGICAL HISTORY:  Past Surgical History:   Procedure Laterality Date     BIOPSY, BREAST, WITH RADIOFREQUENCY IDENTIFICATION Bilateral 2024    Procedure: bilateral tag localized node excision;  Surgeon: Fanny Little MD;  Location:  OR      SECTION       DILATION AND CURETTAGE      retained placenta post-partum 2 weeks     ENT SURGERY      tonsillectomy     EXCISE CYST GENERIC (LOCATION) Left 2024    Procedure: Excision Left Axillary Cyst;  Surgeon: Fanny Little MD;  Location:  OR     GYN SURGERY      egg retrieval for IVF     INSERT PORT VASCULAR ACCESS Right 2023    Procedure: INSERTION, VASCULAR ACCESS PORT;  Surgeon: Fanny Little MD;  Location:  OR     KNEE SURGERY Left      LAPAROSCOPIC HYSTERECTOMY TOTAL, BILATERAL SALPINGO-OOPHORECTOMY, NODE DISSECTION, COMBINED Bilateral 2024    Procedure: laparoscopic removal of uterus, cervix, both ovaries, cystoscopy-BILATERAL;  Surgeon: Hiral Davis MD;  Location:  OR     MASTECTOMY SIMPLE BILATERAL, SENTINEL NODE BILATERAL, COMBINED Bilateral 2024    Procedure: Bilateral skin sparing mastectomies with bilateral sentinel lymph node biopsy;  Surgeon: Fanny Little MD;  Location:  OR     RECONSTRUCT BREAST, INSERT TISSUE EXPANDER BILATERAL, COMBINED Bilateral 2024    Procedure: BILATERAL FIRST STAGE BREAST RECONSTRUCTION WITH TISSUE EXPANDERS AND ACELLULAR DERMAL MATRIX;  Surgeon: Vasiliy Yee MD;  Location:  OR     REMOVE PORT VASCULAR ACCESS N/A 2024    Procedure: port removal;  Surgeon: Fanny Little MD;  Location:  OR         SOCIAL HISTORY:  Social History     Socioeconomic History     Marital status:      Spouse name: Not on file     Number of children: Not on file     Years of education: Not on file     Highest education level: Not on file   Occupational History     Not on file   Tobacco Use     Smoking status: Never     Passive  exposure: Never     Smokeless tobacco: Never   Vaping Use     Vaping status: Never Used   Substance and Sexual Activity     Alcohol use: Not Currently     Comment: rarely     Drug use: Not Currently     Sexual activity: Not on file   Other Topics Concern     Parent/sibling w/ CABG, MI or angioplasty before 65F 55M? No   Social History Narrative     Not on file     Social Drivers of Health     Financial Resource Strain: Low Risk  (4/9/2024)    Financial Resource Strain      Within the past 12 months, have you or your family members you live with been unable to get utilities (heat, electricity) when it was really needed?: No   Food Insecurity: Low Risk  (4/9/2024)    Food Insecurity      Within the past 12 months, did you worry that your food would run out before you got money to buy more?: No      Within the past 12 months, did the food you bought just not last and you didn t have money to get more?: No   Transportation Needs: Low Risk  (4/9/2024)    Transportation Needs      Within the past 12 months, has lack of transportation kept you from medical appointments, getting your medicines, non-medical meetings or appointments, work, or from getting things that you need?: No   Physical Activity: Insufficiently Active (11/2/2023)    [facility]    Exercise Vital Sign      Days of Exercise per Week: 4 days      Minutes of Exercise per Session: 30 min   Stress: Not on file   Social Connections: Unknown (12/22/2021)    Received from [facility], [facility]    Social Connections      Frequency of Communication with Friends and Family: Not on file   Interpersonal Safety: Low Risk  (9/25/2024)    Interpersonal Safety      Do you feel physically and emotionally safe where you currently live?: Yes      Within the past 12 months, have you been hit, slapped, kicked or otherwise physically hurt by someone?: No      Within the past 12  months, have you been humiliated or emotionally abused in other ways by your partner or ex-partner?: No   Housing Stability: Low Risk  (2024)    Housing Stability      Do you have housing? : Yes      Are you worried about losing your housing?: No         FAMILY HISTORY:  Paternal grandmother and many paternal grandfathers with breast cancer; cousin with DCIS tested positive for BRCA1.  No ovarian, colon, or prostate cancer.      PHYSICAL EXAM:  Vital signs:     ECO  GENERAL: No acute distress.  EYES: No scleral icterus. No overt erythema.  LYMPH: No palpable lymphadenopathy in the cervical, supraclavicular, or axillary regions.  Bilateral breasts are surgically absent with expanders in place and no fluid or masses or skin erythema.  RESPIRATORY: No audible cough, wheezing, or labored breathing.  MUSCULOSKELETAL: Range of motion in the neck, shoulders, and arms appear normal.  SKIN: No overt rashes, discolorations, or lesions over the face and neck.  NEUROLOGIC: Alert.  No overt tremors.            ASSESSMENT/PLAN:     Angie is a very pleasant 44-year-old female who presented initially with stage IIb grade 3 invasive lobular carcinoma ER/MI positive HER2/markus negative Ki-67 70%, BRCA2 positive      Status post neoadjuvant chemotherapy with dose dense AC followed by Taxol and bilateral mastectomies with bilateral axillary lymph node excision on May 2024       1 Breast cancer  -continue olaparib dose reduce to 300 in am and 150 in the pm markers and MD visit in mid February  -Continue anastrozole  -kisquali after lymparza  -reconstruction in the upcoming year, will time with lymparza.       2 weight loss  We discussed metformin but would recommend holding of that or naltrexone which she has a prescription of for low because of issues causing GI upset with that        Total time spent on day of visit, including review of tests, obtaining/reviewing separately obtained history, ordering  medications/tests/procedures, communicating with PCP/consultants, and documenting in electronic medical record: 60 minutes               Total time spent on day of visit, including review of tests, obtaining/reviewing separately obtained history, ordering medications/tests/procedures, communicating with PCP/consultants, and documenting in electronic medical record 60  minutes       Again, thank you for allowing me to participate in the care of your patient.        Sincerely,        Flynn Hikcs MD    Electronically signed

## 2024-12-23 NOTE — NURSING NOTE
Current patient location: [address] [place] [id number]    Is the patient currently in the state of [place]? YES    Visit mode:VIDEO    If the visit is dropped, the patient can be reconnected by:VIDEO VISIT: Send to e-mail at: [email or web address]    Will anyone else be joining the visit? NO  (If patient encounters technical issues they should call [phone]    Are changes needed to the allergy or medication list? Pt stated no changes to allergies and Pt stated no med changes    Are refills needed on medications prescribed by this physician? NO    Rooming Documentation:  Unable to complete questionnaire(s) due to time    Reason for visit: RECHECK    Tiffani MAST

## 2024-12-23 NOTE — LETTER
12/23/2024      Angie Michael  [address] [place] [id number]      Dear Colleague,    Thank you for referring your patient, Angie Michael, to the [facility]. Please see a copy of my visit note below.    Virtual Visit Details    Type of service:  Video Visit     Originating Location (pt. Location): Home    Distant Location (provider location):  On-site  Platform used for Video Visit: [facility]    Hematology/Oncology Established Patient Note      Today's Date:  12/23/2024    Reason for visit: Bilateral breast cancer, BRCA-2 mutation positive.    HISTORY OF PRESENT ILLNESS: Angie Michael is a 44 year old female with BRCA2 gene mutation who presents with the following oncologic history:  1.  10/05/2023: High risk surveillance breast MRI showed right breast 3.6 x 3 x 3.2 cm irregular enhancing mass at 10:00, 0.9 x 0.8 x 0.9 cm second mass at 9:00, 1 x 0.9 x 1.2 cm third mass at 9:30, other suspicious foci of enhancement throughout the breast.  Left breast with 9.9 x 5.2 x 6.7 cm suspicious non-mass enhancement at 10:00-7:00; 1.6 x 1.3 x 1.7 cm enhancing mass at 4:00; 1.8 x 1.6 x 1.9 cm enhancing mass at 12:30; other suspicious foci of enhancement throughout the left breast.  One level 1 suspicious right axillary lymph node and one level 1 suspicious left axillary lymph node..  Developing suspicious interpectoral lymph node.  2.  10/12/2023: Bilateral diagnostic mammogram showed right breast with 3.2 x 2.7 x 4.1 cm irregular mass at 10:00, 6 cm from nipple; 0.9 x 0.6 x 1 cm irregular mass at 11:00, 2 cm from nipple and prominent right axillary lymph node.  Left breast with 1.4 x 1.1 x 1.7 cm mass at 12:00, 2 cm from nipple, 1.8 x 1 x 1.6 cm mass at 3:30, 4 cm from nipple, 1 x 0.9 x 1.1 cm mass at 3:00, 7 cm from nipple, multiple areas of hypoechogenicity with calcifications in the lateral left breast, and prominent left axillary lymph node.  3.  10/12/2023:  Biopsy of right breast at 10:00 showed grade 3 invasive lobular carcinoma, ER strongly positive at 81-90%, AL positive at 31-40%, HER2 equivocal with IHC = 2+, FISH negative, Ki-67 = 70%.  Biopsy of right axillary lymph node showed metastatic carcinoma.  Biopsy of left breast at 3:30 showed grade 2 invasive carcinoma with ductal and lobular features, ER positive at %, AL positive at 2%, HER2 equivocal with IHC = 2+, FISH negative, Ki-67 = 26%.  Biopsy of left axillary lymph node showed metastatic carcinoma.  4.  10/17/2023: PET/CT scan showed cluster of hypermetabolic right breast nodules with SUV max 14.9; 2.1 x 1 cm hypermetabolic level 1 right axillary lymph node with SUV max 5.5; hypermetabolic left breast nodule with SUV max 18.8 and several additional less intense hypermetabolic left breast nodules; 1.5 x 1.3 cm hypermetabolic level 1 left axillary lymph node with SUV max 4.  Small sclerotic focus of metabolic activity within the proximal tibial shaft with SUV max 3.7, indeterminate but unlikely to reflect metastatic disease.  Subcentimeter hypermetabolic focus along anterior margin of the left thyroid lobe, likely a thyroid nodule.  5.  11/03/2023: Thyroid ultrasound showed 0.7 x 0.6 x 0.5 cm solid hypoechoic nodule at the junction of the isthmus and left hemithyroid corresponding to increased uptake on PET/CT, consistent with TIRADS 4 nodule and subcentimeter TIRADS 3 nodule in the left inferior thyroid.  6. 11/3/2023: MR right tibia/fibula showed discrete focal 3 cm area of increased T2 signal and enhancement in mid shaft of right tibia with fat signal interspersed throughout this area with significant signal dropout on opposed phase imaging, favoring benign process.  7. 11/21/2023: Started neoadjuvant chemotherapy with dose-dense Adriamycin + Cytoxan with Neulasta support with Dr. Bev Ryan at [facility]. Reacted to [place].  8. 11/25/2023: Evaluated at [facility] ED with diarrhea and  dizziness, latter attributed to olanzapine -- this was discontinued.  9. 1/03/2024: Completed cycle 4 dose-dense AC.  10. 1/16/2024: Breast MRI showed persistent abnormal mass and nonmass enhancement in the left breast measuring 10.2 cm in AP dimension (unchanged).  Previously seen mass at the 12:30 position now measures 1.0 cm in greatest dimension (previously 1.9 cm). Previously seen seen mass at the 4:00 position now measures 1.1 cm in greatest dimension (previously 1.7 cm). Previously seen enlarged left axillary lymph node now has normal size and morphology. Previously seen intrapectoral lymph node now has normal size and morphology. Previously seen abnormal right breast enhancement has entirely resolved. Previously visualized enlarged right axillary lymph node now has normal size and morphology.  Other previously seen axillary lymph nodes appear stable.  11. 1/17/2024: Start of weekly paclitaxel for planned 12 weeks.  12. 2/9/2024: MR right tibia/fibula showed mid right tibial shaft bone lesion increased from 3.2 cm to 3.8 cm; speckled foci of marrow signal likely areas of red marrow reconversion.  13. 4/03/2024: Completion of cycle 12 paclitaxel.  14. 4/5/2024: MR right tibia/fibula showed stable in size and improved signal alteration of right tibial shaft focal marrow alteration, likely red marrow.  15. 5/08/2024: Bilateral mastectomies with bilateral axillary sentinel lymph node excision under c/o Dr. Fnany Little.  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 Right breast with no residual invasive carcinoma; LCIS present; treatment related changes, 4 right axillary sentinel lymph nodes present, ypT0-N0.  16. 6/04/2024: Started Zoladex and anastrozole (latter on 6/11/2024).  17. 7/2/2024-8/7/2024: Adjuvant  radiation therapy to left chest wall and regional nodes.  18.  8/8/2024: Started adjuvant olaparib 300 mg twice daily.  19. 9/9/2024: PET/CT scan showed no evidence of FDG avid malignancy; subcentimeter mildly FDG avid right axillary lymph node favored reactive; stable mild focal FDG uptake of the right mid tibial diaphysis favored benign; persistent FDG uptake within subcentimeter left isthmus thyroid nodule.   20. 9/25/2024: Laparoscopic resection of uterus, cervix, bilateral ovaries under care of Dr. Hiral Bello. Pathology negative     INTERVAL HISTORY:  Angie is here for follow up. Flying for the first time which was better. Not sleeping well. Some hot flashes at night. Not not interested in medication management of this.  Main complaints with the lab of her fatigue and GI upset.      REVIEW OF SYSTEMS:   14 point ROS was reviewed and is negative other than as noted above in HPI.       HOME MEDICATIONS:  Current Outpatient Medications   Medication Sig Dispense Refill     anastrozole (ARIMIDEX) 1 MG tablet Take 1 tablet (1 mg) by mouth daily 90 tablet 3     [START ON 1/5/2025] olaparib (LYNPARZA) 150 MG tablet Take 2 tablets (300 mg) by mouth 2 times daily 120 tablet 0     olaparib (LYNPARZA) 150 MG tablet Take 2 tablets (300 mg) by mouth 2 times daily 120 tablet 0         ALLERGIES:  Allergies   Allergen Reactions     Aprepitant Difficulty breathing and Shortness Of Breath     Fosaprepitant Unknown     Allergy added per chart review. [facility]     Armoracia Rusticana Ext (Horseradish) Hives     Carboprost GI Disturbance     Diarrhea     Codeine Nausea and Vomiting         PAST MEDICAL HISTORY:  BRCA2 gene mutation.  Bilateral breast cancer as outlined above.  9/2022 Galleri test - negative for malignancy.    Gynecologic history:  Age of menarche at 15.  3 children/triplets -- 1 boy and twin girls born 1/2022.  Age of first pregnancy at 41.  Used 10 years of OCPs no HRT, IVF for fertility  treatment.      PAST SURGICAL HISTORY:  Past Surgical History:   Procedure Laterality Date     BIOPSY, BREAST, WITH RADIOFREQUENCY IDENTIFICATION Bilateral 2024    Procedure: bilateral tag localized node excision;  Surgeon: Fanny Little MD;  Location:  OR      SECTION       DILATION AND CURETTAGE      retained placenta post-partum 2 weeks     ENT SURGERY      tonsillectomy     EXCISE CYST GENERIC (LOCATION) Left 2024    Procedure: Excision Left Axillary Cyst;  Surgeon: Fanny Little MD;  Location:  OR     GYN SURGERY      egg retrieval for IVF     INSERT PORT VASCULAR ACCESS Right 2023    Procedure: INSERTION, VASCULAR ACCESS PORT;  Surgeon: Fanny Little MD;  Location:  OR     KNEE SURGERY Left      LAPAROSCOPIC HYSTERECTOMY TOTAL, BILATERAL SALPINGO-OOPHORECTOMY, NODE DISSECTION, COMBINED Bilateral 2024    Procedure: laparoscopic removal of uterus, cervix, both ovaries, cystoscopy-BILATERAL;  Surgeon: Hiral Davis MD;  Location:  OR     MASTECTOMY SIMPLE BILATERAL, SENTINEL NODE BILATERAL, COMBINED Bilateral 2024    Procedure: Bilateral skin sparing mastectomies with bilateral sentinel lymph node biopsy;  Surgeon: Fanny Little MD;  Location:  OR     RECONSTRUCT BREAST, INSERT TISSUE EXPANDER BILATERAL, COMBINED Bilateral 2024    Procedure: BILATERAL FIRST STAGE BREAST RECONSTRUCTION WITH TISSUE EXPANDERS AND ACELLULAR DERMAL MATRIX;  Surgeon: Vasiliy Yee MD;  Location:  OR     REMOVE PORT VASCULAR ACCESS N/A 2024    Procedure: port removal;  Surgeon: Fanny Little MD;  Location:  OR         SOCIAL HISTORY:  Social History     Socioeconomic History     Marital status:      Spouse name: Not on file     Number of children: Not on file     Years of education: Not on file     Highest education level: Not on file   Occupational History     Not on file   Tobacco Use     Smoking status: Never     Passive  exposure: Never     Smokeless tobacco: Never   Vaping Use     Vaping status: Never Used   Substance and Sexual Activity     Alcohol use: Not Currently     Comment: rarely     Drug use: Not Currently     Sexual activity: Not on file   Other Topics Concern     Parent/sibling w/ CABG, MI or angioplasty before 65F 55M? No   Social History Narrative     Not on file     Social Drivers of Health     Financial Resource Strain: Low Risk  (4/9/2024)    Financial Resource Strain      Within the past 12 months, have you or your family members you live with been unable to get utilities (heat, electricity) when it was really needed?: No   Food Insecurity: Low Risk  (4/9/2024)    Food Insecurity      Within the past 12 months, did you worry that your food would run out before you got money to buy more?: No      Within the past 12 months, did the food you bought just not last and you didn t have money to get more?: No   Transportation Needs: Low Risk  (4/9/2024)    Transportation Needs      Within the past 12 months, has lack of transportation kept you from medical appointments, getting your medicines, non-medical meetings or appointments, work, or from getting things that you need?: No   Physical Activity: Insufficiently Active (11/2/2023)    [facility]    Exercise Vital Sign      Days of Exercise per Week: 4 days      Minutes of Exercise per Session: 30 min   Stress: Not on file   Social Connections: Unknown (12/22/2021)    Received from [facility], [facility]    Social Connections      Frequency of Communication with Friends and Family: Not on file   Interpersonal Safety: Low Risk  (9/25/2024)    Interpersonal Safety      Do you feel physically and emotionally safe where you currently live?: Yes      Within the past 12 months, have you been hit, slapped, kicked or otherwise physically hurt by someone?: No      Within the past 12  months, have you been humiliated or emotionally abused in other ways by your partner or ex-partner?: No   Housing Stability: Low Risk  (2024)    Housing Stability      Do you have housing? : Yes      Are you worried about losing your housing?: No         FAMILY HISTORY:  Paternal grandmother and many paternal grandfathers with breast cancer; cousin with DCIS tested positive for BRCA1.  No ovarian, colon, or prostate cancer.      PHYSICAL EXAM:  Vital signs:     ECO  GENERAL: No acute distress.  EYES: No scleral icterus. No overt erythema.  LYMPH: No palpable lymphadenopathy in the cervical, supraclavicular, or axillary regions.  Bilateral breasts are surgically absent with expanders in place and no fluid or masses or skin erythema.  RESPIRATORY: No audible cough, wheezing, or labored breathing.  MUSCULOSKELETAL: Range of motion in the neck, shoulders, and arms appear normal.  SKIN: No overt rashes, discolorations, or lesions over the face and neck.  NEUROLOGIC: Alert.  No overt tremors.            ASSESSMENT/PLAN:     Angie is a very pleasant 44-year-old female who presented initially with stage IIb grade 3 invasive lobular carcinoma ER/WA positive HER2/markus negative Ki-67 70%, BRCA2 positive      Status post neoadjuvant chemotherapy with dose dense AC followed by Taxol and bilateral mastectomies with bilateral axillary lymph node excision on May 2024       1 Breast cancer  -continue olaparib dose reduce to 300 in am and 150 in the pm markers and MD visit in mid February  -Continue anastrozole  -kisquali after lymparza  -reconstruction in the upcoming year, will time with lymparza.       2 weight loss  We discussed metformin but would recommend holding of that or naltrexone which she has a prescription of for low because of issues causing GI upset with that        Total time spent on day of visit, including review of tests, obtaining/reviewing separately obtained history, ordering  medications/tests/procedures, communicating with PCP/consultants, and documenting in electronic medical record: 60 minutes               Total time spent on day of visit, including review of tests, obtaining/reviewing separately obtained history, ordering medications/tests/procedures, communicating with PCP/consultants, and documenting in electronic medical record 60  minutes       Again, thank you for allowing me to participate in the care of your patient.        Sincerely,        Flynn Hicks MD    Electronically signed

## 2025-01-03 LAB
BKR LAB AP ADD'L TEST STATUS: NORMAL
BKR PATH ADDL TEST FINAL COMMENTS: NORMAL

## 2025-01-12 ENCOUNTER — HEALTH MAINTENANCE LETTER (OUTPATIENT)
Age: 45
End: 2025-01-12

## 2025-01-13 DIAGNOSIS — Z17.0 MALIGNANT NEOPLASM OF UPPER-OUTER QUADRANT OF RIGHT BREAST IN FEMALE, ESTROGEN RECEPTOR POSITIVE (H): Primary | ICD-10-CM

## 2025-01-13 DIAGNOSIS — C50.411 MALIGNANT NEOPLASM OF UPPER-OUTER QUADRANT OF RIGHT BREAST IN FEMALE, ESTROGEN RECEPTOR POSITIVE (H): Primary | ICD-10-CM

## 2025-01-15 ENCOUNTER — DOCUMENTATION ONLY (OUTPATIENT)
Dept: ONCOLOGY | Facility: CLINIC | Age: 45
End: 2025-01-15
Payer: COMMERCIAL

## 2025-01-15 DIAGNOSIS — C50.411 MALIGNANT NEOPLASM OF UPPER-OUTER QUADRANT OF RIGHT BREAST IN FEMALE, ESTROGEN RECEPTOR POSITIVE (H): Primary | ICD-10-CM

## 2025-01-15 DIAGNOSIS — Z15.09 BRCA2 GENE MUTATION POSITIVE: ICD-10-CM

## 2025-01-15 DIAGNOSIS — Z17.0 MALIGNANT NEOPLASM OF UPPER-OUTER QUADRANT OF RIGHT BREAST IN FEMALE, ESTROGEN RECEPTOR POSITIVE (H): Primary | ICD-10-CM

## 2025-01-15 DIAGNOSIS — Z15.01 BRCA2 GENE MUTATION POSITIVE: ICD-10-CM

## 2025-01-15 NOTE — PROGRESS NOTES
Oral Chemotherapy Monitoring Program  Lab Follow Up     Patient currently on olaparib (Lynparza).  Reviewed lab results from 1/14/25.           Assessment & Plan:  No significant abnormalities noted, okay to continue olaparib as prescribed. CMP results not included in fax results, unclear if done at outside facility. CMP results have been stable for duration of therapy so okay to check in 1 month.     Follow-Up:  Labs and Lassi in 1 month    Valente Mckinley, PharmD, [facility]  Hematology/Oncology Clinical Pharmacist  [facility] and Oral Chemotherapy  [phone]

## 2025-02-13 ENCOUNTER — DOCUMENTATION ONLY (OUTPATIENT)
Dept: PHARMACY | Facility: CLINIC | Age: 45
End: 2025-02-13
Payer: COMMERCIAL

## 2025-02-13 NOTE — PROGRESS NOTES
Oral Chemotherapy Monitoring Program  Lab Follow Up    Reviewed lab results from 2/12/25.        9/24/2024    12:00 PM 9/30/2024    10:00 AM 10/22/2024     2:00 PM 11/18/2024    11:00 AM 11/21/2024     6:00 AM 1/15/2025     2:00 PM 2/13/2025    12:00 PM   ORAL CHEMOTHERAPY   Assessment Type Lab Monitoring;Chart Review Refill Lab Monitoring Refill Lab Monitoring Lab Monitoring Lab Monitoring   Diagnosis Code Breast Cancer Breast Cancer Breast Cancer Breast Cancer Breast Cancer Breast Cancer Breast Cancer   Providers Dr. Sy Dan   Clinic Name/Location [facility]   Drug Name Lynparza (olaparib) Lynparza (olaparib) Lynparza (olaparib) Lynparza (olaparib) Lynparza (olaparib) Lynparza (olaparib) Lynparza (olaparib)   Dose 300 mg 300 mg 300 mg 300 mg 300 mg 300 mg 150 mg   Current Schedule BID BID BID BID BID  --   Cycle Details Continuous Continuous Continuous Continuous Continuous Continuous Continuous   Start Date of Last Cycle 9/7/2024         Planned next cycle start date 10/7/2024         Adverse Effects No AE identified during assessment  Increased Creatinine    No AE identified during assessment   Increased Creatinine   Grade 1       Pharmacist intervention(Increased creatinine)   No       Is the dose as ordered appropriate for the patient? Yes      Yes       Labs:    Assessment  No concerning abnormalities at this time.     Plan   Continue olaparib   Lassi 3/7     Kuldip Rosenbaum  Pharmacy Intern   [facility]   [phone]

## 2025-02-17 DIAGNOSIS — Z15.01 BRCA2 GENE MUTATION POSITIVE: ICD-10-CM

## 2025-02-17 DIAGNOSIS — Z15.09 BRCA2 GENE MUTATION POSITIVE: ICD-10-CM

## 2025-02-17 DIAGNOSIS — Z17.0 MALIGNANT NEOPLASM OF UPPER-OUTER QUADRANT OF RIGHT BREAST IN FEMALE, ESTROGEN RECEPTOR POSITIVE (H): Primary | ICD-10-CM

## 2025-02-17 DIAGNOSIS — C50.411 MALIGNANT NEOPLASM OF UPPER-OUTER QUADRANT OF RIGHT BREAST IN FEMALE, ESTROGEN RECEPTOR POSITIVE (H): Primary | ICD-10-CM

## 2025-03-07 ENCOUNTER — ONCOLOGY VISIT (OUTPATIENT)
Dept: ONCOLOGY | Facility: CLINIC | Age: 45
End: 2025-03-07
Attending: INTERNAL MEDICINE
Payer: COMMERCIAL

## 2025-03-07 VITALS
BODY MASS INDEX: 25.88 KG/M2 | HEIGHT: 66 IN | HEART RATE: 67 BPM | SYSTOLIC BLOOD PRESSURE: 122 MMHG | RESPIRATION RATE: 16 BRPM | DIASTOLIC BLOOD PRESSURE: 89 MMHG | WEIGHT: 161 LBS | OXYGEN SATURATION: 100 %

## 2025-03-07 DIAGNOSIS — Z79.811 LONG TERM (CURRENT) USE OF AROMATASE INHIBITORS: Primary | ICD-10-CM

## 2025-03-07 DIAGNOSIS — M89.9 BONE LESION: ICD-10-CM

## 2025-03-07 DIAGNOSIS — Z17.0 MALIGNANT NEOPLASM OF UPPER-OUTER QUADRANT OF RIGHT BREAST IN FEMALE, ESTROGEN RECEPTOR POSITIVE (H): ICD-10-CM

## 2025-03-07 DIAGNOSIS — Z15.01 BRCA2 GENE MUTATION POSITIVE: ICD-10-CM

## 2025-03-07 DIAGNOSIS — Z15.09 BRCA2 GENE MUTATION POSITIVE: ICD-10-CM

## 2025-03-07 DIAGNOSIS — E55.9 VITAMIN D DEFICIENCY: ICD-10-CM

## 2025-03-07 DIAGNOSIS — C50.411 MALIGNANT NEOPLASM OF UPPER-OUTER QUADRANT OF RIGHT BREAST IN FEMALE, ESTROGEN RECEPTOR POSITIVE (H): ICD-10-CM

## 2025-03-07 LAB
ALBUMIN SERPL BCG-MCNC: 4.7 G/DL (ref 3.5–5.2)
ALP SERPL-CCNC: 78 U/L (ref 40–150)
ALT SERPL W P-5'-P-CCNC: 12 U/L (ref 0–50)
ANION GAP SERPL CALCULATED.3IONS-SCNC: 10 MMOL/L (ref 7–15)
AST SERPL W P-5'-P-CCNC: 19 U/L (ref 0–45)
BASOPHILS # BLD AUTO: 0 10E3/UL (ref 0–0.2)
BASOPHILS NFR BLD AUTO: 0 %
BILIRUB SERPL-MCNC: 1 MG/DL
BUN SERPL-MCNC: 12.9 MG/DL (ref 6–20)
CALCIUM SERPL-MCNC: 9.8 MG/DL (ref 8.8–10.4)
CANCER AG15-3 SERPL-ACNC: 17 U/ML
CEA SERPL-MCNC: 1 NG/ML
CHLORIDE SERPL-SCNC: 101 MMOL/L (ref 98–107)
CREAT SERPL-MCNC: 0.91 MG/DL (ref 0.51–0.95)
EGFRCR SERPLBLD CKD-EPI 2021: 79 ML/MIN/1.73M2
EOSINOPHIL # BLD AUTO: 0.1 10E3/UL (ref 0–0.7)
EOSINOPHIL NFR BLD AUTO: 1 %
ERYTHROCYTE [DISTWIDTH] IN BLOOD BY AUTOMATED COUNT: 13.1 % (ref 10–15)
GLUCOSE SERPL-MCNC: 99 MG/DL (ref 70–99)
HCO3 SERPL-SCNC: 28 MMOL/L (ref 22–29)
HCT VFR BLD AUTO: 42.4 % (ref 35–47)
HGB BLD-MCNC: 14.3 G/DL (ref 11.7–15.7)
IMM GRANULOCYTES # BLD: 0 10E3/UL
IMM GRANULOCYTES NFR BLD: 0 %
LYMPHOCYTES # BLD AUTO: 1.4 10E3/UL (ref 0.8–5.3)
LYMPHOCYTES NFR BLD AUTO: 25 %
MCH RBC QN AUTO: 31.8 PG (ref 26.5–33)
MCHC RBC AUTO-ENTMCNC: 33.7 G/DL (ref 31.5–36.5)
MCV RBC AUTO: 94 FL (ref 78–100)
MONOCYTES # BLD AUTO: 0.3 10E3/UL (ref 0–1.3)
MONOCYTES NFR BLD AUTO: 5 %
NEUTROPHILS # BLD AUTO: 3.8 10E3/UL (ref 1.6–8.3)
NEUTROPHILS NFR BLD AUTO: 68 %
NRBC # BLD AUTO: 0 10E3/UL
NRBC BLD AUTO-RTO: 0 /100
PLATELET # BLD AUTO: 237 10E3/UL (ref 150–450)
POTASSIUM SERPL-SCNC: 4.1 MMOL/L (ref 3.4–5.3)
PROT SERPL-MCNC: 7.8 G/DL (ref 6.4–8.3)
RBC # BLD AUTO: 4.49 10E6/UL (ref 3.8–5.2)
SODIUM SERPL-SCNC: 139 MMOL/L (ref 135–145)
VIT D+METAB SERPL-MCNC: 27 NG/ML (ref 20–50)
WBC # BLD AUTO: 5.6 10E3/UL (ref 4–11)

## 2025-03-07 PROCEDURE — G0463 HOSPITAL OUTPT CLINIC VISIT: HCPCS | Performed by: INTERNAL MEDICINE

## 2025-03-07 PROCEDURE — 99215 OFFICE O/P EST HI 40 MIN: CPT | Performed by: INTERNAL MEDICINE

## 2025-03-07 PROCEDURE — 82310 ASSAY OF CALCIUM: CPT | Performed by: INTERNAL MEDICINE

## 2025-03-07 PROCEDURE — 86300 IMMUNOASSAY TUMOR CA 15-3: CPT | Performed by: INTERNAL MEDICINE

## 2025-03-07 PROCEDURE — 85004 AUTOMATED DIFF WBC COUNT: CPT | Performed by: INTERNAL MEDICINE

## 2025-03-07 PROCEDURE — 82306 VITAMIN D 25 HYDROXY: CPT | Performed by: INTERNAL MEDICINE

## 2025-03-07 PROCEDURE — 85018 HEMOGLOBIN: CPT | Performed by: INTERNAL MEDICINE

## 2025-03-07 PROCEDURE — 82378 CARCINOEMBRYONIC ANTIGEN: CPT | Performed by: INTERNAL MEDICINE

## 2025-03-07 PROCEDURE — G2211 COMPLEX E/M VISIT ADD ON: HCPCS | Performed by: INTERNAL MEDICINE

## 2025-03-07 PROCEDURE — 99417 PROLNG OP E/M EACH 15 MIN: CPT | Performed by: INTERNAL MEDICINE

## 2025-03-07 PROCEDURE — 82247 BILIRUBIN TOTAL: CPT | Performed by: INTERNAL MEDICINE

## 2025-03-07 PROCEDURE — 36415 COLL VENOUS BLD VENIPUNCTURE: CPT | Performed by: INTERNAL MEDICINE

## 2025-03-07 RX ORDER — TRAZODONE HYDROCHLORIDE 50 MG/1
50 TABLET ORAL AT BEDTIME
Qty: 90 TABLET | Refills: 3 | Status: SHIPPED | OUTPATIENT
Start: 2025-03-07

## 2025-03-07 ASSESSMENT — PAIN SCALES - GENERAL: PAINLEVEL_OUTOF10: NO PAIN (0)

## 2025-03-07 NOTE — PROGRESS NOTES
"Oncology Rooming Note    March 7, 2025 3:05 PM   Angie Michael is a 44 year old female who presents for:    Chief Complaint   Patient presents with    Oncology Clinic Visit     Malignant neoplasm of overlapping sites of both breasts in female, estrogen receptor positive (H)     Initial Vitals: /89 (BP Location: Right arm, Patient Position: Sitting, Cuff Size: Adult Regular)   Pulse 67   Resp 16   Ht 1.676 m (5' 6\")   Wt 73 kg (161 lb)   SpO2 100%   BMI 25.99 kg/m   Estimated body mass index is 25.99 kg/m  as calculated from the following:    Height as of this encounter: 1.676 m (5' 6\").    Weight as of this encounter: 73 kg (161 lb). Body surface area is 1.84 meters squared.  No Pain (0) Comment: Data Unavailable   No LMP recorded.  Allergies reviewed: Yes  Medications reviewed: Yes    Medications: Medication refills not needed today.  Pharmacy name entered into [facility]:    [id number] - [place], [place] - [address] [facility] [address] [facility] [place], [place] - [address] [facility] - [place], [place] - [address]    Frailty Screening:   Is the patient here for a new oncology consult visit in cancer care? 2. No    PHQ9:  Did this patient require a PHQ9?: No      Clinical concerns: None      Yohana Argueta MA              "

## 2025-03-07 NOTE — PROGRESS NOTES
[facility]    Hematology/Oncology Established Patient Note      Today's Date:  March 7, 2025    Reason for visit: Bilateral breast cancer, BRCA-2 mutation positive.    HISTORY OF PRESENT ILLNESS: Angie Michael is a 44 year old female with BRCA2 gene mutation who presents with the following oncologic history:  1.  10/05/2023: High risk surveillance breast MRI showed right breast 3.6 x 3 x 3.2 cm irregular enhancing mass at 10:00, 0.9 x 0.8 x 0.9 cm second mass at 9:00, 1 x 0.9 x 1.2 cm third mass at 9:30, other suspicious foci of enhancement throughout the breast.  Left breast with 9.9 x 5.2 x 6.7 cm suspicious non-mass enhancement at 10:00-7:00; 1.6 x 1.3 x 1.7 cm enhancing mass at 4:00; 1.8 x 1.6 x 1.9 cm enhancing mass at 12:30; other suspicious foci of enhancement throughout the left breast.  One level 1 suspicious right axillary lymph node and one level 1 suspicious left axillary lymph node..  Developing suspicious interpectoral lymph node.  2.  10/12/2023: Bilateral diagnostic mammogram showed right breast with 3.2 x 2.7 x 4.1 cm irregular mass at 10:00, 6 cm from nipple; 0.9 x 0.6 x 1 cm irregular mass at 11:00, 2 cm from nipple and prominent right axillary lymph node.  Left breast with 1.4 x 1.1 x 1.7 cm mass at 12:00, 2 cm from nipple, 1.8 x 1 x 1.6 cm mass at 3:30, 4 cm from nipple, 1 x 0.9 x 1.1 cm mass at 3:00, 7 cm from nipple, multiple areas of hypoechogenicity with calcifications in the lateral left breast, and prominent left axillary lymph node.  3.  10/12/2023: Biopsy of right breast at 10:00 showed grade 3 invasive lobular carcinoma, ER strongly positive at 81-90%, AK positive at 31-40%, HER2 equivocal with IHC = 2+, FISH negative, Ki-67 = 70%.  Biopsy of right axillary lymph node showed metastatic carcinoma.  Biopsy of left breast at 3:30 showed grade 2 invasive carcinoma with ductal and lobular features, ER positive at %, AK positive at 2%, HER2 equivocal with IHC = 2+,  FISH negative, Ki-67 = 26%.  Biopsy of left axillary lymph node showed metastatic carcinoma.  4.  10/17/2023: PET/CT scan showed cluster of hypermetabolic right breast nodules with SUV max 14.9; 2.1 x 1 cm hypermetabolic level 1 right axillary lymph node with SUV max 5.5; hypermetabolic left breast nodule with SUV max 18.8 and several additional less intense hypermetabolic left breast nodules; 1.5 x 1.3 cm hypermetabolic level 1 left axillary lymph node with SUV max 4.  Small sclerotic focus of metabolic activity within the proximal tibial shaft with SUV max 3.7, indeterminate but unlikely to reflect metastatic disease.  Subcentimeter hypermetabolic focus along anterior margin of the left thyroid lobe, likely a thyroid nodule.  5.  11/03/2023: Thyroid ultrasound showed 0.7 x 0.6 x 0.5 cm solid hypoechoic nodule at the junction of the isthmus and left hemithyroid corresponding to increased uptake on PET/CT, consistent with TIRADS 4 nodule and subcentimeter TIRADS 3 nodule in the left inferior thyroid.  6. 11/3/2023: MR right tibia/fibula showed discrete focal 3 cm area of increased T2 signal and enhancement in mid shaft of right tibia with fat signal interspersed throughout this area with significant signal dropout on opposed phase imaging, favoring benign process.  7. 11/21/2023: Started neoadjuvant chemotherapy with dose-dense Adriamycin + Cytoxan with Neulasta support with Dr. Bev Ryan at [facility]. Reacted to [place].  8. 11/25/2023: Evaluated at [facility] ED with diarrhea and dizziness, latter attributed to olanzapine -- this was discontinued.  9. 1/03/2024: Completed cycle 4 dose-dense AC.  10. 1/16/2024: Breast MRI showed persistent abnormal mass and nonmass enhancement in the left breast measuring 10.2 cm in AP dimension (unchanged).  Previously seen mass at the 12:30 position now measures 1.0 cm in greatest dimension (previously 1.9 cm). Previously seen seen mass at the 4:00 position now measures  1.1 cm in greatest dimension (previously 1.7 cm). Previously seen enlarged left axillary lymph node now has normal size and morphology. Previously seen intrapectoral lymph node now has normal size and morphology. Previously seen abnormal right breast enhancement has entirely resolved. Previously visualized enlarged right axillary lymph node now has normal size and morphology.  Other previously seen axillary lymph nodes appear stable.  11. 1/17/2024: Start of weekly paclitaxel for planned 12 weeks.  12. 2/9/2024: MR right tibia/fibula showed mid right tibial shaft bone lesion increased from 3.2 cm to 3.8 cm; speckled foci of marrow signal likely areas of red marrow reconversion.  13. 4/03/2024: Completion of cycle 12 paclitaxel.  14. 4/5/2024: MR right tibia/fibula showed stable in size and improved signal alteration of right tibial shaft focal marrow alteration, likely red marrow.  15. 5/08/2024: Bilateral mastectomies with bilateral axillary sentinel lymph node excision under c/o Dr. Fanny Little.  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 Right breast with no residual invasive carcinoma; LCIS present; treatment related changes, 4 right axillary sentinel lymph nodes present, [id number].  16. 6/04/2024: Started Zoladex and anastrozole (latter on 6/11/2024).  17. 7/2/2024-8/7/2024: Adjuvant radiation therapy to left chest wall and regional nodes.  18.  8/8/2024: Started adjuvant olaparib 300 mg twice daily.  19. 9/9/2024: PET/CT scan showed no evidence of FDG avid malignancy; subcentimeter mildly FDG avid right axillary lymph node favored reactive; stable mild focal FDG uptake of the right mid tibial diaphysis favored benign; persistent FDG uptake within subcentimeter left isthmus thyroid nodule.   20. 9/25/2024:  Laparoscopic resection of uterus, cervix, bilateral ovaries under care of Dr. Hiral Bello. Pathology negative     INTERVAL HISTORY:  Angie returns today for follow-up.  Clinically she is doing well.  We had done dose reduction in December where she went to 300 mg in the morning and 150 mg in the evening.  Less GI issues.  Has trouble sleeping at night has very young children at home        REVIEW OF SYSTEMS:.  14 point ROS was reviewed and is negative other than as noted above in HPI.       HOME MEDICATIONS:  Current Outpatient Medications   Medication Sig Dispense Refill    anastrozole (ARIMIDEX) 1 MG tablet Take 1 tablet (1 mg) by mouth daily 90 tablet 3    olaparib (LYNPARZA) 150 MG tablet Take 2 tablets (300 mg) by mouth in the morning and 1 tablet (150 mg) in the evening. 90 tablet 0    olaparib (LYNPARZA) 150 MG tablet Take 2 tablets (300 mg) by mouth in the morning and 1 tablet (150 mg) in the evening. 90 tablet 0         ALLERGIES:  Allergies   Allergen Reactions    Aprepitant Difficulty breathing and Shortness Of Breath    Fosaprepitant Unknown     Allergy added per chart review. [facility]    Armoracia Rusticana Ext (Horseradish) Hives    Carboprost GI Disturbance     Diarrhea    Codeine Nausea and Vomiting         PAST MEDICAL HISTORY:  BRCA2 gene mutation.  Bilateral breast cancer as outlined above.  2022 Galleri test - negative for malignancy.    Gynecologic history:  Age of menarche at 15.  3 children/triplets -- 1 boy and twin girls born 2022.  Age of first pregnancy at 41.  Used 10 years of OCPs no HRT, IVF for fertility treatment.      PAST SURGICAL HISTORY:  Past Surgical History:   Procedure Laterality Date    BIOPSY, BREAST, WITH RADIOFREQUENCY IDENTIFICATION Bilateral 2024    Procedure: bilateral tag localized node excision;  Surgeon: Fanny Little MD;  Location: [facility] OR     SECTION      DILATION AND CURETTAGE      retained placenta post-partum 2 weeks    ENT  SURGERY      tonsillectomy    EXCISE CYST GENERIC (LOCATION) Left 05/08/2024    Procedure: Excision Left Axillary Cyst;  Surgeon: Fanny Little MD;  Location:  OR    GYN SURGERY      egg retrieval for IVF    INSERT PORT VASCULAR ACCESS Right 11/20/2023    Procedure: INSERTION, VASCULAR ACCESS PORT;  Surgeon: Fanny Little MD;  Location:  OR    KNEE SURGERY Left     LAPAROSCOPIC HYSTERECTOMY TOTAL, BILATERAL SALPINGO-OOPHORECTOMY, NODE DISSECTION, COMBINED Bilateral 9/25/2024    Procedure: laparoscopic removal of uterus, cervix, both ovaries, cystoscopy-BILATERAL;  Surgeon: Hiral Davis MD;  Location:  OR    MASTECTOMY SIMPLE BILATERAL, SENTINEL NODE BILATERAL, COMBINED Bilateral 05/08/2024    Procedure: Bilateral skin sparing mastectomies with bilateral sentinel lymph node biopsy;  Surgeon: Fanny Little MD;  Location:  OR    RECONSTRUCT BREAST, INSERT TISSUE EXPANDER BILATERAL, COMBINED Bilateral 05/08/2024    Procedure: BILATERAL FIRST STAGE BREAST RECONSTRUCTION WITH TISSUE EXPANDERS AND ACELLULAR DERMAL MATRIX;  Surgeon: Vasiliy Yee MD;  Location:  OR    REMOVE PORT VASCULAR ACCESS N/A 05/08/2024    Procedure: port removal;  Surgeon: Fanny Little MD;  Location:  OR         SOCIAL HISTORY:  Social History     Socioeconomic History    Marital status:      Spouse name: Not on file    Number of children: Not on file    Years of education: Not on file    Highest education level: Not on file   Occupational History    Not on file   Tobacco Use    Smoking status: Never     Passive exposure: Never    Smokeless tobacco: Never   Vaping Use    Vaping status: Never Used   Substance and Sexual Activity    Alcohol use: Not Currently     Comment: rarely    Drug use: Not Currently    Sexual activity: Not on file   Other Topics Concern    Parent/sibling w/ CABG, MI or angioplasty before 65F 55M? No   Social History Narrative    Not on file     Social Drivers of Health      Financial Resource Strain: Low Risk  (4/9/2024)    Financial Resource Strain     Within the past 12 months, have you or your family members you live with been unable to get utilities (heat, electricity) when it was really needed?: No   Food Insecurity: Low Risk  (4/9/2024)    Food Insecurity     Within the past 12 months, did you worry that your food would run out before you got money to buy more?: No     Within the past 12 months, did the food you bought just not last and you didn t have money to get more?: No   Transportation Needs: Low Risk  (4/9/2024)    Transportation Needs     Within the past 12 months, has lack of transportation kept you from medical appointments, getting your medicines, non-medical meetings or appointments, work, or from getting things that you need?: No   Physical Activity: Insufficiently Active (11/2/2023)    [facility]    Exercise Vital Sign     Days of Exercise per Week: 4 days     Minutes of Exercise per Session: 30 min   Stress: Not on file   Social Connections: Unknown (12/22/2021)    [facility]    Social Connections     Frequency of Communication with Friends and Family: Not on file   Interpersonal Safety: Low Risk  (9/25/2024)    Interpersonal Safety     Do you feel physically and emotionally safe where you currently live?: Yes     Within the past 12 months, have you been hit, slapped, kicked or otherwise physically hurt by someone?: No     Within the past 12 months, have you been humiliated or emotionally abused in other ways by your partner or ex-partner?: No   Housing Stability: Low Risk  (4/9/2024)    Housing Stability     Do you have housing? : Yes     Are you worried about losing your housing?: No         FAMILY HISTORY:  Paternal grandmother and many paternal grandfathers with breast cancer; cousin with DCIS tested positive for BRCA1.  No ovarian,  colon, or prostate cancer.      PHYSICAL EXAM:  Vital signs:     ECO  GENERAL: No acute distress.  EYES: No scleral icterus. No overt erythema.  LYMPH: No palpable lymphadenopathy in the cervical, supraclavicular, or axillary regions.  Bilateral breast exam is normal status post mastectomies no evidence of recurrence RESPIRATORY: No audible cough, wheezing, or labored breathing.  MUSCULOSKELETAL: Range of motion in the neck, shoulders, and arms appear normal.  SKIN: No overt rashes, discolorations, or lesions over the face and neck.  NEUROLOGIC: Alert.  No overt tremors.            ASSESSMENT/PLAN:     Angie is a very pleasant 44-year-old female who presented initially with stage IIb grade 3 invasive lobular carcinoma ER/HI positive HER2/markus negative Ki-67 70%, BRCA2 positive      Status post neoadjuvant chemotherapy with dose dense AC followed by Taxol and bilateral mastectomies with bilateral axillary lymph node excision on May 2024       1 Breast cancer  -continue olaparib dose reduce to 300 in am and 150 in the pm markers and labs monthly see me back in 4 months  -Continue anastrozole  -kisquali after lymparza.  Completes olaparib in 2025  -reconstruction in the upcoming year, will time with lymparza.   2.  Bone density scan prior to next visit  3.  Vitamin D testing today along with markers and labs  4.  MRI of the tibia/fibular to follow-up on previous abnormality in that region  5.  Trazodone as needed for sleep      All questions answered to her satisfaction        Total time spent on day of visit, including review of tests, obtaining/reviewing separately obtained history, ordering medications/tests/procedures, communicating with PCP/consultants, and documenting in electronic medical record: 70 min

## 2025-03-07 NOTE — LETTER
3/7/2025      Angie Michael  [address] [place] [id number]      Dear Colleague,    Thank you for referring your patient, Angie Michael, to the [facility]. Please see a copy of my visit note below.    [facility]    Hematology/Oncology Established Patient Note      Today's Date:  March 7, 2025    Reason for visit: Bilateral breast cancer, BRCA-2 mutation positive.    HISTORY OF PRESENT ILLNESS: Angie Michael is a 44 year old female with BRCA2 gene mutation who presents with the following oncologic history:  1.  10/05/2023: High risk surveillance breast MRI showed right breast 3.6 x 3 x 3.2 cm irregular enhancing mass at 10:00, 0.9 x 0.8 x 0.9 cm second mass at 9:00, 1 x 0.9 x 1.2 cm third mass at 9:30, other suspicious foci of enhancement throughout the breast.  Left breast with 9.9 x 5.2 x 6.7 cm suspicious non-mass enhancement at 10:00-7:00; 1.6 x 1.3 x 1.7 cm enhancing mass at 4:00; 1.8 x 1.6 x 1.9 cm enhancing mass at 12:30; other suspicious foci of enhancement throughout the left breast.  One level 1 suspicious right axillary lymph node and one level 1 suspicious left axillary lymph node..  Developing suspicious interpectoral lymph node.  2.  10/12/2023: Bilateral diagnostic mammogram showed right breast with 3.2 x 2.7 x 4.1 cm irregular mass at 10:00, 6 cm from nipple; 0.9 x 0.6 x 1 cm irregular mass at 11:00, 2 cm from nipple and prominent right axillary lymph node.  Left breast with 1.4 x 1.1 x 1.7 cm mass at 12:00, 2 cm from nipple, 1.8 x 1 x 1.6 cm mass at 3:30, 4 cm from nipple, 1 x 0.9 x 1.1 cm mass at 3:00, 7 cm from nipple, multiple areas of hypoechogenicity with calcifications in the lateral left breast, and prominent left axillary lymph node.  3.  10/12/2023: Biopsy of right breast at 10:00 showed grade 3 invasive lobular carcinoma, ER strongly positive at 81-90%, HI positive at 31-40%, HER2 equivocal with IHC = 2+, FISH negative, Ki-67 = 70%.   Biopsy of right axillary lymph node showed metastatic carcinoma.  Biopsy of left breast at 3:30 showed grade 2 invasive carcinoma with ductal and lobular features, ER positive at %, ND positive at 2%, HER2 equivocal with IHC = 2+, FISH negative, Ki-67 = 26%.  Biopsy of left axillary lymph node showed metastatic carcinoma.  4.  10/17/2023: PET/CT scan showed cluster of hypermetabolic right breast nodules with SUV max 14.9; 2.1 x 1 cm hypermetabolic level 1 right axillary lymph node with SUV max 5.5; hypermetabolic left breast nodule with SUV max 18.8 and several additional less intense hypermetabolic left breast nodules; 1.5 x 1.3 cm hypermetabolic level 1 left axillary lymph node with SUV max 4.  Small sclerotic focus of metabolic activity within the proximal tibial shaft with SUV max 3.7, indeterminate but unlikely to reflect metastatic disease.  Subcentimeter hypermetabolic focus along anterior margin of the left thyroid lobe, likely a thyroid nodule.  5.  11/03/2023: Thyroid ultrasound showed 0.7 x 0.6 x 0.5 cm solid hypoechoic nodule at the junction of the isthmus and left hemithyroid corresponding to increased uptake on PET/CT, consistent with TIRADS 4 nodule and subcentimeter TIRADS 3 nodule in the left inferior thyroid.  6. 11/3/2023: MR right tibia/fibula showed discrete focal 3 cm area of increased T2 signal and enhancement in mid shaft of right tibia with fat signal interspersed throughout this area with significant signal dropout on opposed phase imaging, favoring benign process.  7. 11/21/2023: Started neoadjuvant chemotherapy with dose-dense Adriamycin + Cytoxan with Neulasta support with Dr. Bev Ryan at [facility]. Reacted to [facility].  8. 11/25/2023: Evaluated at [facility] ED with diarrhea and dizziness, latter attributed to olanzapine -- this was discontinued.  9. 1/03/2024: Completed cycle 4 dose-dense AC.  10. 1/16/2024: Breast MRI showed persistent abnormal mass and nonmass  enhancement in the left breast measuring 10.2 cm in AP dimension (unchanged).  Previously seen mass at the 12:30 position now measures 1.0 cm in greatest dimension (previously 1.9 cm). Previously seen seen mass at the 4:00 position now measures 1.1 cm in greatest dimension (previously 1.7 cm). Previously seen enlarged left axillary lymph node now has normal size and morphology. Previously seen intrapectoral lymph node now has normal size and morphology. Previously seen abnormal right breast enhancement has entirely resolved. Previously visualized enlarged right axillary lymph node now has normal size and morphology.  Other previously seen axillary lymph nodes appear stable.  11. 1/17/2024: Start of weekly paclitaxel for planned 12 weeks.  12. 2/9/2024: MR right tibia/fibula showed mid right tibial shaft bone lesion increased from 3.2 cm to 3.8 cm; speckled foci of marrow signal likely areas of red marrow reconversion.  13. 4/03/2024: Completion of cycle 12 paclitaxel.  14. 4/5/2024: MR right tibia/fibula showed stable in size and improved signal alteration of right tibial shaft focal marrow alteration, likely red marrow.  15. 5/08/2024: Bilateral mastectomies with bilateral axillary sentinel lymph node excision under c/o Dr. Fanny Little.  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 Right breast with no residual invasive carcinoma; LCIS present; treatment related changes, 4 right axillary sentinel lymph nodes present, [id number].  16. 6/04/2024: Started Zoladex and anastrozole (latter on 6/11/2024).  17. 7/2/2024-8/7/2024: Adjuvant radiation therapy to left chest wall and regional nodes.  18.  8/8/2024: Started adjuvant olaparib 300 mg twice daily.  19. 9/9/2024: PET/CT scan showed no evidence of FDG avid malignancy;  subcentimeter mildly FDG avid right axillary lymph node favored reactive; stable mild focal FDG uptake of the right mid tibial diaphysis favored benign; persistent FDG uptake within subcentimeter left isthmus thyroid nodule.   20. 9/25/2024: Laparoscopic resection of uterus, cervix, bilateral ovaries under care of Dr. Hiral Bello. Pathology negative     INTERVAL HISTORY:  Angie returns today for follow-up.  Clinically she is doing well.  We had done dose reduction in December where she went to 300 mg in the morning and 150 mg in the evening.  Less GI issues.  Has trouble sleeping at night has very young children at home        REVIEW OF SYSTEMS:.  14 point ROS was reviewed and is negative other than as noted above in HPI.       HOME MEDICATIONS:  Current Outpatient Medications   Medication Sig Dispense Refill     anastrozole (ARIMIDEX) 1 MG tablet Take 1 tablet (1 mg) by mouth daily 90 tablet 3     olaparib (LYNPARZA) 150 MG tablet Take 2 tablets (300 mg) by mouth in the morning and 1 tablet (150 mg) in the evening. 90 tablet 0     olaparib (LYNPARZA) 150 MG tablet Take 2 tablets (300 mg) by mouth in the morning and 1 tablet (150 mg) in the evening. 90 tablet 0         ALLERGIES:  Allergies   Allergen Reactions     Aprepitant Difficulty breathing and Shortness Of Breath     Fosaprepitant Unknown     Allergy added per chart review. [facility]     Armoracia Rusticana Ext (Horseradish) Hives     Carboprost GI Disturbance     Diarrhea     Codeine Nausea and Vomiting         PAST MEDICAL HISTORY:  BRCA2 gene mutation.  Bilateral breast cancer as outlined above.  9/2022 Galleri test - negative for malignancy.    Gynecologic history:  Age of menarche at 15.  3 children/triplets -- 1 boy and twin girls born 1/2022.  Age of first pregnancy at 41.  Used 10 years of OCPs no HRT, IVF for fertility treatment.      PAST SURGICAL HISTORY:  Past Surgical History:   Procedure Laterality Date     BIOPSY, BREAST, WITH  RADIOFREQUENCY IDENTIFICATION Bilateral 2024    Procedure: bilateral tag localized node excision;  Surgeon: Fanny Little MD;  Location:  OR      SECTION       DILATION AND CURETTAGE      retained placenta post-partum 2 weeks     ENT SURGERY      tonsillectomy     EXCISE CYST GENERIC (LOCATION) Left 2024    Procedure: Excision Left Axillary Cyst;  Surgeon: Fanny Little MD;  Location:  OR     GYN SURGERY      egg retrieval for IVF     INSERT PORT VASCULAR ACCESS Right 2023    Procedure: INSERTION, VASCULAR ACCESS PORT;  Surgeon: Fanny Little MD;  Location:  OR     KNEE SURGERY Left      LAPAROSCOPIC HYSTERECTOMY TOTAL, BILATERAL SALPINGO-OOPHORECTOMY, NODE DISSECTION, COMBINED Bilateral 2024    Procedure: laparoscopic removal of uterus, cervix, both ovaries, cystoscopy-BILATERAL;  Surgeon: Hiral Davis MD;  Location:  OR     MASTECTOMY SIMPLE BILATERAL, SENTINEL NODE BILATERAL, COMBINED Bilateral 2024    Procedure: Bilateral skin sparing mastectomies with bilateral sentinel lymph node biopsy;  Surgeon: Fanny Little MD;  Location:  OR     RECONSTRUCT BREAST, INSERT TISSUE EXPANDER BILATERAL, COMBINED Bilateral 2024    Procedure: BILATERAL FIRST STAGE BREAST RECONSTRUCTION WITH TISSUE EXPANDERS AND ACELLULAR DERMAL MATRIX;  Surgeon: Vasiliy Yee MD;  Location:  OR     REMOVE PORT VASCULAR ACCESS N/A 2024    Procedure: port removal;  Surgeon: Fanny Little MD;  Location:  OR         SOCIAL HISTORY:  Social History     Socioeconomic History     Marital status:      Spouse name: Not on file     Number of children: Not on file     Years of education: Not on file     Highest education level: Not on file   Occupational History     Not on file   Tobacco Use     Smoking status: Never     Passive exposure: Never     Smokeless tobacco: Never   Vaping Use     Vaping status: Never Used   Substance and Sexual Activity      Alcohol use: Not Currently     Comment: rarely     Drug use: Not Currently     Sexual activity: Not on file   Other Topics Concern     Parent/sibling w/ CABG, MI or angioplasty before 65F 55M? No   Social History Narrative     Not on file     Social Drivers of Health     Financial Resource Strain: Low Risk  (4/9/2024)    Financial Resource Strain      Within the past 12 months, have you or your family members you live with been unable to get utilities (heat, electricity) when it was really needed?: No   Food Insecurity: Low Risk  (4/9/2024)    Food Insecurity      Within the past 12 months, did you worry that your food would run out before you got money to buy more?: No      Within the past 12 months, did the food you bought just not last and you didn t have money to get more?: No   Transportation Needs: Low Risk  (4/9/2024)    Transportation Needs      Within the past 12 months, has lack of transportation kept you from medical appointments, getting your medicines, non-medical meetings or appointments, work, or from getting things that you need?: No   Physical Activity: Insufficiently Active (11/2/2023)    [facility]    Exercise Vital Sign      Days of Exercise per Week: 4 days      Minutes of Exercise per Session: 30 min   Stress: Not on file   Social Connections: Unknown (12/22/2021)    [facility], [facility]    Social Connections      Frequency of Communication with Friends and Family: Not on file   Interpersonal Safety: Low Risk  (9/25/2024)    Interpersonal Safety      Do you feel physically and emotionally safe where you currently live?: Yes      Within the past 12 months, have you been hit, slapped, kicked or otherwise physically hurt by someone?: No      Within the past 12 months, have you been humiliated or emotionally abused in other ways by your partner or ex-partner?: No   Housing Stability:  Low Risk  (2024)    Housing Stability      Do you have housing? : Yes      Are you worried about losing your housing?: No         FAMILY HISTORY:  Paternal grandmother and many paternal grandfathers with breast cancer; cousin with DCIS tested positive for BRCA1.  No ovarian, colon, or prostate cancer.      PHYSICAL EXAM:  Vital signs:     ECO  GENERAL: No acute distress.  EYES: No scleral icterus. No overt erythema.  LYMPH: No palpable lymphadenopathy in the cervical, supraclavicular, or axillary regions.  Bilateral breast exam is normal status post mastectomies no evidence of recurrence RESPIRATORY: No audible cough, wheezing, or labored breathing.  MUSCULOSKELETAL: Range of motion in the neck, shoulders, and arms appear normal.  SKIN: No overt rashes, discolorations, or lesions over the face and neck.  NEUROLOGIC: Alert.  No overt tremors.            ASSESSMENT/PLAN:     Angie is a very pleasant 44-year-old female who presented initially with stage IIb grade 3 invasive lobular carcinoma ER/LA positive HER2/markus negative Ki-67 70%, BRCA2 positive      Status post neoadjuvant chemotherapy with dose dense AC followed by Taxol and bilateral mastectomies with bilateral axillary lymph node excision on May 2024       1 Breast cancer  -continue olaparib dose reduce to 300 in am and 150 in the pm markers and labs monthly see me back in 4 months  -Continue anastrozole  -kisquali after lymparza.  Completes olaparib in 2025  -reconstruction in the upcoming year, will time with lymparza.   2.  Bone density scan prior to next visit  3.  Vitamin D testing today along with markers and labs  4.  MRI of the tibia/fibular to follow-up on previous abnormality in that region  5.  Trazodone as needed for sleep      All questions answered to her satisfaction        Total time spent on day of visit, including review of tests, obtaining/reviewing separately obtained history, ordering medications/tests/procedures,  "communicating with PCP/consultants, and documenting in electronic medical record: 70 min    Oncology Rooming Note    March 7, 2025 3:05 PM   Angie Michael is a 44 year old female who presents for:    Chief Complaint   Patient presents with     Oncology Clinic Visit     Malignant neoplasm of overlapping sites of both breasts in female, estrogen receptor positive (H)     Initial Vitals: /89 (BP Location: Right arm, Patient Position: Sitting, Cuff Size: Adult Regular)   Pulse 67   Resp 16   Ht 1.676 m (5' 6\")   Wt 73 kg (161 lb)   SpO2 100%   BMI 25.99 kg/m   Estimated body mass index is 25.99 kg/m  as calculated from the following:    Height as of this encounter: 1.676 m (5' 6\").    Weight as of this encounter: 73 kg (161 lb). Body surface area is 1.84 meters squared.  No Pain (0) Comment: Data Unavailable   No LMP recorded.  Allergies reviewed: Yes  Medications reviewed: Yes    Medications: Medication refills not needed today.  Pharmacy name entered into Sys[employer]:    [id number] - [facility] [place] [address] [facility] AT [facility] [address] [facility] [address] [facility] - [place], [place] - [address]    Frailty Screening:   Is the patient here for a new oncology consult visit in cancer care? 2. No    PHQ9:  Did this patient require a PHQ9?: No      Clinical concerns: None      Yohana Argueta MA                Again, thank you for allowing me to participate in the care of your patient.        Sincerely,        Flynn Hicks MD    Electronically signed"

## 2025-03-11 DIAGNOSIS — C50.812 MALIGNANT NEOPLASM OF OVERLAPPING SITES OF BOTH BREASTS IN FEMALE, ESTROGEN RECEPTOR POSITIVE (H): Primary | ICD-10-CM

## 2025-03-11 DIAGNOSIS — Z17.0 MALIGNANT NEOPLASM OF OVERLAPPING SITES OF BOTH BREASTS IN FEMALE, ESTROGEN RECEPTOR POSITIVE (H): Primary | ICD-10-CM

## 2025-03-11 DIAGNOSIS — C50.811 MALIGNANT NEOPLASM OF OVERLAPPING SITES OF BOTH BREASTS IN FEMALE, ESTROGEN RECEPTOR POSITIVE (H): Primary | ICD-10-CM

## 2025-03-19 DIAGNOSIS — Z17.0 MALIGNANT NEOPLASM OF UPPER-OUTER QUADRANT OF RIGHT BREAST IN FEMALE, ESTROGEN RECEPTOR POSITIVE (H): Primary | ICD-10-CM

## 2025-03-19 DIAGNOSIS — C50.411 MALIGNANT NEOPLASM OF UPPER-OUTER QUADRANT OF RIGHT BREAST IN FEMALE, ESTROGEN RECEPTOR POSITIVE (H): Primary | ICD-10-CM

## 2025-03-19 DIAGNOSIS — Z15.01 BRCA2 GENE MUTATION POSITIVE: ICD-10-CM

## 2025-03-19 DIAGNOSIS — Z15.09 BRCA2 GENE MUTATION POSITIVE: ICD-10-CM

## 2025-03-20 ENCOUNTER — TELEPHONE (OUTPATIENT)
Dept: PHARMACY | Facility: CLINIC | Age: 45
End: 2025-03-20
Payer: COMMERCIAL

## 2025-04-01 NOTE — TELEPHONE ENCOUNTER
I called [facility] to start the quantity limit override, they have to fax the required form as they are unable to create a case over the phone or on cover my meds.  
PA Initiation    Medication: LYNPARZA 150 MG PO TABS  Insurance Company: [employer] - Phone [phone] Fax [phone]  Pharmacy Filling the Rx: [facility] - [place], [place] - [facility] [address]  Filling Pharmacy Phone:    Filling Pharmacy Fax:    Start Date: 3/20/2025         
Per call to [facility] at [phone], spoke with Valente - She informed me that the case was aborted out because this is not a maintenance drug and it is limited to a 30 day supply. She stated that the member could try calling the health plan and seeing if they will allow a 90 day supply but there is nothing that they can do on the PBM side to allow a 90 day supply.    Thank you,  Sneha Osorio Oncology/Transplant Liaison  Phone: [phone]  Fax: [phone]  
Spoke with Angie and we have requested that her refills be sent a little earlier to fill as soon as insurance allows to prevent any delays in treatment/rush deliveries from [facility].    Thank you,  Sneha Osorio Oncology/Transplant Liaison  Phone: [phone]  Fax: [phone]  
18

## 2025-04-16 ENCOUNTER — TELEPHONE (OUTPATIENT)
Dept: ONCOLOGY | Facility: CLINIC | Age: 45
End: 2025-04-16
Payer: COMMERCIAL

## 2025-04-16 NOTE — TELEPHONE ENCOUNTER
Left voicemail message requesting a call back to schedule monthly labs or to let us know if she is doing her monthly labs at her PCP

## 2025-04-21 ENCOUNTER — TELEPHONE (OUTPATIENT)
Dept: ONCOLOGY | Facility: CLINIC | Age: 45
End: 2025-04-21
Payer: COMMERCIAL

## 2025-04-21 NOTE — ORAL ONC MGMT
Oral Chemotherapy Monitoring Program  Lab Follow Up    Reviewed lab results from 4/18/25.        11/18/2024    11:00 AM 11/21/2024     6:00 AM 1/15/2025     2:00 PM 2/13/2025    12:00 PM 3/7/2025     4:00 PM 3/19/2025    12:00 PM 4/21/2025    11:00 AM   ORAL CHEMOTHERAPY   Assessment Type Refill Lab Monitoring Lab Monitoring Lab Monitoring Chart Review Refill Lab Monitoring   Diagnosis Code Breast Cancer Breast Cancer Breast Cancer Breast Cancer Breast Cancer Breast Cancer Breast Cancer   Providers Dr. Sy Dan   Clinic Name/Location [facility]   Drug Name Lynparza (olaparib) Lynparza (olaparib) Lynparza (olaparib) Lynparza (olaparib) Lynparza (olaparib) Lynparza (olaparib) Lynparza (olaparib)   Dose 300 mg 300 mg 300 mg 150 mg 150 mg 150 mg 150 mg   Current Schedule BID BID  -- -- --    Cycle Details Continuous Continuous Continuous Continuous Continuous Continuous Continuous   Adverse Effects    No AE identified during assessment No AE identified during assessment No AE identified during assessment No AE identified during assessment   Any new drug interactions?     No No    Is the dose as ordered appropriate for the patient?    Yes Yes Yes        Labs:  No results found for NA within last 30 days.     No results found for K within last 30 days.     No results found for CA within last 30 days.     No results found for Mag within last 30 days.     No results found for Phos within last 30 days.     No results found for ALBUMIN within last 30 days.     No results found for BUN within last 30 days.     No results found for CR within last 30 days.     No results found for AST within last 30 days.     No results found for ALT within last 30 days.     No results found for BILITOTAL within last 30 days.     No results found for WBC within last 30 days.     No results found for HGB within last 30 days.     No  results found for PLT within last 30 days.     No results found for ANC within last 30 days.     No results found for ANC within last 30 days.        Assessment & Plan:  No concerning abnormalities.    Mychart sent to patient.    Follow-Up:  End of May harris Gallegos  Oncology PharmD  April 21, 2025

## 2025-05-12 ENCOUNTER — DOCUMENTATION ONLY (OUTPATIENT)
Dept: ONCOLOGY | Facility: CLINIC | Age: 45
End: 2025-05-12
Payer: COMMERCIAL

## 2025-05-12 DIAGNOSIS — C50.811 MALIGNANT NEOPLASM OF OVERLAPPING SITES OF BOTH BREASTS IN FEMALE, ESTROGEN RECEPTOR POSITIVE (H): ICD-10-CM

## 2025-05-12 DIAGNOSIS — Z17.0 MALIGNANT NEOPLASM OF OVERLAPPING SITES OF BOTH BREASTS IN FEMALE, ESTROGEN RECEPTOR POSITIVE (H): ICD-10-CM

## 2025-05-12 DIAGNOSIS — C50.812 MALIGNANT NEOPLASM OF OVERLAPPING SITES OF BOTH BREASTS IN FEMALE, ESTROGEN RECEPTOR POSITIVE (H): ICD-10-CM

## 2025-05-12 RX ORDER — ANASTROZOLE 1 MG/1
1 TABLET ORAL DAILY
Qty: 90 TABLET | Refills: 3 | Status: SHIPPED | OUTPATIENT
Start: 2025-05-12

## 2025-05-14 ENCOUNTER — DOCUMENTATION ONLY (OUTPATIENT)
Dept: ONCOLOGY | Facility: CLINIC | Age: 45
End: 2025-05-14
Payer: COMMERCIAL

## 2025-05-14 NOTE — PROGRESS NOTES
Oral Chemotherapy Monitoring Program  Lab Follow Up     Patient currently on olaparib (Lynparza).  Reviewed lab results from 5/12/25.        Assessment & Plan:  No significant abnormalities noted, okay to continue Lynparza as prescribed.     Follow-Up:  Check in call for symptoms 5/16. Then monthly labs due ~6/12/25    Valente Mckinley, PharmD, [facility]  Hematology/Oncology Clinical Pharmacist  [facility] and Oral Chemotherapy  [phone]

## 2025-05-19 ENCOUNTER — TELEPHONE (OUTPATIENT)
Dept: PHARMACY | Facility: CLINIC | Age: 45
End: 2025-05-19
Payer: COMMERCIAL

## 2025-05-19 NOTE — ORAL ONC MGMT
Oral Chemotherapy Monitoring Program.    Patient currently on olaparib therapy. Confirmed patient is taking 300mg in the AM and 150mg in the PM. Patient called back to give update on stomach upset/diarrhea in the last week.    Patient reports starting zofran in the morning. This has started to slow her diarrhea to about 2 a day. They are still loose, but not quite as loose or urgent. She is also using imodium as needed. In general she reports feeling a little better.     She did notice some lower back/abdominal pain on Friday and Saturday this past week. This happened while she was eating and then eventually resolved. She is unsure if it was gas pain or slowing down of her bowels. But it has not happened in the last two days now.     Patient would like to confirm if she should hold her olaparib and anastrazole prior to surgery. Patient thinks she was told a week before and week after. Pharmacist to confirm.    Patient also would like to discuss benefit of staying at current dose for the next few months (completion Aug 2025) or dose reducing and if that would have any significant benefit on her overall prognosis. Pharmacist confirming with provider.    Pharmacy will call patient back with and update on the above information and at that time set up a follow up with her. Will consider having her see provider if her side effects worsen again.    Savannah Caro PharmD  May 19, 2025

## 2025-05-20 ENCOUNTER — TELEPHONE (OUTPATIENT)
Dept: PHARMACY | Facility: CLINIC | Age: 45
End: 2025-05-20
Payer: COMMERCIAL

## 2025-05-20 NOTE — ORAL ONC MGMT
Oral Chemotherapy Monitoring Program.    Patient currently on olaparib therapy for one year. Start date of 8/8/2024.    Reviewed with patient that she should hold her olaparib for 1 week prior to upcoming surgery and 1 week after surgery per Dr. Hicks.     Patient also wanted to discuss possibly dose reducing for the last couple of months vs trying to manage arthralgias/myalgias + stomach upset. Reviewed with Dr. Hicks. In the trial, about 30% of patients had to be dose reduced and the outcome curves were similar for the differing doses.   Reviewed option of trying tylenol or ibuprofen prior to bedtime for the next week + zofran in the morning (this has helped diarrhea/stomach upset some).   Patient would like to try this for a week with a low threshold to reduce to 150mg BID for her remaining couple of months.    Pharmacy will call patient in 1 week and review how she is doing and talk about dose change. If patient is wanting to reduce dose at this time, then Dr. Hicks to be updated and orders updated for remainder of therapy.    Savannah Caro PharmD  May 20, 2025

## 2025-05-22 DIAGNOSIS — Z15.01 BRCA2 GENE MUTATION POSITIVE: ICD-10-CM

## 2025-05-22 DIAGNOSIS — C50.411 MALIGNANT NEOPLASM OF UPPER-OUTER QUADRANT OF RIGHT BREAST IN FEMALE, ESTROGEN RECEPTOR POSITIVE (H): ICD-10-CM

## 2025-05-22 DIAGNOSIS — Z15.09 BRCA2 GENE MUTATION POSITIVE: ICD-10-CM

## 2025-05-22 DIAGNOSIS — Z17.0 MALIGNANT NEOPLASM OF UPPER-OUTER QUADRANT OF RIGHT BREAST IN FEMALE, ESTROGEN RECEPTOR POSITIVE (H): ICD-10-CM

## 2025-05-22 RX ORDER — ONDANSETRON 8 MG/1
8 TABLET, FILM COATED ORAL EVERY 8 HOURS PRN
Qty: 30 TABLET | Refills: 2 | Status: SHIPPED | OUTPATIENT
Start: 2025-05-22

## 2025-05-27 ENCOUNTER — DOCUMENTATION ONLY (OUTPATIENT)
Dept: PHARMACY | Facility: CLINIC | Age: 45
End: 2025-05-27

## 2025-05-27 NOTE — PROGRESS NOTES
Oral Chemotherapy Monitoring Program.    Patient currently on olaparib therapy. Call out to patient to check in on side effects. No drug names mentioned. Requested patient call back at her convenience.    Pharmacist to attempt patient in 2-3 business days.    Savannah Caro PharmD  May 27, 2025

## 2025-05-29 ENCOUNTER — TELEPHONE (OUTPATIENT)
Dept: PHARMACY | Facility: CLINIC | Age: 45
End: 2025-05-29
Payer: COMMERCIAL

## 2025-05-29 NOTE — ORAL ONC MGMT
Oral Chemotherapy Monitoring Program     Angie called into the clinic to let us know that unfortunately her arthralgias and myalgias have gotten worse. She has been taking tylenol for the past 3-4 days but it isn't helping. She describes it as a dull pain throughout the day all day and it has spread to not only her hands and feet but also her neck and shins. Also reports brain fog and not feeling like herself. She asked again about a potential dose reduction to 150 mg BID, which was previously discussed last week. She is still taking zofran which has been helping with diarrhea, but is still having about 4 stools a day. Encouraged patient to continue loperamide as needed as well. Sent inbasket to provider for recommendations on how to proceed (follow up in clinic vs dose reduction). Will update when response received.    Paola Newberry, PharmD  Hematology/Oncology Clinical Pharmacist  [facility]  [phone]

## 2025-06-02 ENCOUNTER — OFFICE VISIT (OUTPATIENT)
Dept: FAMILY MEDICINE | Facility: CLINIC | Age: 45
End: 2025-06-02
Payer: COMMERCIAL

## 2025-06-02 VITALS
DIASTOLIC BLOOD PRESSURE: 74 MMHG | WEIGHT: 160.1 LBS | HEIGHT: 66 IN | OXYGEN SATURATION: 99 % | RESPIRATION RATE: 20 BRPM | TEMPERATURE: 97.8 F | HEART RATE: 76 BPM | SYSTOLIC BLOOD PRESSURE: 107 MMHG | BODY MASS INDEX: 25.73 KG/M2

## 2025-06-02 DIAGNOSIS — Z01.818 PREOP GENERAL PHYSICAL EXAM: Primary | ICD-10-CM

## 2025-06-02 DIAGNOSIS — Z90.13 ABSENCE OF BOTH BREASTS: ICD-10-CM

## 2025-06-02 DIAGNOSIS — C50.411 MALIGNANT NEOPLASM OF UPPER-OUTER QUADRANT OF RIGHT BREAST IN FEMALE, ESTROGEN RECEPTOR POSITIVE (H): ICD-10-CM

## 2025-06-02 DIAGNOSIS — Z17.0 MALIGNANT NEOPLASM OF UPPER-OUTER QUADRANT OF RIGHT BREAST IN FEMALE, ESTROGEN RECEPTOR POSITIVE (H): ICD-10-CM

## 2025-06-02 PROCEDURE — 3078F DIAST BP <80 MM HG: CPT | Performed by: INTERNAL MEDICINE

## 2025-06-02 PROCEDURE — 99214 OFFICE O/P EST MOD 30 MIN: CPT | Performed by: INTERNAL MEDICINE

## 2025-06-02 PROCEDURE — 3074F SYST BP LT 130 MM HG: CPT | Performed by: INTERNAL MEDICINE

## 2025-06-02 NOTE — PROGRESS NOTES
Preoperative Evaluation  [address] [address]  Phone: [phone]  Primary Provider: Physician No Ref-Primary  Pre-op Performing Provider: Yuriy Rosales MD  Jun 2, 2025 5/28/2025   Surgical Information   What procedure is being done? Breast implant surgery/expander exchange   Facility or Hospital where procedure/surgery will be performed: [facility]   Who is doing the procedure / surgery? Dr. Chris Yee   Date of surgery / procedure: June 11th, 2025   Time of surgery / procedure: 10:00   Where do you plan to recover after surgery? at home with family     Fax number for surgical facility: [phone]    Assessment & Plan     The proposed surgical procedure is considered INTERMEDIATE risk.    Preop general physical exam  She has no history of any CAD or CVA  No history of exertional chest pain or shortness of breath  She is not on anticoagulants  Her exercise tolerance is 9 METS or more  She has no history of any diabetes mellitus  She is on Lynparza  This will be held for 1 week before surgery and will be started 1 week after the surgery  She can continue to take Arimidex  She recently had extensive lab work done which was all normal including CMP and CBC  Medically optimized for the procedure  No further testing is warranted    Absence of both breasts  She is going to have breast implant surgery with expander exchange    Malignant neoplasm of upper-outer quadrant of right breast in female, estrogen receptor positive (H)  Status post surgery chemo and radiation   Follows up with oncology           Risks and Recommendations  The patient has the following additional risks and recommendations for perioperative complications:   - No identified additional risk factors other than previously addressed    Antiplatelet or Anticoagulation Medication Instructions   - We reviewed the medication list and the patient is not on an  antiplatelet or anticoagulation medications.    Additional Medication Instructions  Take all scheduled medications on the day of surgery EXCEPT for modifications listed below:  Lynparza should be stopped a week before surgery and can be started 1 week after the surgery  Recommendation  Approval given to proceed with proposed procedure, without further diagnostic evaluation.        Krystal Adams is a 44 year old, presenting for the following:  Pre-Op Exam          6/2/2025     9:16 AM   Additional Questions   Roomed by Kendal     HPI: Here for preop clearance          5/28/2025   Pre-Op Questionnaire   Have you ever had a heart attack or stroke? No   Have you ever had surgery on your heart or blood vessels, such as a stent placement, a coronary artery bypass, or surgery on an artery in your head, neck, heart, or legs? No   Do you have chest pain with activity? No   Do you have a history of heart failure? No   Do you currently have a cold, bronchitis or symptoms of other infection? No   Do you have a cough, shortness of breath, or wheezing? No   Do you or anyone in your family have previous history of blood clots? No   Do you or does anyone in your family have a serious bleeding problem such as prolonged bleeding following surgeries or cuts? No   Have you ever had problems with anemia or been told to take iron pills? (!) YES    Have you had any abnormal blood loss such as black, tarry or bloody stools, or abnormal vaginal bleeding? No   Have you ever had a blood transfusion? (!) YES   Have you ever had a transfusion reaction? No   Are you willing to have a blood transfusion if it is medically needed before, during, or after your surgery? Yes   Have you or any of your relatives ever had problems with anesthesia? No   Do you have sleep apnea, excessive snoring or daytime drowsiness? No   Do you have any artifical heart valves or other implanted medical devices like a pacemaker, defibrillator, or continuous glucose  monitor? No   Do you have artificial joints? No   Are you allergic to latex? No     Advance Care Planning    Discussed advance care planning with patient; however, patient declined at this time.    [id number]    Status of Chronic Conditions:  See problem list for active medical problems.  Problems all longstanding and stable, except as noted/documented.  See ROS for pertinent symptoms related to these conditions.    Patient Active Problem List    Diagnosis Date Noted    Surgery follow-up examination 2024     Priority: Medium    Absence of both breasts 2024     Priority: Medium    BRCA2 gene mutation positive 2024     Priority: Medium    Iron deficiency anemia 2024     Priority: Medium    Malignant neoplasm of upper-outer quadrant of right breast in female, estrogen receptor positive (H) 2023     Priority: Medium    Malignant neoplasm of overlapping sites of both breasts in female, estrogen receptor positive (H) 10/10/2023     Priority: Medium      Past Medical History:   Diagnosis Date    BRCA2 gene mutation positive 2024    History of blood transfusion     Malignant neoplasm of overlapping sites of left breast in female, estrogen receptor positive (H) 10/10/2023     Past Surgical History:   Procedure Laterality Date    BIOPSY, BREAST, WITH RADIOFREQUENCY IDENTIFICATION Bilateral 2024    Procedure: bilateral tag localized node excision;  Surgeon: Fanny Little MD;  Location:  OR     SECTION      DILATION AND CURETTAGE      retained placenta post-partum 2 weeks    ENT SURGERY      tonsillectomy    EXCISE CYST GENERIC (LOCATION) Left 2024    Procedure: Excision Left Axillary Cyst;  Surgeon: Fanny Little MD;  Location:  OR    GYN SURGERY      egg retrieval for IVF    INSERT PORT VASCULAR ACCESS Right 2023    Procedure: INSERTION, VASCULAR ACCESS PORT;  Surgeon: Fanny Little MD;  Location:  OR    KNEE SURGERY Left     LAPAROSCOPIC  HYSTERECTOMY TOTAL, BILATERAL SALPINGO-OOPHORECTOMY, NODE DISSECTION, COMBINED Bilateral 9/25/2024    Procedure: laparoscopic removal of uterus, cervix, both ovaries, cystoscopy-BILATERAL;  Surgeon: Hiral Davis MD;  Location: [facility] OR    MASTECTOMY SIMPLE BILATERAL, SENTINEL NODE BILATERAL, COMBINED Bilateral 05/08/2024    Procedure: Bilateral skin sparing mastectomies with bilateral sentinel lymph node biopsy;  Surgeon: Fanny Little MD;  Location:  OR    RECONSTRUCT BREAST, INSERT TISSUE EXPANDER BILATERAL, COMBINED Bilateral 05/08/2024    Procedure: BILATERAL FIRST STAGE BREAST RECONSTRUCTION WITH TISSUE EXPANDERS AND ACELLULAR DERMAL MATRIX;  Surgeon: Vasiliy Yee MD;  Location:  OR    REMOVE PORT VASCULAR ACCESS N/A 05/08/2024    Procedure: port removal;  Surgeon: Fanny Little MD;  Location:  OR     Current Outpatient Medications   Medication Sig Dispense Refill    anastrozole (ARIMIDEX) 1 MG tablet Take 1 tablet (1 mg) by mouth daily. 90 tablet 3    olaparib (LYNPARZA) 150 MG tablet Take 2 tablets (300 mg) by mouth in the morning and 1 tablet (150 mg) in the evening. 270 tablet 0    olaparib (LYNPARZA) 150 MG tablet Take 2 tablets (300 mg) by mouth in the morning and 1 tablet (150 mg) in the evening. 90 tablet 0    ondansetron (ZOFRAN) 8 MG tablet Take 1 tablet (8 mg) by mouth every 8 hours as needed for nausea. 30 tablet 2    traZODone (DESYREL) 50 MG tablet Take 1 tablet (50 mg) by mouth at bedtime. 90 tablet 3    olaparib (LYNPARZA) 150 MG tablet Take 2 tablets (300 mg) by mouth in the morning and 1 tablet (150 mg) in the evening. 90 tablet 0       Allergies   Allergen Reactions    Aprepitant Difficulty breathing and Shortness Of Breath    Fosaprepitant Unknown     Allergy added per chart review. [facility]    Armoracia Rusticana Ext (Horseradish) Hives    Carboprost GI Disturbance     Diarrhea    Codeine Nausea and Vomiting        Social History     Tobacco Use     "Smoking status: Never     Passive exposure: Never    Smokeless tobacco: Never   Substance Use Topics    Alcohol use: Not Currently     Comment: rarely       History   Drug Use Unknown             Review of Systems  Constitutional, HEENT, cardiovascular, pulmonary, gi and gu systems are negative, except as otherwise noted.    Objective    There were no vitals taken for this visit.   Estimated body mass index is 25.99 kg/m  as calculated from the following:    Height as of 3/7/25: 1.676 m (5' 6\").    Weight as of 3/7/25: 73 kg (161 lb).  Physical Exam  GENERAL: alert and no distress  EYES: Eyes grossly normal to inspection, PERRL and conjunctivae and sclerae normal  NECK: no adenopathy, no asymmetry, masses, or scars  RESP: lungs clear to auscultation - no rales, rhonchi or wheezes  CV: regular rate and rhythm, normal S1 S2, no S3 or S4, no murmur, click or rub, no peripheral edema  MS: no gross musculoskeletal defects noted, no edema  SKIN: no suspicious lesions or rashes  NEURO: Normal strength and tone, mentation intact and speech normal  PSYCH: mentation appears normal, affect normal/bright    Recent Labs   Lab Test 03/07/25  1613 10/22/24  1327   HGB 14.3 12.7    217    140   POTASSIUM 4.1 4.1   CR 0.91 0.98*        Diagnostics  No labs were ordered during this visit.   No EKG required, no history of coronary heart disease, significant arrhythmia, peripheral arterial disease or other structural heart disease.    Revised Cardiac Risk Index (RCRI)  The patient has the following serious cardiovascular risks for perioperative complications:   - No serious cardiac risks = 0 points     RCRI Interpretation: 0 points: Class I (very low risk - 0.4% complication rate)         Signed Electronically by: Yuriy Rosales MD  A copy of this evaluation report is provided to the requesting physician.        "

## 2025-06-09 ENCOUNTER — TRANSFERRED RECORDS (OUTPATIENT)
Dept: ONCOLOGY | Facility: CLINIC | Age: 45
End: 2025-06-09

## 2025-06-10 ENCOUNTER — ONCOLOGY VISIT (OUTPATIENT)
Dept: ONCOLOGY | Facility: CLINIC | Age: 45
End: 2025-06-10
Attending: INTERNAL MEDICINE
Payer: COMMERCIAL

## 2025-06-10 VITALS
BODY MASS INDEX: 25.39 KG/M2 | OXYGEN SATURATION: 100 % | RESPIRATION RATE: 16 BRPM | HEIGHT: 66 IN | DIASTOLIC BLOOD PRESSURE: 73 MMHG | HEART RATE: 70 BPM | WEIGHT: 158 LBS | SYSTOLIC BLOOD PRESSURE: 107 MMHG

## 2025-06-10 DIAGNOSIS — Z15.01 BRCA2 GENE MUTATION POSITIVE: ICD-10-CM

## 2025-06-10 DIAGNOSIS — E56.9 VITAMIN DEFICIENCY: ICD-10-CM

## 2025-06-10 DIAGNOSIS — Z15.09 BRCA2 GENE MUTATION POSITIVE: ICD-10-CM

## 2025-06-10 DIAGNOSIS — C50.411 MALIGNANT NEOPLASM OF UPPER-OUTER QUADRANT OF RIGHT BREAST IN FEMALE, ESTROGEN RECEPTOR POSITIVE (H): Primary | ICD-10-CM

## 2025-06-10 DIAGNOSIS — Z88.8: Primary | ICD-10-CM

## 2025-06-10 DIAGNOSIS — Z17.0 MALIGNANT NEOPLASM OF UPPER-OUTER QUADRANT OF RIGHT BREAST IN FEMALE, ESTROGEN RECEPTOR POSITIVE (H): Primary | ICD-10-CM

## 2025-06-10 PROCEDURE — G2211 COMPLEX E/M VISIT ADD ON: HCPCS | Performed by: INTERNAL MEDICINE

## 2025-06-10 PROCEDURE — 99417 PROLNG OP E/M EACH 15 MIN: CPT | Performed by: INTERNAL MEDICINE

## 2025-06-10 PROCEDURE — G0463 HOSPITAL OUTPT CLINIC VISIT: HCPCS | Performed by: INTERNAL MEDICINE

## 2025-06-10 PROCEDURE — 99215 OFFICE O/P EST HI 40 MIN: CPT | Performed by: INTERNAL MEDICINE

## 2025-06-10 ASSESSMENT — PAIN SCALES - GENERAL: PAINLEVEL_OUTOF10: NO PAIN (0)

## 2025-06-10 NOTE — PROGRESS NOTES
"Oncology Rooming Note    Kriss 10, 2025 11:36 AM   Angie Michael is a 44 year old female who presents for:    Chief Complaint   Patient presents with    Oncology Clinic Visit     Malignant neoplasm of overlapping sites of both breasts in female, estrogen receptor positive (H)     Initial Vitals: /73 (BP Location: Right arm, Patient Position: Sitting, Cuff Size: Adult Regular)   Pulse 70   Resp 16   Ht 1.676 m (5' 6\")   Wt 71.7 kg (158 lb)   SpO2 100%   BMI 25.50 kg/m   Estimated body mass index is 25.5 kg/m  as calculated from the following:    Height as of this encounter: 1.676 m (5' 6\").    Weight as of this encounter: 71.7 kg (158 lb). Body surface area is 1.83 meters squared.  No Pain (0) Comment: Data Unavailable   No LMP recorded.  Allergies reviewed: Yes  Medications reviewed: Yes    Medications: Medication refills not needed today.  Pharmacy name entered into [facility]:    [id number] - [place], [place] - [address] [facility] [address] [facility] [address] [facility] - [place], [place] - [facility] [address]    Frailty Screening:   Is the patient here for a new oncology consult visit in cancer care? 2. No    PHQ9:  Did this patient require a PHQ9?: No      Clinical concerns: None       Yohana Argueta MA            "

## 2025-06-10 NOTE — PROGRESS NOTES
[facility]    Hematology/Oncology Established Patient Note      Today's Date:   Kriss 10 2025    Reason for visit: Bilateral breast cancer, BRCA-2 mutation positive.    HISTORY OF PRESENT ILLNESS: Angie Michael is a 44 year old female with BRCA2 gene mutation who presents with the following oncologic history:  1.  10/05/2023: High risk surveillance breast MRI showed right breast 3.6 x 3 x 3.2 cm irregular enhancing mass at 10:00, 0.9 x 0.8 x 0.9 cm second mass at 9:00, 1 x 0.9 x 1.2 cm third mass at 9:30, other suspicious foci of enhancement throughout the breast.  Left breast with 9.9 x 5.2 x 6.7 cm suspicious non-mass enhancement at 10:00-7:00; 1.6 x 1.3 x 1.7 cm enhancing mass at 4:00; 1.8 x 1.6 x 1.9 cm enhancing mass at 12:30; other suspicious foci of enhancement throughout the left breast.  One level 1 suspicious right axillary lymph node and one level 1 suspicious left axillary lymph node..  Developing suspicious interpectoral lymph node.  2.  10/12/2023: Bilateral diagnostic mammogram showed right breast with 3.2 x 2.7 x 4.1 cm irregular mass at 10:00, 6 cm from nipple; 0.9 x 0.6 x 1 cm irregular mass at 11:00, 2 cm from nipple and prominent right axillary lymph node.  Left breast with 1.4 x 1.1 x 1.7 cm mass at 12:00, 2 cm from nipple, 1.8 x 1 x 1.6 cm mass at 3:30, 4 cm from nipple, 1 x 0.9 x 1.1 cm mass at 3:00, 7 cm from nipple, multiple areas of hypoechogenicity with calcifications in the lateral left breast, and prominent left axillary lymph node.  3.  10/12/2023: Biopsy of right breast at 10:00 showed grade 3 invasive lobular carcinoma, ER strongly positive at 81-90%, PA positive at 31-40%, HER2 equivocal with IHC = 2+, FISH negative, Ki-67 = 70%.  Biopsy of right axillary lymph node showed metastatic carcinoma.  Biopsy of left breast at 3:30 showed grade 2 invasive carcinoma with ductal and lobular features, ER positive at %, PA positive at 2%, HER2 equivocal with IHC = 2+,  FISH negative, Ki-67 = 26%.  Biopsy of left axillary lymph node showed metastatic carcinoma.  4.  10/17/2023: PET/CT scan showed cluster of hypermetabolic right breast nodules with SUV max 14.9; 2.1 x 1 cm hypermetabolic level 1 right axillary lymph node with SUV max 5.5; hypermetabolic left breast nodule with SUV max 18.8 and several additional less intense hypermetabolic left breast nodules; 1.5 x 1.3 cm hypermetabolic level 1 left axillary lymph node with SUV max 4.  Small sclerotic focus of metabolic activity within the proximal tibial shaft with SUV max 3.7, indeterminate but unlikely to reflect metastatic disease.  Subcentimeter hypermetabolic focus along anterior margin of the left thyroid lobe, likely a thyroid nodule.  5.  11/03/2023: Thyroid ultrasound showed 0.7 x 0.6 x 0.5 cm solid hypoechoic nodule at the junction of the isthmus and left hemithyroid corresponding to increased uptake on PET/CT, consistent with TIRADS 4 nodule and subcentimeter TIRADS 3 nodule in the left inferior thyroid.  6. 11/3/2023: MR right tibia/fibula showed discrete focal 3 cm area of increased T2 signal and enhancement in mid shaft of right tibia with fat signal interspersed throughout this area with significant signal dropout on opposed phase imaging, favoring benign process.  7. 11/21/2023: Started neoadjuvant chemotherapy with dose-dense Adriamycin + Cytoxan with Neulasta support with Dr. Bev Ryan at [facility]. Reacted to [place].  8. 11/25/2023: Evaluated at [facility] ED with diarrhea and dizziness, latter attributed to olanzapine -- this was discontinued.  9. 1/03/2024: Completed cycle 4 dose-dense AC.  10. 1/16/2024: Breast MRI showed persistent abnormal mass and nonmass enhancement in the left breast measuring 10.2 cm in AP dimension (unchanged).  Previously seen mass at the 12:30 position now measures 1.0 cm in greatest dimension (previously 1.9 cm). Previously seen seen mass at the 4:00 position now measures  1.1 cm in greatest dimension (previously 1.7 cm). Previously seen enlarged left axillary lymph node now has normal size and morphology. Previously seen intrapectoral lymph node now has normal size and morphology. Previously seen abnormal right breast enhancement has entirely resolved. Previously visualized enlarged right axillary lymph node now has normal size and morphology.  Other previously seen axillary lymph nodes appear stable.  11. 1/17/2024: Start of weekly paclitaxel for planned 12 weeks.  12. 2/9/2024: MR right tibia/fibula showed mid right tibial shaft bone lesion increased from 3.2 cm to 3.8 cm; speckled foci of marrow signal likely areas of red marrow reconversion.  13. 4/03/2024: Completion of cycle 12 paclitaxel.  14. 4/5/2024: MR right tibia/fibula showed stable in size and improved signal alteration of right tibial shaft focal marrow alteration, likely red marrow.  15. 5/08/2024: Bilateral mastectomies with bilateral axillary sentinel lymph node excision under c/o Dr. Fanny Little.  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 Right breast with no residual invasive carcinoma; LCIS present; treatment related changes, 4 right axillary sentinel lymph nodes present, [id number].  16. 6/04/2024: Started Zoladex and anastrozole (latter on 6/11/2024).  17. 7/2/2024-8/7/2024: Adjuvant radiation therapy to left chest wall and regional nodes.  18.  8/8/2024: Started adjuvant olaparib 300 mg twice daily.  19. 9/9/2024: PET/CT scan showed no evidence of FDG avid malignancy; subcentimeter mildly FDG avid right axillary lymph node favored reactive; stable mild focal FDG uptake of the right mid tibial diaphysis favored benign; persistent FDG uptake within subcentimeter left isthmus thyroid nodule.   20. 9/25/2024:  Laparoscopic resection of uterus, cervix, bilateral ovaries under care of Dr. Hiral Bello. Pathology negative     INTERVAL HISTORY:  Angie returns today for follow-up.  Fatigued, gi upset and significant joint pain, all the charting from pharmacy noted        REVIEW OF SYSTEMS:.  14 point ROS was reviewed and is negative other than as noted above in HPI.       HOME MEDICATIONS:  Current Outpatient Medications   Medication Sig Dispense Refill    anastrozole (ARIMIDEX) 1 MG tablet Take 1 tablet (1 mg) by mouth daily. 90 tablet 3    olaparib (LYNPARZA) 150 MG tablet Take 2 tablets (300 mg) by mouth in the morning and 1 tablet (150 mg) in the evening. 270 tablet 0    olaparib (LYNPARZA) 150 MG tablet Take 2 tablets (300 mg) by mouth in the morning and 1 tablet (150 mg) in the evening. 90 tablet 0    ondansetron (ZOFRAN) 8 MG tablet Take 1 tablet (8 mg) by mouth every 8 hours as needed for nausea. 30 tablet 2    traZODone (DESYREL) 50 MG tablet Take 1 tablet (50 mg) by mouth at bedtime. 90 tablet 3    olaparib (LYNPARZA) 150 MG tablet Take 2 tablets (300 mg) by mouth in the morning and 1 tablet (150 mg) in the evening. 90 tablet 0         ALLERGIES:  Allergies   Allergen Reactions    Aprepitant Difficulty breathing and Shortness Of Breath    Fosaprepitant Unknown     Allergy added per chart review. [facility]    Armoracia Rusticana Ext (Horseradish) Hives    Carboprost GI Disturbance     Diarrhea    Codeine Nausea and Vomiting         PAST MEDICAL HISTORY:  BRCA2 gene mutation.  Bilateral breast cancer as outlined above.  9/2022 Galleri test - negative for malignancy.    Gynecologic history:  Age of menarche at 15.  3 children/triplets -- 1 boy and twin girls born 1/2022.  Age of first pregnancy at 41.  Used 10 years of OCPs no HRT, IVF for fertility treatment.      PAST SURGICAL HISTORY:  Past Surgical History:   Procedure Laterality Date    BIOPSY, BREAST, WITH RADIOFREQUENCY IDENTIFICATION Bilateral  2024    Procedure: bilateral tag localized node excision;  Surgeon: Fanny Little MD;  Location:  OR     SECTION      DILATION AND CURETTAGE      retained placenta post-partum 2 weeks    ENT SURGERY      tonsillectomy    EXCISE CYST GENERIC (LOCATION) Left 2024    Procedure: Excision Left Axillary Cyst;  Surgeon: Fanny Little MD;  Location:  OR    GYN SURGERY      egg retrieval for IVF    INSERT PORT VASCULAR ACCESS Right 2023    Procedure: INSERTION, VASCULAR ACCESS PORT;  Surgeon: Fanny Little MD;  Location:  OR    KNEE SURGERY Left     LAPAROSCOPIC HYSTERECTOMY TOTAL, BILATERAL SALPINGO-OOPHORECTOMY, NODE DISSECTION, COMBINED Bilateral 2024    Procedure: laparoscopic removal of uterus, cervix, both ovaries, cystoscopy-BILATERAL;  Surgeon: Hiral Davis MD;  Location:  OR    MASTECTOMY SIMPLE BILATERAL, SENTINEL NODE BILATERAL, COMBINED Bilateral 2024    Procedure: Bilateral skin sparing mastectomies with bilateral sentinel lymph node biopsy;  Surgeon: Fanny Little MD;  Location:  OR    RECONSTRUCT BREAST, INSERT TISSUE EXPANDER BILATERAL, COMBINED Bilateral 2024    Procedure: BILATERAL FIRST STAGE BREAST RECONSTRUCTION WITH TISSUE EXPANDERS AND ACELLULAR DERMAL MATRIX;  Surgeon: Vasiliy Yee MD;  Location:  OR    REMOVE PORT VASCULAR ACCESS N/A 2024    Procedure: port removal;  Surgeon: Fanny Little MD;  Location:  OR         SOCIAL HISTORY:  Social History     Socioeconomic History    Marital status:      Spouse name: Not on file    Number of children: Not on file    Years of education: Not on file    Highest education level: Not on file   Occupational History    Not on file   Tobacco Use    Smoking status: Never     Passive exposure: Never    Smokeless tobacco: Never   Vaping Use    Vaping status: Never Used   Substance and Sexual Activity    Alcohol use: Not Currently     Comment: rarely    Drug  use: Not Currently    Sexual activity: Not on file   Other Topics Concern    Parent/sibling w/ CABG, MI or angioplasty before 65F 55M? No   Social History Narrative    Not on file     Social Drivers of Health     Financial Resource Strain: Low Risk  (4/9/2024)    Financial Resource Strain     Within the past 12 months, have you or your family members you live with been unable to get utilities (heat, electricity) when it was really needed?: No   Food Insecurity: Low Risk  (4/9/2024)    Food Insecurity     Within the past 12 months, did you worry that your food would run out before you got money to buy more?: No     Within the past 12 months, did the food you bought just not last and you didn t have money to get more?: No   Transportation Needs: Low Risk  (4/9/2024)    Transportation Needs     Within the past 12 months, has lack of transportation kept you from medical appointments, getting your medicines, non-medical meetings or appointments, work, or from getting things that you need?: No   Physical Activity: Insufficiently Active (11/2/2023)    Received from [facility]    Exercise Vital Sign     Days of Exercise per Week: 4 days     Minutes of Exercise per Session: 30 min   Stress: Not on file   Social Connections: Unknown (12/22/2021)    Received from [facility]    Social Connections     Frequency of Communication with Friends and Family: Not on file   Interpersonal Safety: Low Risk  (6/2/2025)    Interpersonal Safety     Do you feel physically and emotionally safe where you currently live?: Yes     Within the past 12 months, have you been hit, slapped, kicked or otherwise physically hurt by someone?: No     Within the past 12 months, have you been humiliated or emotionally abused in other ways by your partner or ex-partner?: No   Housing Stability: Low Risk  (4/9/2024)    Housing Stability     Do you have housing? : Yes     Are you worried about losing your housing?: No          FAMILY HISTORY:  Paternal grandmother and many paternal grandfathers with breast cancer; cousin with DCIS tested positive for BRCA1.  No ovarian, colon, or prostate cancer.      PHYSICAL EXAM:  Vital signs:     ECO  Status post bilateral mastectomies with expanders in place.  No evidence of recurrence.  No adenopathy noted            ASSESSMENT/PLAN:     Angie is a very pleasant 44-year-old female who presented initially with stage IIb grade 3 invasive lobular carcinoma ER/AR positive HER2/markus negative Ki-67 70%, BRCA2 positive      Status post neoadjuvant chemotherapy with dose dense AC followed by Taxol and bilateral mastectomies with bilateral axillary lymph node excision on May 2024  Issues with arthralgias and gi upset discussed better off olaparib. Takes zofran prohylactically which she can stop       1 Breast cancer  -reconstruction olaparib on hold for this week and next week then change to 150 mg bid due to GI issues and arthralgias  -continue anastrazole since joint pain better after stopping olaparib   -kisquali 2 mo after stopping olaparib done in August   2 dexa in     3 MRI fibula in August    4 labs and md visit in 6 weeks    5 insomnia try sleep time tea and gummies     All questions answered to her satisfaction      All questions answered to her satisfaction        Total time spent on day of visit, including review of tests, obtaining/reviewing separately obtained history, ordering medications/tests/procedures, communicating with PCP/consultants, and documenting in electronic medical record: 60 min

## 2025-06-10 NOTE — LETTER
"6/10/2025      Angie Michael  [address] [facility] [address]      Dear Colleague,    Thank you for referring your patient, Angie Michael, to the [facility]. Please see a copy of my visit note below.    Oncology Rooming Note    Kriss 10, 2025 11:36 AM   Angie Michael is a 44 year old female who presents for:    Chief Complaint   Patient presents with     Oncology Clinic Visit     Malignant neoplasm of overlapping sites of both breasts in female, estrogen receptor positive (H)     Initial Vitals: /73 (BP Location: Right arm, Patient Position: Sitting, Cuff Size: Adult Regular)   Pulse 70   Resp 16   Ht 1.676 m (5' 6\")   Wt 71.7 kg (158 lb)   SpO2 100%   BMI 25.50 kg/m   Estimated body mass index is 25.5 kg/m  as calculated from the following:    Height as of this encounter: 1.676 m (5' 6\").    Weight as of this encounter: 71.7 kg (158 lb). Body surface area is 1.83 meters squared.  No Pain (0) Comment: Data Unavailable   No LMP recorded.  Allergies reviewed: Yes  Medications reviewed: Yes    Medications: Medication refills not needed today.  Pharmacy name entered into [facility]:    [id number] - [place], [place] - [address] [facility] [address] [facility] [address] & [facility] [place], [place] - [address] [facility] - [place], [place] - [address]    Frailty Screening:   Is the patient here for a new oncology consult visit in cancer care? 2. No    PHQ9:  Did this patient require a PHQ9?: No      Clinical concerns: None       Yohana Argueta MA              [facility]    Hematology/Oncology Established Patient Note      Today's Date:   Kriss 10 2025    Reason for visit: Bilateral breast cancer, BRCA-2 mutation positive.    HISTORY OF PRESENT ILLNESS: Angie Michael is a 44 year old female with BRCA2 gene mutation who presents with the following oncologic history:  1.  10/05/2023: High risk " surveillance breast MRI showed right breast 3.6 x 3 x 3.2 cm irregular enhancing mass at 10:00, 0.9 x 0.8 x 0.9 cm second mass at 9:00, 1 x 0.9 x 1.2 cm third mass at 9:30, other suspicious foci of enhancement throughout the breast.  Left breast with 9.9 x 5.2 x 6.7 cm suspicious non-mass enhancement at 10:00-7:00; 1.6 x 1.3 x 1.7 cm enhancing mass at 4:00; 1.8 x 1.6 x 1.9 cm enhancing mass at 12:30; other suspicious foci of enhancement throughout the left breast.  One level 1 suspicious right axillary lymph node and one level 1 suspicious left axillary lymph node..  Developing suspicious interpectoral lymph node.  2.  10/12/2023: Bilateral diagnostic mammogram showed right breast with 3.2 x 2.7 x 4.1 cm irregular mass at 10:00, 6 cm from nipple; 0.9 x 0.6 x 1 cm irregular mass at 11:00, 2 cm from nipple and prominent right axillary lymph node.  Left breast with 1.4 x 1.1 x 1.7 cm mass at 12:00, 2 cm from nipple, 1.8 x 1 x 1.6 cm mass at 3:30, 4 cm from nipple, 1 x 0.9 x 1.1 cm mass at 3:00, 7 cm from nipple, multiple areas of hypoechogenicity with calcifications in the lateral left breast, and prominent left axillary lymph node.  3.  10/12/2023: Biopsy of right breast at 10:00 showed grade 3 invasive lobular carcinoma, ER strongly positive at 81-90%, IN positive at 31-40%, HER2 equivocal with IHC = 2+, FISH negative, Ki-67 = 70%.  Biopsy of right axillary lymph node showed metastatic carcinoma.  Biopsy of left breast at 3:30 showed grade 2 invasive carcinoma with ductal and lobular features, ER positive at %, IN positive at 2%, HER2 equivocal with IHC = 2+, FISH negative, Ki-67 = 26%.  Biopsy of left axillary lymph node showed metastatic carcinoma.  4.  10/17/2023: PET/CT scan showed cluster of hypermetabolic right breast nodules with SUV max 14.9; 2.1 x 1 cm hypermetabolic level 1 right axillary lymph node with SUV max 5.5; hypermetabolic left breast nodule with SUV max 18.8 and several additional less  intense hypermetabolic left breast nodules; 1.5 x 1.3 cm hypermetabolic level 1 left axillary lymph node with SUV max 4.  Small sclerotic focus of metabolic activity within the proximal tibial shaft with SUV max 3.7, indeterminate but unlikely to reflect metastatic disease.  Subcentimeter hypermetabolic focus along anterior margin of the left thyroid lobe, likely a thyroid nodule.  5.  11/03/2023: Thyroid ultrasound showed 0.7 x 0.6 x 0.5 cm solid hypoechoic nodule at the junction of the isthmus and left hemithyroid corresponding to increased uptake on PET/CT, consistent with TIRADS 4 nodule and subcentimeter TIRADS 3 nodule in the left inferior thyroid.  6. 11/3/2023: MR right tibia/fibula showed discrete focal 3 cm area of increased T2 signal and enhancement in mid shaft of right tibia with fat signal interspersed throughout this area with significant signal dropout on opposed phase imaging, favoring benign process.  7. 11/21/2023: Started neoadjuvant chemotherapy with dose-dense Adriamycin + Cytoxan with Neulasta support with Dr. Bev Ryan at [facility]. Reacted to [place].  8. 11/25/2023: Evaluated at [facility] ED with diarrhea and dizziness, latter attributed to olanzapine -- this was discontinued.  9. 1/03/2024: Completed cycle 4 dose-dense AC.  10. 1/16/2024: Breast MRI showed persistent abnormal mass and nonmass enhancement in the left breast measuring 10.2 cm in AP dimension (unchanged).  Previously seen mass at the 12:30 position now measures 1.0 cm in greatest dimension (previously 1.9 cm). Previously seen seen mass at the 4:00 position now measures 1.1 cm in greatest dimension (previously 1.7 cm). Previously seen enlarged left axillary lymph node now has normal size and morphology. Previously seen intrapectoral lymph node now has normal size and morphology. Previously seen abnormal right breast enhancement has entirely resolved. Previously visualized enlarged right axillary lymph node now has  normal size and morphology.  Other previously seen axillary lymph nodes appear stable.  11. 1/17/2024: Start of weekly paclitaxel for planned 12 weeks.  12. 2/9/2024: MR right tibia/fibula showed mid right tibial shaft bone lesion increased from 3.2 cm to 3.8 cm; speckled foci of marrow signal likely areas of red marrow reconversion.  13. 4/03/2024: Completion of cycle 12 paclitaxel.  14. 4/5/2024: MR right tibia/fibula showed stable in size and improved signal alteration of right tibial shaft focal marrow alteration, likely red marrow.  15. 5/08/2024: Bilateral mastectomies with bilateral axillary sentinel lymph node excision under c/o Dr. Fanny Little.  Pathology showed left breast with grade 2 multifocal invasive ductal carcinoma measuring 10 mm, 6 mm, 1.4 mm; extensive grade 3 DCIS (22 of 37 blocks), negative margins; treatment related changes present; 2 left axillary nodes negative, [id number]. Right breast with no residual invasive carcinoma; LCIS present; treatment related changes, 4 right axillary sentinel lymph nodes present, [id number].  16. 6/04/2024: Started Zoladex and anastrozole (latter on 6/11/2024).  17. 7/2/2024-8/7/2024: Adjuvant radiation therapy to left chest wall and regional nodes.  18.  8/8/2024: Started adjuvant olaparib 300 mg twice daily.  19. 9/9/2024: PET/CT scan showed no evidence of FDG avid malignancy; subcentimeter mildly FDG avid right axillary lymph node favored reactive; stable mild focal FDG uptake of the right mid tibial diaphysis favored benign; persistent FDG uptake within subcentimeter left isthmus thyroid nodule.   20. 9/25/2024: Laparoscopic resection of uterus, cervix, bilateral ovaries under care of Dr. Hiral Bello. Pathology negative     INTERVAL HISTORY:  Angie returns today for follow-up.  Fatigued, gi upset and significant joint pain, all the charting from pharmacy noted        REVIEW OF SYSTEMS:.  14 point ROS was reviewed and is negative other than as noted  above in HPI.       HOME MEDICATIONS:  Current Outpatient Medications   Medication Sig Dispense Refill     anastrozole (ARIMIDEX) 1 MG tablet Take 1 tablet (1 mg) by mouth daily. 90 tablet 3     olaparib (LYNPARZA) 150 MG tablet Take 2 tablets (300 mg) by mouth in the morning and 1 tablet (150 mg) in the evening. 270 tablet 0     olaparib (LYNPARZA) 150 MG tablet Take 2 tablets (300 mg) by mouth in the morning and 1 tablet (150 mg) in the evening. 90 tablet 0     ondansetron (ZOFRAN) 8 MG tablet Take 1 tablet (8 mg) by mouth every 8 hours as needed for nausea. 30 tablet 2     traZODone (DESYREL) 50 MG tablet Take 1 tablet (50 mg) by mouth at bedtime. 90 tablet 3     olaparib (LYNPARZA) 150 MG tablet Take 2 tablets (300 mg) by mouth in the morning and 1 tablet (150 mg) in the evening. 90 tablet 0         ALLERGIES:  Allergies   Allergen Reactions     Aprepitant Difficulty breathing and Shortness Of Breath     Fosaprepitant Unknown     Allergy added per chart review. [facility]     Armoracia Rusticana Ext (Horseradish) Hives     Carboprost GI Disturbance     Diarrhea     Codeine Nausea and Vomiting         PAST MEDICAL HISTORY:  BRCA2 gene mutation.  Bilateral breast cancer as outlined above.  2022 Galleri test - negative for malignancy.    Gynecologic history:  Age of menarche at 15.  3 children/triplets -- 1 boy and twin girls born 2022.  Age of first pregnancy at 41.  Used 10 years of OCPs no HRT, IVF for fertility treatment.      PAST SURGICAL HISTORY:  Past Surgical History:   Procedure Laterality Date     BIOPSY, BREAST, WITH RADIOFREQUENCY IDENTIFICATION Bilateral 2024    Procedure: bilateral tag localized node excision;  Surgeon: Fanny Little MD;  Location: [facility] OR      SECTION       DILATION AND CURETTAGE      retained placenta post-partum 2 weeks     ENT SURGERY      tonsillectomy     EXCISE CYST GENERIC (LOCATION) Left 2024    Procedure: Excision Left Axillary Cyst;  Surgeon:  Fanny Little MD;  Location:  OR     GYN SURGERY      egg retrieval for IVF     INSERT PORT VASCULAR ACCESS Right 11/20/2023    Procedure: INSERTION, VASCULAR ACCESS PORT;  Surgeon: Fanny Little MD;  Location:  OR     KNEE SURGERY Left      LAPAROSCOPIC HYSTERECTOMY TOTAL, BILATERAL SALPINGO-OOPHORECTOMY, NODE DISSECTION, COMBINED Bilateral 9/25/2024    Procedure: laparoscopic removal of uterus, cervix, both ovaries, cystoscopy-BILATERAL;  Surgeon: Hiral Davis MD;  Location:  OR     MASTECTOMY SIMPLE BILATERAL, SENTINEL NODE BILATERAL, COMBINED Bilateral 05/08/2024    Procedure: Bilateral skin sparing mastectomies with bilateral sentinel lymph node biopsy;  Surgeon: Fanny Little MD;  Location:  OR     RECONSTRUCT BREAST, INSERT TISSUE EXPANDER BILATERAL, COMBINED Bilateral 05/08/2024    Procedure: BILATERAL FIRST STAGE BREAST RECONSTRUCTION WITH TISSUE EXPANDERS AND ACELLULAR DERMAL MATRIX;  Surgeon: Vasiliy Yee MD;  Location:  OR     REMOVE PORT VASCULAR ACCESS N/A 05/08/2024    Procedure: port removal;  Surgeon: Fanny Little MD;  Location:  OR         SOCIAL HISTORY:  Social History     Socioeconomic History     Marital status:      Spouse name: Not on file     Number of children: Not on file     Years of education: Not on file     Highest education level: Not on file   Occupational History     Not on file   Tobacco Use     Smoking status: Never     Passive exposure: Never     Smokeless tobacco: Never   Vaping Use     Vaping status: Never Used   Substance and Sexual Activity     Alcohol use: Not Currently     Comment: rarely     Drug use: Not Currently     Sexual activity: Not on file   Other Topics Concern     Parent/sibling w/ CABG, MI or angioplasty before 65F 55M? No   Social History Narrative     Not on file     Social Drivers of Health     Financial Resource Strain: Low Risk  (4/9/2024)    Financial Resource Strain      Within the past 12 months,  have you or your family members you live with been unable to get utilities (heat, electricity) when it was really needed?: No   Food Insecurity: Low Risk  (2024)    Food Insecurity      Within the past 12 months, did you worry that your food would run out before you got money to buy more?: No      Within the past 12 months, did the food you bought just not last and you didn t have money to get more?: No   Transportation Needs: Low Risk  (2024)    Transportation Needs      Within the past 12 months, has lack of transportation kept you from medical appointments, getting your medicines, non-medical meetings or appointments, work, or from getting things that you need?: No   Physical Activity: Insufficiently Active (2023)    Received from [facility]    Exercise Vital Sign      Days of Exercise per Week: 4 days      Minutes of Exercise per Session: 30 min   Stress: Not on file   Social Connections: Unknown (2021)    [facility]    Social Connections      Frequency of Communication with Friends and Family: Not on file   Interpersonal Safety: Low Risk  (2025)    Interpersonal Safety      Do you feel physically and emotionally safe where you currently live?: Yes      Within the past 12 months, have you been hit, slapped, kicked or otherwise physically hurt by someone?: No      Within the past 12 months, have you been humiliated or emotionally abused in other ways by your partner or ex-partner?: No   Housing Stability: Low Risk  (2024)    Housing Stability      Do you have housing? : Yes      Are you worried about losing your housing?: No         FAMILY HISTORY:  Paternal grandmother and many paternal grandfathers with breast cancer; cousin with DCIS tested positive for BRCA1.  No ovarian, colon, or prostate cancer.      PHYSICAL EXAM:  Vital signs:     ECO  Status post bilateral mastectomies with expanders in place.  No evidence of recurrence.  No  adenopathy noted            ASSESSMENT/PLAN:     Angie is a very pleasant 44-year-old female who presented initially with stage IIb grade 3 invasive lobular carcinoma ER/MD positive HER2/markus negative Ki-67 70%, BRCA2 positive      Status post neoadjuvant chemotherapy with dose dense AC followed by Taxol and bilateral mastectomies with bilateral axillary lymph node excision on May 2024  Issues with arthralgias and gi upset discussed better off olaparib. Takes zofran prohylactically which she can stop       1 Breast cancer  -reconstruction olaparib on hold for this week and next week then change to 150 mg bid due to GI issues and arthralgias  -continue anastrazole since joint pain better after stopping olaparib   -kisquali 2 mo after stopping olaparib done in August   2 dexa in 2026    3 MRI fibula in August    4 labs and md visit in 6 weeks    5 insomnia try sleep time tea and gummies     All questions answered to her satisfaction      All questions answered to her satisfaction        Total time spent on day of visit, including review of tests, obtaining/reviewing separately obtained history, ordering medications/tests/procedures, communicating with PCP/consultants, and documenting in electronic medical record: 60 min    Again, thank you for allowing me to participate in the care of your patient.        Sincerely,        Flynn Hicks MD    Electronically signed

## 2025-06-11 ENCOUNTER — DOCUMENTATION ONLY (OUTPATIENT)
Dept: PHARMACY | Facility: CLINIC | Age: 45
End: 2025-06-11
Payer: COMMERCIAL

## 2025-06-11 DIAGNOSIS — Z15.09 BRCA2 GENE MUTATION POSITIVE: ICD-10-CM

## 2025-06-11 DIAGNOSIS — C50.411 MALIGNANT NEOPLASM OF UPPER-OUTER QUADRANT OF RIGHT BREAST IN FEMALE, ESTROGEN RECEPTOR POSITIVE (H): Primary | ICD-10-CM

## 2025-06-11 DIAGNOSIS — Z15.01 BRCA2 GENE MUTATION POSITIVE: ICD-10-CM

## 2025-06-11 DIAGNOSIS — Z17.0 MALIGNANT NEOPLASM OF UPPER-OUTER QUADRANT OF RIGHT BREAST IN FEMALE, ESTROGEN RECEPTOR POSITIVE (H): Primary | ICD-10-CM

## 2025-06-11 NOTE — PROGRESS NOTES
Oral Chemotherapy Monitoring Program  Lab Follow Up    Reviewed lab results from 6/10/25.        5/12/2025    12:00 PM 5/14/2025    10:00 AM 5/16/2025     9:00 AM 5/19/2025     3:00 PM 5/20/2025     4:00 PM 6/10/2025     1:00 PM 6/11/2025     1:00 PM   ORAL CHEMOTHERAPY   Assessment Type Incoming phone call Lab Monitoring Monthly Follow up Incoming phone call Incoming phone call Refill Lab Monitoring   Diagnosis Code Breast Cancer Breast Cancer Breast Cancer Breast Cancer Breast Cancer Breast Cancer Breast Cancer   Providers Dr. Sy Dan   Clinic Name/Location [facility]   Drug Name Lynparza (olaparib)  Lynparza (olaparib)  Lynparza (olaparib)  Lynparza (olaparib)  Lynparza (olaparib)  Lynparza (olaparib)  Lynparza (olaparib)   Dose 150 mg  150 mg  150 mg  150 mg  150 mg  150 mg  150 mg   Current Schedule      BID  BID   Cycle Details Continuous Continuous Continuous Continuous Continuous Continuous Continuous   Adverse Effects Other (See Note for Details)         Other (See Note for Details) upset stomache and joint pian         Pharmacist intervention(other) No             Data saved with a previous flowsheet row definition       Labs:      Assessment & Plan:  No concerning abnormalities. Proceed with olaparib as planned. Patient dose reduced to 150mg BID 6/23 to finish out therapy in August 2025.     Questions answered to patient's satisfaction.    Follow-Up:  6/27 with check in on dose reduction.     Savannah Caro PharmD  June 11, 2025

## 2025-07-21 NOTE — PROGRESS NOTES
[facility] Follow Up Note    CHIEF COMPLAINT:  H/o bilateral breast cancers    HISTORY OF PRESENT ILLNESS:  Angie Michael is a 44 year old female who is seen in follow up for history of bilateral breast cancer.     She is status post bilateral skin sparing mastectomies with bilateral sentinel node biopsy and excision of tag localized for nodes and immediate reconstruction with Dr. Yee on 5/8/2024.  Her final pathology revealed no residual carcinoma in the right breast and negative nodes.  The left breast had multifocal residual invasive ductal carcinoma, grade 2 measuring 10 mm, 6 mm and 1.4 mm with extensive surrounding DCIS.  All margins were negative.  Left axillary lymph nodes were negative.      She had adjuvant radiation on the left with Dr. Cortes.  This went very well and she has had minimal skin changes.  She is on hormone  blockade under the care of Dr. Hicks now and tolerating this well.  She had a PET/CT on 9/9/2024 which revealed no convincing evidence of malignancy postsurgical changes were noted.  Subcentimeter right axillary lymph nodes are favored to be reactive.  Focal FDG uptake in the mid tibia is favored to be benign and has been followed on prior imaging and has not changed.  She was noted to have persistent uptake in a thyroid nodule and thyroid ultrasound was recommended and she had previously had this done in November 2023.    She had her implant exchange surgery in mid June.  She reports she had more tenderness on her right side but this is improved.  Reports implants are much more comfortable than expanders.  She has had improved GI symptoms on reduced dose of Lynparza.        Past Medical History:   Diagnosis Date    BRCA2 gene mutation positive 04/16/2024    History of blood transfusion     Malignant neoplasm of overlapping sites of left breast in female, estrogen receptor positive (H) 10/10/2023       Past Surgical History:   Procedure Laterality Date     BIOPSY, BREAST, WITH RADIOFREQUENCY IDENTIFICATION Bilateral 2024    Procedure: bilateral tag localized node excision;  Surgeon: Fanny Little MD;  Location:  OR     SECTION      DILATION AND CURETTAGE      retained placenta post-partum 2 weeks    ENT SURGERY      tonsillectomy    EXCISE CYST GENERIC (LOCATION) Left 2024    Procedure: Excision Left Axillary Cyst;  Surgeon: Fanny Little MD;  Location:  OR    GYN SURGERY      egg retrieval for IVF    INSERT PORT VASCULAR ACCESS Right 2023    Procedure: INSERTION, VASCULAR ACCESS PORT;  Surgeon: Fanny Little MD;  Location:  OR    KNEE SURGERY Left     LAPAROSCOPIC HYSTERECTOMY TOTAL, BILATERAL SALPINGO-OOPHORECTOMY, NODE DISSECTION, COMBINED Bilateral 2024    Procedure: laparoscopic removal of uterus, cervix, both ovaries, cystoscopy-BILATERAL;  Surgeon: Hiral Davis MD;  Location:  OR    MASTECTOMY SIMPLE BILATERAL, SENTINEL NODE BILATERAL, COMBINED Bilateral 2024    Procedure: Bilateral skin sparing mastectomies with bilateral sentinel lymph node biopsy;  Surgeon: Fanny Little MD;  Location:  OR    RECONSTRUCT BREAST, INSERT TISSUE EXPANDER BILATERAL, COMBINED Bilateral 2024    Procedure: BILATERAL FIRST STAGE BREAST RECONSTRUCTION WITH TISSUE EXPANDERS AND ACELLULAR DERMAL MATRIX;  Surgeon: Vasiliy Yee MD;  Location:  OR    REMOVE PORT VASCULAR ACCESS N/A 2024    Procedure: port removal;  Surgeon: Fanny Little MD;  Location:  OR       Family History   Problem Relation Age of Onset    Hyperlipidemia Mother     Other Cancer Father         Leukemia    Breast Cancer Other        Social History     Tobacco Use    Smoking status: Never     Passive exposure: Never    Smokeless tobacco: Never   Substance Use Topics    Alcohol use: Not Currently     Comment: rarely       Patient Active Problem List   Diagnosis    Malignant neoplasm of upper-outer quadrant of right  breast in female, estrogen receptor positive (H)    BRCA2 gene mutation positive    Iron deficiency anemia    Malignant neoplasm of overlapping sites of both breasts in female, estrogen receptor positive (H)    Absence of both breasts    Surgery follow-up examination     Allergies   Allergen Reactions    Aprepitant Difficulty breathing and Shortness Of Breath    Fosaprepitant Unknown     Allergy added per chart review. [facility]    Armoracia Rusticana Ext (Horseradish) Hives    Carboprost GI Disturbance     Diarrhea    Codeine Nausea and Vomiting     Current Outpatient Medications   Medication Sig Dispense Refill    anastrozole (ARIMIDEX) 1 MG tablet Take 1 tablet (1 mg) by mouth daily. 90 tablet 3    olaparib (LYNPARZA) 150 MG tablet Take 1 tablet (150 mg) by mouth 2 times daily . To start 1 week after surgery 94 tablet 0    olaparib (LYNPARZA) 150 MG tablet Take 2 tablets (300 mg) by mouth in the morning and 1 tablet (150 mg) in the evening. 270 tablet 0    olaparib (LYNPARZA) 150 MG tablet Take 2 tablets (300 mg) by mouth in the morning and 1 tablet (150 mg) in the evening. 90 tablet 0    olaparib (LYNPARZA) 150 MG tablet Take 2 tablets (300 mg) by mouth in the morning and 1 tablet (150 mg) in the evening. 90 tablet 0    ondansetron (ZOFRAN) 8 MG tablet Take 1 tablet (8 mg) by mouth every 8 hours as needed for nausea. 30 tablet 2    traZODone (DESYREL) 50 MG tablet Take 1 tablet (50 mg) by mouth at bedtime. 90 tablet 3     Vitals: There were no vitals taken for this visit.  BMI= There is no height or weight on file to calculate BMI.    EXAM:  GENERAL: healthy, alert and no distress   BREAST: Bilateral mastectomy incisions are healed well.  Implants are in place.  Mild indentation on the upper outer wound near the axilla bilaterally.  There are no masses palpable on her chest wall.  Very mild radiation changes on the left breast skin.  There is no axillary or supraclavicular lymphadenopathy.  CARDIOVASCULAR:   RRR  RESPIRATORY: nonlabored breathing  NECK: Neck supple. No adenopathy. Thyroid symmetric, normal size  SKIN: No suspicious lesions or rashes  LYMPH: Normal cervical lymph nodes      ASSESSMENT/PLAN:  Angie Michael is now 1 year status post bilateral mastectomies with bilateral sentinel nodes and left radiation following neoadjuvant chemotherapy.  She is doing very well.  Her clinical exam is benign.  She will have ongoing chest wall exams with Dr. Hicks and Dr. Gaffney who she will be establishing with as a primary care provider and will see me as needed in the future.  We did discuss with fat grafting we can see fat necrosis and what that can feel like.  We discussed lifestyle modifications to continue going forward to reduce risk of recurrence as well.        20 minutes total time spent on the date of this encounter doing: chart review, review of test results, patient visit, physical exam, education, counseling, developing plan of care, and documenting.      Fanny Little MD  [facility]  [phone]        Please route or send letter to:  Primary Care Provider (PCP) and Referring Provider

## 2025-07-22 ENCOUNTER — OFFICE VISIT (OUTPATIENT)
Dept: SURGERY | Facility: CLINIC | Age: 45
End: 2025-07-22
Payer: COMMERCIAL

## 2025-07-22 VITALS
DIASTOLIC BLOOD PRESSURE: 64 MMHG | BODY MASS INDEX: 24.91 KG/M2 | HEART RATE: 54 BPM | OXYGEN SATURATION: 98 % | RESPIRATION RATE: 16 BRPM | HEIGHT: 66 IN | WEIGHT: 155 LBS | SYSTOLIC BLOOD PRESSURE: 114 MMHG

## 2025-07-22 DIAGNOSIS — C50.912 BILATERAL MALIGNANT NEOPLASM OF BREAST IN FEMALE, ESTROGEN RECEPTOR POSITIVE, UNSPECIFIED SITE OF BREAST (H): Primary | ICD-10-CM

## 2025-07-22 DIAGNOSIS — C50.911 BILATERAL MALIGNANT NEOPLASM OF BREAST IN FEMALE, ESTROGEN RECEPTOR POSITIVE, UNSPECIFIED SITE OF BREAST (H): Primary | ICD-10-CM

## 2025-07-22 DIAGNOSIS — Z17.0 BILATERAL MALIGNANT NEOPLASM OF BREAST IN FEMALE, ESTROGEN RECEPTOR POSITIVE, UNSPECIFIED SITE OF BREAST (H): Primary | ICD-10-CM

## 2025-07-22 PROCEDURE — 3074F SYST BP LT 130 MM HG: CPT | Performed by: SURGERY

## 2025-07-22 PROCEDURE — 3078F DIAST BP <80 MM HG: CPT | Performed by: SURGERY

## 2025-07-22 PROCEDURE — 99213 OFFICE O/P EST LOW 20 MIN: CPT | Performed by: SURGERY

## 2025-07-23 DIAGNOSIS — Z17.0 MALIGNANT NEOPLASM OF OVERLAPPING SITES OF BOTH BREASTS IN FEMALE, ESTROGEN RECEPTOR POSITIVE (H): Primary | ICD-10-CM

## 2025-07-23 DIAGNOSIS — C50.812 MALIGNANT NEOPLASM OF OVERLAPPING SITES OF BOTH BREASTS IN FEMALE, ESTROGEN RECEPTOR POSITIVE (H): Primary | ICD-10-CM

## 2025-07-23 DIAGNOSIS — C50.811 MALIGNANT NEOPLASM OF OVERLAPPING SITES OF BOTH BREASTS IN FEMALE, ESTROGEN RECEPTOR POSITIVE (H): Primary | ICD-10-CM

## 2025-08-06 ENCOUNTER — ANCILLARY PROCEDURE (OUTPATIENT)
Dept: MRI IMAGING | Facility: CLINIC | Age: 45
End: 2025-08-06
Attending: INTERNAL MEDICINE
Payer: COMMERCIAL

## 2025-08-06 DIAGNOSIS — M89.9 BONE LESION: ICD-10-CM

## 2025-08-06 DIAGNOSIS — C50.411 MALIGNANT NEOPLASM OF UPPER-OUTER QUADRANT OF RIGHT BREAST IN FEMALE, ESTROGEN RECEPTOR POSITIVE (H): ICD-10-CM

## 2025-08-06 DIAGNOSIS — Z17.0 MALIGNANT NEOPLASM OF UPPER-OUTER QUADRANT OF RIGHT BREAST IN FEMALE, ESTROGEN RECEPTOR POSITIVE (H): ICD-10-CM

## 2025-08-06 PROCEDURE — 255N000002 HC RX 255 OP 636: Performed by: INTERNAL MEDICINE

## 2025-08-06 PROCEDURE — 73720 MRI LWR EXTREMITY W/O&W/DYE: CPT | Mod: RT

## 2025-08-06 PROCEDURE — 73720 MRI LWR EXTREMITY W/O&W/DYE: CPT | Mod: 26 | Performed by: RADIOLOGY

## 2025-08-06 PROCEDURE — A9585 GADOBUTROL INJECTION: HCPCS | Performed by: INTERNAL MEDICINE

## 2025-08-06 RX ORDER — GADOBUTROL 604.72 MG/ML
7 INJECTION INTRAVENOUS ONCE
Status: COMPLETED | OUTPATIENT
Start: 2025-08-06 | End: 2025-08-06

## 2025-08-06 RX ADMIN — GADOBUTROL 7 ML: 604.72 INJECTION INTRAVENOUS at 11:01

## 2025-08-07 ENCOUNTER — DOCUMENTATION ONLY (OUTPATIENT)
Dept: PHARMACY | Facility: CLINIC | Age: 45
End: 2025-08-07
Payer: COMMERCIAL

## 2025-08-11 ENCOUNTER — ONCOLOGY VISIT (OUTPATIENT)
Dept: ONCOLOGY | Facility: CLINIC | Age: 45
End: 2025-08-11
Attending: INTERNAL MEDICINE
Payer: COMMERCIAL

## 2025-08-11 VITALS
WEIGHT: 161 LBS | RESPIRATION RATE: 16 BRPM | DIASTOLIC BLOOD PRESSURE: 87 MMHG | OXYGEN SATURATION: 99 % | BODY MASS INDEX: 25.88 KG/M2 | HEART RATE: 82 BPM | SYSTOLIC BLOOD PRESSURE: 121 MMHG | HEIGHT: 66 IN

## 2025-08-11 DIAGNOSIS — Z17.0 MALIGNANT NEOPLASM OF OVERLAPPING SITES OF BOTH BREASTS IN FEMALE, ESTROGEN RECEPTOR POSITIVE (H): Primary | ICD-10-CM

## 2025-08-11 DIAGNOSIS — C50.812 MALIGNANT NEOPLASM OF OVERLAPPING SITES OF BOTH BREASTS IN FEMALE, ESTROGEN RECEPTOR POSITIVE (H): Primary | ICD-10-CM

## 2025-08-11 DIAGNOSIS — C50.811 MALIGNANT NEOPLASM OF OVERLAPPING SITES OF BOTH BREASTS IN FEMALE, ESTROGEN RECEPTOR POSITIVE (H): Primary | ICD-10-CM

## 2025-08-11 DIAGNOSIS — E78.5 ELEVATED LIPIDS: ICD-10-CM

## 2025-08-11 PROCEDURE — G0463 HOSPITAL OUTPT CLINIC VISIT: HCPCS | Performed by: INTERNAL MEDICINE

## 2025-08-11 ASSESSMENT — PAIN SCALES - GENERAL: PAINLEVEL_OUTOF10: NO PAIN (0)

## 2025-08-18 SDOH — HEALTH STABILITY: PHYSICAL HEALTH: ON AVERAGE, HOW MANY DAYS PER WEEK DO YOU ENGAGE IN MODERATE TO STRENUOUS EXERCISE (LIKE A BRISK WALK)?: 1 DAY

## 2025-08-18 SDOH — HEALTH STABILITY: PHYSICAL HEALTH: ON AVERAGE, HOW MANY MINUTES DO YOU ENGAGE IN EXERCISE AT THIS LEVEL?: 60 MIN

## 2025-08-18 ASSESSMENT — SOCIAL DETERMINANTS OF HEALTH (SDOH): HOW OFTEN DO YOU GET TOGETHER WITH FRIENDS OR RELATIVES?: ONCE A WEEK

## 2025-08-19 ENCOUNTER — TELEPHONE (OUTPATIENT)
Dept: ONCOLOGY | Facility: CLINIC | Age: 45
End: 2025-08-19

## 2025-08-19 ENCOUNTER — LAB (OUTPATIENT)
Dept: INFUSION THERAPY | Facility: CLINIC | Age: 45
End: 2025-08-19
Payer: COMMERCIAL

## 2025-08-19 ENCOUNTER — OFFICE VISIT (OUTPATIENT)
Dept: FAMILY MEDICINE | Facility: CLINIC | Age: 45
End: 2025-08-19
Payer: COMMERCIAL

## 2025-08-19 VITALS
RESPIRATION RATE: 16 BRPM | OXYGEN SATURATION: 97 % | WEIGHT: 160 LBS | DIASTOLIC BLOOD PRESSURE: 65 MMHG | SYSTOLIC BLOOD PRESSURE: 91 MMHG | BODY MASS INDEX: 25.71 KG/M2 | HEART RATE: 79 BPM | TEMPERATURE: 96.8 F | HEIGHT: 66 IN

## 2025-08-19 DIAGNOSIS — Z00.00 ROUTINE GENERAL MEDICAL EXAMINATION AT A HEALTH CARE FACILITY: Primary | ICD-10-CM

## 2025-08-19 DIAGNOSIS — N39.0 URINARY TRACT INFECTION: Primary | ICD-10-CM

## 2025-08-19 DIAGNOSIS — Z15.01 BRCA2 GENE MUTATION POSITIVE: ICD-10-CM

## 2025-08-19 DIAGNOSIS — Z90.13 ABSENCE OF BOTH BREASTS: ICD-10-CM

## 2025-08-19 DIAGNOSIS — F41.8 SITUATIONAL ANXIETY: ICD-10-CM

## 2025-08-19 DIAGNOSIS — R30.0 DYSURIA: ICD-10-CM

## 2025-08-19 DIAGNOSIS — Z90.79 S/P TOTAL ABDOMINAL HYSTERECTOMY AND BILATERAL SALPINGO-OOPHORECTOMY: ICD-10-CM

## 2025-08-19 DIAGNOSIS — C50.812 MALIGNANT NEOPLASM OF OVERLAPPING SITES OF BOTH BREASTS IN FEMALE, ESTROGEN RECEPTOR POSITIVE (H): ICD-10-CM

## 2025-08-19 DIAGNOSIS — E04.1 THYROID NODULE: ICD-10-CM

## 2025-08-19 DIAGNOSIS — K58.0 IRRITABLE BOWEL SYNDROME WITH DIARRHEA: ICD-10-CM

## 2025-08-19 DIAGNOSIS — Z17.0 MALIGNANT NEOPLASM OF OVERLAPPING SITES OF BOTH BREASTS IN FEMALE, ESTROGEN RECEPTOR POSITIVE (H): ICD-10-CM

## 2025-08-19 DIAGNOSIS — Z90.710 S/P TOTAL ABDOMINAL HYSTERECTOMY AND BILATERAL SALPINGO-OOPHORECTOMY: ICD-10-CM

## 2025-08-19 DIAGNOSIS — C50.811 MALIGNANT NEOPLASM OF OVERLAPPING SITES OF BOTH BREASTS IN FEMALE, ESTROGEN RECEPTOR POSITIVE (H): ICD-10-CM

## 2025-08-19 DIAGNOSIS — Z90.722 S/P TOTAL ABDOMINAL HYSTERECTOMY AND BILATERAL SALPINGO-OOPHORECTOMY: ICD-10-CM

## 2025-08-19 DIAGNOSIS — E78.5 HYPERLIPIDEMIA LDL GOAL <100: ICD-10-CM

## 2025-08-19 DIAGNOSIS — N39.0 URINARY TRACT INFECTION: ICD-10-CM

## 2025-08-19 DIAGNOSIS — Z12.11 COLON CANCER SCREENING: ICD-10-CM

## 2025-08-19 DIAGNOSIS — Z15.09 BRCA2 GENE MUTATION POSITIVE: ICD-10-CM

## 2025-08-19 PROBLEM — Z09 SURGERY FOLLOW-UP EXAMINATION: Status: RESOLVED | Noted: 2024-05-28 | Resolved: 2025-08-19

## 2025-08-19 LAB
ALBUMIN UR-MCNC: NEGATIVE MG/DL
APPEARANCE UR: CLEAR
BILIRUB UR QL STRIP: NEGATIVE
COLOR UR AUTO: ABNORMAL
GLUCOSE UR STRIP-MCNC: NEGATIVE MG/DL
HGB UR QL STRIP: NEGATIVE
KETONES UR STRIP-MCNC: NEGATIVE MG/DL
LEUKOCYTE ESTERASE UR QL STRIP: NEGATIVE
MUCOUS THREADS #/AREA URNS LPF: PRESENT /LPF
NITRATE UR QL: NEGATIVE
PH UR STRIP: 6.5 [PH] (ref 5–7)
RBC URINE: 1 /HPF
SP GR UR STRIP: 1.02 (ref 1–1.03)
UROBILINOGEN UR STRIP-MCNC: NORMAL MG/DL
WBC URINE: 4 /HPF

## 2025-08-19 PROCEDURE — 81001 URINALYSIS AUTO W/SCOPE: CPT | Performed by: INTERNAL MEDICINE

## 2025-08-19 PROCEDURE — 87563 M. GENITALIUM AMP PROBE: CPT | Performed by: INTERNAL MEDICINE

## 2025-08-19 RX ORDER — ESTRADIOL 0.1 MG/G
2 CREAM VAGINAL
Qty: 42 G | Refills: 3 | Status: SHIPPED | OUTPATIENT
Start: 2025-08-21

## 2025-08-19 RX ORDER — PHENAZOPYRIDINE HYDROCHLORIDE 100 MG/1
100 TABLET, FILM COATED ORAL 3 TIMES DAILY PRN
Qty: 6 TABLET | Refills: 0 | Status: SHIPPED | OUTPATIENT
Start: 2025-08-19

## 2025-08-19 RX ORDER — PHENAZOPYRIDINE HYDROCHLORIDE 100 MG/1
100 TABLET, FILM COATED ORAL 3 TIMES DAILY PRN
Qty: 6 TABLET | Refills: 0 | Status: SHIPPED | OUTPATIENT
Start: 2025-08-19 | End: 2025-08-19

## 2025-08-19 ASSESSMENT — PAIN SCALES - GENERAL: PAINLEVEL_OUTOF10: MILD PAIN (1)

## 2025-08-21 ENCOUNTER — TELEPHONE (OUTPATIENT)
Dept: PHARMACY | Facility: CLINIC | Age: 45
End: 2025-08-21
Payer: COMMERCIAL

## 2025-08-21 DIAGNOSIS — R30.9 PAINFUL URINATION: Primary | ICD-10-CM

## 2025-08-21 LAB
M GENITALIUM DNA SPEC QL NAA+PROBE: NOT DETECTED
M HOMINIS DNA SPEC QL NAA+PROBE: NOT DETECTED
U PARVUM DNA SPEC QL NAA+PROBE: NOT DETECTED
U UREALYTICUM DNA SPEC QL NAA+PROBE: NOT DETECTED

## 2025-08-25 ENCOUNTER — PATIENT OUTREACH (OUTPATIENT)
Dept: CARE COORDINATION | Facility: CLINIC | Age: 45
End: 2025-08-25
Payer: COMMERCIAL

## 2025-08-25 DIAGNOSIS — Z15.01 BRCA2 GENE MUTATION POSITIVE: Primary | ICD-10-CM

## 2025-08-25 DIAGNOSIS — Z15.09 BRCA2 GENE MUTATION POSITIVE: Primary | ICD-10-CM

## 2025-08-25 DIAGNOSIS — Z17.0 MALIGNANT NEOPLASM OF UPPER-OUTER QUADRANT OF RIGHT BREAST IN FEMALE, ESTROGEN RECEPTOR POSITIVE (H): ICD-10-CM

## 2025-08-25 DIAGNOSIS — C50.411 MALIGNANT NEOPLASM OF UPPER-OUTER QUADRANT OF RIGHT BREAST IN FEMALE, ESTROGEN RECEPTOR POSITIVE (H): ICD-10-CM

## 2025-09-02 ENCOUNTER — VIRTUAL VISIT (OUTPATIENT)
Dept: UROLOGY | Facility: CLINIC | Age: 45
End: 2025-09-02
Attending: OBSTETRICS & GYNECOLOGY
Payer: COMMERCIAL

## 2025-09-02 ENCOUNTER — ANCILLARY PROCEDURE (OUTPATIENT)
Dept: ULTRASOUND IMAGING | Facility: CLINIC | Age: 45
End: 2025-09-02
Attending: INTERNAL MEDICINE
Payer: COMMERCIAL

## 2025-09-02 DIAGNOSIS — E04.1 THYROID NODULE: ICD-10-CM

## 2025-09-02 DIAGNOSIS — R35.0 URINARY FREQUENCY: Primary | ICD-10-CM

## 2025-09-02 DIAGNOSIS — R39.15 URGENCY OF URINATION: ICD-10-CM

## 2025-09-02 PROCEDURE — 98001 SYNCH AUDIO-VIDEO NEW LOW 30: CPT | Performed by: OBSTETRICS & GYNECOLOGY

## 2025-09-02 PROCEDURE — 76536 US EXAM OF HEAD AND NECK: CPT

## 2025-09-02 PROCEDURE — 1126F AMNT PAIN NOTED NONE PRSNT: CPT | Performed by: OBSTETRICS & GYNECOLOGY

## 2025-09-04 ENCOUNTER — PATIENT OUTREACH (OUTPATIENT)
Dept: CARE COORDINATION | Facility: CLINIC | Age: 45
End: 2025-09-04
Payer: COMMERCIAL

## (undated) DEVICE — DECANTER BAG 2002S

## (undated) DEVICE — SOL WATER IRRIG 1000ML BOTTLE 2F7114

## (undated) DEVICE — SU VICRYL 2-0 SH 27" J317H

## (undated) DEVICE — DRSG BIOPATCH GERMICIDAL SPLIT SPONGE 7MM LG

## (undated) DEVICE — KOH COLPOTOMIZER OCCLUDER  CPO-6

## (undated) DEVICE — ESU ENDO SCISSORS 5MM CVD 5DCS

## (undated) DEVICE — GLOVE BIOGEL PI MICRO INDICATOR UNDERGLOVE SZ 6.5 48965

## (undated) DEVICE — JELLY LUBRICATING SURGILUBE 2OZ TUBE

## (undated) DEVICE — PREP SKIN SCRUB TRAY 4461A

## (undated) DEVICE — SOL NACL 0.9% INJ 250ML BAG 2B1322Q

## (undated) DEVICE — ESU ELEC BLADE 6" COATED/INSULATED E1455-6

## (undated) DEVICE — SYR 50ML LL W/O NDL 309653

## (undated) DEVICE — TUBING C02 INSUFFLATION LAP FILTER HEATER 6198

## (undated) DEVICE — GOWN IMPERVIOUS BREATHABLE SMART LG 89015

## (undated) DEVICE — SOL NACL 0.9% IRRIG 1000ML BOTTLE 2F7124

## (undated) DEVICE — PACK MINOR SBA15MIFSE

## (undated) DEVICE — LINEN TOWEL PACK X5 5464

## (undated) DEVICE — NDL INSUFFLATION 13GA 120MM C2201

## (undated) DEVICE — GLOVE BIOGEL PI SZ 7.5 40875

## (undated) DEVICE — DEVICE ENDO STITCH APPLIER 10MM 173016

## (undated) DEVICE — SYR 10ML SLIP TIP W/O NDL

## (undated) DEVICE — DECANTER VIAL 2006S

## (undated) DEVICE — SOL NACL 0.9% INJ 1000ML BAG 2B1324X

## (undated) DEVICE — SUCTION IRR STRYKERFLOW II W/TIP 250-070-520

## (undated) DEVICE — DRSG STERI STRIP 1/2X4" R1547

## (undated) DEVICE — SU WND CLOSURE RELOAD VLOC 180 ABS 0 GREEN 8" VLOCA008L

## (undated) DEVICE — TUBING IRRIG TUR Y TYPE 96" LF 6543-01

## (undated) DEVICE — SU MONOCRYL 4-0 PS-2 18" UND Y496G

## (undated) DEVICE — PAD CHUX UNDERPAD 23X24" 7136

## (undated) DEVICE — DRSG KERLIX 4 1/2"X4YDS ROLL 6715

## (undated) DEVICE — ESU CORD MONOPOLAR 10'  E0510

## (undated) DEVICE — KIT PATIENT POSITIONING PIGAZZI LATEX FREE 40580

## (undated) DEVICE — CLEANSER JET LAVAGE IRRISEPT 0.05% CHG IRRISEPT45USA

## (undated) DEVICE — ESU ELEC BLADE 2.75" COATED/INSULATED E1455

## (undated) DEVICE — SU SILK 2-0 SH 30" K833H

## (undated) DEVICE — DEVICE SUTURE PASSER 14GA WECK EFX EFXSP2

## (undated) DEVICE — PACK MAJOR SBA15MAFSI

## (undated) DEVICE — RETR ELEV / UTERINE MANIPULATOR V-CARE MED CUP 60-6085-201

## (undated) DEVICE — SU SILK 2-0 FSL 18" 677G

## (undated) DEVICE — DRAPE LAP TRANSVERSE 29421

## (undated) DEVICE — SLEEVE PROTECTIVE BREAST BIOPSY  GMSLV001-10

## (undated) DEVICE — WIPES FOLEY CARE SURESTEP PROVON DFC100

## (undated) DEVICE — PREP CHLORAPREP 26ML TINTED HI-LITE ORANGE 930815

## (undated) DEVICE — SU MONOCRYL 3-0 PS-2 27" Y427H

## (undated) DEVICE — SU PROLENE 2-0 CT-2 30" 8411H

## (undated) DEVICE — SU VICRYL 4-0 PS-2 18" UND J496H

## (undated) DEVICE — DRAPE BREAST/CHEST 29420

## (undated) DEVICE — SU VICRYL 3-0 SH 27" J316H

## (undated) DEVICE — GLOVE BIOGEL PI MICRO SZ 6.0 48560

## (undated) DEVICE — DRAIN JACKSON PRATT 15FR ROUND SIL LF JP-2229

## (undated) DEVICE — ENDO TROCAR FIRST ENTRY KII FIOS Z-THRD 05X100MM CTF03

## (undated) DEVICE — DRAPE LEGGINGS 8421

## (undated) DEVICE — DRAPE IOBAN INCISE 23X17" 6650EZ

## (undated) DEVICE — CATH TRAY FOLEY 16FR BARDEX W/DRAIN BAG STATLOCK 300316A

## (undated) DEVICE — COVER ULTRASOUND PROBE FLEXI-FEEL 6X48" LF 25-FF648

## (undated) DEVICE — KIT PROCEDURE SPY-PHI SGL HH9001

## (undated) DEVICE — DRAPE COVER C-ARM SEAMLESS SNAP-KAP 03-KP26 LATEX FREE

## (undated) DEVICE — ENDO TROCAR SLEEVE KII Z-THREADED 05X100MM CTS02

## (undated) DEVICE — SOLUTION WOUND CLEANSING 3/4OZ 10% PVP EA-L3011FB-50

## (undated) DEVICE — BLADE KNIFE SURG 11 371111

## (undated) DEVICE — SU DERMABOND MINI DHVM12

## (undated) DEVICE — CLIP ETHICON LIGACLIP SM BLUE LT100

## (undated) DEVICE — CATH TRAY FOLEY SURESTEP 16FR WDRAIN BAG STLK LATEX A300316A

## (undated) DEVICE — GLOVE BIOGEL PI ULTRATOUCH SZ 6.5 41165

## (undated) DEVICE — SU VICRYL 0 UR-6 27" J603H

## (undated) DEVICE — ESU PENCIL W/SMOKE EVAC NEPTUNE STRYKER 0703-046-000

## (undated) DEVICE — SUCTION CANISTER MEDIVAC LINER 1500ML W/LID 65651-515

## (undated) DEVICE — ENDO TROCAR FIRST ENTRY KII FIOS Z-THRD 12X100MM CTF73

## (undated) DEVICE — SPECIMEN TRAP MUCUS LUKENS 8ML 3.25" LF 19510-035

## (undated) DEVICE — SPONGE LAP 18X18" X8435

## (undated) DEVICE — SU VICRYL 3-0 TIE 12X18" J904T

## (undated) DEVICE — EVAC SYSTEM CLEAR FLOW SC082500

## (undated) DEVICE — Device

## (undated) DEVICE — SU SILK 2-0 SH CR 8X18" C012D

## (undated) DEVICE — ESU GROUND PAD ADULT W/CORD E7507

## (undated) DEVICE — DRSG DRAIN 4X4" 7086

## (undated) DEVICE — SYR 10ML LL W/O NDL

## (undated) DEVICE — TUBING INFUSION INFILTRATION LIPOSUCTION 156" 24-6008

## (undated) DEVICE — SUTURE BOOTS 051003PBX

## (undated) DEVICE — SU DERMABOND ADVANCED .7ML DNX12

## (undated) DEVICE — ESU LIGASURE LAPAROSCOPIC BLUNT TIP SEALER 5MMX37CM LF1837

## (undated) DEVICE — DRSG TEGADERM 8X12" 1629

## (undated) DEVICE — ANTIFOG SOLUTION SEE SHARP 150M TROCAR SWABS 30978

## (undated) DEVICE — GLOVE BIOGEL PI MICRO INDICATOR UNDERGLOVE SZ 7.0 48970

## (undated) DEVICE — SET BREAST BIOPSY LOCALIZER 20 PROBE 8MM PENCIL 09-0006

## (undated) RX ORDER — INDOCYANINE GREEN AND WATER 25 MG
KIT INJECTION
Status: DISPENSED
Start: 2024-05-08

## (undated) RX ORDER — HEPARIN SODIUM (PORCINE) LOCK FLUSH IV SOLN 100 UNIT/ML 100 UNIT/ML
SOLUTION INTRAVENOUS
Status: DISPENSED
Start: 2024-02-27

## (undated) RX ORDER — PROPOFOL 10 MG/ML
INJECTION, EMULSION INTRAVENOUS
Status: DISPENSED
Start: 2023-11-20

## (undated) RX ORDER — ONDANSETRON 2 MG/ML
INJECTION INTRAMUSCULAR; INTRAVENOUS
Status: DISPENSED
Start: 2023-11-20

## (undated) RX ORDER — PROPOFOL 10 MG/ML
INJECTION, EMULSION INTRAVENOUS
Status: DISPENSED
Start: 2024-05-08

## (undated) RX ORDER — HYDROMORPHONE HYDROCHLORIDE 1 MG/ML
INJECTION, SOLUTION INTRAMUSCULAR; INTRAVENOUS; SUBCUTANEOUS
Status: DISPENSED
Start: 2024-05-08

## (undated) RX ORDER — DEXAMETHASONE SODIUM PHOSPHATE 4 MG/ML
INJECTION, SOLUTION INTRA-ARTICULAR; INTRALESIONAL; INTRAMUSCULAR; INTRAVENOUS; SOFT TISSUE
Status: DISPENSED
Start: 2023-11-20

## (undated) RX ORDER — HEPARIN SODIUM (PORCINE) LOCK FLUSH IV SOLN 100 UNIT/ML 100 UNIT/ML
SOLUTION INTRAVENOUS
Status: DISPENSED
Start: 2024-04-05

## (undated) RX ORDER — ONDANSETRON 2 MG/ML
INJECTION INTRAMUSCULAR; INTRAVENOUS
Status: DISPENSED
Start: 2024-09-25

## (undated) RX ORDER — ONDANSETRON 2 MG/ML
INJECTION INTRAMUSCULAR; INTRAVENOUS
Status: DISPENSED
Start: 2024-05-08

## (undated) RX ORDER — DEXAMETHASONE SODIUM PHOSPHATE 4 MG/ML
INJECTION, SOLUTION INTRA-ARTICULAR; INTRALESIONAL; INTRAMUSCULAR; INTRAVENOUS; SOFT TISSUE
Status: DISPENSED
Start: 2024-05-08

## (undated) RX ORDER — PROPOFOL 10 MG/ML
INJECTION, EMULSION INTRAVENOUS
Status: DISPENSED
Start: 2024-09-25

## (undated) RX ORDER — CEFAZOLIN SODIUM/WATER 2 G/20 ML
SYRINGE (ML) INTRAVENOUS
Status: DISPENSED
Start: 2024-05-08

## (undated) RX ORDER — FENTANYL CITRATE 50 UG/ML
INJECTION, SOLUTION INTRAMUSCULAR; INTRAVENOUS
Status: DISPENSED
Start: 2024-02-27

## (undated) RX ORDER — FENTANYL CITRATE 0.05 MG/ML
INJECTION, SOLUTION INTRAMUSCULAR; INTRAVENOUS
Status: DISPENSED
Start: 2024-09-25

## (undated) RX ORDER — HEPARIN SODIUM 5000 [USP'U]/.5ML
INJECTION, SOLUTION INTRAVENOUS; SUBCUTANEOUS
Status: DISPENSED
Start: 2024-09-25

## (undated) RX ORDER — GABAPENTIN 300 MG/1
CAPSULE ORAL
Status: DISPENSED
Start: 2024-05-08

## (undated) RX ORDER — GLYCOPYRROLATE 0.2 MG/ML
INJECTION, SOLUTION INTRAMUSCULAR; INTRAVENOUS
Status: DISPENSED
Start: 2023-11-20

## (undated) RX ORDER — FENTANYL CITRATE 50 UG/ML
INJECTION, SOLUTION INTRAMUSCULAR; INTRAVENOUS
Status: DISPENSED
Start: 2024-05-08

## (undated) RX ORDER — OXYCODONE HYDROCHLORIDE 5 MG/1
TABLET ORAL
Status: DISPENSED
Start: 2024-09-25

## (undated) RX ORDER — CEFAZOLIN SODIUM/WATER 2 G/20 ML
SYRINGE (ML) INTRAVENOUS
Status: DISPENSED
Start: 2024-09-25

## (undated) RX ORDER — SCOLOPAMINE TRANSDERMAL SYSTEM 1 MG/1
PATCH, EXTENDED RELEASE TRANSDERMAL
Status: DISPENSED
Start: 2024-09-25

## (undated) RX ORDER — DEXAMETHASONE SODIUM PHOSPHATE 4 MG/ML
INJECTION, SOLUTION INTRA-ARTICULAR; INTRALESIONAL; INTRAMUSCULAR; INTRAVENOUS; SOFT TISSUE
Status: DISPENSED
Start: 2024-09-25

## (undated) RX ORDER — HEPARIN SODIUM (PORCINE) LOCK FLUSH IV SOLN 100 UNIT/ML 100 UNIT/ML
SOLUTION INTRAVENOUS
Status: DISPENSED
Start: 2024-01-16

## (undated) RX ORDER — FENTANYL CITRATE 50 UG/ML
INJECTION, SOLUTION INTRAMUSCULAR; INTRAVENOUS
Status: DISPENSED
Start: 2023-11-20

## (undated) RX ORDER — HEPARIN SODIUM (PORCINE) LOCK FLUSH IV SOLN 100 UNIT/ML 100 UNIT/ML
SOLUTION INTRAVENOUS
Status: DISPENSED
Start: 2024-02-09

## (undated) RX ORDER — GLYCOPYRROLATE 0.2 MG/ML
INJECTION, SOLUTION INTRAMUSCULAR; INTRAVENOUS
Status: DISPENSED
Start: 2024-09-25

## (undated) RX ORDER — KETOROLAC TROMETHAMINE 30 MG/ML
INJECTION, SOLUTION INTRAMUSCULAR; INTRAVENOUS
Status: DISPENSED
Start: 2024-09-25

## (undated) RX ORDER — METRONIDAZOLE 500 MG/100ML
INJECTION, SOLUTION INTRAVENOUS
Status: DISPENSED
Start: 2024-09-25

## (undated) RX ORDER — PHENAZOPYRIDINE HYDROCHLORIDE 200 MG/1
TABLET, FILM COATED ORAL
Status: DISPENSED
Start: 2024-09-25

## (undated) RX ORDER — ACETAMINOPHEN 160 MG
TABLET,DISINTEGRATING ORAL
Status: DISPENSED
Start: 2024-05-08

## (undated) RX ORDER — FENTANYL CITRATE 0.05 MG/ML
INJECTION, SOLUTION INTRAMUSCULAR; INTRAVENOUS
Status: DISPENSED
Start: 2024-05-08

## (undated) RX ORDER — FENTANYL CITRATE 50 UG/ML
INJECTION, SOLUTION INTRAMUSCULAR; INTRAVENOUS
Status: DISPENSED
Start: 2024-09-25

## (undated) RX ORDER — FLUMAZENIL 0.1 MG/ML
INJECTION, SOLUTION INTRAVENOUS
Status: DISPENSED
Start: 2024-02-27

## (undated) RX ORDER — NALOXONE HYDROCHLORIDE 0.4 MG/ML
INJECTION, SOLUTION INTRAMUSCULAR; INTRAVENOUS; SUBCUTANEOUS
Status: DISPENSED
Start: 2024-02-27

## (undated) RX ORDER — HYDROXYZINE HYDROCHLORIDE 25 MG/1
TABLET, FILM COATED ORAL
Status: DISPENSED
Start: 2024-09-25

## (undated) RX ORDER — CELECOXIB 200 MG/1
CAPSULE ORAL
Status: DISPENSED
Start: 2024-05-08